# Patient Record
Sex: FEMALE | Race: WHITE | NOT HISPANIC OR LATINO | Employment: OTHER | URBAN - METROPOLITAN AREA
[De-identification: names, ages, dates, MRNs, and addresses within clinical notes are randomized per-mention and may not be internally consistent; named-entity substitution may affect disease eponyms.]

---

## 2017-07-26 ENCOUNTER — OFFICE VISIT (OUTPATIENT)
Dept: MEDICAL GROUP | Facility: LAB | Age: 77
End: 2017-07-26
Payer: MEDICARE

## 2017-07-26 VITALS
HEART RATE: 68 BPM | OXYGEN SATURATION: 94 % | SYSTOLIC BLOOD PRESSURE: 160 MMHG | WEIGHT: 150.2 LBS | HEIGHT: 65 IN | BODY MASS INDEX: 25.02 KG/M2 | DIASTOLIC BLOOD PRESSURE: 92 MMHG | TEMPERATURE: 99 F

## 2017-07-26 DIAGNOSIS — E78.2 MIXED HYPERLIPIDEMIA: ICD-10-CM

## 2017-07-26 DIAGNOSIS — I10 ESSENTIAL HYPERTENSION: ICD-10-CM

## 2017-07-26 DIAGNOSIS — R60.0 BILATERAL EDEMA OF LOWER EXTREMITY: ICD-10-CM

## 2017-07-26 DIAGNOSIS — E55.9 VITAMIN D DEFICIENCY DISEASE: ICD-10-CM

## 2017-07-26 DIAGNOSIS — F41.9 ANXIETY: ICD-10-CM

## 2017-07-26 PROCEDURE — 99214 OFFICE O/P EST MOD 30 MIN: CPT | Performed by: INTERNAL MEDICINE

## 2017-07-26 RX ORDER — DIAZEPAM 5 MG/1
5 TABLET ORAL EVERY 6 HOURS PRN
Qty: 60 TAB | Refills: 0 | Status: SHIPPED | OUTPATIENT
Start: 2017-07-26 | End: 2018-11-08 | Stop reason: SDUPTHER

## 2017-07-26 RX ORDER — METOPROLOL TARTRATE 100 MG/1
100 TABLET ORAL 2 TIMES DAILY
Qty: 180 TAB | Refills: 5 | Status: SHIPPED | OUTPATIENT
Start: 2017-07-26 | End: 2018-12-17 | Stop reason: SDUPTHER

## 2017-07-26 RX ORDER — TRIAMTERENE AND HYDROCHLOROTHIAZIDE 37.5; 25 MG/1; MG/1
1 CAPSULE ORAL EVERY MORNING
Qty: 30 CAP | Refills: 5 | Status: SHIPPED | OUTPATIENT
Start: 2017-07-26 | End: 2018-02-01 | Stop reason: SDUPTHER

## 2017-07-26 RX ORDER — ATORVASTATIN CALCIUM 20 MG/1
20 TABLET, FILM COATED ORAL DAILY
Qty: 30 TAB | Refills: 5 | Status: SHIPPED | OUTPATIENT
Start: 2017-07-26 | End: 2018-02-01 | Stop reason: SDUPTHER

## 2017-07-26 ASSESSMENT — PATIENT HEALTH QUESTIONNAIRE - PHQ9: CLINICAL INTERPRETATION OF PHQ2 SCORE: 0

## 2017-07-26 NOTE — MR AVS SNAPSHOT
"        Priscilla Ferraro Andreina   2017 11:00 AM   Office Visit   MRN: [id number]    Department:  [facility]   Dept Phone:  [phone]    Description:  Female : 1940   Provider:  Shanda Reyes M.D.           Reason for Visit     Medication Refill refill on water pill    Leg Swelling both legs are swollen      Allergies as of 2017     Allergen Noted Reactions    Other Environmental 2016   Cough    Sampson, Dust      You were diagnosed with     Essential hypertension   [3430865]       Mixed hyperlipidemia   [272.2.ICD-9-CM]       Vitamin D deficiency disease   [673440]       Anxiety   [313302]       Bilateral edema of lower extremity   [042831]         Vital Signs     Blood Pressure Pulse Temperature Height Weight Body Mass Index    160/92 mmHg 68 37.2 °C (99 °F) 1.651 m (5' 5\") 68.13 kg (150 lb 3.2 oz) 24.99 kg/m2    Oxygen Saturation Breastfeeding? Smoking Status             94% No Never Smoker          Basic Information     Date Of Birth Sex Race Ethnicity Preferred Language    1940 Female White Non- English      Problem List              ICD-10-CM Priority Class Noted - Resolved    Hyperlipidemia E78.5   Unknown - Present    Hypertension I10   Unknown - Present    Osteopenia M85.80   Unknown - Present    Vitamin D deficiency disease E55.9   2010 - Present    Anxiety F41.9   2017 - Present    Bilateral edema of lower extremity R60.0   2017 - Present      Health Maintenance        Date Due Completion Dates    IMM PNEUMOCOCCAL 65+ (ADULT) LOW/MEDIUM RISK SERIES (1 of 2 - PCV13) 2003 ---    MAMMOGRAM 10/20/2015 10/20/2014    IMM INFLUENZA (1) 2009    BONE DENSITY 2018 (Prv Comp), 2009 (Prv Comp)    Override on 2013: Previously completed    Override on 2009: Previously completed    IMM DTaP/Tdap/Td Vaccine (2 - Td) 2022    COLONOSCOPY 5/15/2022 5/15/2012 (Prv Comp)    Override on 5/15/2012: Previously " completed            Current Immunizations     Influenza Vaccine Pediatric 11/12/2009    Pneumococcal Vaccine (UF)Historical Data 11/12/2009    SHINGLES VACCINE 2/1/2012    Tdap Vaccine 2/1/2012      Below and/or attached are the medications your provider expects you to take. Review all of your home medications and newly ordered medications with your provider and/or pharmacist. Follow medication instructions as directed by your provider and/or pharmacist. Please keep your medication list with you and share with your provider. Update the information when medications are discontinued, doses are changed, or new medications (including over-the-counter products) are added; and carry medication information at all times in the event of emergency situations     Allergies:  OTHER ENVIRONMENTAL - Cough               Medications  Valid as of: July 26, 2017 - 12:56 PM    Generic Name Brand Name Tablet Size Instructions for use    Atorvastatin Calcium (Tab) LIPITOR 20 MG Take 1 Tab by mouth every day.        Cholecalciferol (Cap) Vitamin D 1000 UNIT Take 2 Caps by mouth every day.        DiazePAM (Tab) VALIUM 5 MG Take 1 Tab by mouth every 6 hours as needed for Anxiety.        Ipratropium-Albuterol (Aero Soln) COMBIVENT RESPIMAT  MCG/ACT Inhale 1 Puff by mouth 4 times a day as needed (For SOB).        Metoprolol Tartrate (Tab) LOPRESSOR 100 MG Take 1 Tab by mouth 2 times a day.        Multiple Vitamin   Take 1 Tab by mouth every day.        Triamterene-HCTZ (Cap) MAXZIDE-25/DYAZIDE 37.5-25 MG Take 1 Cap by mouth every morning.        .                 Medicines prescribed today were sent to:     [id number] - [id number] [facility] [place] [address]    Phone: [phone] Fax: [phone]    Open 24 Hours?: No      Medication refill instructions:       If your prescription bottle indicates you have medication refills left, it is not necessary to call your provider’s office. Please  contact your pharmacy and they will refill your medication.    If your prescription bottle indicates you do not have any refills left, you may request refills at any time through one of the following ways: The online [employer] system (except Urgent Care), by calling your provider’s office, or by asking your pharmacy to contact your provider’s office with a refill request. Medication refills are processed only during regular business hours and may not be available until the next business day. Your provider may request additional information or to have a follow-up visit with you prior to refilling your medication.   *Please Note: Medication refills are assigned a new Rx number when refilled electronically. Your pharmacy may indicate that no refills were authorized even though a new prescription for the same medication is available at the pharmacy. Please request the medicine by name with the pharmacy before contacting your provider for a refill.        Your To Do List     Future Labs/Procedures Complete By Expires    COMP METABOLIC PANEL  As directed 7/27/2018    LIPID PROFILE  As directed 7/27/2018         [employer] Access Code: Activation code not generated  Current [employer] Status: Active

## 2017-07-26 NOTE — PROGRESS NOTES
Chief Complaint   Patient presents with   • Medication Refill     refill on water pill   • Leg Swelling     both legs are swollen       HISTORY OF PRESENT ILLNESS: Patient is a 77 y.o. female patient who presents today to discuss the evaluation and management of several chronic medical problems as follows:   It is been greater than one year since she was last seen in the office.    1. Essential hypertension    Patient currently takes metoprolol 100 mg twice a day for blood pressure. She is also supposed to be on a diuretic, however she ran out of this several weeks ago. When she checks her BP at home she states it is better controlled than in the office. This morning it was 136/96.    2. Mixed hyperlipidemia    Results for MOISE MENCHACA (MRN [id number]) as of 7/26/2017 11:41   Ref. Range 10/22/2014 08:32 3/9/2016 09:37   Cholesterol,Tot Latest Ref Range: 100-199 mg/dL 206 (H) 164   Triglycerides Latest Ref Range: 0-149 mg/dL 191 (H) 169 (H)   HDL Latest Ref Range: >39 mg/dL 38 (L) 43   LDL Latest Ref Range: 0-99 mg/dL 130 (H) 87   VLDL Cholesterol Calc Latest Ref Range: 5-40 mg/dL 38 34     Patient takes Lipitor 20 mg for her cholesterol. She states she is compliant. It is been well over a year since she last had her cholesterol checked.    3. Vitamin D deficiency disease    Patient takes vitamin D 1000 units daily.    4. Anxiety    Patient takes Valium sparingly.  was checked and she received 90 tablets 6 months ago, and she still has some left. She is requesting a refill today however.    5. Bilateral edema of lower extremity    Patient has chronic lower extremity edema. This has been worse since her right hip replacement one year ago and since she fractured her toe about 18 months ago. It is dependent and is better in the mornings. It has been a little worse since she ran out of her diuretic several weeks ago.      Patient Active Problem List    Diagnosis Date Noted   • Anxiety 07/26/2017   • Bilateral edema  "of lower extremity 07/26/2017   • Vitamin D deficiency disease 11/12/2010   • Hypertension    • Osteopenia    • Hyperlipidemia       Allergies:Other environmental    Current meds including changes today  Current Outpatient Prescriptions   Medication Sig Dispense Refill   • metoprolol (LOPRESSOR) 100 MG Tab Take 1 Tab by mouth 2 times a day. 180 Tab 5   • atorvastatin (LIPITOR) 20 MG Tab Take 1 Tab by mouth every day. 30 Tab 5   • triamterene/hctz (MAXZIDE-25/DYAZIDE) 37.5-25 MG Cap Take 1 Cap by mouth every morning. 30 Cap 5   • diazepam (VALIUM) 5 MG Tab Take 1 Tab by mouth every 6 hours as needed for Anxiety. 60 Tab 0   • ipratropium-albuterol (COMBIVENT RESPIMAT)  MCG/ACT Aero Soln Inhale 1 Puff by mouth 4 times a day as needed (For SOB). 4 g 1   • Cholecalciferol (VITAMIN D) 1000 UNIT CAPS Take 2 Caps by mouth every day.     • Multiple Vitamin (MULTI-VITAMIN PO) Take 1 Tab by mouth every day.       No current facility-administered medications for this visit.     Social History   Substance Use Topics   • Smoking status: Never Smoker    • Smokeless tobacco: Never Used   • Alcohol Use: No     Social History     Social History Narrative       Family History   Problem Relation Age of Onset   • Heart Disease Father    • Hyperlipidemia Father    • Hypertension Father    • Heart Disease Brother    • Hypertension Mother    • Hyperlipidemia Mother        Review of Systems:  No chest pain, No shortness of breath, No dyspnea on exertion  Gastrointestinal ROS: No abdominal pain, No nausea, vomiting, diarrhea, or constipation        Exam:    Pleasant woman accompanied by her   Blood pressure 160/92, pulse 68, temperature 37.2 °C (99 °F), height 1.651 m (5' 5\"), weight 68.13 kg (150 lb 3.2 oz), SpO2 94 %, not currently breastfeeding.  General:  Well nourished, well developed female in NAD affect and mood within normal limits  Head is grossly normal.  Neck: Supple without adenopathy  Pulmonary: Clear to " ausculation.  Normal effort. No rales, rhonchi, or wheezing.  Cardiovascular: Regular rate and rhythm without murmur.   Extremities: no clubbing, cyanosis, 2+ pitting edema to mid shin.  .Neuro: moves all extremities symmetrically    Please note that this dictation was created using voice recognition software. I have made every reasonable attempt to correct obvious errors, but I expect that there are errors of grammar and possibly content that I did not discover before finalizing the note.    Assessment/Plan:  1. Essential hypertension    BP not adequately controlled, we'll try changing hydrochlorothiazide to Dyazide, continue to monitor.  - metoprolol (LOPRESSOR) 100 MG Tab; Take 1 Tab by mouth 2 times a day.  Dispense: 180 Tab; Refill: 5  - triamterene/hctz (MAXZIDE-25/DYAZIDE) 37.5-25 MG Cap; Take 1 Cap by mouth every morning.  Dispense: 30 Cap; Refill: 5  - COMP METABOLIC PANEL; Future    2. Mixed hyperlipidemia      - atorvastatin (LIPITOR) 20 MG Tab; Take 1 Tab by mouth every day.  Dispense: 30 Tab; Refill: 5  - LIPID PROFILE; Future    3. Vitamin D deficiency disease    Continue vitamin D    4. Anxiety    - diazepam (VALIUM) 5 MG Tab; Take 1 Tab by mouth every 6 hours as needed for Anxiety.  Dispense: 60 Tab; Refill: 0    5. Bilateral edema of lower extremity    We'll try Dyazide, hopefully resuming her diuretic will result in some improvement.    Followup: Patient would like to follow up with me at the [facility] location in several months.

## 2017-09-20 ENCOUNTER — HOSPITAL ENCOUNTER (OUTPATIENT)
Dept: LAB | Facility: MEDICAL CENTER | Age: 77
End: 2017-09-20
Attending: INTERNAL MEDICINE
Payer: MEDICARE

## 2017-09-20 DIAGNOSIS — E78.2 MIXED HYPERLIPIDEMIA: ICD-10-CM

## 2017-09-20 DIAGNOSIS — I10 ESSENTIAL HYPERTENSION: ICD-10-CM

## 2017-09-20 LAB
ALBUMIN SERPL BCP-MCNC: 4 G/DL (ref 3.2–4.9)
ALBUMIN/GLOB SERPL: 1.2 G/DL
ALP SERPL-CCNC: 85 U/L (ref 30–99)
ALT SERPL-CCNC: 13 U/L (ref 2–50)
ANION GAP SERPL CALC-SCNC: 7 MMOL/L (ref 0–11.9)
AST SERPL-CCNC: 20 U/L (ref 12–45)
BILIRUB SERPL-MCNC: 0.4 MG/DL (ref 0.1–1.5)
BUN SERPL-MCNC: 25 MG/DL (ref 8–22)
CALCIUM SERPL-MCNC: 10.1 MG/DL (ref 8.5–10.5)
CHLORIDE SERPL-SCNC: 108 MMOL/L (ref 96–112)
CHOLEST SERPL-MCNC: 143 MG/DL (ref 100–199)
CO2 SERPL-SCNC: 27 MMOL/L (ref 20–33)
CREAT SERPL-MCNC: 0.99 MG/DL (ref 0.5–1.4)
GFR SERPL CREATININE-BSD FRML MDRD: 54 ML/MIN/1.73 M 2
GLOBULIN SER CALC-MCNC: 3.3 G/DL (ref 1.9–3.5)
GLUCOSE SERPL-MCNC: 99 MG/DL (ref 65–99)
HDLC SERPL-MCNC: 39 MG/DL
LDLC SERPL CALC-MCNC: 79 MG/DL
POTASSIUM SERPL-SCNC: 4.4 MMOL/L (ref 3.6–5.5)
PROT SERPL-MCNC: 7.3 G/DL (ref 6–8.2)
SODIUM SERPL-SCNC: 142 MMOL/L (ref 135–145)
TRIGL SERPL-MCNC: 123 MG/DL (ref 0–149)

## 2017-09-20 PROCEDURE — 80061 LIPID PANEL: CPT

## 2017-09-20 PROCEDURE — 80053 COMPREHEN METABOLIC PANEL: CPT

## 2017-09-20 PROCEDURE — 36415 COLL VENOUS BLD VENIPUNCTURE: CPT

## 2017-10-10 ENCOUNTER — OFFICE VISIT (OUTPATIENT)
Dept: MEDICAL GROUP | Facility: MEDICAL CENTER | Age: 77
End: 2017-10-10
Payer: MEDICARE

## 2017-10-10 VITALS
RESPIRATION RATE: 16 BRPM | DIASTOLIC BLOOD PRESSURE: 90 MMHG | TEMPERATURE: 97.8 F | OXYGEN SATURATION: 97 % | WEIGHT: 152 LBS | HEART RATE: 62 BPM | BODY MASS INDEX: 25.33 KG/M2 | SYSTOLIC BLOOD PRESSURE: 138 MMHG | HEIGHT: 65 IN

## 2017-10-10 DIAGNOSIS — E78.00 PURE HYPERCHOLESTEROLEMIA: ICD-10-CM

## 2017-10-10 DIAGNOSIS — F41.9 ANXIETY: ICD-10-CM

## 2017-10-10 DIAGNOSIS — R60.0 BILATERAL EDEMA OF LOWER EXTREMITY: ICD-10-CM

## 2017-10-10 DIAGNOSIS — J98.01 COUGH DUE TO BRONCHOSPASM: ICD-10-CM

## 2017-10-10 DIAGNOSIS — I10 ESSENTIAL HYPERTENSION: ICD-10-CM

## 2017-10-10 PROCEDURE — 99214 OFFICE O/P EST MOD 30 MIN: CPT | Performed by: INTERNAL MEDICINE

## 2017-10-10 NOTE — PROGRESS NOTES
Chief Complaint   Patient presents with   • Establish Care     establish care and lab results       HISTORY OF PRESENT ILLNESS: Patient is a 77 y.o. female patient who presents today to discuss the evaluation and management of:    Patient is transferring her care to me, I have seen her in the past at [facility].      1. Essential hypertension    Patient currently is taking Lopressor 100 mg twice daily and Dyazide 37.5/25 daily. With this her blood pressure is fairly well controlled with readings in the 117-120/80-90 range at home. She states that she is always high in the doctor's office, today she is 132/90. She has no history of heart disease.    2. Pure hypercholesterolemia    Results for MOISE MENCHACA (MRN [id number]) as of 10/10/2017 13:48   Ref. Range 9/20/2017 09:12   Cholesterol,Tot Latest Ref Range: 100 - 199 mg/dL 143   Triglycerides Latest Ref Range: 0 - 149 mg/dL 123   HDL Latest Ref Range: >=40 mg/dL 39 (A)   LDL Latest Ref Range: <100 mg/dL 79   Patient had a recent lipid panel done which revealed her LDL is excellent at 79. She continues on atorvastatin 20 mg daily. Her  would like to her to take his chocolate supplement which he states has been FDA approved for hyperlipidemia.    3. Cough due to bronchospasm    Patient is requesting a refill on her Combivent inhaler. She takes this as needed when she has cough and it helps significantly.    4. Bilateral edema of lower extremity    Patient continues to have bilateral lower extremity edema. Unfortunately the Dyazide did not do anything. This is been a chronic problem for the patient, and she has compression stockings at home however she does not like to wear them. I presume this is from venous insufficiency.    5. Anxiety    Anxiety has been very well controlled recently. Patient has Valium which she takes at home sparingly.        Patient Active Problem List    Diagnosis Date Noted   • Cough due to bronchospasm 10/10/2017   • Anxiety  "07/26/2017   • Bilateral edema of lower extremity 07/26/2017   • Essential hypertension    • Osteopenia    • Hyperlipidemia         Allergies:Other environmental    Current meds including changes today  Current Outpatient Prescriptions   Medication Sig Dispense Refill   • ipratropium-albuterol (COMBIVENT RESPIMAT)  MCG/ACT Aero Soln Inhale 1 Puff by mouth 4 times a day. 1 Inhaler 2   • metoprolol (LOPRESSOR) 100 MG Tab Take 1 Tab by mouth 2 times a day. 180 Tab 5   • atorvastatin (LIPITOR) 20 MG Tab Take 1 Tab by mouth every day. 30 Tab 5   • triamterene/hctz (MAXZIDE-25/DYAZIDE) 37.5-25 MG Cap Take 1 Cap by mouth every morning. 30 Cap 5   • diazepam (VALIUM) 5 MG Tab Take 1 Tab by mouth every 6 hours as needed for Anxiety. 60 Tab 0   • Cholecalciferol (VITAMIN D) 1000 UNIT CAPS Take 2 Caps by mouth every day.     • Multiple Vitamin (MULTI-VITAMIN PO) Take 1 Tab by mouth every day.       No current facility-administered medications for this visit.      Social History   Substance Use Topics   • Smoking status: Never Smoker   • Smokeless tobacco: Never Used   • Alcohol use No     Social History     Social History Narrative   • No narrative on file       Family History   Problem Relation Age of Onset   • Heart Disease Father    • Hyperlipidemia Father    • Hypertension Father    • Heart Disease Brother    • Hypertension Mother    • Hyperlipidemia Mother        Review of Systems:  No chest pain, No shortness of breath, No dyspnea on exertion  Gastrointestinal ROS: No abdominal pain, No nausea, vomiting, diarrhea, or constipation        Exam:    Pleasant female accompanied by her   Blood pressure 138/90, pulse 62, temperature 36.6 °C (97.8 °F), resp. rate 16, height 1.651 m (5' 5\"), weight 68.9 kg (152 lb), SpO2 97 %.  General:  Well nourished, well developed female in NAD affect and mood within normal limits  Head is grossly normal.  Neck: Supple without adenopathy  Pulmonary: Clear to ausculation.  Normal " effort. No rales, rhonchi, or wheezing.  Cardiovascular: Regular rate and rhythm without murmur.   Extremities: no clubbing, cyanosis, or edema.  Neuro: moves all extremities symmetrically    Please note that this dictation was created using voice recognition software. I have made every reasonable attempt to correct obvious errors, but I expect that there are errors of grammar and possibly content that I did not discover before finalizing the note.    Assessment/Plan:  1. Essential hypertension    Stable, reasonable control, continue Lopressor and Dyazide. Patient did have an elevation in her creatinine when last checked, will repeat labs in a few months.  - BASIC METABOLIC PANEL; Future    2. Pure hypercholesterolemia    -Patient would like to discontinue her atorvastatin and switch over to her 's dark chocolate supplement. I think this is reasonable especially considering her age. She may make the switch and we will check a future lipid panel in about 3 months.  - LIPID PROFILE; Future    3. Cough due to bronchospasm      - ipratropium-albuterol (COMBIVENT RESPIMAT)  MCG/ACT Aero Soln; Inhale 1 Puff by mouth 4 times a day.  Dispense: 1 Inhaler; Refill: 2    4. Bilateral edema of lower extremity    Recommend patient try wearing her stockings that she has at home. Would not increase diuretic at this time.    5. Anxiety    Stable and infrequent problem, continue when necessary Valium.    Patient and her  are both going to get flu shots from their pharmacy.    Followup:  Patient will call for an appointment in about 3 months after she has her repeat blood work done.

## 2018-02-01 DIAGNOSIS — E78.2 MIXED HYPERLIPIDEMIA: ICD-10-CM

## 2018-02-01 DIAGNOSIS — I10 ESSENTIAL HYPERTENSION: ICD-10-CM

## 2018-02-01 RX ORDER — TRIAMTERENE AND HYDROCHLOROTHIAZIDE 37.5; 25 MG/1; MG/1
1 CAPSULE ORAL EVERY MORNING
Qty: 30 CAP | Refills: 5 | Status: SHIPPED | OUTPATIENT
Start: 2018-02-01 | End: 2018-12-17 | Stop reason: SDUPTHER

## 2018-02-01 RX ORDER — ATORVASTATIN CALCIUM 20 MG/1
20 TABLET, FILM COATED ORAL DAILY
Qty: 30 TAB | Refills: 5 | Status: SHIPPED | OUTPATIENT
Start: 2018-02-01 | End: 2018-11-08

## 2018-04-26 ENCOUNTER — HOSPITAL ENCOUNTER (EMERGENCY)
Facility: MEDICAL CENTER | Age: 78
End: 2018-04-26
Attending: EMERGENCY MEDICINE
Payer: MEDICARE

## 2018-04-26 ENCOUNTER — APPOINTMENT (OUTPATIENT)
Dept: RADIOLOGY | Facility: MEDICAL CENTER | Age: 78
End: 2018-04-26
Attending: EMERGENCY MEDICINE
Payer: MEDICARE

## 2018-04-26 VITALS
BODY MASS INDEX: 48.82 KG/M2 | HEIGHT: 65 IN | DIASTOLIC BLOOD PRESSURE: 60 MMHG | SYSTOLIC BLOOD PRESSURE: 141 MMHG | RESPIRATION RATE: 17 BRPM | HEART RATE: 60 BPM | OXYGEN SATURATION: 98 % | TEMPERATURE: 97.9 F | WEIGHT: 293 LBS

## 2018-04-26 DIAGNOSIS — S73.004A DISLOCATION OF RIGHT HIP, INITIAL ENCOUNTER (HCC): ICD-10-CM

## 2018-04-26 PROCEDURE — 99285 EMERGENCY DEPT VISIT HI MDM: CPT

## 2018-04-26 PROCEDURE — 96375 TX/PRO/DX INJ NEW DRUG ADDON: CPT

## 2018-04-26 PROCEDURE — 700111 HCHG RX REV CODE 636 W/ 250 OVERRIDE (IP): Performed by: EMERGENCY MEDICINE

## 2018-04-26 PROCEDURE — 304561 HCHG STAT O2

## 2018-04-26 PROCEDURE — 27265 TREAT HIP DISLOCATION: CPT

## 2018-04-26 PROCEDURE — 96374 THER/PROPH/DIAG INJ IV PUSH: CPT

## 2018-04-26 PROCEDURE — 73502 X-RAY EXAM HIP UNI 2-3 VIEWS: CPT | Mod: RT

## 2018-04-26 RX ORDER — ONDANSETRON 2 MG/ML
4 INJECTION INTRAMUSCULAR; INTRAVENOUS ONCE
Status: COMPLETED | OUTPATIENT
Start: 2018-04-26 | End: 2018-04-26

## 2018-04-26 RX ORDER — MORPHINE SULFATE 4 MG/ML
4 INJECTION, SOLUTION INTRAMUSCULAR; INTRAVENOUS ONCE
Status: COMPLETED | OUTPATIENT
Start: 2018-04-26 | End: 2018-04-26

## 2018-04-26 RX ORDER — HYDROCODONE BITARTRATE AND ACETAMINOPHEN 5; 325 MG/1; MG/1
1 TABLET ORAL EVERY 8 HOURS PRN
Qty: 10 TAB | Refills: 0 | Status: SHIPPED | OUTPATIENT
Start: 2018-04-26 | End: 2018-04-29

## 2018-04-26 RX ADMIN — ONDANSETRON 4 MG: 2 INJECTION INTRAMUSCULAR; INTRAVENOUS at 12:43

## 2018-04-26 RX ADMIN — PROPOFOL 40 MG: 10 INJECTION, EMULSION INTRAVENOUS at 14:15

## 2018-04-26 RX ADMIN — MORPHINE SULFATE 4 MG: 4 INJECTION INTRAVENOUS at 12:44

## 2018-04-26 ASSESSMENT — PAIN SCALES - GENERAL: PAINLEVEL_OUTOF10: 8

## 2018-04-26 NOTE — ED NOTES
Pre propofol conscious sedation, ERP, 2 RN, RT, ER tech at bedside  1405: 150/77, 69, 98% on 2L nasal cannula  1404: Propofol 40 mg via 20 guage R IV hand peripheral   1405:Hip in place   1406: 125/65 76 92% on 5L nasal cannula  Patient remains sedated and sleeping with VSS  1412: patient awake and oriented   1413 knee immobilizer in place  1414;post XR in room

## 2018-04-26 NOTE — ED PROVIDER NOTES
ED Provider Note    CHIEF COMPLAINT   Chief Complaint   Patient presents with   • GLF   • Hip Pain       HPI   Priscilla Hdz is a 78 y.o. female who presents to the emergency room today with complaints of right hip pain with probable dislocation. Patient states she fell just prior to being seen in the emergency room slipped on the floor at home wheezing her balance falling on her right hip causing dislocation she's had several previous dislocations to her right hip in the past. Hip replacement 8/2016 by Dr. Goodson. Patient has pain to the area itches extreme rated 9/10 and radiates slightly down the hip and around toward the pelvic area. Unable to move area and there is deformity noted. No numbness or tingling and no head injury or other complaints at this time.    REVIEW OF SYSTEMS   See HPI for further details. All other systems are negative.     PAST MEDICAL HISTORY   Past Medical History:   Diagnosis Date   • Arthritis     hip, knee   • ASTHMA    • Cataract    • High cholesterol    • Hyperlipidemia    • Hypertension    • Menopause    • Osteopenia        FAMILY HISTORY  Family History   Problem Relation Age of Onset   • Heart Disease Father    • Hyperlipidemia Father    • Hypertension Father    • Heart Disease Brother    • Hypertension Mother    • Hyperlipidemia Mother        SOCIAL HISTORY  Social History     Social History   • Marital status:      Spouse name: N/A   • Number of children: N/A   • Years of education: N/A     Social History Main Topics   • Smoking status: Never Smoker   • Smokeless tobacco: Never Used   • Alcohol use No   • Drug use: No   • Sexual activity: Yes     Partners: Male     Other Topics Concern   • Not on file     Social History Narrative   • No narrative on file       SURGICAL HISTORY  Past Surgical History:   Procedure Laterality Date   • HIP ARTHROPLASTY TOTAL Right 8/9/2016    Procedure: HIP ARTHROPLASTY TOTAL;  Surgeon: Oniel Goodson M.D.;  Location: SURGERY  "[facility];  Service:    • CATARACT EXTRACTION WITH IOL Bilateral 2012       CURRENT MEDICATIONS   Home Medications     Reviewed by Elsa Patricio (Pharmacy Tech) on 04/26/18 at 1259  Med List Status: Complete   Medication Last Dose Status   atorvastatin (LIPITOR) 20 MG Tab 4/25/2018 Active   diazepam (VALIUM) 5 MG Tab PRN Active   ipratropium-albuterol (COMBIVENT RESPIMAT)  MCG/ACT Aero Soln PRN Active   metoprolol (LOPRESSOR) 100 MG Tab 4/26/2018 Active   triamterene/hctz (MAXZIDE-25/DYAZIDE) 37.5-25 MG Cap 4/26/2018 Active                ALLERGIES   Allergies   Allergen Reactions   • Other Environmental Cough     Pine, Dust       PHYSICAL EXAM  VITAL SIGNS: /60   Pulse 60   Temp 36.6 °C (97.9 °F)   Resp 17   Ht 1.651 m (5' 5\")   Wt (!) 161 kg (354 lb 15.1 oz)   SpO2 98%   BMI 59.07 kg/m²  Room air O2: 90    Constitutional: Well developed, Well nourished,  acute distress, Non-toxic appearance.   Cardiovascular: Normal heart rate, Normal rhythm, No murmurs, No rubs, No gallops.   Thorax & Lungs: Normal breath sounds, No respiratory distress, No wheezing, No chest tenderness.   Abdomen: Bowel sounds normal, Soft, No tenderness, No masses, No pulsatile masses.   Skin: Warm, Dry, No erythema, No rash.   Extremities: Intact distal pulses, No cyanosis, No clubbing. Edema noted to lower extremities but patient states this is usual for her  Musculoskeletal: There is shortening of the right hip with obvious anterior dislocation noted pulses sensation intact distally.  Neurologic: Alert & oriented x 3, Normal motor function, Normal sensory function, No focal deficits noted.     RADIOLOGY/PROCEDURES  DX-HIP-COMPLETE - UNILATERAL 2+ RIGHT   Final Result      Anatomic reduction of prior right hip dislocation      DX-HIP-COMPLETE - UNILATERAL 2+ RIGHT   Final Result      Right hip arthroplasty femoral component dislocation from the acetabular cup            COURSE & MEDICAL DECISION " MAKING  Pertinent Labs & Imaging studies reviewed. (See chart for details) patient has dislocation anterior right hip which was reduced with conscious sedation by ER physician. On reexamination she is awake alert. Discuss follow-up with orthopedics this next week with Dr. Goodson. She is placed on Norco for pain. [place] regulations and consent was reviewed with patient.  was reviewed. Patient understands the above consent instructions had no further questions will follow up as directed return if further problems placed in a knee immobilizer. Will use also a walker which was given to her here in the emergency room.        PROCEDURE NOTE #1: Conscious sedation performed by ER physician for procedure of reduction of his location right hip; verbal consent was obtained by patient and  at bedside. IV established patient placed on oxygen and continuous pulse oximetry and cardiac monitoring with blood pressure checked every 5 minutes. She is given morphine for pain 4 mg IV. Given propofol 40 mg IV push with adequate sedation. She was kept until she is awake alert verbal. Total time with this procedure of 15 minutes. Patient tolerated this well.      PROCEDURE NOTE #2 reduction of dislocation right hip by ER physician; with conscious sedation as described above patient had ER tech placing pressure on her right hip/pelvis with ER physician giving countertraction and manipulated the right hip back into the joint without difficulty. Patient tolerated this procedure well and postprocedure x-ray showed adequate reduction with no fracture or complication.    FINAL IMPRESSION  1. Acute dislocation right hip  2. Hypertension by history  3.      Electronically signed by: Maikel Koehler, 4/26/2018 4:30 PM

## 2018-04-26 NOTE — ED NOTES
Patient R/A and D/C home by ERP, hip in place after XR. Prescription for Norco given, narcotic consent to be signed, Patient being D/C home with prescription for walker. Son aware and on route

## 2018-04-26 NOTE — RESPIRATORY CARE
Conscious Sedation Respiratory Update       O2 (LPM): 3 (04/26/18 1412)  O2 Daily Delivery Respiratory : Silicone Nasal Cannula (04/26/18 1406)  Events/Summary/Plan: conscious sedation (04/26/18 3139)    Provided therapist support during conscious sedation to assure oxygenation, maintain airway, and ventilation throughout the procedure. The patient did need jaw thrust to maintain her airway. Her RR was 16-18, her HR was 74-80, her oxygen saturation was kept between 94%-98% while on 3 lpm nasal cannula.

## 2018-04-26 NOTE — ED NOTES
Patient brb [facility] with a GLF this morning. Complaint of R hip pain, obvious deformity and shortening. Pedal pulses present. Patient states 8/10 for pain. Has has THR 2 yrs ago. Patient denies hitting head or LOC. MD at bedside

## 2018-07-19 NOTE — ED NOTES
7/19/2018      RE: Facundo Baeza  [address]  [facility] [address]       HISTORY OF PRESENT ILLNESS:  Facundo is a 1-year-old girl with a diagnosis of central precocious puberty.  She is here today for a Supprelin implant.  She is an absolutely adorable young lady.  She had had EMLA placed on her right upper arm.  With [facility] [employer] assistance and distraction, we were ultimately able to get it cleaned and we injected a local anesthetic but she was extraordinarily fearful and scared and with all capabilities of distraction therapy with myself and Child Family [facility] and our nurse assistant, did not feel we could control her anxiety to a level without some oral sedation to enable safe implant of the Supprelin implant.      Discussed this with mother and she was in agreement.  She was externally helpful throughout the event as well and we will arrange for Facundo to come back in the very near future in pediatric sedation for implantation of her Supprelin implant.  There was no charge for this visit for Physician Services.         Raffy aMrtinez MD   Med rec complete per patient  Allergies reviewed    Patient only uses Combivent and Valium PRN

## 2018-08-09 ENCOUNTER — HOSPITAL ENCOUNTER (OUTPATIENT)
Dept: LAB | Facility: MEDICAL CENTER | Age: 78
End: 2018-08-09
Attending: INTERNAL MEDICINE
Payer: MEDICARE

## 2018-08-09 DIAGNOSIS — I10 ESSENTIAL HYPERTENSION: ICD-10-CM

## 2018-08-09 DIAGNOSIS — E78.00 PURE HYPERCHOLESTEROLEMIA: ICD-10-CM

## 2018-08-09 LAB
ANION GAP SERPL CALC-SCNC: 10 MMOL/L (ref 0–11.9)
BUN SERPL-MCNC: 18 MG/DL (ref 8–22)
CALCIUM SERPL-MCNC: 9.3 MG/DL (ref 8.5–10.5)
CHLORIDE SERPL-SCNC: 109 MMOL/L (ref 96–112)
CHOLEST SERPL-MCNC: 173 MG/DL (ref 100–199)
CO2 SERPL-SCNC: 25 MMOL/L (ref 20–33)
CREAT SERPL-MCNC: 0.89 MG/DL (ref 0.5–1.4)
GLUCOSE SERPL-MCNC: 87 MG/DL (ref 65–99)
HDLC SERPL-MCNC: 41 MG/DL
LDLC SERPL CALC-MCNC: 106 MG/DL
POTASSIUM SERPL-SCNC: 4 MMOL/L (ref 3.6–5.5)
SODIUM SERPL-SCNC: 144 MMOL/L (ref 135–145)
TRIGL SERPL-MCNC: 129 MG/DL (ref 0–149)

## 2018-08-09 PROCEDURE — 80061 LIPID PANEL: CPT

## 2018-08-09 PROCEDURE — 36415 COLL VENOUS BLD VENIPUNCTURE: CPT

## 2018-08-09 PROCEDURE — 80048 BASIC METABOLIC PNL TOTAL CA: CPT

## 2018-08-10 ENCOUNTER — HOSPITAL ENCOUNTER (EMERGENCY)
Facility: MEDICAL CENTER | Age: 78
End: 2018-08-10
Attending: EMERGENCY MEDICINE
Payer: MEDICARE

## 2018-08-10 ENCOUNTER — APPOINTMENT (OUTPATIENT)
Dept: RADIOLOGY | Facility: MEDICAL CENTER | Age: 78
End: 2018-08-10
Attending: EMERGENCY MEDICINE
Payer: MEDICARE

## 2018-08-10 VITALS
TEMPERATURE: 98.6 F | DIASTOLIC BLOOD PRESSURE: 73 MMHG | SYSTOLIC BLOOD PRESSURE: 158 MMHG | RESPIRATION RATE: 18 BRPM | HEART RATE: 74 BPM | WEIGHT: 152.12 LBS | OXYGEN SATURATION: 98 % | BODY MASS INDEX: 25.31 KG/M2

## 2018-08-10 DIAGNOSIS — S73.004A DISLOCATION OF RIGHT HIP, INITIAL ENCOUNTER (HCC): ICD-10-CM

## 2018-08-10 PROCEDURE — 27265 TREAT HIP DISLOCATION: CPT

## 2018-08-10 PROCEDURE — 700111 HCHG RX REV CODE 636 W/ 250 OVERRIDE (IP)

## 2018-08-10 PROCEDURE — 700111 HCHG RX REV CODE 636 W/ 250 OVERRIDE (IP): Performed by: EMERGENCY MEDICINE

## 2018-08-10 PROCEDURE — 99151 MOD SED SAME PHYS/QHP <5 YRS: CPT

## 2018-08-10 PROCEDURE — 73502 X-RAY EXAM HIP UNI 2-3 VIEWS: CPT | Mod: RT

## 2018-08-10 PROCEDURE — 99285 EMERGENCY DEPT VISIT HI MDM: CPT

## 2018-08-10 PROCEDURE — 96374 THER/PROPH/DIAG INJ IV PUSH: CPT

## 2018-08-10 RX ORDER — HYDROCODONE BITARTRATE AND ACETAMINOPHEN 5; 325 MG/1; MG/1
1-2 TABLET ORAL EVERY 6 HOURS PRN
Qty: 10 TAB | Refills: 0 | Status: SHIPPED | OUTPATIENT
Start: 2018-08-10 | End: 2018-08-12

## 2018-08-10 RX ADMIN — PROPOFOL 60 MG: 10 INJECTION, EMULSION INTRAVENOUS at 09:18

## 2018-08-10 RX ADMIN — FENTANYL CITRATE 50 MCG: 50 INJECTION INTRAMUSCULAR; INTRAVENOUS at 08:27

## 2018-08-10 ASSESSMENT — PAIN SCALES - GENERAL
PAINLEVEL_OUTOF10: ASSUMED PAIN PRESENT
PAINLEVEL_OUTOF10: 6
PAINLEVEL_OUTOF10: 4

## 2018-08-10 NOTE — DISCHARGE INSTRUCTIONS
Hip Dislocation  Introduction  Hip dislocation is when the bones in your hip joint move out of place. To treat this, your doctor must move your bones back into place (reduction). This condition is an emergency. If you think you have dislocated your hip, do not move. Get medical help right away.  Symptoms of a dislocated hip may include:  · Very bad pain in your hip area. Pain may get worse when you move or when you try to use your hip to support (bear) your weight.  · Not being able to move the hip.  · Having the leg of the dislocated hip looking shorter than the other leg.  · Inward turning of the foot on the side of the dislocated hip.  · Loss of feeling in your lower leg, foot, or ankle.  Follow these instructions at home:  If you have a splint:  · Do not put pressure on any part of the splint until it is fully hardened. This may take many hours.  · Wear the splint as told by your doctor. Remove it only as told by your doctor.  · Loosen the splint if your toes tingle, get numb, or turn cold and blue.  · Do not let your splint get wet if it is not waterproof.  ¨ Do not take baths, swim, or use a hot tub until your doctor says it is okay. Ask your doctor if you can take showers. You may only be allowed to take sponge baths for bathing.  ¨ If your splint is not waterproof, cover it with a watertight plastic bag when you take a bath or a shower.  · Keep the splint clean.  Managing pain, stiffness, and swelling  · If directed, put ice on the injured area:  ¨ Put ice in a plastic bag.  ¨ Place a towel between your skin and the bag.  ¨ Leave the ice on for 20 minutes, 2-3 times a day.  · Wear compression stockings or wraps as told by your doctor.  · Move your toes often to avoid stiffness and to lessen swelling.  Driving  · Do not drive or use heavy machinery while taking prescription pain medicine, or as told by your doctor.  · Ask your doctor when it is safe to drive if you have a splint on your  hip.  Activity  · Return to your normal activities as told by your doctor. Ask your doctor what activities are safe for you.  · If physical therapy was prescribed, do exercises as told by your doctor.  Safety  · Do not use your hip to support your body weight until your doctor says that you can. Use crutches or a walker as told by your doctor.  General instructions  · Do not use any tobacco products, such as cigarettes, chewing tobacco, and e-cigarettes. Tobacco can delay bone healing. If you need help quitting, ask your doctor.  · Take over-the-counter and prescription medicines only as told by your doctor.  · Keep all follow-up visits as told by your doctor. This is important.  How is this prevented?  · If you have trouble walking or keeping your balance, try using a cane or a walker. If you feel unstable, sit down right away.  · Exercise regularly, as told by your doctor.  · Warm up and stretch before being active.  · Cool down and stretch after being active.  Contact a doctor if:  · You have pain that gets worse.  · You have pain that does not get better with medicine.  · You have swelling in your hip area or your leg.  · You have red skin on your hip area or your leg.  · You cannot move any part of your hip or leg.  · You feel tingling in any part of your hip, leg, or foot.  Get help right away if:  · You feel like your hip has dislocated again.  · You have very bad pain in your hip or groin.  · You have numbness or weakness in your leg.  If you have symptoms of a hip dislocation, do not wait to see if the symptoms will go away. Get medical help right away. Call your local emergency services (911 in the [place] Do not drive yourself to the hospital.   This information is not intended to replace advice given to you by your health care provider. Make sure you discuss any questions you have with your health care provider.  Document Released: 08/16/2012 Document Revised: 05/25/2017 Document Reviewed: 07/19/2016  ©  2017 [employer]

## 2018-08-10 NOTE — ED NOTES
D/c pt home with family , 1 rx given . Pt aware of f/u instructions with Ortho , aware to return for any changes or concerns. No further questions upon d/c home from ed  Pt and family  given and understands controlled substance use consent form signed  Pt aware to no drive or operate vehicle after receiving or taking medication

## 2018-08-10 NOTE — ED NOTES
Pt to ed c/o possible hip dislocation.  States happen 4 times before  Leg shortened , iv in place fentanyl 100, zofram 4 pta by ems

## 2018-08-10 NOTE — ED PROVIDER NOTES
ED Provider Note      CHIEF COMPLAINT   Chief Complaint   Patient presents with   • Dislocation Hip       HPI   Priscilla Hdz is a 78 y.o. female who presents with right hip pain.  Went to the restroom.  When standing up she had her right hip internally rotated felt a pop in the right hip and immediate pain with shortening.  She was holding onto the rails in the bathroom and did not fall to the ground or sustain significant trauma.  She has throbbing pain in the right hip.  Nonradiating constant.  Denies head injury, neck pain, back pain or other trauma.  She received fentanyl prior to coming in and reported this improved her symptoms.    REVIEW OF SYSTEMS   Pertinent negative: As above    PAST MEDICAL HISTORY   Past Medical History:   Diagnosis Date   • Arthritis     hip, knee   • ASTHMA    • Cataract    • High cholesterol    • Hyperlipidemia    • Hypertension    • Menopause    • Osteopenia        SOCIAL HISTORY  Social History   Substance Use Topics   • Smoking status: Never Smoker   • Smokeless tobacco: Never Used   • Alcohol use No       ALLERGIES   See chart    PHYSICAL EXAM  VITAL SIGNS: /73   Pulse 74   Temp 37 °C (98.6 °F)   Resp 18   Wt 69 kg (152 lb 1.9 oz)   SpO2 98%   BMI 25.31 kg/m²   Head: Atraumatic  Eyes: Eyes normal inspection  Neck: has full range of motion, normal inspection.  Constitutional: No acute distress   Cardiovascular: Normal heart rate. Pulses strong dorsalis pedis  Thorax & Lungs: No respiratory distress  Skin: Intact  Musculoskeletal: Shortening and internal rotation of the right leg.  Tenderness around the right hip.  Pedal edema bilaterally which is symmetric.  Neurologic:  Normal sensory and motor    RADIOLOGY/PROCEDURES  DX-HIP-COMPLETE - UNILATERAL 2+ RIGHT   Final Result         1. Interval reduction of the right hip prosthesis.      DX-HIP-COMPLETE - UNILATERAL 2+ RIGHT   Final Result         Superior dislocation of the right hip prosthesis.         Imaging is  interpreted by radiologist reviewed by me    Conscious Sedation Procedure Note    Indication: hip dislocation    Consent: I have discussed with the patient and/or the patient representative the indication, alternatives, and the possible risks and/or complications of the planned procedure and the anesthesia methods. The patient and/or patient representative appear to understand and agree to proceed.    Physician Involvement: The attending physician was present and supervising this procedure.    Pre-Sedation Documentation and Exam: I have personally completed a history, physical exam & review of systems for this patient (see notes).  Airway Assessment: normal    Prior History of Anesthesia Complications: none    ASA Classification: Class 2 - A normal healthy patient with mild systemic disease    Sedation/ Anesthesia Plan: intravenous sedation    Medications Used: propofol 40 mg intravenously followed by an additional 20 mg of propofol    Monitoring and Safety: The patient was placed on a cardiac monitor and vital signs, pulse oximetry and level of consciousness were continuously evaluated throughout the procedure. The patient was closely monitored until recovery from the medications was complete and the patient had returned to baseline status. Respiratory therapy was on standby at all times during the procedure.    Post-Sedation Vital Signs: Vital signs were reviewed and were stable after the procedure (see flow sheet for vitals)            Post-Sedation Exam: Lungs: clear and Cardiovascular: normal           Complications: none  Sedation time 15 minutes    Joint Reduction Procedure Note    Indication: Joint dislocation    Consent: The patient was counseled regarding the procedure, it's indications, risks, potential complications and alternatives and any questions were answered. Consent was obtained.    Procedure: The pre-reduction exam showed distal perfusion & neurologic function to be normal. The patient was placed  in the supine position. Anesthesia/pain control using conscious sedation as described above. Reduction of the right hip was performed by direct traction. Post reduction films were obtained and revealed satisfactory reduction. A post-reduction exam revealed distal perfusion & neurologic function to be normal.    The patient tolerated the procedure well.    Complications: None      COURSE & MEDICAL DECISION MAKING  Analgesia for possible fracture-fentanyl    Patient presents with right prosthetic hip dislocation.  This is the fourth time this has occurred.  Her pain was controlled.  X-ray demonstrated dislocation.  Dislocation reduced as described above.  I have advised hip abduction pillow limited internal rotation of the right lower extremity and follow-up with her orthopedist, Dr. whitaker.  Patient should return to the ER for recurrent dislocation or concern.      FINAL IMPRESSION  1.  Right prosthetic hip dislocation  2.  Closed reduction right prosthetic hip dislocation by me  3.  Procedural sedation by me      This dictation was created using voice recognition software. The accuracy of the dictation is limited to the abilities of the software. I expect there may be some errors of grammar and possibly content. The nursing notes were reviewed and certain aspects of this information were incorporated into this note.      Electronically signed by: Willem Brower, 8/10/2018 10:32 AM

## 2018-08-10 NOTE — RESPIRATORY CARE
Conscious Sedation Respiratory Update       O2 (LPM): 2 (08/10/18 0918)  O2 Daily Delivery Respiratory : Silicone Nasal Cannula (08/10/18 0918)    Events/Summary/Plan: Conscious sedation started at 0918. Found pt on 2L N. Increased O2 to 10L during the procedure to keep sats >90%. Procedure ended at 0925. Decreased O2 to 4L. Pt tolerated the procedure well. (08/10/18 0918)

## 2018-08-10 NOTE — ED NOTES
Pt medicated as directed by ER md, poc update given to pt. Imaging pending    Pt NPO since 1800 last night

## 2018-08-10 NOTE — ED NOTES
Mod sedation for right hip dislocation completed , pt tolerated well. Pt awake alert at this time  Xray pending

## 2018-08-14 ENCOUNTER — OFFICE VISIT (OUTPATIENT)
Dept: MEDICAL GROUP | Facility: MEDICAL CENTER | Age: 78
End: 2018-08-14
Payer: MEDICARE

## 2018-08-14 VITALS
WEIGHT: 154.32 LBS | SYSTOLIC BLOOD PRESSURE: 160 MMHG | TEMPERATURE: 98.4 F | RESPIRATION RATE: 16 BRPM | DIASTOLIC BLOOD PRESSURE: 84 MMHG | BODY MASS INDEX: 25.71 KG/M2 | OXYGEN SATURATION: 95 % | HEIGHT: 65 IN | HEART RATE: 76 BPM

## 2018-08-14 DIAGNOSIS — E78.1 PURE HYPERGLYCERIDEMIA: ICD-10-CM

## 2018-08-14 DIAGNOSIS — I10 ESSENTIAL HYPERTENSION: ICD-10-CM

## 2018-08-14 DIAGNOSIS — S73.004D HIP DISLOCATION, RIGHT, SUBSEQUENT ENCOUNTER: ICD-10-CM

## 2018-08-14 DIAGNOSIS — R60.0 BILATERAL EDEMA OF LOWER EXTREMITY: ICD-10-CM

## 2018-08-14 DIAGNOSIS — F41.9 ANXIETY: ICD-10-CM

## 2018-08-14 PROCEDURE — 99214 OFFICE O/P EST MOD 30 MIN: CPT | Performed by: INTERNAL MEDICINE

## 2018-08-14 ASSESSMENT — PATIENT HEALTH QUESTIONNAIRE - PHQ9: CLINICAL INTERPRETATION OF PHQ2 SCORE: 0

## 2018-08-15 NOTE — PROGRESS NOTES
Chief Complaint   Patient presents with   • Hip Pain     follow up,seen at ER on 8/10 for dislocation       HISTORY OF PRESENT ILLNESS: Patient is a 78 y.o. female patient who presents today to discuss the evaluation and management of:    Last seen 10 months ago when she established.      1. Hip dislocation, right, subsequent encounter    Patient was seen in the ER 1 week ago after dislocating her right hip.  She did not have a fall or trauma, however turned quickly when in the bathroom and felt/heard a loud pop.  ER physician put it back into place, she has follow-up with her orthopedic surgeon later this week.  She is still having some pain and soreness and is taking an occasional Norco.  She is walking with a walker again.    2. Bilateral edema of lower extremity    Patient's main complaint is of persistent lower extremity edema.  This is been a problem over several years.  She has never had any vein surgery or evaluation.  She has no history of heart disease, she had a normal echocardiogram 2 years ago.  She has no shortness of breath, and kidney and liver function have been normal.    3. Pure hyperglyceridemia    Results for MOISE MENCHACA (MRN [id number]) as of 8/14/2018 17:21   Ref. Range 8/9/2018 09:17   Cholesterol,Tot Latest Ref Range: 100 - 199 mg/dL 173   Triglycerides Latest Ref Range: 0 - 149 mg/dL 129   HDL Latest Ref Range: >=40 mg/dL 41   LDL Latest Ref Range: <100 mg/dL 106 (H)     Recent lipid panel shows LDL mildly elevated in spite of atorvastatin 20 mg daily.    4. Essential hypertension    Patient is currently taking Lopressor 100 mg twice daily and Dyazide 37.5/25 mg daily.  Her blood pressure remains somewhat elevated with this.  Patient has been under increased stress over the last couple of months due to her hip, as well as her  who was hospitalized in early July, and now is in rehab.    5. Anxiety    Patient has had more anxiety recently, she has Valium which she takes  "sparingly.        Patient Active Problem List    Diagnosis Date Noted   • Hip dislocation, right, subsequent encounter 08/14/2018   • Cough due to bronchospasm 10/10/2017   • Anxiety 07/26/2017   • Bilateral edema of lower extremity 07/26/2017   • Essential hypertension    • Osteopenia    • Hyperlipidemia         Allergies:Other environmental    Current meds including changes today  Current Outpatient Prescriptions   Medication Sig Dispense Refill   • atorvastatin (LIPITOR) 20 MG Tab Take 1 Tab by mouth every day. 30 Tab 5   • triamterene/hctz (MAXZIDE-25/DYAZIDE) 37.5-25 MG Cap Take 1 Cap by mouth every morning. 30 Cap 5   • ipratropium-albuterol (COMBIVENT RESPIMAT)  MCG/ACT Aero Soln Inhale 1 Puff by mouth 4 times a day. 1 Inhaler 2   • metoprolol (LOPRESSOR) 100 MG Tab Take 1 Tab by mouth 2 times a day. 180 Tab 5   • diazepam (VALIUM) 5 MG Tab Take 1 Tab by mouth every 6 hours as needed for Anxiety. 60 Tab 0     No current facility-administered medications for this visit.      Social History   Substance Use Topics   • Smoking status: Never Smoker   • Smokeless tobacco: Never Used   • Alcohol use No     Social History     Social History Narrative   • No narrative on file       Family History   Problem Relation Age of Onset   • Heart Disease Father    • Hyperlipidemia Father    • Hypertension Father    • Heart Disease Brother    • Hypertension Mother    • Hyperlipidemia Mother        Review of Systems:  No chest pain, No shortness of breath, No dyspnea on exertion  Gastrointestinal ROS: No abdominal pain, No nausea, vomiting, diarrhea, or constipation        Exam:      Blood pressure 160/84, pulse 76, temperature 36.9 °C (98.4 °F), resp. rate 16, height 1.651 m (5' 5\"), weight 70 kg (154 lb 5.2 oz), SpO2 95 %, not currently breastfeeding.  General: Overweight female in NAD affect and mood within normal limits  Head is grossly normal.  Neck: Supple without adenopathy  Pulmonary: Clear to ausculation.  Normal " effort. No rales, rhonchi, or wheezing.  Cardiovascular: Regular rate and rhythm without murmur.   Extremities: no clubbing, cyanosis, or edema.  Neuro: moves all extremities symmetrically    Please note that this dictation was created using voice recognition software. I have made every reasonable attempt to correct obvious errors, but I expect that there are errors of grammar and possibly content that I did not discover before finalizing the note.    Assessment/Plan:  1. Hip dislocation, right, subsequent encounter    Patient has orthopedic follow-up, it is unclear whether they will recommend physical therapy or need to do any further surgery.    2. Bilateral edema of lower extremity    -At this time, I suspect venous insufficiency.  There is no evidence of congestive heart failure or liver or kidney disease.  - REFERRAL TO VASCULAR SURGERY    3. Pure hyperglyceridemia    LDL is close to goal on current dose of atorvastatin    4. Essential hypertension    Patient's BP is somewhat elevated today, however she has been under a lot of stress.  Patient will monitor outside the office on her current medication, and we will follow-up here in the office in a couple of months    5. Anxiety    Stable    Followup: Return in about 2 months (around 10/14/2018).

## 2018-08-16 ENCOUNTER — TELEPHONE (OUTPATIENT)
Dept: MEDICAL GROUP | Facility: MEDICAL CENTER | Age: 78
End: 2018-08-16

## 2018-08-16 DIAGNOSIS — I10 ESSENTIAL (PRIMARY) HYPERTENSION: ICD-10-CM

## 2018-08-16 DIAGNOSIS — S73.004S HIP DISLOCATION, RIGHT, SEQUELA: ICD-10-CM

## 2018-08-16 NOTE — TELEPHONE ENCOUNTER
Pt is scheduled for Rt. Hip replacement surgery on 9/5/2018 by  (Ortho). He sent a letter asking for; Medical Clearance letter, EKG order, chest x-ray order and labs.   Dr. Reyes, please order or advise... If pt needs to be seen.

## 2018-08-20 ENCOUNTER — HOSPITAL ENCOUNTER (OUTPATIENT)
Dept: RADIOLOGY | Facility: MEDICAL CENTER | Age: 78
End: 2018-08-20
Attending: INTERNAL MEDICINE | Admitting: ORTHOPAEDIC SURGERY
Payer: MEDICARE

## 2018-08-20 ENCOUNTER — APPOINTMENT (OUTPATIENT)
Dept: ADMISSIONS | Facility: MEDICAL CENTER | Age: 78
DRG: 468 | End: 2018-08-20
Attending: ORTHOPAEDIC SURGERY
Payer: MEDICARE

## 2018-08-20 DIAGNOSIS — Z01.812 PRE-OPERATIVE LABORATORY EXAMINATION: ICD-10-CM

## 2018-08-20 DIAGNOSIS — Z01.810 PRE-OPERATIVE CARDIOVASCULAR EXAMINATION: ICD-10-CM

## 2018-08-20 DIAGNOSIS — S73.004S HIP DISLOCATION, RIGHT, SEQUELA: ICD-10-CM

## 2018-08-20 PROCEDURE — 36415 COLL VENOUS BLD VENIPUNCTURE: CPT

## 2018-08-20 PROCEDURE — 85027 COMPLETE CBC AUTOMATED: CPT

## 2018-08-20 PROCEDURE — 87641 MR-STAPH DNA AMP PROBE: CPT

## 2018-08-20 PROCEDURE — 87640 STAPH A DNA AMP PROBE: CPT | Mod: XU

## 2018-08-20 PROCEDURE — 71046 X-RAY EXAM CHEST 2 VIEWS: CPT

## 2018-08-20 NOTE — DISCHARGE PLANNING
DISCHARGE PLANNING NOTE - TOTAL JOINT    Procedure: Procedure(s):  HIP REVISION TOTAL  Procedure Date: 8/29/2018  Insurance:    Equipment currently available at home? yes  Steps into the home? 3  Steps within the home? none  Toilet height? Tall  Type of shower? Walk-in  Who will be with you during your recovery? other:possible inpt therapy  Is Outpatient Physical Therapy set up after surgery? No   Did you take the Total Joint Class and where? Yes    Plan: Pt plans to speak to Dr. Goodson on 8/23/18 regarding discharge plans and possibly going to [facility] therapy.   is in this facility and she does not have anyone at home with her.

## 2018-08-20 NOTE — OR NURSING
"Preadmit appointment: \" Preparing for your Procedure information\" sheet given to patient with verbal and written instructions. Patient instructed to continue prescribed medications through the day before surgery, instructed to take the following medications the day of surgery per anesthesia protocol: Combivent inhaler if needed, pt instructed to bring day of surgery, take Metoprolol, Valium if needed.   "

## 2018-08-20 NOTE — OR NURSING
"TOTAL JOINT REPLACEMENT SURGERY   PreAdmit Appointment  Pre-Operative Education Note       1) Did you take a Total Joint Replacement Pre-Operative Education class?  Where?  Answer: yes, 2 years ago at [facility]    2) If you did not take a class, did you receive pre-op education in the form of a book or through an online class?    Answer:     3) Have you had the same joint replacement procedure within the last 3 years?   Answer: Yes    For patients who answered \"No\" to the above questions:     4) Was patient given information on [facility]'s Pre-Op Education class through the Pre-Op Education Class flyer or the Alternative Pre-op Education flyer?   Answer:     5) Was a [facility] Total Joint Replacement Patient Guide binder handed out?   Answer:    6) Did the patient refuse preoperative education?  Answer:    "

## 2018-08-21 LAB
EKG IMPRESSION: NORMAL
ERYTHROCYTE [DISTWIDTH] IN BLOOD BY AUTOMATED COUNT: 46.5 FL (ref 35.9–50)
HCT VFR BLD AUTO: 43.7 % (ref 37–47)
HGB BLD-MCNC: 14.3 G/DL (ref 12–16)
MCH RBC QN AUTO: 30.9 PG (ref 27–33)
MCHC RBC AUTO-ENTMCNC: 32.7 G/DL (ref 33.6–35)
MCV RBC AUTO: 94.4 FL (ref 81.4–97.8)
PLATELET # BLD AUTO: 246 K/UL (ref 164–446)
PMV BLD AUTO: 11.2 FL (ref 9–12.9)
RBC # BLD AUTO: 4.63 M/UL (ref 4.2–5.4)
SCCMEC + MECA PNL NOSE NAA+PROBE: NEGATIVE
SCCMEC + MECA PNL NOSE NAA+PROBE: POSITIVE
WBC # BLD AUTO: 12.4 K/UL (ref 4.8–10.8)

## 2018-08-29 ENCOUNTER — TELEPHONE (OUTPATIENT)
Dept: MEDICAL GROUP | Facility: MEDICAL CENTER | Age: 78
End: 2018-08-29

## 2018-08-29 ENCOUNTER — HOSPITAL ENCOUNTER (INPATIENT)
Facility: MEDICAL CENTER | Age: 78
LOS: 2 days | DRG: 468 | End: 2018-08-31
Attending: ORTHOPAEDIC SURGERY | Admitting: ORTHOPAEDIC SURGERY
Payer: MEDICARE

## 2018-08-29 ENCOUNTER — APPOINTMENT (OUTPATIENT)
Dept: RADIOLOGY | Facility: MEDICAL CENTER | Age: 78
DRG: 468 | End: 2018-08-29
Attending: ORTHOPAEDIC SURGERY
Payer: MEDICARE

## 2018-08-29 DIAGNOSIS — S73.004D HIP DISLOCATION, RIGHT, SUBSEQUENT ENCOUNTER: ICD-10-CM

## 2018-08-29 LAB
GRAM STN SPEC: NORMAL
GRAM STN SPEC: NORMAL
SIGNIFICANT IND 70042: NORMAL
SIGNIFICANT IND 70042: NORMAL
SITE SITE: NORMAL
SITE SITE: NORMAL
SOURCE SOURCE: NORMAL
SOURCE SOURCE: NORMAL

## 2018-08-29 PROCEDURE — 160042 HCHG SURGERY MINUTES - EA ADDL 1 MIN LEVEL 5: Performed by: ORTHOPAEDIC SURGERY

## 2018-08-29 PROCEDURE — 160002 HCHG RECOVERY MINUTES (STAT): Performed by: ORTHOPAEDIC SURGERY

## 2018-08-29 PROCEDURE — 700101 HCHG RX REV CODE 250

## 2018-08-29 PROCEDURE — 160036 HCHG PACU - EA ADDL 30 MINS PHASE I: Performed by: ORTHOPAEDIC SURGERY

## 2018-08-29 PROCEDURE — A9270 NON-COVERED ITEM OR SERVICE: HCPCS

## 2018-08-29 PROCEDURE — C1776 JOINT DEVICE (IMPLANTABLE): HCPCS | Performed by: ORTHOPAEDIC SURGERY

## 2018-08-29 PROCEDURE — 0SR902A REPLACEMENT OF RIGHT HIP JOINT WITH METAL ON POLYETHYLENE SYNTHETIC SUBSTITUTE, UNCEMENTED, OPEN APPROACH: ICD-10-PCS | Performed by: ORTHOPAEDIC SURGERY

## 2018-08-29 PROCEDURE — 87205 SMEAR GRAM STAIN: CPT | Mod: 91

## 2018-08-29 PROCEDURE — 700111 HCHG RX REV CODE 636 W/ 250 OVERRIDE (IP): Performed by: ORTHOPAEDIC SURGERY

## 2018-08-29 PROCEDURE — 700102 HCHG RX REV CODE 250 W/ 637 OVERRIDE(OP)

## 2018-08-29 PROCEDURE — 700111 HCHG RX REV CODE 636 W/ 250 OVERRIDE (IP)

## 2018-08-29 PROCEDURE — 160048 HCHG OR STATISTICAL LEVEL 1-5: Performed by: ORTHOPAEDIC SURGERY

## 2018-08-29 PROCEDURE — 501838 HCHG SUTURE GENERAL: Performed by: ORTHOPAEDIC SURGERY

## 2018-08-29 PROCEDURE — 700105 HCHG RX REV CODE 258: Performed by: ORTHOPAEDIC SURGERY

## 2018-08-29 PROCEDURE — A9270 NON-COVERED ITEM OR SERVICE: HCPCS | Performed by: ORTHOPAEDIC SURGERY

## 2018-08-29 PROCEDURE — 87070 CULTURE OTHR SPECIMN AEROBIC: CPT

## 2018-08-29 PROCEDURE — 94760 N-INVAS EAR/PLS OXIMETRY 1: CPT

## 2018-08-29 PROCEDURE — 700112 HCHG RX REV CODE 229: Performed by: ORTHOPAEDIC SURGERY

## 2018-08-29 PROCEDURE — 700102 HCHG RX REV CODE 250 W/ 637 OVERRIDE(OP): Performed by: ORTHOPAEDIC SURGERY

## 2018-08-29 PROCEDURE — 502578 HCHG PACK, TOTAL HIP: Performed by: ORTHOPAEDIC SURGERY

## 2018-08-29 PROCEDURE — 160031 HCHG SURGERY MINUTES - 1ST 30 MINS LEVEL 5: Performed by: ORTHOPAEDIC SURGERY

## 2018-08-29 PROCEDURE — 0SP90JZ REMOVAL OF SYNTHETIC SUBSTITUTE FROM RIGHT HIP JOINT, OPEN APPROACH: ICD-10-PCS | Performed by: ORTHOPAEDIC SURGERY

## 2018-08-29 PROCEDURE — 700101 HCHG RX REV CODE 250: Performed by: ORTHOPAEDIC SURGERY

## 2018-08-29 PROCEDURE — 160035 HCHG PACU - 1ST 60 MINS PHASE I: Performed by: ORTHOPAEDIC SURGERY

## 2018-08-29 PROCEDURE — 72170 X-RAY EXAM OF PELVIS: CPT

## 2018-08-29 PROCEDURE — 87015 SPECIMEN INFECT AGNT CONCNTJ: CPT

## 2018-08-29 PROCEDURE — 160009 HCHG ANES TIME/MIN: Performed by: ORTHOPAEDIC SURGERY

## 2018-08-29 PROCEDURE — 770001 HCHG ROOM/CARE - MED/SURG/GYN PRIV*

## 2018-08-29 PROCEDURE — 87075 CULTR BACTERIA EXCEPT BLOOD: CPT

## 2018-08-29 DEVICE — IMPLANTABLE DEVICE: Type: IMPLANTABLE DEVICE | Site: HIP | Status: FUNCTIONAL

## 2018-08-29 RX ORDER — SCOLOPAMINE TRANSDERMAL SYSTEM 1 MG/1
1 PATCH, EXTENDED RELEASE TRANSDERMAL
Status: DISCONTINUED | OUTPATIENT
Start: 2018-08-29 | End: 2018-08-31 | Stop reason: HOSPADM

## 2018-08-29 RX ORDER — AMOXICILLIN 250 MG
1 CAPSULE ORAL
Status: DISCONTINUED | OUTPATIENT
Start: 2018-08-29 | End: 2018-08-31 | Stop reason: HOSPADM

## 2018-08-29 RX ORDER — ONDANSETRON 2 MG/ML
4 INJECTION INTRAMUSCULAR; INTRAVENOUS EVERY 4 HOURS PRN
Status: DISCONTINUED | OUTPATIENT
Start: 2018-08-29 | End: 2018-08-31 | Stop reason: HOSPADM

## 2018-08-29 RX ORDER — ACETAMINOPHEN 500 MG
TABLET ORAL
Status: COMPLETED
Start: 2018-08-29 | End: 2018-08-29

## 2018-08-29 RX ORDER — DEXTROSE MONOHYDRATE, SODIUM CHLORIDE, AND POTASSIUM CHLORIDE 50; 1.49; 4.5 G/1000ML; G/1000ML; G/1000ML
INJECTION, SOLUTION INTRAVENOUS CONTINUOUS
Status: DISCONTINUED | OUTPATIENT
Start: 2018-08-29 | End: 2018-08-31 | Stop reason: HOSPADM

## 2018-08-29 RX ORDER — CEFAZOLIN SODIUM 1 G/3ML
INJECTION, POWDER, FOR SOLUTION INTRAMUSCULAR; INTRAVENOUS
Status: DISCONTINUED | OUTPATIENT
Start: 2018-08-29 | End: 2018-08-29 | Stop reason: HOSPADM

## 2018-08-29 RX ORDER — POLYETHYLENE GLYCOL 3350 17 G/17G
1 POWDER, FOR SOLUTION ORAL 2 TIMES DAILY PRN
Status: DISCONTINUED | OUTPATIENT
Start: 2018-08-29 | End: 2018-08-31 | Stop reason: HOSPADM

## 2018-08-29 RX ORDER — SODIUM CHLORIDE, SODIUM LACTATE, POTASSIUM CHLORIDE, CALCIUM CHLORIDE 600; 310; 30; 20 MG/100ML; MG/100ML; MG/100ML; MG/100ML
INJECTION, SOLUTION INTRAVENOUS CONTINUOUS
Status: DISCONTINUED | OUTPATIENT
Start: 2018-08-29 | End: 2018-08-31 | Stop reason: HOSPADM

## 2018-08-29 RX ORDER — DOCUSATE SODIUM 100 MG/1
100 CAPSULE, LIQUID FILLED ORAL 2 TIMES DAILY
Status: DISCONTINUED | OUTPATIENT
Start: 2018-08-29 | End: 2018-08-31 | Stop reason: HOSPADM

## 2018-08-29 RX ORDER — TRIAMTERENE AND HYDROCHLOROTHIAZIDE 37.5; 25 MG/1; MG/1
1 TABLET ORAL
Status: DISCONTINUED | OUTPATIENT
Start: 2018-08-29 | End: 2018-08-31 | Stop reason: HOSPADM

## 2018-08-29 RX ORDER — BISACODYL 10 MG
10 SUPPOSITORY, RECTAL RECTAL
Status: DISCONTINUED | OUTPATIENT
Start: 2018-08-29 | End: 2018-08-31 | Stop reason: HOSPADM

## 2018-08-29 RX ORDER — CELECOXIB 200 MG/1
200 CAPSULE ORAL
Status: DISCONTINUED | OUTPATIENT
Start: 2018-08-30 | End: 2018-08-31 | Stop reason: HOSPADM

## 2018-08-29 RX ORDER — ENEMA 19; 7 G/133ML; G/133ML
1 ENEMA RECTAL
Status: DISCONTINUED | OUTPATIENT
Start: 2018-08-29 | End: 2018-08-31 | Stop reason: HOSPADM

## 2018-08-29 RX ORDER — ATORVASTATIN CALCIUM 20 MG/1
20 TABLET, FILM COATED ORAL EVERY EVENING
Status: DISCONTINUED | OUTPATIENT
Start: 2018-08-29 | End: 2018-08-31 | Stop reason: HOSPADM

## 2018-08-29 RX ORDER — ACETAMINOPHEN 500 MG
1000 TABLET ORAL EVERY 6 HOURS
Status: DISCONTINUED | OUTPATIENT
Start: 2018-08-29 | End: 2018-08-31 | Stop reason: HOSPADM

## 2018-08-29 RX ORDER — AMOXICILLIN 250 MG
1 CAPSULE ORAL NIGHTLY
Status: DISCONTINUED | OUTPATIENT
Start: 2018-08-29 | End: 2018-08-31 | Stop reason: HOSPADM

## 2018-08-29 RX ORDER — DIPHENHYDRAMINE HYDROCHLORIDE 50 MG/ML
25 INJECTION INTRAMUSCULAR; INTRAVENOUS EVERY 6 HOURS PRN
Status: DISCONTINUED | OUTPATIENT
Start: 2018-08-29 | End: 2018-08-31 | Stop reason: HOSPADM

## 2018-08-29 RX ORDER — HALOPERIDOL 5 MG/ML
1 INJECTION INTRAMUSCULAR EVERY 6 HOURS PRN
Status: DISCONTINUED | OUTPATIENT
Start: 2018-08-29 | End: 2018-08-31 | Stop reason: HOSPADM

## 2018-08-29 RX ORDER — CELECOXIB 200 MG/1
CAPSULE ORAL
Status: COMPLETED
Start: 2018-08-29 | End: 2018-08-29

## 2018-08-29 RX ORDER — OXYCODONE HYDROCHLORIDE 10 MG/1
10 TABLET ORAL
Status: DISCONTINUED | OUTPATIENT
Start: 2018-08-29 | End: 2018-08-31 | Stop reason: HOSPADM

## 2018-08-29 RX ORDER — OXYCODONE HYDROCHLORIDE 5 MG/1
5 TABLET ORAL
Status: DISCONTINUED | OUTPATIENT
Start: 2018-08-29 | End: 2018-08-31 | Stop reason: HOSPADM

## 2018-08-29 RX ORDER — TRAMADOL HYDROCHLORIDE 50 MG/1
50 TABLET ORAL EVERY 6 HOURS
Status: DISCONTINUED | OUTPATIENT
Start: 2018-08-29 | End: 2018-08-31 | Stop reason: HOSPADM

## 2018-08-29 RX ORDER — LIDOCAINE HYDROCHLORIDE 10 MG/ML
INJECTION, SOLUTION EPIDURAL; INFILTRATION; INTRACAUDAL; PERINEURAL
Status: COMPLETED
Start: 2018-08-29 | End: 2018-08-29

## 2018-08-29 RX ORDER — METOPROLOL TARTRATE 50 MG/1
100 TABLET, FILM COATED ORAL 2 TIMES DAILY
Status: DISCONTINUED | OUTPATIENT
Start: 2018-08-29 | End: 2018-08-31 | Stop reason: HOSPADM

## 2018-08-29 RX ORDER — IPRATROPIUM BROMIDE AND ALBUTEROL SULFATE 2.5; .5 MG/3ML; MG/3ML
3 SOLUTION RESPIRATORY (INHALATION)
Status: DISCONTINUED | OUTPATIENT
Start: 2018-08-29 | End: 2018-08-30

## 2018-08-29 RX ORDER — MORPHINE SULFATE 4 MG/ML
4 INJECTION, SOLUTION INTRAMUSCULAR; INTRAVENOUS
Status: DISCONTINUED | OUTPATIENT
Start: 2018-08-29 | End: 2018-08-31 | Stop reason: HOSPADM

## 2018-08-29 RX ORDER — DEXAMETHASONE SODIUM PHOSPHATE 4 MG/ML
4 INJECTION, SOLUTION INTRA-ARTICULAR; INTRALESIONAL; INTRAMUSCULAR; INTRAVENOUS; SOFT TISSUE
Status: DISCONTINUED | OUTPATIENT
Start: 2018-08-29 | End: 2018-08-31 | Stop reason: HOSPADM

## 2018-08-29 RX ADMIN — TRAMADOL HYDROCHLORIDE 50 MG: 50 TABLET, COATED ORAL at 23:54

## 2018-08-29 RX ADMIN — METOPROLOL TARTRATE 100 MG: 50 TABLET ORAL at 18:15

## 2018-08-29 RX ADMIN — ACETAMINOPHEN 1000 MG: 500 TABLET, FILM COATED ORAL at 23:54

## 2018-08-29 RX ADMIN — SODIUM CHLORIDE, SODIUM LACTATE, POTASSIUM CHLORIDE, CALCIUM CHLORIDE 1000 ML: 600; 310; 30; 20 INJECTION, SOLUTION INTRAVENOUS at 06:29

## 2018-08-29 RX ADMIN — ATORVASTATIN CALCIUM 20 MG: 20 TABLET, FILM COATED ORAL at 18:12

## 2018-08-29 RX ADMIN — SODIUM CHLORIDE 2 G: 9 INJECTION, SOLUTION INTRAVENOUS at 16:57

## 2018-08-29 RX ADMIN — CELECOXIB 200 MG: 200 CAPSULE ORAL at 06:15

## 2018-08-29 RX ADMIN — SODIUM CHLORIDE 2 G: 9 INJECTION, SOLUTION INTRAVENOUS at 23:51

## 2018-08-29 RX ADMIN — POTASSIUM CHLORIDE, DEXTROSE MONOHYDRATE AND SODIUM CHLORIDE: 150; 5; 450 INJECTION, SOLUTION INTRAVENOUS at 16:56

## 2018-08-29 RX ADMIN — DOCUSATE SODIUM 100 MG: 100 CAPSULE, LIQUID FILLED ORAL at 18:12

## 2018-08-29 RX ADMIN — FENTANYL CITRATE 50 MCG: 50 INJECTION, SOLUTION INTRAMUSCULAR; INTRAVENOUS at 08:45

## 2018-08-29 RX ADMIN — ACETAMINOPHEN 1000 MG: 500 TABLET, FILM COATED ORAL at 12:46

## 2018-08-29 RX ADMIN — DOCUSATE SODIUM 100 MG: 100 CAPSULE, LIQUID FILLED ORAL at 12:46

## 2018-08-29 RX ADMIN — LIDOCAINE HYDROCHLORIDE 0.2 ML: 10 INJECTION, SOLUTION EPIDURAL; INFILTRATION; INTRACAUDAL; PERINEURAL at 06:28

## 2018-08-29 RX ADMIN — ACETAMINOPHEN 1000 MG: 500 TABLET, COATED ORAL at 06:15

## 2018-08-29 RX ADMIN — TRAMADOL HYDROCHLORIDE 50 MG: 50 TABLET, COATED ORAL at 12:46

## 2018-08-29 RX ADMIN — POTASSIUM CHLORIDE, DEXTROSE MONOHYDRATE AND SODIUM CHLORIDE: 150; 5; 450 INJECTION, SOLUTION INTRAVENOUS at 23:58

## 2018-08-29 RX ADMIN — ACETAMINOPHEN 1000 MG: 500 TABLET, FILM COATED ORAL at 18:11

## 2018-08-29 RX ADMIN — TRAMADOL HYDROCHLORIDE 50 MG: 50 TABLET, COATED ORAL at 18:12

## 2018-08-29 ASSESSMENT — COGNITIVE AND FUNCTIONAL STATUS - GENERAL
TOILETING: A LITTLE
DRESSING REGULAR LOWER BODY CLOTHING: A LITTLE
SUGGESTED CMS G CODE MODIFIER DAILY ACTIVITY: CJ
STANDING UP FROM CHAIR USING ARMS: A LITTLE
MOBILITY SCORE: 18
TURNING FROM BACK TO SIDE WHILE IN FLAT BAD: A LITTLE
MOVING FROM LYING ON BACK TO SITTING ON SIDE OF FLAT BED: A LITTLE
DAILY ACTIVITIY SCORE: 20
CLIMB 3 TO 5 STEPS WITH RAILING: A LITTLE
DRESSING REGULAR UPPER BODY CLOTHING: A LITTLE
WALKING IN HOSPITAL ROOM: A LITTLE
SUGGESTED CMS G CODE MODIFIER MOBILITY: CK
HELP NEEDED FOR BATHING: A LITTLE
MOVING TO AND FROM BED TO CHAIR: A LITTLE

## 2018-08-29 ASSESSMENT — COPD QUESTIONNAIRES
HAVE YOU SMOKED AT LEAST 100 CIGARETTES IN YOUR ENTIRE LIFE: NO/DON'T KNOW
DO YOU EVER COUGH UP ANY MUCUS OR PHLEGM?: NO/ONLY WITH OCCASIONAL COLDS OR INFECTIONS
DURING THE PAST 4 WEEKS HOW MUCH DID YOU FEEL SHORT OF BREATH: NONE/LITTLE OF THE TIME
COPD SCREENING SCORE: 2

## 2018-08-29 ASSESSMENT — PAIN SCALES - GENERAL
PAINLEVEL_OUTOF10: 1
PAINLEVEL_OUTOF10: 0
PAINLEVEL_OUTOF10: 3
PAINLEVEL_OUTOF10: ASSUMED PAIN PRESENT
PAINLEVEL_OUTOF10: 0
PAINLEVEL_OUTOF10: ASSUMED PAIN PRESENT
PAINLEVEL_OUTOF10: 0
PAINLEVEL_OUTOF10: ASSUMED PAIN PRESENT
PAINLEVEL_OUTOF10: 0
PAINLEVEL_OUTOF10: ASSUMED PAIN PRESENT
PAINLEVEL_OUTOF10: ASSUMED PAIN PRESENT

## 2018-08-29 ASSESSMENT — LIFESTYLE VARIABLES
ALCOHOL_USE: NO
EVER_SMOKED: NEVER

## 2018-08-29 ASSESSMENT — PATIENT HEALTH QUESTIONNAIRE - PHQ9
2. FEELING DOWN, DEPRESSED, IRRITABLE, OR HOPELESS: NOT AT ALL
SUM OF ALL RESPONSES TO PHQ9 QUESTIONS 1 AND 2: 0
1. LITTLE INTEREST OR PLEASURE IN DOING THINGS: NOT AT ALL

## 2018-08-29 NOTE — OP REPORT
DATE OF SERVICE:  08/29/2018    PREOPERATIVE DIAGNOSIS:  Multiple right hip dislocations following total hip   arthroplasty.    POSTOPERATIVE DIAGNOSIS:  Multiple right hip dislocations following total hip   arthroplasty.    PROCEDURE PERFORMED:  Right total hip arthroplasty revision of femoral and   acetabular components.    SURGEON:  Oniel Goodson MD    ASSISTANT:  Thomas Helton CST FA    ANESTHESIA:  General endotracheal anesthesia with fascial iliacus block.    ANESTHESIOLOGIST:  Dave Wu MD.    ESTIMATED BLOOD LOSS:  Minimal.    COMPLICATIONS:  None.    INDICATIONS FOR PROCEDURE:  This is an 78-year-old white female who was   approximately 4 years status post right total hip arthroplasty.  She had early   dislocation of her hip approximately 2 months following her index procedure.    She was treated in a closed fashion.  She has had multiple dislocations since   that time the last being approximately 2 months ago, which was her fourth   dislocation.  She presents for operative treatment.  For further details of H   and P, please see chart.    Risks, benefits, alternatives of the procedure were discussed with patient   include but not limited to bleeding, infection, neurovascular injury, need for   further surgery, PE, DVT, blood transfusion with its associated risks,   anesthesia with its associated risks, and death.  All questions were answered.    No warranties or guarantees were given or implied.  Consent was signed and   is on chart.    DESCRIPTION OF PROCEDURE:  Patient was taken to operating room, placed supine   on the operating table.  Prior to this, a fascial iliacus block had been   placed in preoperative holding for postoperative pain control.  General   endotracheal anesthesia was induced.  Patient was placed in left lateral   decubitus position.  An axillary roll was placed, all bony prominences were   well padded.  Pegs were placed to stabilize the patient.  Right buttock and    lower extremity were prepped and draped in normal sterile fashion.    Examination of the hip showed a previous incision to be fairly posterior and   therefore a new incision made approximately 3 cm anterior.  This was taken   down sharply through skin and subcutaneous tissue.  Bovie cautery was used to   obtain hemostasis.  Dissection carried down to the level of the IT band.  The   IT band was split in line with the incision.  Examination of the hip at this   point showed no posterior remaining tissues.  The patient had index labs and   was not felt to be infected; however, cultures were still taken.  The   patient's hip was then dislocated.  Femoral head was removed and soft tissue   removed off of the calcar.  Examination at this time showed to be well seated   with no signs of loosening or other abnormalities.  It appeared to be in   appropriate version.  Retractors were then placed around the acetabulum.  Scar   tissue was removed around the periphery of the cup.  Previous poly was   removed without difficulty.  Examination of the acetabular component showed to   be well fixed with no obvious signs of loosening and adequate inclination and   version.  At this point, a 20 degree lipped trial liner was placed.  The   femoral stem was then trialed with a +4, 36 mm head.  This had significantly   improved stability, but her leg was still somewhat shortened compared to that   side; therefore, this was removed and a +8 mm head was placed.  This showed   excellent leg lengths with good stability in flexion and adduction to 80   degrees of internal rotation.  This was felt to be of the appropriate choice   and therefore, the trials were removed.  A [employer] 36 mm 20 degree   lipped liner was impacted into appropriate position with the lipped posterior   superior.  A 36 mm +8 cobalt chrome head was impacted.  This was again reduced   showed excellent alignment with good stability.  At this point, the hip was    washed out with diluted Betadine.  This was then lavaged with saline.  There   was not posterior scar tissue, which was able to close over the posterior   aspect of the hip.  The IT band was then closed using #1 Vicryl, subcutaneous   tissue closed using 2-0 Vicryl and skin closed using staples.  The patient was   placed in soft sterile dressing and abduction pillow.  She was awakened from   anesthesia and returned to recovery cart in the recovery room in stable   condition.  There were no luis m or intraoperative complications noted.       ____________________________________     MD AMANDEEP Greene / RAVEN    DD:  08/29/2018 08:42:57  DT:  08/29/2018 09:30:32    D#:  [id number]  Job#:  [id number]

## 2018-08-29 NOTE — TELEPHONE ENCOUNTER
Spoke with [facility]. I was informed they received results for orders but no clearance letter from you.  please advise. Her surgery is today.

## 2018-08-29 NOTE — OR SURGEON
Immediate Post OP Note    PreOp Diagnosis: Rt hip arthroplasty dislocation    PostOp Diagnosis: same    Procedure(s):  HIP REVISION TOTAL - Wound Class: Clean    Surgeon(s):  Oniel Goodson M.D.    Anesthesiologist/Type of Anesthesia:  Anesthesiologist: Dave Wu M.D.  Anesthesia Technician: Unique Sutton/General    Surgical Staff:  Assistant: Thomas Helton C.N.A.  Circulator: Vale Saeed R.N.  Limb Barfield: Finn Dougherty Circulator: Harsha Irwin R.N.  Scrub Person: Adilene Robles Reyes    Specimens removed if any:  * No specimens in log *    Estimated Blood Loss: minmal    Findings: unstable shortened hip    Complications: none        8/29/2018 8:37 AM Oniel Goodson M.D.

## 2018-08-29 NOTE — OR NURSING
"0837: To PACU from OR via bed, drowsy, c/o pain, respirations spontaneous and non-labored. Icepack applied over c/d/i R hip surgical dressings.   0845: Medicated for 8-9/10 pain as surgeon exchanging abductor pillow between legs for larger size.   0850: Drowsy, c/o pain but does not rate  0855: Sleeping - not roused at this time  0905: Rouses to name, \"I don't know\" level of pain dozes off again quickly after DB&C so not medicated further.   0920: Pain at tolerable level, awaiting xrays, sitting up sipping po water.  0935: Stable for transfer to room as as soon as Xrays complete.  "

## 2018-08-30 PROCEDURE — 97161 PT EVAL LOW COMPLEX 20 MIN: CPT

## 2018-08-30 PROCEDURE — G8978 MOBILITY CURRENT STATUS: HCPCS | Mod: CL

## 2018-08-30 PROCEDURE — A9270 NON-COVERED ITEM OR SERVICE: HCPCS | Performed by: ORTHOPAEDIC SURGERY

## 2018-08-30 PROCEDURE — 94760 N-INVAS EAR/PLS OXIMETRY 1: CPT

## 2018-08-30 PROCEDURE — G8979 MOBILITY GOAL STATUS: HCPCS | Mod: CJ

## 2018-08-30 PROCEDURE — 700112 HCHG RX REV CODE 229: Performed by: ORTHOPAEDIC SURGERY

## 2018-08-30 PROCEDURE — 700102 HCHG RX REV CODE 250 W/ 637 OVERRIDE(OP): Performed by: ORTHOPAEDIC SURGERY

## 2018-08-30 PROCEDURE — 770001 HCHG ROOM/CARE - MED/SURG/GYN PRIV*

## 2018-08-30 RX ORDER — ACETAMINOPHEN 500 MG
1000 TABLET ORAL EVERY 6 HOURS
Qty: 30 TAB | Refills: 0
Start: 2018-08-30 | End: 2019-04-21

## 2018-08-30 RX ORDER — TRAMADOL HYDROCHLORIDE 50 MG/1
50 TABLET ORAL EVERY 6 HOURS
Qty: 30 TAB | Refills: 0
Start: 2018-08-30 | End: 2018-09-09

## 2018-08-30 RX ORDER — OXYCODONE HYDROCHLORIDE 5 MG/1
5 TABLET ORAL
Qty: 30 TAB | Refills: 0
Start: 2018-08-30 | End: 2018-09-04

## 2018-08-30 RX ORDER — IPRATROPIUM BROMIDE AND ALBUTEROL SULFATE 2.5; .5 MG/3ML; MG/3ML
3 SOLUTION RESPIRATORY (INHALATION)
Status: DISCONTINUED | OUTPATIENT
Start: 2018-08-30 | End: 2018-08-31 | Stop reason: HOSPADM

## 2018-08-30 RX ORDER — ASPIRIN 325 MG
325 TABLET, DELAYED RELEASE (ENTERIC COATED) ORAL 2 TIMES DAILY
Qty: 60 TAB | Refills: 0 | Status: SHIPPED | OUTPATIENT
Start: 2018-08-30 | End: 2018-12-28

## 2018-08-30 RX ADMIN — TRAMADOL HYDROCHLORIDE 50 MG: 50 TABLET, COATED ORAL at 12:14

## 2018-08-30 RX ADMIN — TRIAMTERENE AND HYDROCHLOROTHIAZIDE 1 TABLET: 37.5; 25 TABLET ORAL at 06:58

## 2018-08-30 RX ADMIN — ACETAMINOPHEN 1000 MG: 500 TABLET, FILM COATED ORAL at 18:06

## 2018-08-30 RX ADMIN — CELECOXIB 200 MG: 200 CAPSULE ORAL at 08:21

## 2018-08-30 RX ADMIN — DOCUSATE SODIUM 100 MG: 100 CAPSULE, LIQUID FILLED ORAL at 18:06

## 2018-08-30 RX ADMIN — ACETAMINOPHEN 1000 MG: 500 TABLET, FILM COATED ORAL at 23:46

## 2018-08-30 RX ADMIN — TRAMADOL HYDROCHLORIDE 50 MG: 50 TABLET, COATED ORAL at 23:46

## 2018-08-30 RX ADMIN — ASPIRIN 325 MG: 325 TABLET, DELAYED RELEASE ORAL at 18:06

## 2018-08-30 RX ADMIN — METOPROLOL TARTRATE 100 MG: 50 TABLET ORAL at 06:57

## 2018-08-30 RX ADMIN — DOCUSATE SODIUM 100 MG: 100 CAPSULE, LIQUID FILLED ORAL at 06:57

## 2018-08-30 RX ADMIN — ATORVASTATIN CALCIUM 20 MG: 20 TABLET, FILM COATED ORAL at 18:06

## 2018-08-30 RX ADMIN — TRAMADOL HYDROCHLORIDE 50 MG: 50 TABLET, COATED ORAL at 18:06

## 2018-08-30 RX ADMIN — METOPROLOL TARTRATE 100 MG: 50 TABLET ORAL at 18:06

## 2018-08-30 RX ADMIN — ASPIRIN 325 MG: 325 TABLET, DELAYED RELEASE ORAL at 06:58

## 2018-08-30 RX ADMIN — TRAMADOL HYDROCHLORIDE 50 MG: 50 TABLET, COATED ORAL at 06:57

## 2018-08-30 RX ADMIN — ACETAMINOPHEN 1000 MG: 500 TABLET, FILM COATED ORAL at 06:57

## 2018-08-30 RX ADMIN — ACETAMINOPHEN 1000 MG: 500 TABLET, FILM COATED ORAL at 12:14

## 2018-08-30 ASSESSMENT — PAIN SCALES - GENERAL
PAINLEVEL_OUTOF10: 0
PAINLEVEL_OUTOF10: 0
PAINLEVEL_OUTOF10: 5
PAINLEVEL_OUTOF10: 2
PAINLEVEL_OUTOF10: 3
PAINLEVEL_OUTOF10: 0
PAINLEVEL_OUTOF10: 3
PAINLEVEL_OUTOF10: 6
PAINLEVEL_OUTOF10: 0

## 2018-08-30 ASSESSMENT — GAIT ASSESSMENTS: GAIT LEVEL OF ASSIST: UNABLE TO PARTICIPATE

## 2018-08-30 NOTE — PROGRESS NOTES
Received report from [facility] nurse.  Assumed patient care.  Patient is A&Ox4, resting comfortably in bed.  Patient on RA.  No signs of SOB/respiratory distress.  Denied pain, still numb from peripheral nerve block.  Assessment completed, labs noted, VSS.  Surgical dressing to R hip in place CDI, +CMS, + pulse, able to wiggle toes and dorsi/plantar flex.  Polar ice & SCDs on.  Educated patient regarding the importance to call for assistance. Fall precautions in place. Bed locked & at lowest position. Call light and personal belongings within reach. Continue to monitor.

## 2018-08-30 NOTE — PROGRESS NOTES
Doing well, some weakness from block  AVSS  Dressing c/d/i  Motor intact ehl/fhl  Sensation intact sp/dp/pt  dp 2+    S/p Rt hip revision  Doing well  D/c home after PT

## 2018-08-30 NOTE — PROGRESS NOTES
Received report from Eliane RO. Assumed care. This pt is AOx4,   reports pain in R hip,will medicate per MAR. Patient and RN discussed plan of care including ambulating 50 ft, IS use, Pt/OT, monitor neurovascular status for sensation as pt has been unable to walk as block has yet to wear off: questions answered. Chart reviewed. Call light in place, fall precautions in place, patient educated on importance of calling for assistance. No additional needs at this time.

## 2018-08-30 NOTE — PROGRESS NOTES
Spoke with Dr. Goodson regarding a knee immobilizer for the patient to assist with stability as she is still numb from her peripheral nerve block.  Received verbal to use immobilizer with ambulation.

## 2018-08-30 NOTE — CARE PLAN
Problem: Communication  Goal: The ability to communicate needs accurately and effectively will improve  Outcome: PROGRESSING AS EXPECTED    Intervention: [place] patient and significant other/support system to call light to alert staff of needs  Patient oriented to surroundings and unit policies/routines.  Educated and understands the use of the call button.  Patient calls appropriately.      Problem: Safety  Goal: Will remain free from falls  Outcome: PROGRESSING AS EXPECTED    Intervention: Implement fall precautions  Patient educated and understands the fall precautions in place to prevent falls.  Bed alarm is on, IV pole closest to bathroom, treaded slipper socks on, and bedrails closest to bathroom down.  Patient also educated and understands the use of the call button for any assistance with mobility.

## 2018-08-30 NOTE — PROGRESS NOTES
Pt refused to ambulate at this time due to numbness on her right lower extremety. Assisted to use her female urinal.Pt voided.

## 2018-08-30 NOTE — FLOWSHEET NOTE
Patient has declined duoneb nebulizer (only uses combivent once in awhile at home), duoneb changed to PRN per respiratory protocol.  Patient demonstrates good technique on her IS.     08/30/18 0905   Interdisciplinary Plan of Care-Goals (Indications)   Bronchodilator Indications History / Diagnosis   Hyperinflation Protocol Indications (post-op IS)   Therapy Not Performed   Type of Therapy Not Performed SVN   Reason Therapy Not Performed PRN, No Indication   Incentive Spirometry Group   Breathing Exercises Yes   Incentive Spirometer Volume 1250 mL   Chest Exam   Respiration 16   Pulse 64   Breath Sounds   RUL Breath Sounds Clear   RML Breath Sounds Diminished   RLL Breath Sounds Diminished   KEVIN Breath Sounds Clear   LLL Breath Sounds Diminished   Oxygen   Home O2 Use Prior To Admission? No   Pulse Oximetry 91 %   O2 (LPM) 0   O2 Daily Delivery Respiratory  Room Air with O2 Available

## 2018-08-30 NOTE — CARE PLAN
Problem: Knowledge Deficit  Goal: Knowledge of disease process/condition, treatment plan, diagnostic tests, and medications will improve    Intervention: Explain information regarding disease process/condition, treatment plan, diagnostic tests, and medications and document in education  1000 received to room 220 from PACU with VSS, rt hip dressing CDI with vasc status intact, pt has adequate pain relief with Block 100% intact.  1200 denies incisional pain, vasc status intact, reviewed post op orders with pt and son at bedside, no post op n/v.  1700 up to recyliner with 2 assist and FWW block remains 100 % intact,  using IS, anticipate dc to home tomorrow.

## 2018-08-30 NOTE — PROGRESS NOTES
Pt called wants to go BR , unable to walk due to weakness and buckling knee. Pt stated she can feel on her right leg but still weak.Pt agree to use commode to urinate at this time.   at this time.

## 2018-08-30 NOTE — PROGRESS NOTES
"Total Joint Replacement Program Rounding     Inpatient bedside rounding completed to address quality of care provided by total joint replacement program IDT and overall patient experience at [facility].    Patient Satisfaction addressed. Patient/family updated on POC during hospital stay.  Thorough safety education completed including use of call light prior to all mobility throughout the entirety of the hospital stay. Also educated on ankle pumps and incentive inspirometry use to prevent post-op complications.    CNA present with patient and asking for 2nd assist to get patient up to commode upon arrival to room for rounding. Pt's RLE continues to have motor weakness s/p iliacus block resulting in trace+ quad activation and inability to bear weight on RLE without buckling. Pt unable to stand and pivot on LLE to commode using FWW, therefore educated pt in sitting and scooting over to commode which she was able to complete. Able to transfer sit<>stand and stand at FWW with min A of 2 - verbal cues required throughout to ensure she follow hip precautions. Hip precautions were maintained throughout. No pain in R hip. Numbness reported in RLE but able to sense light touch on plantar surface of R foot.     Recommend knee immobilizer to improve stability and safety during transfers until block resolves. Recommend use of techniques as described above and nursing assist of 2-3 during mobility until strength and sensation improve to RLE. RN will follow up with MD dunn: pt's current functional status and block involvement. PT/OT also updated and will see patient later this morning.     Nerve block handout issued to patient for education.  Patient aware of goals for the day and need for nerve block to resolve prior to her being safe for DC to home.     No further questions/concerns at this time. Please see \"MyRounding\" for further details of rounding discussion. RN updated.         "

## 2018-08-30 NOTE — DISCHARGE SUMMARY
DATE OF ADMISSION:  08/29/2018    DATE OF DISCHARGE:  08/31/2018    PROCEDURE:  Right total hip revision.    HISTORY:  This is an 78-year-old female who is 4 years status post right total   hip.  She has had multiple dislocations and presents for revision.  For   further details, please see chart.    HOSPITAL COURSE:  The patient was admitted, taken to the operating room for   the above named procedure.  She tolerated the procedure well and returned to   the floor.  She was advanced from a clear liquid diet to general diet without   difficulty.  She was advanced from IV pain medications to oral pain   medications with good pain control.  On day of discharge, she was ambulating   independently.  Her pain was well controlled.  She was felt stable to be   discharged to home.    DISCHARGE INSTRUCTIONS:  The patient will be discharged home.    DISCHARGE DIAGNOSIS:  Status post right total hip revision.    DISCHARGE MEDICATIONS:  1.  Tylenol 1000 mg p.o. q. 6 hours.  2.  Tramadol 50 mg p.o. q. 6 hours.  3.  Oxycodone 5-10 mg p.o. q. 4 hours p.r.n. pain.  4.  Aspirin 325 mg p.o. daily.  5.  All prehospital medications as discussed.    The patient has been instructed to call my office for fever, redness, chills,   drainage, or other problems.  She will follow up in 2 weeks' time.  She will   start outpatient PT.       ____________________________________     MD AMANDEEP Greene / RAVEN    DD:  08/30/2018 08:06:42  DT:  08/30/2018 10:07:19    D#:  [id number]  Job#:  [id number]

## 2018-08-30 NOTE — PROGRESS NOTES
Paged Dr. Goodson's medical assistant and left voicemail that pt is unable to go home today. Pt is weary of walking at home. PT evaluated, stated that pt will need another night to get sensation and strength back.

## 2018-08-30 NOTE — RESPIRATORY CARE
COPD EDUCATION by COPD CLINICAL EDUCATOR  8/30/2018 at 10:21 AM by Bernice Mckeon     Patient reviewed by COPD education team. Patient does not qualify for COPD program.

## 2018-08-31 VITALS
BODY MASS INDEX: 24.9 KG/M2 | OXYGEN SATURATION: 92 % | DIASTOLIC BLOOD PRESSURE: 48 MMHG | RESPIRATION RATE: 16 BRPM | HEART RATE: 61 BPM | WEIGHT: 149.47 LBS | SYSTOLIC BLOOD PRESSURE: 129 MMHG | TEMPERATURE: 97.7 F | HEIGHT: 65 IN

## 2018-08-31 PROCEDURE — A9270 NON-COVERED ITEM OR SERVICE: HCPCS | Performed by: ORTHOPAEDIC SURGERY

## 2018-08-31 PROCEDURE — G8987 SELF CARE CURRENT STATUS: HCPCS | Mod: CJ

## 2018-08-31 PROCEDURE — G8989 SELF CARE D/C STATUS: HCPCS | Mod: CJ

## 2018-08-31 PROCEDURE — 97110 THERAPEUTIC EXERCISES: CPT

## 2018-08-31 PROCEDURE — G8979 MOBILITY GOAL STATUS: HCPCS | Mod: CJ

## 2018-08-31 PROCEDURE — 97166 OT EVAL MOD COMPLEX 45 MIN: CPT

## 2018-08-31 PROCEDURE — G8980 MOBILITY D/C STATUS: HCPCS | Mod: CJ

## 2018-08-31 PROCEDURE — 97116 GAIT TRAINING THERAPY: CPT

## 2018-08-31 PROCEDURE — 700102 HCHG RX REV CODE 250 W/ 637 OVERRIDE(OP): Performed by: ORTHOPAEDIC SURGERY

## 2018-08-31 PROCEDURE — 97530 THERAPEUTIC ACTIVITIES: CPT

## 2018-08-31 PROCEDURE — 94760 N-INVAS EAR/PLS OXIMETRY 1: CPT

## 2018-08-31 PROCEDURE — G8988 SELF CARE GOAL STATUS: HCPCS | Mod: CJ

## 2018-08-31 PROCEDURE — 700112 HCHG RX REV CODE 229: Performed by: ORTHOPAEDIC SURGERY

## 2018-08-31 RX ADMIN — ASPIRIN 325 MG: 325 TABLET, DELAYED RELEASE ORAL at 05:50

## 2018-08-31 RX ADMIN — DOCUSATE SODIUM 100 MG: 100 CAPSULE, LIQUID FILLED ORAL at 05:50

## 2018-08-31 RX ADMIN — CELECOXIB 200 MG: 200 CAPSULE ORAL at 08:38

## 2018-08-31 RX ADMIN — TRAMADOL HYDROCHLORIDE 50 MG: 50 TABLET, COATED ORAL at 12:59

## 2018-08-31 RX ADMIN — ACETAMINOPHEN 1000 MG: 500 TABLET, FILM COATED ORAL at 12:58

## 2018-08-31 RX ADMIN — METOPROLOL TARTRATE 100 MG: 50 TABLET ORAL at 05:50

## 2018-08-31 RX ADMIN — TRIAMTERENE AND HYDROCHLOROTHIAZIDE 1 TABLET: 37.5; 25 TABLET ORAL at 05:50

## 2018-08-31 RX ADMIN — ACETAMINOPHEN 500 MG: 500 TABLET, FILM COATED ORAL at 05:50

## 2018-08-31 RX ADMIN — TRAMADOL HYDROCHLORIDE 50 MG: 50 TABLET, COATED ORAL at 05:50

## 2018-08-31 RX ADMIN — OXYCODONE HYDROCHLORIDE 5 MG: 5 TABLET ORAL at 14:37

## 2018-08-31 ASSESSMENT — PAIN SCALES - GENERAL
PAINLEVEL_OUTOF10: 3
PAINLEVEL_OUTOF10: 7
PAINLEVEL_OUTOF10: 6
PAINLEVEL_OUTOF10: 4
PAINLEVEL_OUTOF10: 6

## 2018-08-31 ASSESSMENT — COGNITIVE AND FUNCTIONAL STATUS - GENERAL
DAILY ACTIVITIY SCORE: 20
HELP NEEDED FOR BATHING: A LITTLE
DRESSING REGULAR UPPER BODY CLOTHING: A LITTLE
SUGGESTED CMS G CODE MODIFIER DAILY ACTIVITY: CJ
TOILETING: A LITTLE
DRESSING REGULAR LOWER BODY CLOTHING: A LITTLE

## 2018-08-31 ASSESSMENT — GAIT ASSESSMENTS
DEVIATION: DECREASED TOE OFF
ASSISTIVE DEVICE: FRONT WHEEL WALKER
DISTANCE (FEET): 225
GAIT LEVEL OF ASSIST: STAND BY ASSIST

## 2018-08-31 ASSESSMENT — ACTIVITIES OF DAILY LIVING (ADL): TOILETING: INDEPENDENT

## 2018-08-31 NOTE — FLOWSHEET NOTE
08/31/18 1146   Events/Summary/Plan   Events/Summary/Plan PRN check   Interdisciplinary Plan of Care-Goals (Indications)   Bronchodilator Indications History / Diagnosis   Interdisciplinary Plan of Care-Outcomes    Bronchodilator Outcome Patient at Stable Baseline   Therapy Not Performed   Type of Therapy Not Performed SVN   Reason Therapy Not Performed PRN, No Indication   Chest Exam   Respiration 18   Heart Rate (Monitored) (!) 59   Breath Sounds   RUL Breath Sounds Clear   RML Breath Sounds Clear   RLL Breath Sounds Diminished   KEVIN Breath Sounds Clear   LLL Breath Sounds Diminished   Oximetry   #Pulse Oximetry (Single Determination) Yes   Oxygen   Pulse Oximetry 92 %   O2 (LPM) 0   O2 Daily Delivery Respiratory  Room Air with O2 Available

## 2018-08-31 NOTE — PROGRESS NOTES
Discharging patient home per MD order.  Pt demonstrated understanding of discharge instructions, follow up appointments, home medications, prescriptions. Ambulating with assistance of FWW, voiding without difficulty, pain well controlled, tolerating oral medications, tolerating diet. Pt verbalized understanding of discharge instructions and educational handouts, all questions answered.  Pt discharged off unit with hospital escort at 1455.

## 2018-08-31 NOTE — PROGRESS NOTES
Report received at change of shift, care of pt assumed. Pt A&Ox4, resting comfortably in bed. Continues to report numbness to the RLE, dorsi/plantar flexion decreased. Distal pulse 2+. Surgical dressing in tact, scant drainage. Polar ice machine is in place. Pain is well controlled with oral medications. Tolerating regular diet with no nausea or vomiting. Plan of care discussed and pt instructed to call for any needs or concerns, and prior to getting up out of bed. Safety precautions in place, will continue to monitor.

## 2018-08-31 NOTE — CARE PLAN
Problem: Discharge Barriers/Planning  Goal: Patient's continuum of care needs will be met    Intervention: Assess potential discharge barriers on admission and throughout hospital stay  All discharge criteria met, neurovascular status intact

## 2018-08-31 NOTE — PROGRESS NOTES
Received report from [facility] OR. Assumed care. This pt is AOx4, reports pain, will medicate per MAR. Patient and RN discussed plan of care including PT/OT, IS use, pain management, possible discharge today: questions answered. Chart reviewed. Call light in place, fall precautions in place, patient educated on importance of calling for assistance. No additional needs at this time.

## 2018-08-31 NOTE — PROGRESS NOTES
Pain controlled, had issues with weakness from blocks now resolving  AVSS  Dressing c/d/i  Motor intact EHL/FHL  5/5 strength to pf foot, weakness bilaterally ankle DF  Good quad strength    S/p Rt THR  Block resolving  D/c home after PT

## 2018-08-31 NOTE — THERAPY
"Occupational Therapy Evaluation completed.   Functional Status: Pt is an 79 y/o female, admit for a R SHAUN revision. Pt lives with her  and son. Son is able to assist with care after d/c. Pt PLOF - I with AMB ADLS with the use of a device. Pt presents with Mild residual deficits in R LE stability from block, has minimal c/o pain with ADLS and functional mobility. Pt requires CGA with AE to complete LB self care, Set up for UB, CGA to complete functional transfers with vc for safe walker placement. Pt was educated regarding THP, DME, and AE . Pt was able to verbalize understanding and return demonstrate techniques. Pt will be seen for initial evaluation and treatment only, as she is functional in this setting.  Plan of Care: Patient with no further skilled OT needs in the acute care setting at this time  Discharge Recommendations:  Equipment: No Equipment Needed. Post-acute therapy Discharge to home with outpatient or home health for additional skilled therapy services.    See \"Rehab Therapy-Acute\" Patient Summary Report for complete documentation.    "

## 2018-08-31 NOTE — PROGRESS NOTES
"Total Joint Replacement Program Rounding     Inpatient bedside rounding completed to address quality of care provided by total joint replacement program IDT and overall patient experience at [facility].    Patient Satisfaction addressed. Patient/family updated on POC during hospital stay.  Thorough safety education completed including use of call light prior to all mobility throughout the entirety of the hospital stay. Also educated on ankle pumps and incentive inspirometry use to prevent post-op complications.    Much improved quad strength on RLE today with good muscle activation. Pt will work with PT & OT; anticipate DC to home today pending goal achievement.     No further questions/concerns at this time. Please see \"MyRounding\" for further details of rounding discussion. RN updated.         "

## 2018-08-31 NOTE — THERAPY
"Physical Therapy Treatment completed.   Bed Mobility:  Supine to Sit: Stand by Assist  Transfers: Sit to Stand: Stand by Assist  Gait: Level Of Assist: Stand by Assist with Front-Wheel Walker       Plan of Care: Patient with no further skilled PT needs in the acute care setting at this time  Discharge Recommendations: Equipment: No Equipment Needed. Post-acute therapy Discharge to home with outpatient or home health for additional skilled therapy services.     See \"Rehab Therapy-Acute\" Patient Summary Report for complete documentation.     Pt demonstrating increased knee control this date secondary to block wearing off.  Pt able to perform knee extension indep .  Pt able to stand w/o immobilizer and perform standing TE.  Immobilizer donned for gait.  Pt completed HEP using handout for cues.  Progressed HEP to include seated and standing TE.  Educated pt in swing through gait with erect stance.  Educated pt in stairs with bilat rails.  Pt stated she has a rail on the left and something stable she holds onto on the right.  Pt demonstrated safe gait on level surfaces and stairs.  Pt demonstrated knowledge of HEP with handout.  Pt able to recall hip precautions and maintained precautions with mobility.  No further skilled PT needs in acute care setting.  Pt scheduled for OPPT.  "

## 2018-08-31 NOTE — DISCHARGE INSTRUCTIONS
Discharge Instructions    Discharged to home by car with relative. Discharged via wheelchair, hospital escort: Yes.  Special equipment needed: Walker    Be sure to schedule a follow-up appointment with your primary care doctor or any specialists as instructed.     Discharge Plan:   Diet Plan: Discussed  Activity Level: Discussed  Confirmed Follow up Appointment: Appointment Scheduled  Confirmed Symptoms Management: Discussed  Medication Reconciliation Updated: Yes  Pneumococcal Vaccine Administered/Refused: Not given - Patient refused pneumococcal vaccine  Influenza Vaccine Indication: Not indicated: Previously immunized this influenza season and > 8 years of age    I understand that a diet low in cholesterol, fat, and sodium is recommended for good health. Unless I have been given specific instructions below for another diet, I accept this instruction as my diet prescription.   Other diet: regular as tolerated    Special Instructions: Discharge instructions for the Orthopedic Patient    Follow up with Primary Care Physician within 2 weeks of discharge to home, regarding:  Review of medications and diagnostic testing.  Surveillance for medical complications.  Workup and treatment of osteoporosis, if appropriate.     -Is this a Joint Replacement patient? Yes   Total Joint Hip Replacement Discharge Instructions    Pain  - The goal is to slowly wean off the prescription pain medicine.  - Ice can be used for pain control.  20 minutes at a time is recommended, and never directly against your skin or incision.  - Most patients are off the pain pills by 3 weeks; others may require a low level of pain medications for many months. If your pain continues to be severe, follow up with your physician.  Infection  Deep hip joint infections that require removal of the prostheses occur in less than 0.1% of patients. Lesser infections in the skin (cellulites) are more common and much more easily treated.  - Keep the incision as  clean and dry as possible.  - Always wash your hands before touching your incision.  - Skin infections tend to develop around 7-10 days after surgery, most can be treated with oral antibiotics.  - Dental Care should be delayed for 3 months after surgery, your surgeon recommends taking a dose of antibiotics 1 hour prior to any dental procedure.  After 2 years, most surgeons recommend antibiotics only before an extensive procedure.  Ask your surgeon what he recommends.  - Signs and symptoms of infection can include:  low grade fever, redness, pain, swelling and drainage from your incision.  Notify your surgeon immediately if you develop any of these symptoms.  Post op Disturbances  - Bowel habits - constipation is extremely common and is caused by a combination of anesthesia, lack of mobility and pain medicine.  Use stool softeners or laxatives if necessary. It is important not to ignore this problem, as bowel obstructions can be a serious complication after joint replacement surgery.  - Mood/Energy Level - Many patients experience a lack of energy and endurance for up to 2-3 months after surgery.  Some may also feel down and can even become depressed.  This is likely due to the postoperative anemia, change in activity level, lack of sleep, pain medicine and just the emotional reaction to the surgery itself that is a big disruption in a person’s life.  This usually passes.  If symptoms persist, follow up with your primary physician.  - Returning to work - Your surgeon will give you more specific instructions.  Generally, if you work a sedentary job requiring little standing or walking, most patients may return within 2-6 weeks.  Manual labor jobs involving walking, lifting and standing may take 3-4 months.  Your surgeon’s office can provide a release to part-time or light duty work early on in your recovery and progress you to full duty as able.  - Driving - You can begin driving an automatic shift car in 4 to 8  weeks, provided you are no longer taking narcotic pain medication. If you have a stick-shift car and your right hip was replaced, do not begin driving until your doctor says you can.   - Avoiding falls -  throw rugs and tack down loose carpeting.  Be aware of floor hazards such as pets, small objects or uneven surfaces.   -  Airport Metal Detectors - The sensitivity of metal detectors varies and it is likely that your prosthesis will cause an alarm. Inform the  that you have an artificial joint.  Diet  - Resume your normal diet as tolerated.  - It is important to achieve a healthy nutritional status by eating a well balanced diet on a regular basis.  - Your physician may recommend that you take iron and vitamin supplements.   - Continue to drink plenty of fluids.  Shower/Bathing  - You may shower as soon as you get home from the hospital unless otherwise instructed.  - Keep your incision out of water.  To keep the incision dry when showering, cover it with a plastic bag or plastic wrap.  - Pat incision dry if it gets wet.  Don’t rub.  - Do not submerge in a bath until staples are out and the incision is completely healed. (Approximately 6-8 weeks after surgery).  Dressing Change:  Procedure (if recommended by your physician)  - Wash hands.  - Open all dressing change materials.  - Remove old dressing and discard.  - Inspect incision for redness, increase in clear drainage, yellow/green drainage, odor and surrounding skin hot to touch.  -  ABD (large gauze) pad by one corner and lay over the incision.  Be careful not to touch the inside of the dressing that will lay over the incision.  - Secure in place as instructed (Ace wrap or tape).    Swelling/Bruising  - Swelling is normal after hip replacement and can involve the thigh, knee, calf and foot.  - Swelling can last from 3-6 months.  - Elevate your leg higher than your heart while reclining.  The first week you are home you should  elevate your leg an equal amount of time, as you are active.    - Anti-inflammatory pills can be taken once you have stopped the blood thinners.  - The swelling is usually worse after you go home since you are upright for longer periods of time.  - Bruising is common and can involve the entire leg including the thigh, calf and even foot.  Bruising often does not appear until after you arrive home and it can be quite dramatic- purple, black, green.  The bruising you can see is not usually concerning and will subside without any treatment.      Blood Clot Prevention  Blood clots in the legs and the less common, but frightening, clots that travel to the lungs are a real focus of our preventative. Most patients are at standard risk for them, but those patients who are at higher risk include people who have had previous clots, a family history of clotting, smoking, diabetes, obesity, advanced age, use of estrogen and a sedentary lifestyle.    - Signs of blood clots in legs - Swelling in thigh, calf or ankle that does not go down with elevation.  Pain, heat and tenderness in calf, back of calf or groin area.  NOTE: blood clots can occur in either leg.  - You have been receiving anticoagulant therapy (blood thinners) in the hospital and you may be instructed to continue at home depending on your risk factors.  - Your risk for developing a clot continues for up to 2-3 months after surgery.  You should avoid prolonged sitting and dehydration during that time (long air trips and car trips).  If you do take a trip during this time, please get up and move around every 1- 1.5 hours.  - If you are prescribed blood thinning medication for home, follow instructions as directed. (Handouts provided if applicable).      Activity    Once you get home, you should stay active. The key is not to overdo it! While you can expect some good days and some bad days, you should notice a gradual improvement over time you should notice a gradual  improvement and a gradual increase in your endurance over the next 6 to 12 months.    - Weight Bearing - If you have undergone cemented or hybrid hip replacement, you can put some weight on the leg immediately using a cane or walker, and you should continue to use some support for 4 to 6 weeks to help the muscles recover.   - Sleeping Positions - Sleep on your back with your legs slightly apart or on your side with a regular pillow between your knees. Be sure to use the pillow for at least 6 weeks, or until your doctor says you can do without it. Sleeping on your stomach should be all right  - Sitting - For at least the first 3 months, sit only in chairs that have arms. Do not sit on low chairs, low stools, or reclining chairs. Do not cross your legs at the knees. The physical therapist will show you how to sit and stand from a chair, keeping your affected leg out in front of you. Get up and move around on a regular basis--at least once every hour.  - Walking - Walk as much as you like once your doctor gives you the go-ahead, but remember that walking is no substitute for your prescribed exercises. Walking with a pair of trekking poles is helpful and adds as much as 40% to the exercise you get when you walk  - Therapy may be needed in some cases, to strengthen your muscles and improve your gait (walking pattern).  This decision will be made at your post-operative appointment.  Follow your therapist recommended post-operative exercises (handout provided by Therapist).  - Swimming is also recommended; you can begin as soon as the sutures have been removed and the wound is healed, approximately 6 to 8 weeks after surgery. Using a pair of training fins may make swimming a more enjoyable and effective exercise.  - Other activities - Lower impact activities are preferred.  If you have specific questions, consult your Surgeon.    - Sexual activity - Your surgeon can tell you when it should be safe to resume sexual  activity.      When to Call the Doctor   Call the physician if:   - Fever over 100.5? F  - Increased pain, drainage, redness, odor or heat around the incision area  - Shaking chills  - Increased knee pain with activity and rest  - Increased pain in calf, tenderness or redness above or below the knee  - Increased swelling of calf, ankle, foot  - Sudden increased shortness of breath, sudden onset of chest pain, localized chest pain with coughing  - Incision opening  Or, if there are any questions or concerns about medications or care.       -Is this patient being discharged with medication to prevent blood clots?  Yes, Aspirin Aspirin, ASA oral tablets  What is this medicine?  ASPIRIN (AS pir in) is a pain reliever. It is used to treat mild pain and fever. This medicine is also used as directed by a doctor to prevent and to treat heart attacks, to prevent strokes, and to treat arthritis or inflammation.  This medicine may be used for other purposes; ask your health care provider or pharmacist if you have questions.  COMMON BRAND NAME(S): Aspir-Low, Aspir-Puja, Aspirtab, Gray Advanced Aspirin, Gray Aspirin, Gray Aspirin Extra Strength, Gray Aspirin Plus, Gray Extra Strength, Gray Extra Strength Plus, Gray Genuine Aspirin, Gray Womens Aspirin, Bufferin, Bufferin Extra Strength, Bufferin Low Dose  What should I tell my health care provider before I take this medicine?  They need to know if you have any of these conditions:  -anemia  -asthma  -bleeding problems  -child with chickenpox, the flu, or other viral infection  -diabetes  -gout  -if you frequently drink alcohol containing drinks  -kidney disease  -liver disease  -low level of vitamin K  -lupus  -smoke tobacco  -stomach ulcers or other problems  -an unusual or allergic reaction to aspirin, tartrazine dye, other medicines, dyes, or preservatives  -pregnant or trying to get pregnant  -breast-feeding  How should I use this medicine?  Take this medicine by  mouth with a glass of water. Follow the directions on the package or prescription label. You can take this medicine with or without food. If it upsets your stomach, take it with food. Do not take your medicine more often than directed.  Talk to your pediatrician regarding the use of this medicine in children. While this drug may be prescribed for children as young as 12 years of age for selected conditions, precautions do apply. Children and teenagers should not use this medicine to treat chicken pox or flu symptoms unless directed by a doctor.  Patients over 65 years old may have a stronger reaction and need a smaller dose.  Overdosage: If you think you have taken too much of this medicine contact a poison control center or emergency room at once.  NOTE: This medicine is only for you. Do not share this medicine with others.  What if I miss a dose?  If you are taking this medicine on a regular schedule and miss a dose, take it as soon as you can. If it is almost time for your next dose, take only that dose. Do not take double or extra doses.  What may interact with this medicine?  Do not take this medicine with any of the following medications:  -cidofovir  -ketorolac  -probenecid  This medicine may also interact with the following medications:  -alcohol  -alendronate  -bismuth subsalicylate  -flavocoxid  -herbal supplements like feverfew, garlic, atiya, ginkgo biloba, horse chestnut  -medicines for diabetes or glaucoma like acetazolamide, methazolamide  -medicines for gout  -medicines that treat or prevent blood clots like enoxaparin, heparin, ticlopidine, warfarin  -other aspirin and aspirin-like medicines  -NSAIDs, medicines for pain and inflammation, like ibuprofen or naproxen  -pemetrexed  -sulfinpyrazone  -varicella live vaccine  This list may not describe all possible interactions. Give your health care provider a list of all the medicines, herbs, non-prescription drugs, or dietary supplements you use. Also  tell them if you smoke, drink alcohol, or use illegal drugs. Some items may interact with your medicine.  What should I watch for while using this medicine?  If you are treating yourself for pain, tell your doctor or health care professional if the pain lasts more than 10 days, if it gets worse, or if there is a new or different kind of pain. Tell your doctor if you see redness or swelling. Also, check with your doctor if you have a fever that lasts for more than 3 days. Only take this medicine to prevent heart attacks or blood clotting if prescribed by your doctor or health care professional.  Do not take aspirin or aspirin-like medicines with this medicine. Too much aspirin can be dangerous. Always read the labels carefully.  This medicine can irritate your stomach or cause bleeding problems. Do not smoke cigarettes or drink alcohol while taking this medicine. Do not lie down for 30 minutes after taking this medicine to prevent irritation to your throat.  If you are scheduled for any medical or dental procedure, tell your healthcare provider that you are taking this medicine. You may need to stop taking this medicine before the procedure.  This medicine may be used to treat migraines. If you take migraine medicines for 10 or more days a month, your migraines may get worse. Keep a diary of headache days and medicine use. Contact your healthcare professional if your migraine attacks occur more frequently.  What side effects may I notice from receiving this medicine?  Side effects that you should report to your doctor or health care professional as soon as possible:  -allergic reactions like skin rash, itching or hives, swelling of the face, lips, or tongue  -breathing problems  -changes in hearing, ringing in the ears  -confusion  -general ill feeling or flu-like symptoms  -pain on swallowing  -redness, blistering, peeling or loosening of the skin, including inside the mouth or nose  -signs and symptoms of bleeding  such as bloody or black, tarry stools; red or dark-brown urine; spitting up blood or brown material that looks like coffee grounds; red spots on the skin; unusual bruising or bleeding from the eye, gums, or nose  -trouble passing urine or change in the amount of urine  -unusually weak or tired  -yellowing of the eyes or skin  Side effects that usually do not require medical attention (report to your doctor or health care professional if they continue or are bothersome):  -diarrhea or constipation  -headache  -nausea, vomiting  -stomach gas, heartburn  This list may not describe all possible side effects. Call your doctor for medical advice about side effects. You may report side effects to FDA at 7-452-EOY-2805.  Where should I keep my medicine?  Keep out of the reach of children.  Store at room temperature between 15 and 30 degrees C (59 and 86 degrees F). Protect from heat and moisture. Do not use this medicine if it has a strong vinegar smell. Throw away any unused medicine after the expiration date.  NOTE: This sheet is a summary. It may not cover all possible information. If you have questions about this medicine, talk to your doctor, pharmacist, or health care provider.  © 2018 [employer] (2014-08-19 11:30:31)      · Is patient discharged on Warfarin / Coumadin?   No     Depression / Suicide Risk    As you are discharged from this [facility] facility, it is important to learn how to keep safe from harming yourself.    Recognize the warning signs:  · Abrupt changes in personality, positive or negative- including increase in energy   · Giving away possessions  · Change in eating patterns- significant weight changes-  positive or negative  · Change in sleeping patterns- unable to sleep or sleeping all the time   · Unwillingness or inability to communicate  · Depression  · Unusual sadness, discouragement and loneliness  · Talk of wanting to die  · Neglect of personal appearance   · Rebelliousness-  reckless behavior  · Withdrawal from people/activities they love  · Confusion- inability to concentrate     If you or a loved one observes any of these behaviors or has concerns about self-harm, here's what you can do:  · Talk about it- your feelings and reasons for harming yourself  · Remove any means that you might use to hurt yourself (examples: pills, rope, extension cords, firearm)  · Get professional help from the community (Mental Health, Substance Abuse, psychological counseling)  · Do not be alone:Call your Safe Contact- someone whom you trust who will be there for you.  · Call your local CRISIS HOTLINE 794-2829 or 982-524-0064  · Call your local Children's Mobile Crisis Response Team [place] (987) 229-9498 or www.Biosceptre  · Call the toll free National Suicide Prevention Hotlines   · National Suicide Prevention Lifeline 242-293-SVZX (0523)  · National Hope Line Network 800-SUICIDE (477-6701)

## 2018-08-31 NOTE — CARE PLAN
Problem: Pain Management  Goal: Pain level will decrease to patient's comfort goal  Outcome: PROGRESSING AS EXPECTED  Assessing pain level during hourly rounding. Pain level well controlled with Tylenol, Tramadol, and Ice.     Problem: Mobility  Goal: Risk for activity intolerance will decrease  Outcome: PROGRESSING AS EXPECTED  Pt continuing to have numbness/tingling to RLE from the block, difficulty mobilizing. Pt able to stand pivot to commode.

## 2018-08-31 NOTE — THERAPY
"Physical Therapy Evaluation completed.   Bed Mobility:     Transfers: Sit to Stand: Minimal Assist  Gait: Level Of Assist: Unable to Participate with Front-Wheel Walker       Plan of Care: Will benefit from Physical Therapy 7 times per week  Discharge Recommendations: Equipment: No Equipment Needed. Post-acute therapy Currently anticipate no further skilled therapy needs once patient is discharged from the inpatient setting.    See \"Rehab Therapy-Acute\" Patient Summary Report for complete documentation.   Pt is an 78 y.o.f. 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 Pt states she had the original hip surgery 2 years ago.  She has dislocated 4 times in 2 years.  Pt presents with block still in place.  Pt is not able to perform any active knee extension.  Mild df returned.  Pt has some active hip flex.  Educated pt in ice and elvation at home.  Educated pt in hip precautions.  Educated pt in HEP and transfers.  No gait performed secondary to lack of knee control on right.  Pt would benefit from cont skilled PT in acute care setting to progress with HEP, gait, stairs and education in precautions.  "

## 2018-09-01 LAB
BACTERIA TISS AEROBE CULT: NORMAL
BACTERIA WND AEROBE CULT: NORMAL
GRAM STN SPEC: NORMAL
GRAM STN SPEC: NORMAL
SIGNIFICANT IND 70042: NORMAL
SIGNIFICANT IND 70042: NORMAL
SITE SITE: NORMAL
SITE SITE: NORMAL
SOURCE SOURCE: NORMAL
SOURCE SOURCE: NORMAL

## 2018-09-03 LAB
BACTERIA SPEC ANAEROBE CULT: NORMAL
BACTERIA SPEC ANAEROBE CULT: NORMAL
SIGNIFICANT IND 70042: NORMAL
SIGNIFICANT IND 70042: NORMAL
SITE SITE: NORMAL
SITE SITE: NORMAL
SOURCE SOURCE: NORMAL
SOURCE SOURCE: NORMAL

## 2018-11-08 ENCOUNTER — OFFICE VISIT (OUTPATIENT)
Dept: MEDICAL GROUP | Facility: MEDICAL CENTER | Age: 78
End: 2018-11-08
Payer: MEDICARE

## 2018-11-08 VITALS
DIASTOLIC BLOOD PRESSURE: 76 MMHG | BODY MASS INDEX: 24.24 KG/M2 | WEIGHT: 145.5 LBS | OXYGEN SATURATION: 94 % | HEART RATE: 76 BPM | HEIGHT: 65 IN | RESPIRATION RATE: 20 BRPM | SYSTOLIC BLOOD PRESSURE: 146 MMHG | TEMPERATURE: 97.7 F

## 2018-11-08 DIAGNOSIS — F41.9 ANXIETY: ICD-10-CM

## 2018-11-08 DIAGNOSIS — R60.0 BILATERAL EDEMA OF LOWER EXTREMITY: ICD-10-CM

## 2018-11-08 DIAGNOSIS — I10 ESSENTIAL HYPERTENSION: ICD-10-CM

## 2018-11-08 DIAGNOSIS — S73.004D HIP DISLOCATION, RIGHT, SUBSEQUENT ENCOUNTER: ICD-10-CM

## 2018-11-08 DIAGNOSIS — F39 MOOD DISORDER (HCC): ICD-10-CM

## 2018-11-08 DIAGNOSIS — M85.80 OSTEOPENIA, UNSPECIFIED LOCATION: ICD-10-CM

## 2018-11-08 DIAGNOSIS — E78.00 PURE HYPERCHOLESTEROLEMIA: ICD-10-CM

## 2018-11-08 DIAGNOSIS — Z23 NEED FOR VACCINATION: ICD-10-CM

## 2018-11-08 PROBLEM — J98.01 COUGH DUE TO BRONCHOSPASM: Status: RESOLVED | Noted: 2017-10-10 | Resolved: 2018-11-08

## 2018-11-08 PROCEDURE — 90662 IIV NO PRSV INCREASED AG IM: CPT | Performed by: INTERNAL MEDICINE

## 2018-11-08 PROCEDURE — 99214 OFFICE O/P EST MOD 30 MIN: CPT | Mod: 25 | Performed by: INTERNAL MEDICINE

## 2018-11-08 PROCEDURE — G0008 ADMIN INFLUENZA VIRUS VAC: HCPCS | Performed by: INTERNAL MEDICINE

## 2018-11-08 RX ORDER — DIAZEPAM 5 MG/1
5 TABLET ORAL EVERY 12 HOURS PRN
Qty: 30 TAB | Refills: 0 | Status: SHIPPED
Start: 2018-11-08 | End: 2018-12-08

## 2018-11-08 NOTE — PROGRESS NOTES
Chief Complaint   Patient presents with   • Surgery     R hip, follow up     Chief complaint: 3-month follow-up of hypertension, edema, hip surgery.    HISTORY OF PRESENT ILLNESS: Patient is a 78 y.o. female patient who presents today to discuss the evaluation and management of:          1. Need for vaccination    Requesting flu shot    2. Hip dislocation, right, subsequent encounter    When I saw the patient in August, she had partially dislocated her hip replacement.  She underwent revision at the end of August.  She is doing well with her rehab, she continues to go to physical therapy twice a week.  She is no longer taking pain medication.    3. Mood disorder (HCC)    Patient struggles daily due to dealing with her  who is challenging both psychologically, and with his health issues.  She has never been diagnosed with depression, and has never taken medication for depression.  She admits to becoming angry with him, and frustrated with him on a daily basis.  She is not tearful or despondent.  She is very open to counseling, and ideally would like to go with him.    4. Anxiety    Patient has Valium 5 mg which she takes infrequently.   was checked, she received 60 tablets in July 2017 and they have not been refilled.    5. Bilateral edema of lower extremity    Patient was evaluated by vein specialist, she is scheduled to undergo procedure in early January bilaterally.  In the interim, she is wearing compression stockings which her son helps her to take on and remove daily.    6. Pure hypercholesterolemia    Patient has been taking atorvastatin 20 mg daily for primary prevention.  She has no history of stroke or heart attack.    7. Essential hypertension    Patient's blood pressure is reasonably controlled on metoprolol 100 mg twice daily and triamterene/hydrochlorothiazide 1 daily.    8. Osteopenia, unspecified location    It is been several years since patient's last DEXA scan.  She would be willing to  repeat it in the future.        Patient Active Problem List    Diagnosis Date Noted   • Mood disorder (HCC) 11/08/2018   • Hip dislocation, right, subsequent encounter 08/14/2018   • Anxiety 07/26/2017   • Bilateral edema of lower extremity 07/26/2017   • Essential hypertension    • Osteopenia    • Hyperlipidemia         Allergies:Other environmental    Current meds including changes today  Current Outpatient Prescriptions   Medication Sig Dispense Refill   • diazePAM (VALIUM) 5 MG Tab Take 1 Tab by mouth every 12 hours as needed for Anxiety for up to 30 days. 30 Tab 0   • triamterene/hctz (MAXZIDE-25/DYAZIDE) 37.5-25 MG Cap Take 1 Cap by mouth every morning. 30 Cap 5   • metoprolol (LOPRESSOR) 100 MG Tab Take 1 Tab by mouth 2 times a day. 180 Tab 5   • acetaminophen (TYLENOL) 500 MG Tab Take 2 Tabs by mouth every 6 hours. (Patient not taking: Reported on 11/8/2018) 30 Tab 0   • aspirin  MG Tablet Delayed Response Take 1 Tab by mouth 2 times a day. (Patient not taking: Reported on 11/8/2018) 60 Tab 0   • ipratropium-albuterol (COMBIVENT RESPIMAT)  MCG/ACT Aero Soln Inhale 1 Puff by mouth 4 times a day. (Patient taking differently: Inhale 1 Puff by mouth as needed.) 1 Inhaler 2     No current facility-administered medications for this visit.      Social History   Substance Use Topics   • Smoking status: Never Smoker   • Smokeless tobacco: Never Used   • Alcohol use No     Social History     Social History Narrative   • No narrative on file       Family History   Problem Relation Age of Onset   • Heart Disease Father    • Hyperlipidemia Father    • Hypertension Father    • Heart Disease Brother    • Hypertension Mother    • Hyperlipidemia Mother        Review of Systems:  No chest pain, No shortness of breath, No dyspnea on exertion  Gastrointestinal ROS: No abdominal pain, No nausea, vomiting, diarrhea, or constipation        Exam:      Blood pressure 146/76, pulse 76, temperature 36.5 °C (97.7 °F),  "temperature source Temporal, resp. rate 20, height 1.651 m (5' 5\"), weight 66 kg (145 lb 8.1 oz), SpO2 94 %, not currently breastfeeding.  General:  Well nourished, well developed female in NAD affect and mood within normal limits  Head is grossly normal.  Neck: Supple without adenopathy  Pulmonary: Clear to ausculation.  Normal effort. No rales, rhonchi, or wheezing.  Cardiovascular: Regular rate and rhythm without murmur.   Extremities: no clubbing, cyanosis,, 1-2+ edema, compression stockings on.  Neuro: moves all extremities symmetrically    Please note that this dictation was created using voice recognition software. I have made every reasonable attempt to correct obvious errors, but I expect that there are errors of grammar and possibly content that I did not discover before finalizing the note.    Assessment/Plan:  1. Need for vaccination      - INFLUENZA VACCINE, HIGH DOSE (65+ ONLY)    2. Hip dislocation, right, subsequent encounter    Doing well with PT    3. Mood disorder (HCC)    -If patient is not able have counseling through her insurance, and she remains depressed, will discuss starting medication when I see her in 3 months.  - REFERRAL TO BEHAVIORAL HEALTH    4. Anxiety      - diazePAM (VALIUM) 5 MG Tab; Take 1 Tab by mouth every 12 hours as needed for Anxiety for up to 30 days.  Dispense: 30 Tab; Refill: 0    5. Bilateral edema of lower extremity    Scheduled for vein surgery    6. Pure hypercholesterolemia    Discussed that there is no evidence that continuing statins after age 80 is beneficial as primary prevention.  Patient is willing to discontinue.    7. Essential hypertension    Reasonable control of blood pressure, continue current medications.    8. Osteopenia, unspecified location    Plan on ordering DEXA this spring when patient has recovered from her vein surgery and has finished PT for her hip.    Followup: Return in about 3 months (around 2/8/2019).        "

## 2018-12-17 DIAGNOSIS — I10 ESSENTIAL HYPERTENSION: ICD-10-CM

## 2018-12-17 RX ORDER — METOPROLOL TARTRATE 100 MG/1
100 TABLET ORAL 2 TIMES DAILY
Qty: 180 TAB | Refills: 5 | Status: ON HOLD | OUTPATIENT
Start: 2018-12-17 | End: 2019-06-20

## 2018-12-17 RX ORDER — TRIAMTERENE AND HYDROCHLOROTHIAZIDE 37.5; 25 MG/1; MG/1
1 CAPSULE ORAL EVERY MORNING
Qty: 90 CAP | Refills: 1 | Status: SHIPPED | OUTPATIENT
Start: 2018-12-17 | End: 2019-01-31

## 2018-12-28 ENCOUNTER — OFFICE VISIT (OUTPATIENT)
Dept: MEDICAL GROUP | Facility: MEDICAL CENTER | Age: 78
End: 2018-12-28
Payer: MEDICARE

## 2018-12-28 VITALS
HEART RATE: 104 BPM | HEIGHT: 65 IN | TEMPERATURE: 99.8 F | WEIGHT: 145.8 LBS | OXYGEN SATURATION: 94 % | DIASTOLIC BLOOD PRESSURE: 66 MMHG | SYSTOLIC BLOOD PRESSURE: 146 MMHG | BODY MASS INDEX: 24.29 KG/M2 | RESPIRATION RATE: 20 BRPM

## 2018-12-28 DIAGNOSIS — R05.9 COUGH: ICD-10-CM

## 2018-12-28 DIAGNOSIS — J02.9 SORE THROAT: ICD-10-CM

## 2018-12-28 LAB
INT CON NEG: NEGATIVE
INT CON POS: POSITIVE
S PYO AG THROAT QL: NORMAL

## 2018-12-28 PROCEDURE — 99213 OFFICE O/P EST LOW 20 MIN: CPT | Performed by: INTERNAL MEDICINE

## 2018-12-28 PROCEDURE — 87880 STREP A ASSAY W/OPTIC: CPT | Performed by: INTERNAL MEDICINE

## 2018-12-28 RX ORDER — PROMETHAZINE HYDROCHLORIDE, PHENYLEPHRINE HYDROCHLORIDE AND CODEINE PHOSPHATE 6.25; 5; 1 MG/5ML; MG/5ML; MG/5ML
5 SOLUTION ORAL EVERY 8 HOURS PRN
Qty: 200 ML | Refills: 0 | Status: SHIPPED | OUTPATIENT
Start: 2018-12-28 | End: 2019-01-04

## 2018-12-28 NOTE — PROGRESS NOTES
Chief Complaint   Patient presents with   • Cough     congestion,sore throat x 3 days     Chief complaint: Cough and sore throat times 3 days    HISTORY OF PRESENT ILLNESS: Patient is a 78 y.o. female patient who presents today to discuss the evaluation and management of:          1. Cough    Patient developed cough, malaise, and sore throat on [employer] (3 days ago).  She took over-the-counter cough syrup without much relief.  She has been unable to sleep much the last 2 nights due to her coughing.  She had a difficult time swallowing yesterday because her throat was so sore.  She has had no fever that she knows of.  She has had no significant sinus congestion or purulent sputum or sinus drainage.    2. Sore throat    See above        Patient Active Problem List    Diagnosis Date Noted   • Cough 12/28/2018   • Sore throat 12/28/2018   • Mood disorder (HCC) 11/08/2018   • Hip dislocation, right, subsequent encounter 08/14/2018   • Anxiety 07/26/2017   • Bilateral edema of lower extremity 07/26/2017   • Essential hypertension    • Osteopenia    • Hyperlipidemia         Allergies:Other environmental    Current meds including changes today  Current Outpatient Prescriptions   Medication Sig Dispense Refill   • Promethazine-Phenyleph-Codeine (PHENERGAN VC CODEINE) 6.25-5-10 MG/5ML Syrup Take 5 mL by mouth every 8 hours as needed for up to 7 days. 200 mL 0   • metoprolol (LOPRESSOR) 100 MG Tab Take 1 Tab by mouth 2 times a day. 180 Tab 5   • triamterene/hctz (MAXZIDE-25/DYAZIDE) 37.5-25 MG Cap Take 1 Cap by mouth every morning. 90 Cap 1   • acetaminophen (TYLENOL) 500 MG Tab Take 2 Tabs by mouth every 6 hours. (Patient not taking: Reported on 11/8/2018) 30 Tab 0   • ipratropium-albuterol (COMBIVENT RESPIMAT)  MCG/ACT Aero Soln Inhale 1 Puff by mouth 4 times a day. (Patient taking differently: Inhale 1 Puff by mouth as needed.) 1 Inhaler 2     No current facility-administered medications for this visit.   "    Social History   Substance Use Topics   • Smoking status: Never Smoker   • Smokeless tobacco: Never Used   • Alcohol use No     Social History     Social History Narrative   • No narrative on file       Family History   Problem Relation Age of Onset   • Heart Disease Father    • Hyperlipidemia Father    • Hypertension Father    • Heart Disease Brother    • Hypertension Mother    • Hyperlipidemia Mother              Exam:      Blood pressure 146/66, pulse (!) 104, temperature 37.7 °C (99.8 °F), temperature source Temporal, resp. rate 20, height 1.651 m (5' 5\"), weight 66.1 kg (145 lb 12.8 oz), SpO2 94 %, not currently breastfeeding.  General:  Well nourished, well developed female in NAD affect and mood within normal limits  Head is grossly normal.  Oropharynx injected no exudate  Neck: Tender enlarged anterior cervical nodes bilaterally.  Pulmonary: Clear to ausculation.  Normal effort. No rales, rhonchi, or wheezing.  Cardiovascular: Regular rate and rhythm without murmur.   Extremities: no clubbing, cyanosis, or edema.  Neuro: moves all extremities symmetrically    Please note that this dictation was created using voice recognition software. I have made every reasonable attempt to correct obvious errors, but I expect that there are errors of grammar and possibly content that I did not discover before finalizing the note.    Assessment/Plan:  1. Cough    -Likely viral URI with persistent cough interfering with sleep.  - Promethazine-Phenyleph-Codeine (PHENERGAN VC CODEINE) 6.25-5-10 MG/5ML Syrup; Take 5 mL by mouth every 8 hours as needed for up to 7 days.  Dispense: 200 mL; Refill: 0    2. Sore throat    -Rapid strep negative, patient will continue to treat symptomatically with OTC Advil, warm tea with honey, salt water gargles.  - POCT Rapid Strep A    Followup: Patient has pre-existing follow-up in February 2019.       "

## 2019-01-31 DIAGNOSIS — I10 ESSENTIAL HYPERTENSION: ICD-10-CM

## 2019-01-31 RX ORDER — TRIAMTERENE AND HYDROCHLOROTHIAZIDE 37.5; 25 MG/1; MG/1
CAPSULE ORAL
Qty: 90 CAP | Refills: 1 | Status: SHIPPED | OUTPATIENT
Start: 2019-01-31 | End: 2019-09-03 | Stop reason: SDUPTHER

## 2019-02-12 ENCOUNTER — OFFICE VISIT (OUTPATIENT)
Dept: MEDICAL GROUP | Facility: MEDICAL CENTER | Age: 79
End: 2019-02-12
Payer: MEDICARE

## 2019-02-12 VITALS
SYSTOLIC BLOOD PRESSURE: 160 MMHG | TEMPERATURE: 99.1 F | RESPIRATION RATE: 20 BRPM | HEIGHT: 65 IN | BODY MASS INDEX: 24.16 KG/M2 | OXYGEN SATURATION: 95 % | DIASTOLIC BLOOD PRESSURE: 68 MMHG | HEART RATE: 72 BPM | WEIGHT: 145 LBS

## 2019-02-12 DIAGNOSIS — F41.9 ANXIETY: ICD-10-CM

## 2019-02-12 DIAGNOSIS — S73.004D HIP DISLOCATION, RIGHT, SUBSEQUENT ENCOUNTER: ICD-10-CM

## 2019-02-12 DIAGNOSIS — I87.2 VENOUS INSUFFICIENCY OF BOTH LOWER EXTREMITIES: ICD-10-CM

## 2019-02-12 DIAGNOSIS — F39 MOOD DISORDER (HCC): ICD-10-CM

## 2019-02-12 DIAGNOSIS — E78.00 PURE HYPERCHOLESTEROLEMIA: ICD-10-CM

## 2019-02-12 DIAGNOSIS — I10 ESSENTIAL HYPERTENSION: ICD-10-CM

## 2019-02-12 PROBLEM — R60.0 BILATERAL EDEMA OF LOWER EXTREMITY: Status: RESOLVED | Noted: 2017-07-26 | Resolved: 2019-02-12

## 2019-02-12 PROBLEM — J02.9 SORE THROAT: Status: RESOLVED | Noted: 2018-12-28 | Resolved: 2019-02-12

## 2019-02-12 PROBLEM — R05.9 COUGH: Status: RESOLVED | Noted: 2018-12-28 | Resolved: 2019-02-12

## 2019-02-12 PROCEDURE — 99214 OFFICE O/P EST MOD 30 MIN: CPT | Performed by: INTERNAL MEDICINE

## 2019-02-12 RX ORDER — ATORVASTATIN CALCIUM 20 MG/1
20 TABLET, FILM COATED ORAL DAILY
Qty: 90 TAB | Refills: 2 | Status: SHIPPED | OUTPATIENT
Start: 2019-02-12 | End: 2019-05-22 | Stop reason: SDUPTHER

## 2019-02-12 ASSESSMENT — PATIENT HEALTH QUESTIONNAIRE - PHQ9: CLINICAL INTERPRETATION OF PHQ2 SCORE: 0

## 2019-02-12 NOTE — PROGRESS NOTES
Chief Complaint   Patient presents with   • Hypertension     follow up     Chief complaint: 3-month follow-up of depression/anxiety, hypertension    HISTORY OF PRESENT ILLNESS: Patient is a 79 y.o. female patient who presents today to discuss the evaluation and management of:          1. Venous insufficiency of both lower extremities    Patient had vein ablation done at [facility] for lower extremity edema and pain.  Her symptoms are improved, but she was told that she needed to have her posterior veins treated as well.  That will be coming up next month.  She continues to wear compression stockings.    2. Hip dislocation, right, subsequent encounter    Patient re-dislocated her hip this fall, she continues with therapy and follow-up with the orthopedic surgeon.  She is still using a walker.  She has not fallen.    3. Mood disorder (HCC)    Patient is doing much better the last few weeks.  The vast majority of her stress and anxiety is due to caring for her  who is chronically ill.  He has been in the hospital then rehab for the last 6 weeks, therefore her home life is much better.  Her son has been pitching in around the house helping with cooking and cleaning.  She is not currently on any medication, and scores low on the depression scale.  She has not taken Valium for 2 weeks.    4. Essential hypertension    Patient is taking metoprolol 100 mg twice a day, and triamterene/hydrochlorothiazide daily.  Her BP at home has been fine, it is a little elevated currently.  Metabolic panel in August was normal    5. Pure hypercholesterolemia    Patient is taking atorvastatin 20 mg daily his primary prevention for vascular disease.  Her LDL was 165 in 2010.  Last lipid panel August 2017 LDL was 106.    6. Anxiety    As above, patient's anxiety is much improved.  She does not need a refill on her Valium        Patient Active Problem List    Diagnosis Date Noted   • Venous insufficiency of both lower extremities  "02/12/2019   • Mood disorder (HCC) 11/08/2018   • Hip dislocation, right, subsequent encounter 08/14/2018   • Anxiety 07/26/2017   • Essential hypertension    • Osteopenia    • Hyperlipidemia         Allergies:Other environmental    Current meds including changes today  Current Outpatient Prescriptions   Medication Sig Dispense Refill   • atorvastatin (LIPITOR) 20 MG Tab Take 1 Tab by mouth every day. 90 Tab 2   • triamterene/hctz (MAXZIDE-25/DYAZIDE) 37.5-25 MG Cap TAKE ONE CAPSULE BY MOUTH EVERY MORNING 90 Cap 1   • metoprolol (LOPRESSOR) 100 MG Tab Take 1 Tab by mouth 2 times a day. 180 Tab 5   • acetaminophen (TYLENOL) 500 MG Tab Take 2 Tabs by mouth every 6 hours. (Patient not taking: Reported on 11/8/2018) 30 Tab 0   • ipratropium-albuterol (COMBIVENT RESPIMAT)  MCG/ACT Aero Soln Inhale 1 Puff by mouth 4 times a day. (Patient taking differently: Inhale 1 Puff by mouth as needed.) 1 Inhaler 2     No current facility-administered medications for this visit.      Social History   Substance Use Topics   • Smoking status: Never Smoker   • Smokeless tobacco: Never Used   • Alcohol use No     Social History     Social History Narrative   • No narrative on file       Family History   Problem Relation Age of Onset   • Heart Disease Father    • Hyperlipidemia Father    • Hypertension Father    • Heart Disease Brother    • Hypertension Mother    • Hyperlipidemia Mother        Review of Systems:  No chest pain, No shortness of breath, No dyspnea on exertion  Gastrointestinal ROS: No abdominal pain, No nausea, vomiting, diarrhea, or constipation        Exam:      Blood pressure 160/68, pulse 72, temperature 37.3 °C (99.1 °F), temperature source Temporal, resp. rate 20, height 1.651 m (5' 5\"), weight 65.8 kg (145 lb), SpO2 95 %, not currently breastfeeding.  General:  Well nourished, well developed female in NAD affect and mood within normal limits  Head is grossly normal.  Neck: Supple without adenopathy  Pulmonary: " Clear to ausculation.  Normal effort. No rales, rhonchi, or wheezing.  Cardiovascular: Regular rate and rhythm without murmur.   Extremities: no clubbing, cyanosis, 1+ puffy ankle and pedal edema  Neuro: moves all extremities symmetrically    Please note that this dictation was created using voice recognition software. I have made every reasonable attempt to correct obvious errors, but I expect that there are errors of grammar and possibly content that I did not discover before finalizing the note.    Assessment/Plan:  1. Venous insufficiency of both lower extremities    Patient has follow-up procedure scheduled in March    2. Hip dislocation, right, subsequent encounter    Continue therapy and Ortho follow-up    3. Mood disorder (HCC)    At this time, patient is much improved and does not require any further intervention    4. Essential hypertension    Stable, continue current medications    5. Pure hypercholesterolemia    Discussed that patient may not be benefiting from statin therapy for primary prevention, she prefers to continue for now    6. Anxiety    Stable, continue as needed Valium    Discussed patient with , she will see patient going forward after I leave this summer.    Followup: Return in about 4 months (around 6/12/2019).

## 2019-04-21 ENCOUNTER — APPOINTMENT (OUTPATIENT)
Dept: RADIOLOGY | Facility: MEDICAL CENTER | Age: 79
End: 2019-04-21
Attending: EMERGENCY MEDICINE
Payer: MEDICARE

## 2019-04-21 ENCOUNTER — HOSPITAL ENCOUNTER (EMERGENCY)
Facility: MEDICAL CENTER | Age: 79
End: 2019-04-21
Attending: EMERGENCY MEDICINE
Payer: MEDICARE

## 2019-04-21 VITALS
HEIGHT: 65 IN | DIASTOLIC BLOOD PRESSURE: 70 MMHG | HEART RATE: 75 BPM | OXYGEN SATURATION: 98 % | RESPIRATION RATE: 18 BRPM | BODY MASS INDEX: 24.13 KG/M2 | TEMPERATURE: 97.8 F | SYSTOLIC BLOOD PRESSURE: 138 MMHG

## 2019-04-21 DIAGNOSIS — S90.222A CONTUSION OF TOENAIL OF LEFT FOOT, INITIAL ENCOUNTER: ICD-10-CM

## 2019-04-21 PROCEDURE — 99284 EMERGENCY DEPT VISIT MOD MDM: CPT

## 2019-04-21 PROCEDURE — 73660 X-RAY EXAM OF TOE(S): CPT | Mod: LT

## 2019-04-21 RX ORDER — BACITRACIN ZINC AND POLYMYXIN B SULFATE 500; 1000 [USP'U]/G; [USP'U]/G
OINTMENT TOPICAL ONCE
Status: DISCONTINUED | OUTPATIENT
Start: 2019-04-21 | End: 2019-04-21 | Stop reason: HOSPADM

## 2019-04-21 ASSESSMENT — PAIN DESCRIPTION - DESCRIPTORS: DESCRIPTORS: ACHING

## 2019-04-21 NOTE — ED PROVIDER NOTES
ED Provider Note    Scribed for Dave De La Garza M.D. by Dave De La Garza. 4/21/2019  10:29 AM    CHIEF COMPLAINT  Chief Complaint   Patient presents with   • Foot Pain   • Laceration       HPI  Priscilla Hdz is a 79 y.o. female who presents to the Emergency Room after striking her left great toe against a shower chair earlier this morning.  She reports a moderate amount of bleeding at the time, which is controlled on arrival.  She is concerned because she broke her toe 2 years ago.  She did not fall or hurt any other part of her body.  She denies any recent illness including fevers or chills or nausea or vomiting or chest pain or shortness of breath.  She reports that the toenail of her affected great toe had to be removed by podiatry, and has been problematic since, with significant disfiguration.  REVIEW OF SYSTEMS  See HPI for further details.    PAST MEDICAL HISTORY   has a past medical history of Arthritis; ASTHMA; Cataract; High cholesterol; Hyperlipidemia; Hypertension; Menopause; and Osteopenia.    SOCIAL HISTORY  Social History     Social History Main Topics   • Smoking status: Never Smoker   • Smokeless tobacco: Never Used   • Alcohol use No   • Drug use: No   • Sexual activity: Yes     Partners: Male       SURGICAL HISTORY   has a past surgical history that includes cataract extraction with iol (Bilateral, 2012); hip arthroplasty total (Right, 8/9/2016); and hip revision total (Right, 8/29/2018).    CURRENT MEDICATIONS  Home Medications     Reviewed by Elsa Vidal (Pharmacy Tech) on 04/21/19 at 1106  Med List Status: Complete   Medication Last Dose Status   atorvastatin (LIPITOR) 20 MG Tab 4/20/2019 Active   metoprolol (LOPRESSOR) 100 MG Tab 4/20/2019 Active   triamterene/hctz (MAXZIDE-25/DYAZIDE) 37.5-25 MG Cap 4/20/2019 Active                ALLERGIES  Allergies   Allergen Reactions   • Other Environmental Cough     Pine, Dust       PHYSICAL EXAM  VITAL SIGNS: /66   Pulse 78    "Temp 36.6 °C (97.8 °F) (Temporal)   Resp 18   Ht 1.651 m (5' 5\")   SpO2 99%   BMI 24.13 kg/m²   Pulse ox interpretation: I interpret this pulse ox as normal.  Constitutional: Alert in no apparent distress.  HENT: Normocephalic, Atraumatic, Bilateral external ears normal. Nose normal.   Eyes: Conjunctiva normal, non-icteric.   Heart: Normal peripheral perfusion.  Lungs: Unlabored respirations.  Skin: Superficial laceration across the top of the left great toe just proximal to the nailbed.  Nails of all toes, but especially the left great toe, are chronically disfigured, thickened, with evidence of nail fungus.  Neurologic: Alert, Grossly non-focal.   Psychiatric: Affect normal, Judgment normal, Mood normal, Appears appropriate and not intoxicated.     COURSE & MEDICAL DECISION MAKING  The patient's VS, Nurses notes reviewed. (See chart for details)    10:29 AM Patient seen and examined at bedside.  She has significant onychomycosis of this of disfigured nail, and bleeding seems to have been coming from the nailbed itself.  There is no subungual hematoma.  There is a superficial, nonsuturable laceration across the top of the toe, perhaps secondary to partial toenail avulsion.  There is no active bleeding.  The patient's tetanus shot is up-to-date.  I have her concern for fracture, her toe will be x-rayed.  She does not require a tetanus shot.  Should be treated with bacitracin ointment and a bandage.    11:27 AM this patient's x-ray did not show any acute fracture.  She has had no further bleeding.  We discussed that she should follow-up with her podiatrist again due to the disfigured toenails, rest this toe to prevent further bleeding while it heals for the next 24 hours, and try to stay off her feet until then.       The patient will return for new or worsening symptoms and is stable at the time of discharge.    The patient is referred to a primary physician for blood pressure management, diabetic screening, and " for all other preventative health concerns.      DISPOSITION:  Patient will be discharged home in stable condition.    FOLLOW UP:  Your podiatrist            OUTPATIENT MEDICATIONS:  New Prescriptions    No medications on file         FINAL IMPRESSION  1. Contusion of toenail of left foot, initial encounter

## 2019-04-21 NOTE — DISCHARGE INSTRUCTIONS
Continue to gently with soap and water once or twice daily.  Keep it covered with a dry bandage.  You may notice a small amount of bleeding as it is.  Try to rest it for the next 24 hours.  Please follow-up with your podiatrist.

## 2019-04-21 NOTE — ED NOTES
"Pt presents with a hx of left foot fracture approximately 2 years.  She C/O of injuring her extremity earlier this AM after accidentally hitting a shower chair.  Her medical history is significant for the following:  Arthritis        hip, knee   • ASTHMA     • Cataract     • High cholesterol     • Hyperlipidemia     • Hypertension     • Menopause     • Osteopenia      /66   Pulse 78   Temp 36.6 °C (97.8 °F) (Temporal)   Resp 18   Ht 1.651 m (5' 5\")   SpO2 99%   BMI 24.13 kg/m²   /66   Pulse 78   Temp 36.6 °C (97.8 °F) (Temporal)   Resp 18   Ht 1.651 m (5' 5\")   SpO2 99%   BMI 24.13 kg/m²     "

## 2019-05-23 RX ORDER — ATORVASTATIN CALCIUM 20 MG/1
TABLET, FILM COATED ORAL
Qty: 90 TAB | Refills: 2 | Status: SHIPPED | OUTPATIENT
Start: 2019-05-23 | End: 2019-10-11 | Stop reason: SDUPTHER

## 2019-06-11 ENCOUNTER — APPOINTMENT (OUTPATIENT)
Dept: MEDICAL GROUP | Facility: MEDICAL CENTER | Age: 79
End: 2019-06-11
Payer: MEDICARE

## 2019-06-18 ENCOUNTER — HOSPITAL ENCOUNTER (INPATIENT)
Facility: MEDICAL CENTER | Age: 79
LOS: 1 days | DRG: 310 | End: 2019-06-19
Attending: EMERGENCY MEDICINE | Admitting: INTERNAL MEDICINE
Payer: MEDICARE

## 2019-06-18 ENCOUNTER — APPOINTMENT (OUTPATIENT)
Dept: RADIOLOGY | Facility: MEDICAL CENTER | Age: 79
DRG: 310 | End: 2019-06-18
Attending: EMERGENCY MEDICINE
Payer: MEDICARE

## 2019-06-18 ENCOUNTER — OFFICE VISIT (OUTPATIENT)
Dept: MEDICAL GROUP | Facility: MEDICAL CENTER | Age: 79
End: 2019-06-18
Payer: MEDICARE

## 2019-06-18 VITALS
TEMPERATURE: 99 F | HEART RATE: 40 BPM | OXYGEN SATURATION: 91 % | RESPIRATION RATE: 20 BRPM | DIASTOLIC BLOOD PRESSURE: 56 MMHG | SYSTOLIC BLOOD PRESSURE: 182 MMHG | BODY MASS INDEX: 26.52 KG/M2 | WEIGHT: 159.17 LBS | HEIGHT: 65 IN

## 2019-06-18 DIAGNOSIS — I44.2 COMPLETE HEART BLOCK BY ELECTROCARDIOGRAM (HCC): ICD-10-CM

## 2019-06-18 DIAGNOSIS — R74.01 TRANSAMINITIS: ICD-10-CM

## 2019-06-18 DIAGNOSIS — I10 ESSENTIAL HYPERTENSION: ICD-10-CM

## 2019-06-18 DIAGNOSIS — I44.2 HEART BLOCK AV COMPLETE (HCC): ICD-10-CM

## 2019-06-18 PROBLEM — R00.1 BRADYCARDIA: Status: ACTIVE | Noted: 2019-06-18

## 2019-06-18 LAB
ALBUMIN SERPL BCP-MCNC: 3.5 G/DL (ref 3.2–4.9)
ALBUMIN/GLOB SERPL: 1.1 G/DL
ALP SERPL-CCNC: 119 U/L (ref 30–99)
ALT SERPL-CCNC: 135 U/L (ref 2–50)
ANION GAP SERPL CALC-SCNC: 11 MMOL/L (ref 0–11.9)
AST SERPL-CCNC: 182 U/L (ref 12–45)
BASOPHILS # BLD AUTO: 0.3 % (ref 0–1.8)
BASOPHILS # BLD: 0.03 K/UL (ref 0–0.12)
BILIRUB SERPL-MCNC: 0.6 MG/DL (ref 0.1–1.5)
BUN SERPL-MCNC: 34 MG/DL (ref 8–22)
CALCIUM SERPL-MCNC: 8.3 MG/DL (ref 8.4–10.2)
CHLORIDE SERPL-SCNC: 107 MMOL/L (ref 96–112)
CO2 SERPL-SCNC: 21 MMOL/L (ref 20–33)
CREAT SERPL-MCNC: 1.12 MG/DL (ref 0.5–1.4)
EKG IMPRESSION: NORMAL
EOSINOPHIL # BLD AUTO: 0.16 K/UL (ref 0–0.51)
EOSINOPHIL NFR BLD: 1.7 % (ref 0–6.9)
ERYTHROCYTE [DISTWIDTH] IN BLOOD BY AUTOMATED COUNT: 51 FL (ref 35.9–50)
GLOBULIN SER CALC-MCNC: 3.1 G/DL (ref 1.9–3.5)
GLUCOSE SERPL-MCNC: 134 MG/DL (ref 65–99)
HCT VFR BLD AUTO: 36.6 % (ref 37–47)
HGB BLD-MCNC: 11.5 G/DL (ref 12–16)
IMM GRANULOCYTES # BLD AUTO: 0.03 K/UL (ref 0–0.11)
IMM GRANULOCYTES NFR BLD AUTO: 0.3 % (ref 0–0.9)
LYMPHOCYTES # BLD AUTO: 1.69 K/UL (ref 1–4.8)
LYMPHOCYTES NFR BLD: 17.7 % (ref 22–41)
MAGNESIUM SERPL-MCNC: 2 MG/DL (ref 1.5–2.5)
MCH RBC QN AUTO: 29.6 PG (ref 27–33)
MCHC RBC AUTO-ENTMCNC: 31.4 G/DL (ref 33.6–35)
MCV RBC AUTO: 94.1 FL (ref 81.4–97.8)
MONOCYTES # BLD AUTO: 1.25 K/UL (ref 0–0.85)
MONOCYTES NFR BLD AUTO: 13.1 % (ref 0–13.4)
NEUTROPHILS # BLD AUTO: 6.38 K/UL (ref 2–7.15)
NEUTROPHILS NFR BLD: 66.9 % (ref 44–72)
NRBC # BLD AUTO: 0 K/UL
NRBC BLD-RTO: 0 /100 WBC
PLATELET # BLD AUTO: 179 K/UL (ref 164–446)
PMV BLD AUTO: 11.8 FL (ref 9–12.9)
POTASSIUM SERPL-SCNC: 4 MMOL/L (ref 3.6–5.5)
PROT SERPL-MCNC: 6.6 G/DL (ref 6–8.2)
RBC # BLD AUTO: 3.89 M/UL (ref 4.2–5.4)
SODIUM SERPL-SCNC: 139 MMOL/L (ref 135–145)
TROPONIN I SERPL-MCNC: 0.04 NG/ML (ref 0–0.04)
WBC # BLD AUTO: 9.5 K/UL (ref 4.8–10.8)

## 2019-06-18 PROCEDURE — 94760 N-INVAS EAR/PLS OXIMETRY 1: CPT

## 2019-06-18 PROCEDURE — 80053 COMPREHEN METABOLIC PANEL: CPT

## 2019-06-18 PROCEDURE — 93005 ELECTROCARDIOGRAM TRACING: CPT | Performed by: EMERGENCY MEDICINE

## 2019-06-18 PROCEDURE — 700102 HCHG RX REV CODE 250 W/ 637 OVERRIDE(OP): Performed by: HOSPITALIST

## 2019-06-18 PROCEDURE — 99213 OFFICE O/P EST LOW 20 MIN: CPT | Performed by: INTERNAL MEDICINE

## 2019-06-18 PROCEDURE — 71045 X-RAY EXAM CHEST 1 VIEW: CPT

## 2019-06-18 PROCEDURE — 83735 ASSAY OF MAGNESIUM: CPT

## 2019-06-18 PROCEDURE — 76705 ECHO EXAM OF ABDOMEN: CPT

## 2019-06-18 PROCEDURE — 93005 ELECTROCARDIOGRAM TRACING: CPT

## 2019-06-18 PROCEDURE — G0378 HOSPITAL OBSERVATION PER HR: HCPCS

## 2019-06-18 PROCEDURE — 99220 PR INITIAL OBSERVATION CARE,LEVL III: CPT | Performed by: HOSPITALIST

## 2019-06-18 PROCEDURE — 85025 COMPLETE CBC W/AUTO DIFF WBC: CPT

## 2019-06-18 PROCEDURE — 99285 EMERGENCY DEPT VISIT HI MDM: CPT

## 2019-06-18 PROCEDURE — 36415 COLL VENOUS BLD VENIPUNCTURE: CPT

## 2019-06-18 PROCEDURE — 84484 ASSAY OF TROPONIN QUANT: CPT

## 2019-06-18 PROCEDURE — A9270 NON-COVERED ITEM OR SERVICE: HCPCS | Performed by: HOSPITALIST

## 2019-06-18 RX ORDER — AMOXICILLIN 250 MG
2 CAPSULE ORAL 2 TIMES DAILY
Status: DISCONTINUED | OUTPATIENT
Start: 2019-06-18 | End: 2019-06-19

## 2019-06-18 RX ORDER — TRIAMTERENE AND HYDROCHLOROTHIAZIDE 37.5; 25 MG/1; MG/1
1 TABLET ORAL
Status: DISCONTINUED | OUTPATIENT
Start: 2019-06-19 | End: 2019-06-19

## 2019-06-18 RX ORDER — DIAZEPAM 5 MG/1
5 TABLET ORAL EVERY 6 HOURS PRN
COMMUNITY
End: 2021-08-02

## 2019-06-18 RX ORDER — DIAZEPAM 5 MG/1
5 TABLET ORAL EVERY 6 HOURS PRN
Status: DISCONTINUED | OUTPATIENT
Start: 2019-06-18 | End: 2019-06-19

## 2019-06-18 RX ORDER — BISACODYL 10 MG
10 SUPPOSITORY, RECTAL RECTAL
Status: DISCONTINUED | OUTPATIENT
Start: 2019-06-18 | End: 2019-06-19

## 2019-06-18 RX ORDER — POLYETHYLENE GLYCOL 3350 17 G/17G
1 POWDER, FOR SOLUTION ORAL
Status: DISCONTINUED | OUTPATIENT
Start: 2019-06-18 | End: 2019-06-19

## 2019-06-18 RX ORDER — ATORVASTATIN CALCIUM 20 MG/1
20 TABLET, FILM COATED ORAL
Status: DISCONTINUED | OUTPATIENT
Start: 2019-06-18 | End: 2019-06-19

## 2019-06-18 RX ADMIN — ATORVASTATIN CALCIUM 20 MG: 20 TABLET, FILM COATED ORAL at 23:26

## 2019-06-18 ASSESSMENT — LIFESTYLE VARIABLES
EVER FELT BAD OR GUILTY ABOUT YOUR DRINKING: NO
ON A TYPICAL DAY WHEN YOU DRINK ALCOHOL HOW MANY DRINKS DO YOU HAVE: 0
TOTAL SCORE: 0
HAVE YOU EVER FELT YOU SHOULD CUT DOWN ON YOUR DRINKING: NO
ALCOHOL_USE: YES
CONSUMPTION TOTAL: NEGATIVE
TOTAL SCORE: 0
EVER_SMOKED: NEVER
AVERAGE NUMBER OF DAYS PER WEEK YOU HAVE A DRINK CONTAINING ALCOHOL: 0
EVER HAD A DRINK FIRST THING IN THE MORNING TO STEADY YOUR NERVES TO GET RID OF A HANGOVER: NO
HOW MANY TIMES IN THE PAST YEAR HAVE YOU HAD 5 OR MORE DRINKS IN A DAY: 0
TOTAL SCORE: 0
HAVE PEOPLE ANNOYED YOU BY CRITICIZING YOUR DRINKING: NO

## 2019-06-18 ASSESSMENT — COPD QUESTIONNAIRES
COPD SCREENING SCORE: 2
HAVE YOU SMOKED AT LEAST 100 CIGARETTES IN YOUR ENTIRE LIFE: NO/DON'T KNOW
IN THE PAST 12 MONTHS DO YOU DO LESS THAN YOU USED TO BECAUSE OF YOUR BREATHING PROBLEMS: DISAGREE/UNSURE
DURING THE PAST 4 WEEKS HOW MUCH DID YOU FEEL SHORT OF BREATH: NONE/LITTLE OF THE TIME
DO YOU EVER COUGH UP ANY MUCUS OR PHLEGM?: NO/ONLY WITH OCCASIONAL COLDS OR INFECTIONS

## 2019-06-18 ASSESSMENT — ENCOUNTER SYMPTOMS
DEPRESSION: 0
BLURRED VISION: 0
PHOTOPHOBIA: 0
TINGLING: 0
DOUBLE VISION: 0
VOMITING: 0
EYE PAIN: 0
STRIDOR: 0
ORTHOPNEA: 0
DIZZINESS: 0
SPUTUM PRODUCTION: 0
SHORTNESS OF BREATH: 0
CHILLS: 0
COUGH: 0
SORE THROAT: 0
MYALGIAS: 0
HEADACHES: 0
TREMORS: 0
BACK PAIN: 0
BLOOD IN STOOL: 0
NERVOUS/ANXIOUS: 0
WEAKNESS: 0
SPEECH CHANGE: 0
FEVER: 0
HEARTBURN: 0
CLAUDICATION: 0
MEMORY LOSS: 0
PND: 0
NECK PAIN: 0
SENSORY CHANGE: 0
CONSTIPATION: 0
HEMOPTYSIS: 0
NAUSEA: 0
PALPITATIONS: 0

## 2019-06-18 ASSESSMENT — PATIENT HEALTH QUESTIONNAIRE - PHQ9
2. FEELING DOWN, DEPRESSED, IRRITABLE, OR HOPELESS: NOT AT ALL
1. LITTLE INTEREST OR PLEASURE IN DOING THINGS: NOT AT ALL
SUM OF ALL RESPONSES TO PHQ9 QUESTIONS 1 AND 2: 0

## 2019-06-19 ENCOUNTER — APPOINTMENT (OUTPATIENT)
Dept: CARDIOLOGY | Facility: MEDICAL CENTER | Age: 79
DRG: 310 | End: 2019-06-19
Attending: INTERNAL MEDICINE
Payer: MEDICARE

## 2019-06-19 ENCOUNTER — HOSPITAL ENCOUNTER (INPATIENT)
Facility: MEDICAL CENTER | Age: 79
LOS: 2 days | DRG: 244 | End: 2019-06-21
Attending: INTERNAL MEDICINE | Admitting: INTERNAL MEDICINE
Payer: MEDICARE

## 2019-06-19 VITALS
TEMPERATURE: 99.2 F | OXYGEN SATURATION: 92 % | WEIGHT: 163.8 LBS | SYSTOLIC BLOOD PRESSURE: 169 MMHG | DIASTOLIC BLOOD PRESSURE: 42 MMHG | RESPIRATION RATE: 20 BRPM | HEIGHT: 65 IN | BODY MASS INDEX: 27.29 KG/M2 | HEART RATE: 38 BPM

## 2019-06-19 DIAGNOSIS — I44.1 HEART BLOCK AV SECOND DEGREE: ICD-10-CM

## 2019-06-19 PROBLEM — R60.0 PEDAL EDEMA: Status: ACTIVE | Noted: 2019-06-19

## 2019-06-19 PROBLEM — R00.1 SYMPTOMATIC BRADYCARDIA: Status: ACTIVE | Noted: 2019-06-19

## 2019-06-19 PROBLEM — I27.20 PULMONARY HYPERTENSION (HCC): Status: ACTIVE | Noted: 2019-06-19

## 2019-06-19 PROBLEM — I10 SYSTOLIC HYPERTENSION: Status: ACTIVE | Noted: 2019-06-19

## 2019-06-19 LAB
ALBUMIN SERPL BCP-MCNC: 3.5 G/DL (ref 3.2–4.9)
ALBUMIN/GLOB SERPL: 1.1 G/DL
ALP SERPL-CCNC: 101 U/L (ref 30–99)
ALT SERPL-CCNC: 138 U/L (ref 2–50)
ANION GAP SERPL CALC-SCNC: 9 MMOL/L (ref 0–11.9)
AST SERPL-CCNC: 142 U/L (ref 12–45)
BASOPHILS # BLD AUTO: 0.4 % (ref 0–1.8)
BASOPHILS # BLD: 0.04 K/UL (ref 0–0.12)
BILIRUB SERPL-MCNC: 0.7 MG/DL (ref 0.1–1.5)
BUN SERPL-MCNC: 33 MG/DL (ref 8–22)
CALCIUM SERPL-MCNC: 8.6 MG/DL (ref 8.4–10.2)
CHLORIDE SERPL-SCNC: 109 MMOL/L (ref 96–112)
CO2 SERPL-SCNC: 25 MMOL/L (ref 20–33)
CREAT SERPL-MCNC: 1.13 MG/DL (ref 0.5–1.4)
EKG IMPRESSION: NORMAL
EOSINOPHIL # BLD AUTO: 0.22 K/UL (ref 0–0.51)
EOSINOPHIL NFR BLD: 2.3 % (ref 0–6.9)
ERYTHROCYTE [DISTWIDTH] IN BLOOD BY AUTOMATED COUNT: 50.8 FL (ref 35.9–50)
GLOBULIN SER CALC-MCNC: 3.1 G/DL (ref 1.9–3.5)
GLUCOSE SERPL-MCNC: 101 MG/DL (ref 65–99)
HCT VFR BLD AUTO: 35.7 % (ref 37–47)
HGB BLD-MCNC: 11.3 G/DL (ref 12–16)
IMM GRANULOCYTES # BLD AUTO: 0.02 K/UL (ref 0–0.11)
IMM GRANULOCYTES NFR BLD AUTO: 0.2 % (ref 0–0.9)
LV EJECT FRACT  99904: 75
LV EJECT FRACT MOD 2C 99903: 87.7
LV EJECT FRACT MOD 4C 99902: 85.06
LV EJECT FRACT MOD BP 99901: 86.91
LYMPHOCYTES # BLD AUTO: 2.41 K/UL (ref 1–4.8)
LYMPHOCYTES NFR BLD: 25.3 % (ref 22–41)
MCH RBC QN AUTO: 29.8 PG (ref 27–33)
MCHC RBC AUTO-ENTMCNC: 31.7 G/DL (ref 33.6–35)
MCV RBC AUTO: 94.2 FL (ref 81.4–97.8)
MONOCYTES # BLD AUTO: 1.43 K/UL (ref 0–0.85)
MONOCYTES NFR BLD AUTO: 15 % (ref 0–13.4)
NEUTROPHILS # BLD AUTO: 5.41 K/UL (ref 2–7.15)
NEUTROPHILS NFR BLD: 56.8 % (ref 44–72)
NRBC # BLD AUTO: 0 K/UL
NRBC BLD-RTO: 0 /100 WBC
PLATELET # BLD AUTO: 163 K/UL (ref 164–446)
PMV BLD AUTO: 12 FL (ref 9–12.9)
POTASSIUM SERPL-SCNC: 4.2 MMOL/L (ref 3.6–5.5)
PROT SERPL-MCNC: 6.6 G/DL (ref 6–8.2)
RBC # BLD AUTO: 3.79 M/UL (ref 4.2–5.4)
SODIUM SERPL-SCNC: 143 MMOL/L (ref 135–145)
WBC # BLD AUTO: 9.5 K/UL (ref 4.8–10.8)

## 2019-06-19 PROCEDURE — 700102 HCHG RX REV CODE 250 W/ 637 OVERRIDE(OP): Performed by: INTERNAL MEDICINE

## 2019-06-19 PROCEDURE — 99221 1ST HOSP IP/OBS SF/LOW 40: CPT | Mod: AI | Performed by: INTERNAL MEDICINE

## 2019-06-19 PROCEDURE — 93306 TTE W/DOPPLER COMPLETE: CPT | Mod: 26 | Performed by: INTERNAL MEDICINE

## 2019-06-19 PROCEDURE — 93306 TTE W/DOPPLER COMPLETE: CPT

## 2019-06-19 PROCEDURE — 85025 COMPLETE CBC W/AUTO DIFF WBC: CPT

## 2019-06-19 PROCEDURE — 770020 HCHG ROOM/CARE - TELE (206)

## 2019-06-19 PROCEDURE — A9270 NON-COVERED ITEM OR SERVICE: HCPCS | Performed by: INTERNAL MEDICINE

## 2019-06-19 PROCEDURE — A9270 NON-COVERED ITEM OR SERVICE: HCPCS | Performed by: HOSPITALIST

## 2019-06-19 PROCEDURE — 700102 HCHG RX REV CODE 250 W/ 637 OVERRIDE(OP): Performed by: HOSPITALIST

## 2019-06-19 PROCEDURE — 93010 ELECTROCARDIOGRAM REPORT: CPT | Performed by: INTERNAL MEDICINE

## 2019-06-19 PROCEDURE — 96374 THER/PROPH/DIAG INJ IV PUSH: CPT

## 2019-06-19 PROCEDURE — 93005 ELECTROCARDIOGRAM TRACING: CPT | Performed by: INTERNAL MEDICINE

## 2019-06-19 PROCEDURE — 700111 HCHG RX REV CODE 636 W/ 250 OVERRIDE (IP): Performed by: INTERNAL MEDICINE

## 2019-06-19 PROCEDURE — 80053 COMPREHEN METABOLIC PANEL: CPT

## 2019-06-19 RX ORDER — FUROSEMIDE 10 MG/ML
20 INJECTION INTRAMUSCULAR; INTRAVENOUS
Status: DISCONTINUED | OUTPATIENT
Start: 2019-06-19 | End: 2019-06-19

## 2019-06-19 RX ORDER — HYDRALAZINE HYDROCHLORIDE 25 MG/1
25 TABLET, FILM COATED ORAL EVERY 8 HOURS
Status: DISCONTINUED | OUTPATIENT
Start: 2019-06-19 | End: 2019-06-19

## 2019-06-19 RX ORDER — FUROSEMIDE 10 MG/ML
20 INJECTION INTRAMUSCULAR; INTRAVENOUS
Status: DISCONTINUED | OUTPATIENT
Start: 2019-06-20 | End: 2019-06-20

## 2019-06-19 RX ORDER — BISACODYL 10 MG
10 SUPPOSITORY, RECTAL RECTAL
Status: DISCONTINUED | OUTPATIENT
Start: 2019-06-19 | End: 2019-06-21 | Stop reason: HOSPADM

## 2019-06-19 RX ORDER — FUROSEMIDE 10 MG/ML
20 INJECTION INTRAMUSCULAR; INTRAVENOUS
Status: CANCELLED | OUTPATIENT
Start: 2019-06-19

## 2019-06-19 RX ORDER — AMOXICILLIN 250 MG
2 CAPSULE ORAL 2 TIMES DAILY
Status: CANCELLED | OUTPATIENT
Start: 2019-06-19

## 2019-06-19 RX ORDER — POLYETHYLENE GLYCOL 3350 17 G/17G
1 POWDER, FOR SOLUTION ORAL
Status: CANCELLED | OUTPATIENT
Start: 2019-06-19

## 2019-06-19 RX ORDER — BISACODYL 10 MG
10 SUPPOSITORY, RECTAL RECTAL
Status: CANCELLED | OUTPATIENT
Start: 2019-06-19

## 2019-06-19 RX ORDER — AMLODIPINE BESYLATE 5 MG/1
10 TABLET ORAL
Status: DISCONTINUED | OUTPATIENT
Start: 2019-06-19 | End: 2019-06-19

## 2019-06-19 RX ORDER — POLYETHYLENE GLYCOL 3350 17 G/17G
1 POWDER, FOR SOLUTION ORAL
Status: DISCONTINUED | OUTPATIENT
Start: 2019-06-19 | End: 2019-06-21 | Stop reason: HOSPADM

## 2019-06-19 RX ORDER — DIAZEPAM 5 MG/1
5 TABLET ORAL EVERY 6 HOURS PRN
Status: CANCELLED | OUTPATIENT
Start: 2019-06-19

## 2019-06-19 RX ORDER — HYDRALAZINE HYDROCHLORIDE 25 MG/1
25 TABLET, FILM COATED ORAL EVERY 8 HOURS
Status: DISCONTINUED | OUTPATIENT
Start: 2019-06-20 | End: 2019-06-20

## 2019-06-19 RX ORDER — DIAZEPAM 5 MG/1
5 TABLET ORAL EVERY 6 HOURS PRN
Status: DISCONTINUED | OUTPATIENT
Start: 2019-06-19 | End: 2019-06-21 | Stop reason: HOSPADM

## 2019-06-19 RX ORDER — ATORVASTATIN CALCIUM 20 MG/1
20 TABLET, FILM COATED ORAL
Status: CANCELLED | OUTPATIENT
Start: 2019-06-19

## 2019-06-19 RX ORDER — ATORVASTATIN CALCIUM 20 MG/1
20 TABLET, FILM COATED ORAL DAILY
Status: DISCONTINUED | OUTPATIENT
Start: 2019-06-20 | End: 2019-06-20

## 2019-06-19 RX ORDER — HYDRALAZINE HYDROCHLORIDE 25 MG/1
25 TABLET, FILM COATED ORAL EVERY 8 HOURS
Status: CANCELLED | OUTPATIENT
Start: 2019-06-19

## 2019-06-19 RX ORDER — AMOXICILLIN 250 MG
2 CAPSULE ORAL 2 TIMES DAILY
Status: DISCONTINUED | OUTPATIENT
Start: 2019-06-20 | End: 2019-06-21 | Stop reason: HOSPADM

## 2019-06-19 RX ADMIN — AMLODIPINE BESYLATE 10 MG: 5 TABLET ORAL at 09:24

## 2019-06-19 RX ADMIN — FUROSEMIDE 20 MG: 10 INJECTION, SOLUTION INTRAVENOUS at 11:48

## 2019-06-19 RX ADMIN — TRIAMTERENE AND HYDROCHLOROTHIAZIDE 1 TABLET: 37.5; 25 TABLET ORAL at 03:40

## 2019-06-19 RX ADMIN — ATORVASTATIN CALCIUM 20 MG: 20 TABLET, FILM COATED ORAL at 20:12

## 2019-06-19 RX ADMIN — FUROSEMIDE 20 MG: 10 INJECTION, SOLUTION INTRAVENOUS at 17:02

## 2019-06-19 ASSESSMENT — COGNITIVE AND FUNCTIONAL STATUS - GENERAL
CLIMB 3 TO 5 STEPS WITH RAILING: A LITTLE
MOBILITY SCORE: 22
SUGGESTED CMS G CODE MODIFIER DAILY ACTIVITY: CH
SUGGESTED CMS G CODE MODIFIER MOBILITY: CJ
WALKING IN HOSPITAL ROOM: A LITTLE
DAILY ACTIVITIY SCORE: 24

## 2019-06-19 ASSESSMENT — ENCOUNTER SYMPTOMS
NAUSEA: 0
DIZZINESS: 1
DIZZINESS: 1
PALPITATIONS: 0
NERVOUS/ANXIOUS: 0
VOMITING: 0
NERVOUS/ANXIOUS: 0
WEAKNESS: 1
HEADACHES: 0
ABDOMINAL PAIN: 0
SHORTNESS OF BREATH: 1
SORE THROAT: 0
ABDOMINAL PAIN: 0
DIARRHEA: 0
WEAKNESS: 1
CHILLS: 0
NAUSEA: 0
HEADACHES: 0
SORE THROAT: 0
DIARRHEA: 0
CONSTIPATION: 0
FEVER: 0
VOMITING: 0

## 2019-06-19 NOTE — PROGRESS NOTES
Patient BP rising to 200's systolically, MD Sandoval notified. Order to give BP med early, see MAR. Will continue to monitor.

## 2019-06-19 NOTE — PROGRESS NOTES
Telemetry Shift Summary    Rhythm SB with 2nd degree type 2 AVB  HR Range 34-44  Ectopy RPVC, rare couplet, rare bigeminy  Measurements 20/08/52        Normal Values  Rhythm SR  HR Range    Measurements 0.12-0.20 / 0.06-0.10  / 0.30-0.52

## 2019-06-19 NOTE — PROGRESS NOTES
"2 RN skin check complete.   Devices in place continuous cardiac monitoring, sequential compression devices, and nasal cannula.  Skin assessed under devices intact.  Confirmed pressure ulcers found on n/a.  New potential pressure ulcers noted on n/a.   Patient's right big toe bruised from \"banging against chair.\"  The following interventions in place patient turns self side to side, patient occasionally ambulates, pillows used for support and positioning, silicone oxygen tubing in use*  "

## 2019-06-19 NOTE — ED TRIAGE NOTES
"Chief Complaint   Patient presents with   • Weakness   • Bradycardia   • Shortness of Breath     Pt reports that she was at her PCP today, noted to have low HR in 30's. Takes metoprolol 100mg BID.   States that she has been trying to \"get off my walker,\" but feels weak and SOB.  PWD.  BP (!) 165/56   Pulse (!) 33   Temp 36.7 °C (98 °F)   Resp 16   SpO2 92%   Pt informed of wait times. Educated on triage process.  Asked to return to triage RN for any new or worsening of symptoms. Thanked for patience.        "

## 2019-06-19 NOTE — PROGRESS NOTES
Monitor summary:  Second degree type II and Third degree HB 37-45  Frequent PVCs, occasional couplets, and triplets noted

## 2019-06-19 NOTE — ASSESSMENT & PLAN NOTE
[facility] 2 (2:1)  Patient is symptomatic with this  It has not improved despite cessation of beta-blockers  Echocardiogram is pending  Plan to transfer to [facility] for pacemaker once bed is available

## 2019-06-19 NOTE — CARE PLAN
Problem: Communication  Goal: The ability to communicate needs accurately and effectively will improve  Patient is calling appropriately for any and all needs.     Problem: Knowledge Deficit  Goal: Knowledge of disease process/condition, treatment plan, diagnostic tests, and medications will improve  POC discussed with patient. All questions answered. Patient verbalized understanding.

## 2019-06-19 NOTE — H&P
Hospital Medicine History & Physical Note    Date of Service  6/18/2019    Primary Care Physician  Shanda Reyes    Consultants  ERP discussed case with Dr. Wan     Code Status  Full Code    Chief Complaint  Chief Complaint   Patient presents with   • Weakness   • Bradycardia   • Shortness of Breath       History of Presenting Illness  Andreina is a very pleasant 79 y.o. female with a past medical history of hypertension, dyslipidemia presented to the emergency room on 6/18/2019 for evaluation of bradycardia.  Patient apparently saw her primary care physician today and was noted that she was profoundly bradycardic as a result she was sent to the emergency room for further evaluation.  Luckily patient says she had no issues, denies having any lightheadedness, shortness of breath, denies any chest pain or nausea vomiting.    Review of Systems  Review of Systems   Constitutional: Negative for chills, fever and malaise/fatigue.   HENT: Negative for congestion, hearing loss, sore throat and tinnitus.    Eyes: Negative for blurred vision, double vision, photophobia and pain.   Respiratory: Negative for cough, hemoptysis, sputum production, shortness of breath and stridor.    Cardiovascular: Negative for chest pain, palpitations, orthopnea, claudication and PND.   Gastrointestinal: Negative for blood in stool, constipation, heartburn, melena, nausea and vomiting.   Genitourinary: Negative for dysuria, frequency and urgency.   Musculoskeletal: Negative for back pain, myalgias and neck pain.   Neurological: Negative for dizziness, tingling, tremors, sensory change, speech change, weakness and headaches.   Psychiatric/Behavioral: Negative for depression, memory loss and suicidal ideas. The patient is not nervous/anxious.        Past Medical History  Past Medical History:   Diagnosis Date   • Arthritis     hip, knee   • ASTHMA    • Cataract     bilateral IOL   • High cholesterol    • Hyperlipidemia    • Hypertension    •  Menopause    • Osteopenia        Surgical History   has a past surgical history that includes cataract extraction with iol (Bilateral, 2012); hip arthroplasty total (Right, 8/9/2016); and hip revision total (Right, 8/29/2018).    Family History  family history includes Heart Disease in her brother and father; Hyperlipidemia in her father and mother; Hypertension in her father and mother.    Social History   reports that she has never smoked. She has never used smokeless tobacco. She reports that she does not drink alcohol or use drugs.    Allergies  Allergies   Allergen Reactions   • Other Environmental Cough     Pine, Dust       Medications  Prior to Admission medications    Medication Sig Start Date End Date Taking? Authorizing Provider   diazePAM (VALIUM) 5 MG Tab Take 5 mg by mouth every 6 hours as needed for Anxiety.   Yes Nn Emergency Md Per Protocol, M.D.   Ipratropium-Albuterol (COMBIVENT INH) Inhale  by mouth.   Yes Nn Emergency Md Per Protocol, M.D.   atorvastatin (LIPITOR) 20 MG Tab TAKE ONE TABLET BY MOUTH EVERY DAY 5/23/19   Shanda Reyes   triamterene/hctz (MAXZIDE-25/DYAZIDE) 37.5-25 MG Cap TAKE ONE CAPSULE BY MOUTH EVERY MORNING 1/31/19   Shanda Reyes   metoprolol (LOPRESSOR) 100 MG Tab Take 1 Tab by mouth 2 times a day. 12/17/18   Shanda Reyes       Physical Exam  Temp:  [36.7 °C (98 °F)] 36.7 °C (98 °F)  Pulse:  [30-44] 37  Resp:  [16-35] 19  BP: (165)/(56) 165/56  SpO2:  [92 %-96 %] 96 %  Physical Exam   Constitutional: She is oriented to person, place, and time. She appears well-developed and well-nourished. No distress.   HENT:   Head: Normocephalic and atraumatic.   Mouth/Throat: No oropharyngeal exudate.   Eyes: Pupils are equal, round, and reactive to light. Conjunctivae are normal. Right eye exhibits no discharge. No scleral icterus.   Neck: Neck supple. No JVD present. No thyromegaly present.   Cardiovascular: Regular rhythm and intact distal pulses.    No murmur  heard.  Bradycardic   Pulmonary/Chest: Effort normal and breath sounds normal. No stridor. No respiratory distress. She has no wheezes. She has no rales.   Abdominal: Soft. Bowel sounds are normal. She exhibits no distension. There is no tenderness. There is no rebound.   Musculoskeletal: Normal range of motion. She exhibits no edema.   Neurological: She is alert and oriented to person, place, and time. No cranial nerve deficit.   Skin: Skin is warm. She is not diaphoretic. No erythema.   Psychiatric: She has a normal mood and affect. Her behavior is normal. Thought content normal.       Laboratory:  Recent Labs      06/18/19   1748   WBC  9.5   RBC  3.89*   HEMOGLOBIN  11.5*   HEMATOCRIT  36.6*   MCV  94.1   MCH  29.6   MCHC  31.4*   RDW  51.0*   PLATELETCT  179   MPV  11.8     Recent Labs      06/18/19   1748   SODIUM  139   POTASSIUM  4.0   CHLORIDE  107   CO2  21   GLUCOSE  134*   BUN  34*   CREATININE  1.12   CALCIUM  8.3*     Recent Labs      06/18/19   1748   ALTSGPT  135*   ASTSGOT  182*   ALKPHOSPHAT  119*   TBILIRUBIN  0.6   GLUCOSE  134*         Recent Labs      06/18/19   1748   TROPONINI  0.04       Urinalysis:          Imaging:  US-RUQ   Final Result         1.  Mild atherosclerosis   2.  Mildly pulsatile flow in the main portal vein.      DX-CHEST-PORTABLE (1 VIEW)   Final Result      New interstitial edema, basilar atelectasis and possible pleural fluid. Consolidation from pneumonia or aspiration is not excluded          Assessment/Plan:  I anticipate this patient is appropriate for observation status at this time.    * Bradycardia   Assessment & Plan    Asymptomatic bradycardia  Was likely secondary to high doses of metoprolol.  ERP discussed case with  recommended stopping metoprolol and seeing if her heart rate does improve, if not patient may need a pacemaker and transfer to [facility].  Otherwise I recommend lower dose of AV barrett blockade medications once her heart  rate has normalized  Continue to monitor on telemetry     Essential hypertension- (present on admission)   Assessment & Plan    Controlled, resume triamterene with hydrochlorothiazide, hold metoprolol         VTE prophylaxis: Prophylaxis: lovenox

## 2019-06-19 NOTE — ED NOTES
ERP in to discuss results and further plan of care with pt and family. Pt states no changes in condition at this time.

## 2019-06-19 NOTE — ED NOTES
Pt c/o weakness in legs x 1 wk, SOB x3-4 day and saw PCP today, sent to ED for Bradycardia. Pt mildly hypoxic in Triage, started on 2L NC. Pt on cardiac monitor and Zoll pads in place. IV placed and blood samples to lab.

## 2019-06-19 NOTE — PROGRESS NOTES
Report received. Assumed care of patient. Patient resting well in bed with complaints of mild shortness of breath. Patient has no chest pain and complains of weakness and dizziness when standing. This RN looked at patient on telemetry monitor, patient appears to be in a second degree type II HB. All needs are currently met. All safety precautions in place. Will continue to monitor.

## 2019-06-19 NOTE — PROGRESS NOTES
RN called by monitor tech, patient sustaining heart rate in the 30s. Patient appears to be in third degree HB, MD notified. Patient remains asymptomatic.

## 2019-06-19 NOTE — PROGRESS NOTES
Chief Complaint   Patient presents with   • Hypertension     follow up     Chief complaint: 4-month follow-up of anxiety, hypertension    HISTORY OF PRESENT ILLNESS: Patient is a 79 y.o. female patient who presents today to discuss the evaluation and management of:          1. Essential hypertension    Patient's blood pressure is running high today, she is on metoprolol 100 mg twice daily, and triamterene/hydrochlorothiazide daily.  When she left her house today it was 150 systolic.  In general, patient has been more stressed.  Her  Bennie is going to be hospitalized next week for procedure for multiple kidney stones.  He has been in and out of the hospital for the last year and a half and had been home doing well.    2. Complete heart block by electrocardiogram (HCC)    Patient noted to have marked bradycardia on exam, EKG shows complete heart block with rate of 32.  Patient denies chest pain, she does note upon questioning shortness of breath with exertion, and an increase in her chronic lower extremity edema.  I note that she has gained 14 pounds since I saw her in February.  She has been on the beta-blocker at her current dose for years, and has never had bradycardia.        Patient Active Problem List    Diagnosis Date Noted   • Complete heart block by electrocardiogram (HCC) 06/18/2019   • Venous insufficiency of both lower extremities 02/12/2019   • Mood disorder ([facility]) 11/08/2018   • Hip dislocation, right, subsequent encounter 08/14/2018   • Anxiety 07/26/2017   • Essential hypertension    • Osteopenia    • Hyperlipidemia         Allergies:Other environmental    Current meds including changes today  Current Outpatient Prescriptions   Medication Sig Dispense Refill   • diazePAM (VALIUM) 5 MG Tab Take 5 mg by mouth every 6 hours as needed for Anxiety.     • Ipratropium-Albuterol (COMBIVENT INH) Inhale  by mouth.     • atorvastatin (LIPITOR) 20 MG Tab TAKE ONE TABLET BY MOUTH EVERY DAY 90 Tab 2   •  "triamterene/hctz (MAXZIDE-25/DYAZIDE) 37.5-25 MG Cap TAKE ONE CAPSULE BY MOUTH EVERY MORNING 90 Cap 1   • metoprolol (LOPRESSOR) 100 MG Tab Take 1 Tab by mouth 2 times a day. 180 Tab 5     No current facility-administered medications for this visit.      Social History   Substance Use Topics   • Smoking status: Never Smoker   • Smokeless tobacco: Never Used   • Alcohol use No     Social History     Social History Narrative   • No narrative on file       Family History   Problem Relation Age of Onset   • Heart Disease Father    • Hyperlipidemia Father    • Hypertension Father    • Heart Disease Brother    • Hypertension Mother    • Hyperlipidemia Mother        Review of Systems:  See above      Exam:      BP (!) 182/56 (BP Location: Left arm, Patient Position: Sitting)   Pulse (!) 40   Temp 37.2 °C (99 °F) (Temporal)   Resp 20   Ht 1.651 m (5' 5\")   Wt 72.2 kg (159 lb 2.8 oz)   SpO2 91%   General:  Well nourished, well developed female in NAD affect and mood within normal limits  Head is grossly normal.  Neck: Supple without adenopathy  Pulmonary: Clear to ausculation.  Normal effort. No rales, rhonchi, or wheezing.  Cardiovascular: Regular bradycardia rate low 30s  Extremities: 2-3+ pitting edema to knees no skin breakdown  Neuro: moves all extremities symmetrically    Please note that this dictation was created using voice recognition software. I have made every reasonable attempt to correct obvious errors, but I expect that there are errors of grammar and possibly content that I did not discover before finalizing the note.    Assessment/Plan:  1. Essential hypertension    Patient will need to have new BP medication started in the hospital she will be going off her beta-blocker    2. Complete heart block by electrocardiogram (HCC)    -As above, EKG shows complete heart block.  Patient advised to go to emergency room for admission, she will need telemetry, cardiology consult, and possible pacemaker.  - EKG - " Clinic Performed    Followup: No Follow-up on file.

## 2019-06-19 NOTE — ASSESSMENT & PLAN NOTE
Started on Norvasc but then changed to hydralazine due to severe chronic pedal edema  We will also start Lasix as it appears her HCTZ has not been addressing this issue  Echo is pending

## 2019-06-19 NOTE — PROGRESS NOTES
Patient's EKG currently reading as 2nd degree type II. MD Sandoval notified. Order received to place transcutaneous pads on patient proactively. Will continue to monitor.

## 2019-06-19 NOTE — CARE PLAN
Problem: Safety  Goal: Will remain free from injury  Outcome: PROGRESSING AS EXPECTED  Keep call light within reach. Ensure environment is clutter free. Have patient wear treaded socks. Provide appropriate assistive device.     Problem: Knowledge Deficit  Goal: Knowledge of disease process/condition, treatment plan, diagnostic tests, and medications will improve  Outcome: PROGRESSING AS EXPECTED  Allow time for patient to ask questions. Answer questions to best of ability. Update patient on plan of care.

## 2019-06-19 NOTE — DISCHARGE PLANNING
LSW spoke with pt at bedside to complete assessment. Pt lives with spouse. Pt is independent with ADLs and IADLs. Pt uses a walker, no 02. Pt reports no issues with getting to appointments even though she has not been driving. Pt has never had HHC although her  uses [facility]. Pt reported no SNF history. No noted substance abuse or mental health history. Pt declined AD packet. Pt uses [facility] [address] for Rx. D/c needs unknown at this time. CM team will continue to follow.     Care Transition Team Assessment    Information Source  Information Given By: Patient  Who is responsible for making decisions for patient? : Patient    Readmission Evaluation  Is this a readmission?: No    Elopement Risk  Legal Hold: No  Ambulatory or Self Mobile in Wheelchair: Yes  Disoriented: No  Psychiatric Symptoms: None  History of Wandering: No  Elopement this Admit: No  Vocalizing Wanting to Leave: No  Displays Behaviors, Body Language Wanting to Leave: No-Not at Risk for Elopement  Elopement Risk: Not at Risk for Elopement    Interdisciplinary Discharge Planning  Does Admitting Nurse Feel This Could be a Complex Discharge?: No  Primary Care Physician: Dr. Reyes  Lives with - Patient's Self Care Capacity: Spouse  Patient or legal guardian wants to designate a caregiver (see row info): No  Support Systems: Family Member(s), Children  Housing / Facility: [address]  Do You Take your Prescribed Medications Regularly: Yes  Able to Return to Previous ADL's: Yes  Mobility Issues: Yes (uses walker)  Prior Services: None  Patient Expects to be Discharged to:: Home  Assistance Needed: No  Durable Medical Equipment: Walker  DME Provider / Phone: Walker from Hospital    Discharge Preparedness  What is your plan after discharge?: Uncertain - pending medical team collaboration  What are your discharge supports?: Child, Spouse  Prior Functional Level: Independent with Activities of Daily Living  Difficulity with ADLs:  None  Difficulity with IADLs: None    Functional Assesment  Prior Functional Level: Independent with Activities of Daily Living    Finances  Financial Barriers to Discharge: No  Prescription Coverage: Yes    Vision / Hearing Impairment  Vision Impairment : No  Hearing Impairment : No         Advance Directive  Advance Directive?: None  Advance Directive offered?: AD Booklet refused    Domestic Abuse  Have you ever been the victim of abuse or violence?: No  Physical Abuse or Sexual Abuse: No  Verbal Abuse or Emotional Abuse: No  Possible Abuse Reported to:: Not Applicable    Psychological Assessment  History of Substance Abuse: None  History of Psychiatric Problems: No  Non-compliant with Treatment: No  Newly Diagnosed Illness: No    Discharge Risks or Barriers  Discharge risks or barriers?: No    Anticipated Discharge Information  Anticipated discharge disposition: Discharge needs currently unknown  Discharge Address:  ([id number] [id number])  Discharge Contact Phone Number:  ([phone])

## 2019-06-19 NOTE — ED PROVIDER NOTES
ED Provider Note  ED Provider Note    Primary care provider: Shanda Reyes  Means of arrival: Walk-in  History obtained from: Patient    CHIEF COMPLAINT  Chief Complaint   Patient presents with   • Weakness   • Bradycardia   • Shortness of Breath     Seen at 6:10 PM.   HPI  Priscilla Hdz is a 79 y.o. female who presents to the Emergency Department with bradycardia.  She is on metoprolol but has not had any change in her dose.  She went to see her primary care physician today, primarily for a checkup.  She did not have any symptoms going in other than some increased stress of late due to her 's multiple surgeries.  At the clinic she was noted to be profoundly bradycardic and sent here for further evaluation.    She is otherwise without complaints.  She denies any lightheadedness, chest pain, shortness of breath, cough, numbness, weakness, impaired immunity, rash, bleeding diathesis.    REVIEW OF SYSTEMS  See HPI,   Remainder of ROS negative.     PAST MEDICAL HISTORY   has a past medical history of Arthritis; ASTHMA; Cataract; High cholesterol; Hyperlipidemia; Hypertension; Menopause; and Osteopenia.    SURGICAL HISTORY   has a past surgical history that includes cataract extraction with iol (Bilateral, 2012); hip arthroplasty total (Right, 8/9/2016); and hip revision total (Right, 8/29/2018).    SOCIAL HISTORY  Social History   Substance Use Topics   • Smoking status: Never Smoker   • Smokeless tobacco: Never Used   • Alcohol use No      History   Drug Use No       FAMILY HISTORY  Family History   Problem Relation Age of Onset   • Heart Disease Father    • Hyperlipidemia Father    • Hypertension Father    • Heart Disease Brother    • Hypertension Mother    • Hyperlipidemia Mother        CURRENT MEDICATIONS  Reviewed.  See Encounter Summary.     ALLERGIES  Allergies   Allergen Reactions   • Other Environmental Cough     Pine, Dust       PHYSICAL EXAM  VITAL SIGNS: BP (!) 165/56   Pulse (!) 39   Temp  36.7 °C (98 °F)   Resp (!) 26   SpO2 93%   Constitutional: Awake, alert in no apparent distress.  HENT: Normocephalic, Bilateral external ears normal. Nose normal.   Eyes: Conjunctiva normal, non-icteric, EOMI.    Thorax & Lungs: Easy unlabored respirations, faint crackles bibasilar.  Cardiovascular: Bradycardic, No murmurs, rubs or gallops.  Abdomen:  Soft, nontender, nondistended, normal active bowel sounds.   :    Skin: Visualized skin is  Dry, No erythema, No rash.   Musculoskeletal:   2+ edema of the bilateral lower extremities, the patient states that this is chronic and slightly improved.  Neurologic: Alert, Grossly non-focal.   Psychiatric: Normal affect, Normal mood  Lymphatic:  No cervical LAD    EKG   12 lead Interpreted by me  Rhythm: Junctional bradycardia  Rate: 33  Axis: normal  Ectopy: none  Conduction: Complete heart block   ST Segments: no acute change  T Waves: no acute change  Clinical Impression: Complete heart block with bradycardia.    RADIOLOGY  US-RUQ   Final Result         1.  Mild atherosclerosis   2.  Mildly pulsatile flow in the main portal vein.      DX-CHEST-PORTABLE (1 VIEW)   Final Result      New interstitial edema, basilar atelectasis and possible pleural fluid. Consolidation from pneumonia or aspiration is not excluded            COURSE & MEDICAL DECISION MAKING  Pertinent Labs & Imaging studies reviewed. (See chart for details)      6:10 PM - Medical record reviewed, patient seen and examined at bedside.    6:50 PM-patient updated with test results, and border of the mildly elevated LFTs.  She does not have any right upper quadrant tenderness, she has not any vomiting or diarrhea.  She does not drink alcohol and has not had any Tylenol use.  Current resting heart rate is in the high 30s.  She is hemodynamically stable at this time and asymptomatic.  Plan to discuss case with cardiology.    Decision Making:  This is a 79 y.o. year old female who presents with essentially  asymptomatic bradycardia.  She went to her primary care physician today for a checkup and was found to have a profound bradycardia.  Fortunately she is is not hypotensive, in fact her blood pressures are actually mildly elevated today.  Cardiac work-up is otherwise unrevealing.  I discussed the case with Dr. Wan-the plan will be to hold the patient's beta-blocker as she is on a significant dose of this and it is likely contributing to her bradycardia.  If she does not have return of normal sinus rhythm overnight she may have to transfer to [facility] for pacemaker placement.    Incidentally, the patient did have LFTs mildly elevated today, she does not have any pain.  Right upper quadrant ultrasound is unrevealing at this time.  Possibly fatty liver changes.  At this time the patient will be admitted to telemetry in stable condition.      FINAL IMPRESSION  1. Heart block AV complete (HCC)    2. Transaminitis

## 2019-06-19 NOTE — PROGRESS NOTES
Pt arrived to unit via gurney. Ambulated from gurney to bed, x1 assist and FWW. Tele monitor applied, vitals taken. Pt assessed. A&O x4.  Discussed POC with pt. Welcome folder provided and discussed. Communication board filled out. Questions and concerns addressed, verbalized understanding. Fall precautions in place. Pt demonstrates ability to use call light appropriately. Pt left in lowest position. Bed locked and low, bed alarm on.

## 2019-06-19 NOTE — RESPIRATORY CARE
COPD EDUCATION by COPD CLINICAL EDUCATOR  6/19/2019 at 8:18 AM by Bernice Mckeon     Patient reviewed by COPD education team. Patient does not have a history or diagnosis of COPD and is a non-smoker, therefore does not qualify for the COPD program.

## 2019-06-20 ENCOUNTER — APPOINTMENT (OUTPATIENT)
Dept: RADIOLOGY | Facility: MEDICAL CENTER | Age: 79
DRG: 244 | End: 2019-06-20
Attending: INTERNAL MEDICINE
Payer: MEDICARE

## 2019-06-20 ENCOUNTER — APPOINTMENT (OUTPATIENT)
Dept: CARDIOLOGY | Facility: MEDICAL CENTER | Age: 79
DRG: 244 | End: 2019-06-20
Attending: INTERNAL MEDICINE
Payer: MEDICARE

## 2019-06-20 ENCOUNTER — PATIENT OUTREACH (OUTPATIENT)
Dept: HEALTH INFORMATION MANAGEMENT | Facility: OTHER | Age: 79
End: 2019-06-20

## 2019-06-20 PROBLEM — R74.01 TRANSAMINITIS: Status: ACTIVE | Noted: 2019-06-20

## 2019-06-20 LAB
ANION GAP SERPL CALC-SCNC: 6 MMOL/L (ref 0–11.9)
BUN SERPL-MCNC: 36 MG/DL (ref 8–22)
CALCIUM SERPL-MCNC: 9 MG/DL (ref 8.5–10.5)
CHLORIDE SERPL-SCNC: 109 MMOL/L (ref 96–112)
CO2 SERPL-SCNC: 25 MMOL/L (ref 20–33)
CREAT SERPL-MCNC: 1.09 MG/DL (ref 0.5–1.4)
EKG IMPRESSION: NORMAL
GLUCOSE SERPL-MCNC: 132 MG/DL (ref 65–99)
POTASSIUM SERPL-SCNC: 4.3 MMOL/L (ref 3.6–5.5)
SODIUM SERPL-SCNC: 140 MMOL/L (ref 135–145)

## 2019-06-20 PROCEDURE — 700102 HCHG RX REV CODE 250 W/ 637 OVERRIDE(OP): Performed by: INTERNAL MEDICINE

## 2019-06-20 PROCEDURE — A9270 NON-COVERED ITEM OR SERVICE: HCPCS | Performed by: INTERNAL MEDICINE

## 2019-06-20 PROCEDURE — 93010 ELECTROCARDIOGRAM REPORT: CPT | Mod: 59 | Performed by: INTERNAL MEDICINE

## 2019-06-20 PROCEDURE — 71045 X-RAY EXAM CHEST 1 VIEW: CPT

## 2019-06-20 PROCEDURE — 33208 INSRT HEART PM ATRIAL & VENT: CPT | Mod: KX | Performed by: INTERNAL MEDICINE

## 2019-06-20 PROCEDURE — 36415 COLL VENOUS BLD VENIPUNCTURE: CPT

## 2019-06-20 PROCEDURE — 02H63JZ INSERTION OF PACEMAKER LEAD INTO RIGHT ATRIUM, PERCUTANEOUS APPROACH: ICD-10-PCS | Performed by: INTERNAL MEDICINE

## 2019-06-20 PROCEDURE — 305387 CL-PERMANENT PACEMAKER INSERTION

## 2019-06-20 PROCEDURE — A9270 NON-COVERED ITEM OR SERVICE: HCPCS | Performed by: HOSPITALIST

## 2019-06-20 PROCEDURE — 700117 HCHG RX CONTRAST REV CODE 255: Performed by: INTERNAL MEDICINE

## 2019-06-20 PROCEDURE — 0JH606Z INSERTION OF PACEMAKER, DUAL CHAMBER INTO CHEST SUBCUTANEOUS TISSUE AND FASCIA, OPEN APPROACH: ICD-10-PCS | Performed by: INTERNAL MEDICINE

## 2019-06-20 PROCEDURE — 700101 HCHG RX REV CODE 250

## 2019-06-20 PROCEDURE — 02HK3JZ INSERTION OF PACEMAKER LEAD INTO RIGHT VENTRICLE, PERCUTANEOUS APPROACH: ICD-10-PCS | Performed by: INTERNAL MEDICINE

## 2019-06-20 PROCEDURE — 770020 HCHG ROOM/CARE - TELE (206)

## 2019-06-20 PROCEDURE — 99152 MOD SED SAME PHYS/QHP 5/>YRS: CPT | Performed by: INTERNAL MEDICINE

## 2019-06-20 PROCEDURE — 93005 ELECTROCARDIOGRAM TRACING: CPT | Performed by: INTERNAL MEDICINE

## 2019-06-20 PROCEDURE — 700111 HCHG RX REV CODE 636 W/ 250 OVERRIDE (IP)

## 2019-06-20 PROCEDURE — 700111 HCHG RX REV CODE 636 W/ 250 OVERRIDE (IP): Performed by: INTERNAL MEDICINE

## 2019-06-20 PROCEDURE — 700102 HCHG RX REV CODE 250 W/ 637 OVERRIDE(OP): Performed by: HOSPITALIST

## 2019-06-20 PROCEDURE — 80048 BASIC METABOLIC PNL TOTAL CA: CPT

## 2019-06-20 PROCEDURE — 99232 SBSQ HOSP IP/OBS MODERATE 35: CPT | Performed by: HOSPITALIST

## 2019-06-20 PROCEDURE — 700111 HCHG RX REV CODE 636 W/ 250 OVERRIDE (IP): Performed by: HOSPITALIST

## 2019-06-20 PROCEDURE — 99223 1ST HOSP IP/OBS HIGH 75: CPT | Mod: 25 | Performed by: INTERNAL MEDICINE

## 2019-06-20 RX ORDER — TRIAMTERENE AND HYDROCHLOROTHIAZIDE 37.5; 25 MG/1; MG/1
1 CAPSULE ORAL
Status: DISCONTINUED | OUTPATIENT
Start: 2019-06-20 | End: 2019-06-21 | Stop reason: HOSPADM

## 2019-06-20 RX ORDER — FUROSEMIDE 10 MG/ML
20 INJECTION INTRAMUSCULAR; INTRAVENOUS
Status: DISCONTINUED | OUTPATIENT
Start: 2019-06-21 | End: 2019-06-21 | Stop reason: HOSPADM

## 2019-06-20 RX ORDER — LIDOCAINE HYDROCHLORIDE 20 MG/ML
INJECTION, SOLUTION INFILTRATION; PERINEURAL
Status: COMPLETED
Start: 2019-06-20 | End: 2019-06-20

## 2019-06-20 RX ORDER — ACETAMINOPHEN 325 MG/1
650 TABLET ORAL EVERY 4 HOURS PRN
Status: DISCONTINUED | OUTPATIENT
Start: 2019-06-20 | End: 2019-06-21 | Stop reason: HOSPADM

## 2019-06-20 RX ORDER — ATORVASTATIN CALCIUM 20 MG/1
20 TABLET, FILM COATED ORAL EVERY EVENING
Status: DISCONTINUED | OUTPATIENT
Start: 2019-06-20 | End: 2019-06-21 | Stop reason: HOSPADM

## 2019-06-20 RX ORDER — MAGNESIUM SULFATE HEPTAHYDRATE 40 MG/ML
2 INJECTION, SOLUTION INTRAVENOUS ONCE
Status: COMPLETED | OUTPATIENT
Start: 2019-06-20 | End: 2019-06-20

## 2019-06-20 RX ORDER — HYDRALAZINE HYDROCHLORIDE 25 MG/1
37.5 TABLET, FILM COATED ORAL EVERY 8 HOURS
Status: DISCONTINUED | OUTPATIENT
Start: 2019-06-20 | End: 2019-06-21 | Stop reason: HOSPADM

## 2019-06-20 RX ORDER — MIDAZOLAM HYDROCHLORIDE 1 MG/ML
INJECTION INTRAMUSCULAR; INTRAVENOUS
Status: COMPLETED
Start: 2019-06-20 | End: 2019-06-20

## 2019-06-20 RX ORDER — CEFAZOLIN SODIUM 1 G/3ML
INJECTION, POWDER, FOR SOLUTION INTRAMUSCULAR; INTRAVENOUS
Status: COMPLETED
Start: 2019-06-20 | End: 2019-06-20

## 2019-06-20 RX ORDER — HYDRALAZINE HYDROCHLORIDE 50 MG/1
50 TABLET, FILM COATED ORAL EVERY 8 HOURS
Status: DISCONTINUED | OUTPATIENT
Start: 2019-06-20 | End: 2019-06-20

## 2019-06-20 RX ORDER — AMLODIPINE BESYLATE 5 MG/1
5 TABLET ORAL
Status: DISCONTINUED | OUTPATIENT
Start: 2019-06-20 | End: 2019-06-20

## 2019-06-20 RX ORDER — BUPIVACAINE HYDROCHLORIDE 2.5 MG/ML
INJECTION, SOLUTION EPIDURAL; INFILTRATION; INTRACAUDAL
Status: COMPLETED
Start: 2019-06-20 | End: 2019-06-20

## 2019-06-20 RX ADMIN — MIDAZOLAM HYDROCHLORIDE 2 MG: 1 INJECTION, SOLUTION INTRAMUSCULAR; INTRAVENOUS at 08:59

## 2019-06-20 RX ADMIN — LIDOCAINE HYDROCHLORIDE: 20 INJECTION, SOLUTION INFILTRATION; PERINEURAL at 08:58

## 2019-06-20 RX ADMIN — HYDRALAZINE HYDROCHLORIDE 37.5 MG: 25 TABLET, FILM COATED ORAL at 21:06

## 2019-06-20 RX ADMIN — MAGNESIUM SULFATE IN WATER 2 G: 40 INJECTION, SOLUTION INTRAVENOUS at 14:47

## 2019-06-20 RX ADMIN — ATORVASTATIN CALCIUM 20 MG: 20 TABLET, FILM COATED ORAL at 17:56

## 2019-06-20 RX ADMIN — HYDRALAZINE HYDROCHLORIDE 25 MG: 25 TABLET, FILM COATED ORAL at 06:03

## 2019-06-20 RX ADMIN — IOHEXOL 10 ML: 350 INJECTION, SOLUTION INTRAVENOUS at 09:16

## 2019-06-20 RX ADMIN — FENTANYL CITRATE 100 MCG: 50 INJECTION INTRAMUSCULAR; INTRAVENOUS at 08:59

## 2019-06-20 RX ADMIN — CEFAZOLIN 2000 MG: 330 INJECTION, POWDER, FOR SOLUTION INTRAMUSCULAR; INTRAVENOUS at 08:38

## 2019-06-20 RX ADMIN — ACETAMINOPHEN 650 MG: 325 TABLET, FILM COATED ORAL at 19:44

## 2019-06-20 RX ADMIN — HYDRALAZINE HYDROCHLORIDE 37.5 MG: 25 TABLET, FILM COATED ORAL at 14:47

## 2019-06-20 RX ADMIN — BUPIVACAINE HYDROCHLORIDE: 2.5 INJECTION, SOLUTION EPIDURAL; INFILTRATION; INTRACAUDAL; PERINEURAL at 08:59

## 2019-06-20 RX ADMIN — FUROSEMIDE 20 MG: 10 INJECTION, SOLUTION INTRAMUSCULAR; INTRAVENOUS at 06:08

## 2019-06-20 RX ADMIN — TRIAMTERENE AND HYDROCHLOROTHIAZIDE 1 CAPSULE: 25; 37.5 CAPSULE ORAL at 06:03

## 2019-06-20 ASSESSMENT — COGNITIVE AND FUNCTIONAL STATUS - GENERAL
MOVING FROM LYING ON BACK TO SITTING ON SIDE OF FLAT BED: A LITTLE
DAILY ACTIVITIY SCORE: 24
SUGGESTED CMS G CODE MODIFIER MOBILITY: CK
WALKING IN HOSPITAL ROOM: A LITTLE
STANDING UP FROM CHAIR USING ARMS: A LITTLE
SUGGESTED CMS G CODE MODIFIER DAILY ACTIVITY: CH
CLIMB 3 TO 5 STEPS WITH RAILING: A LITTLE
MOBILITY SCORE: 19
MOVING TO AND FROM BED TO CHAIR: A LITTLE

## 2019-06-20 ASSESSMENT — ENCOUNTER SYMPTOMS
ABDOMINAL PAIN: 0
DEPRESSION: 0
NAUSEA: 0
FOCAL WEAKNESS: 0
EYE DISCHARGE: 0
CHILLS: 0
VOMITING: 0
PSYCHIATRIC NEGATIVE: 1
FALLS: 0
HEADACHES: 0
HEMOPTYSIS: 0
SPEECH CHANGE: 0
WEAKNESS: 0
COUGH: 0
BLURRED VISION: 0
NERVOUS/ANXIOUS: 0
EYE PAIN: 0
MYALGIAS: 0
WEAKNESS: 1
DIZZINESS: 0
SORE THROAT: 0
SHORTNESS OF BREATH: 1
LOSS OF CONSCIOUSNESS: 0
FEVER: 0
SHORTNESS OF BREATH: 0
PALPITATIONS: 0
WHEEZING: 0
BRUISES/BLEEDS EASILY: 0

## 2019-06-20 ASSESSMENT — PATIENT HEALTH QUESTIONNAIRE - PHQ9
SUM OF ALL RESPONSES TO PHQ9 QUESTIONS 1 AND 2: 0
1. LITTLE INTEREST OR PLEASURE IN DOING THINGS: NOT AT ALL
2. FEELING DOWN, DEPRESSED, IRRITABLE, OR HOPELESS: NOT AT ALL

## 2019-06-20 ASSESSMENT — LIFESTYLE VARIABLES
ALCOHOL_USE: NO
EVER_SMOKED: NEVER

## 2019-06-20 NOTE — ASSESSMENT & PLAN NOTE
Now significantly hypertensive.  - Beta-blocker discontinued due to bradycardia  - tolerates norvasc but severe chronic pedal edema  - increase HCTZ to 50mg q8hrs, monitor BP  - continue home maxzide-25/dyazide  - lasix 20mg BID, monitor renal function

## 2019-06-20 NOTE — PROGRESS NOTES
STEVE arrived. Report and COBRA given. No further questions asked. Patient states she has all belongings. Tele monitor taken off and given to monitor tech.

## 2019-06-20 NOTE — PROGRESS NOTES
Telemetry Shift Summary    Rhythm 3rd degree HB, SB  HR Range 35-38  Ectopy rare PVC, rare couplet  Measurements -/08/56        Normal Values  Rhythm SR  HR Range    Measurements 0.12-0.20 / 0.06-0.10  / 0.30-0.52

## 2019-06-20 NOTE — ASSESSMENT & PLAN NOTE
Chronic due to venous insufficiency  - Diuresis as tolerated  - trying to avoid norvasc if possible

## 2019-06-20 NOTE — PROGRESS NOTES
Hospital Medicine Daily Progress Note    Date of Service  6/19/2019    Chief Complaint  Weakness    Hospital Course    *This is a 79-year-old female with past medical history of hypertension venous insufficiency presented to her primary care provider and was found to have profound bradycardia and sent to the ER she complains of weakness and shortness of breath.  She was on 200 mg of metoprolol total daily and this was discontinued upon admission.  Despite this patient remains fatigued, and has bradycardia including second degree type II heart block.*      Interval Problem Update  Patient still feels weak and fatigued,  Discussed EKG with cardiology  Plan for transfer to [facility] when bed available    Consultants/Specialty  Cardiology    Code Status  Full    Disposition  Transfer for higher level of care (PPM)    Review of Systems  Review of Systems   Constitutional: Positive for malaise/fatigue. Negative for chills and fever.   HENT: Negative for congestion and sore throat.    Respiratory: Positive for shortness of breath.    Cardiovascular: Positive for leg swelling. Negative for chest pain and palpitations.   Gastrointestinal: Negative for abdominal pain, constipation, diarrhea, nausea and vomiting.   Genitourinary: Negative for dysuria.   Skin: Negative for itching and rash.   Neurological: Positive for dizziness and weakness. Negative for headaches.   Psychiatric/Behavioral: The patient is not nervous/anxious.         Physical Exam  Temp:  [36.4 °C (97.6 °F)-36.8 °C (98.2 °F)] 36.8 °C (98.2 °F)  Pulse:  [30-48] 36  Resp:  [16-35] 20  BP: (150-205)/(44-77) 150/44  SpO2:  [92 %-98 %] 93 %    Physical Exam   Constitutional: She is oriented to person, place, and time. She appears well-developed and well-nourished. No distress.   HENT:   Head: Normocephalic and atraumatic.   Eyes: Pupils are equal, round, and reactive to light. Conjunctivae and EOM are normal. Right eye exhibits no discharge. Left eye exhibits  no discharge.   Neck: Neck supple.   Cardiovascular: Regular rhythm.    siddhartha   Pulmonary/Chest: Effort normal and breath sounds normal.   Abdominal: Soft. Bowel sounds are normal. She exhibits no distension. There is no tenderness.   Musculoskeletal: She exhibits edema (3+). She exhibits no tenderness.   Neurological: She is alert and oriented to person, place, and time. No cranial nerve deficit.   Skin: Skin is warm and dry. She is not diaphoretic.   Psychiatric: She has a normal mood and affect.   Nursing note and vitals reviewed.      Fluids    Intake/Output Summary (Last 24 hours) at 06/19/19 1706  Last data filed at 06/19/19 1300   Gross per 24 hour   Intake              700 ml   Output              150 ml   Net              550 ml       Laboratory  Recent Labs      06/18/19 1748  06/19/19   0325   WBC  9.5  9.5   RBC  3.89*  3.79*   HEMOGLOBIN  11.5*  11.3*   HEMATOCRIT  36.6*  35.7*   MCV  94.1  94.2   MCH  29.6  29.8   MCHC  31.4*  31.7*   RDW  51.0*  50.8*   PLATELETCT  179  163*   MPV  11.8  12.0     Recent Labs      06/18/19   1748  06/19/19   0325   SODIUM  139  143   POTASSIUM  4.0  4.2   CHLORIDE  107  109   CO2  21  25   GLUCOSE  134*  101*   BUN  34*  33*   CREATININE  1.12  1.13   CALCIUM  8.3*  8.6                   Imaging  EC-ECHOCARDIOGRAM COMPLETE W/O CONT         US-RUQ   Final Result         1.  Mild atherosclerosis   2.  Mildly pulsatile flow in the main portal vein.      DX-CHEST-PORTABLE (1 VIEW)   Final Result      New interstitial edema, basilar atelectasis and possible pleural fluid. Consolidation from pneumonia or aspiration is not excluded           Assessment/Plan  * Bradycardia   Assessment & Plan    Dell 2 (2:1)  Patient is symptomatic with this  It has not improved despite cessation of beta-blockers  Echocardiogram is pending  Plan to transfer to [facility] for pacemaker once bed is available     Essential hypertension- (present on admission)   Assessment & Plan     Started on Norvasc but then changed to hydralazine due to severe chronic pedal edema  We will also start Lasix as it appears her HCTZ has not been addressing this issue  Echo is pending          VTE prophylaxis: lovenox

## 2019-06-20 NOTE — PROGRESS NOTES
Direct admit from [facility]. For Bradycardia. Admitting MD is Dr. Charles. ADT order signed and held, to be released upon patient's arrival on the unit. Patient arriving via EMS ground.

## 2019-06-20 NOTE — PROGRESS NOTES
Report given to Laurel RO. Plan of care discussed. No further questions asked. Awaiting for [facility] for transport.

## 2019-06-20 NOTE — CONSULTS
Cardiology consult    Requested by Dr. Charles      REASON FOR CONSULT: Pleasant 79-year-old white female with complete heart block and now second-degree heart block    HPI: Patient admitted after seeing her primary care physician.  Patient noted to be bradycardic.  Symptoms included fatigue and some dyspnea.  Denies any chest pain patient uses a walker recent right hip revision.  No history of chest pain.  No history of stroke.  Patient was on metoprolol 100 mg twice a day.  This is an old medication was not changed.  Noted to be in complete heart block medications held now and second-degree heart block.  Heart rates in 30s.  Patient's  has children.  She is retired from [employer].  No previous cardiac history.  Has history of hypertension and hypercholesterolemia.  Initially case discussed with Dr. Wan and then Dr. Becker.    MEDICATIONS:      Current Facility-Administered Medications:   •  triamterene/hctz (MAXZIDE-25/DYAZIDE) 37.5-25 MG 1 Cap, 1 Cap, Oral, Q DAY, Erik Woodall M.D.  •  atorvastatin (LIPITOR) tablet 20 mg, 20 mg, Oral, DAILY, Radha Charles M.D.  •  diazePAM (VALIUM) tablet 5 mg, 5 mg, Oral, Q6HRS PRN, Radha Charles M.D.  •  furosemide (LASIX) injection 20 mg, 20 mg, Intravenous, BID DIURETIC, Radha Charles M.D.  •  hydrALAZINE (APRESOLINE) tablet 25 mg, 25 mg, Oral, Q8HRS, Radha Charles M.D.  •  senna-docusate (PERICOLACE or SENOKOT S) 8.6-50 MG per tablet 2 Tab, 2 Tab, Oral, BID **AND** polyethylene glycol/lytes (MIRALAX) PACKET 1 Packet, 1 Packet, Oral, QDAY PRN **AND** magnesium hydroxide (MILK OF MAGNESIA) suspension 30 mL, 30 mL, Oral, QDAY PRN **AND** bisacodyl (DULCOLAX) suppository 10 mg, 10 mg, Rectal, QDAY PRN, Radha Charles M.D.    ALLERGIES:   Ms. Hdz  is allergic to other environmental.     SURGERIES:  Ms. Hdz   has a past surgical history that includes cataract extraction with iol (Bilateral, 2012); hip  arthroplasty total (Right, 8/9/2016); and hip revision total (Right, 8/29/2018).     MEDICAL ILLNESSES:  Ms. Hdz   has a past medical history of Arthritis; ASTHMA; Cataract; High cholesterol; Hyperlipidemia; Hypertension; Menopause; and Osteopenia.     SOCIAL HISTORY:  Ms. Hdz   reports that she has never smoked. She has never used smokeless tobacco. She reports that she does not drink alcohol or use drugs.     FAMILY HISTORY:  Ms.'s Hdz  family history includes Heart Disease in her brother and father; Hyperlipidemia in her father and mother; Hypertension in her father and mother.      ROS:  Review of Systems   Constitutional: Positive for malaise/fatigue. Negative for chills and fever.   HENT: Negative for congestion.    Eyes: Negative for blurred vision, pain and discharge.   Respiratory: Positive for shortness of breath. Negative for cough, hemoptysis and wheezing.    Cardiovascular: Negative for chest pain and palpitations.   Gastrointestinal: Negative for abdominal pain, nausea and vomiting.   Musculoskeletal: Negative for joint pain and myalgias.   Skin: Negative for itching and rash.   Neurological: Positive for weakness. Negative for speech change and loss of consciousness.   Endo/Heme/Allergies: Does not bruise/bleed easily.   Psychiatric/Behavioral: Negative for depression. The patient is not nervous/anxious.    All other systems reviewed and are negative.    In addition to the above, a complete review of system was performed and all other organs systems were normal    PHYSICAL EXAM:  Physical Exam   Constitutional: She is oriented to person, place, and time and well-developed, well-nourished, and in no distress.   HENT:   Head: Normocephalic and atraumatic.   Eyes: Pupils are equal, round, and reactive to light. EOM are normal.   Neck: Normal range of motion. Neck supple.   Cardiovascular: Regular rhythm.  Bradycardia present.  Exam reveals no gallop and no friction rub.    Pulmonary/Chest: Effort  "normal and breath sounds normal.   Abdominal: Soft. Bowel sounds are normal.   Musculoskeletal: Normal range of motion. She exhibits no edema, tenderness or deformity.   Neurological: She is alert and oriented to person, place, and time.   Skin: Skin is dry.   Psychiatric: Mood, memory, affect and judgment normal.       BP (!) 184/63   Pulse (!) 40   Temp 37.1 °C (98.7 °F) (Temporal)   Resp 16   Ht 1.651 m (5' 5\")   Wt 70.1 kg (154 lb 8.7 oz)   SpO2 93%   BMI 25.72 kg/m²      Lab Results   Component Value Date/Time    CHOLSTRLTOT 173 08/09/2018 09:17 AM     (H) 08/09/2018 09:17 AM    HDL 41 08/09/2018 09:17 AM    TRIGLYCERIDE 129 08/09/2018 09:17 AM       Lab Results   Component Value Date/Time    SODIUM 143 06/19/2019 03:25 AM    POTASSIUM 4.2 06/19/2019 03:25 AM    CHLORIDE 109 06/19/2019 03:25 AM    CO2 25 06/19/2019 03:25 AM    GLUCOSE 101 (H) 06/19/2019 03:25 AM    BUN 33 (H) 06/19/2019 03:25 AM    CREATININE 1.13 06/19/2019 03:25 AM    CREATININE 0.77 01/29/2013 07:49 AM    BUNCREATRAT 19 03/09/2016 09:37 AM    BUNCREATRAT 23 01/29/2013 07:49 AM    GLOMRATE >59 11/10/2010 09:26 AM     Lab Results   Component Value Date/Time    ALKPHOSPHAT 101 (H) 06/19/2019 03:25 AM    ASTSGOT 142 (H) 06/19/2019 03:25 AM    ALTSGPT 138 (H) 06/19/2019 03:25 AM    TBILIRUBIN 0.7 06/19/2019 03:25 AM      No results found for: BNPBTYPENAT  Recent Labs      06/18/19   1748   TROPONINI  0.04     Recent Labs      06/18/19   1748  06/19/19   0325   WBC  9.5  9.5   RBC  3.89*  3.79*   HEMOGLOBIN  11.5*  11.3*   HEMATOCRIT  36.6*  35.7*   MCV  94.1  94.2   MCH  29.6  29.8   MCHC  31.4*  31.7*   RDW  51.0*  50.8*   PLATELETCT  179  163*   MPV  11.8  12.0   Echocardiogram from 6/19  CONCLUSIONS  Prior Echo - 8/5/16Hyperdynamic left ventricular systolic function.  Left ventricular ejection fraction is visually estimated to be greater   than 75%.  Estimated right ventricular systolic pressure  is 60 mmHg.    EKG: From 6/18 and "  showed complete heart block with escape with a right bundle branch block similar to her native morphology.  Telemetry now shows 2-1 heart block  Results for orders placed or performed during the hospital encounter of 19   EKG   Result Value Ref Range    Report       [facility] Emergency Dept.    Test Date:  2019  Pt Name:    MOISE MENCHACA                Department: [facility]  MRN:        [id number]                      Room:  Gender:     Female                       Technician: [id number]  :        [id number]                   Requested By:ER TRIAGE PROTOCOL  Order #:    [id number]                    Reading MD: DEBORA ORTIZ MD    Measurements  Intervals                                Axis  Rate:       34                           P:  FL:                                      QRS:        103  QRSD:       120                          T:          78  QT:         648  QTc:        488    Interpretive Statements  POSSIBLE ATRIAL ARRHYTHMIA, A-RATE  83  IVCD, CONSIDER ATYPICAL RBBB  Compared to ECG 2018 14:18:42  Sinus rhythm no longer present    Electronically Signed On 2019 18:00:04 PDT by DEBORA ORTIZ MD     EKG   Result Value Ref Range    Report       [facility]    Test Date:  2019  Pt Name:    MOISE MENCHACA                Department: MED  MRN:        [id number]                      Room:       3315  Gender:     Female                       Technician: [id number]  :        [id number]                   Requested By:PATITO GUERRA  Order #:    [id number]                    Reading MD: Louis Lucero MD    Measurements  Intervals                                Axis  Rate:       38                           P:          0  FL:                                      QRS:        88  QRSD:       135                          T:          77  QT:         565  QTc:        450    Interpretive Statements  AV block, complete (third degree)  Right bundle branch block  Abnrm  T, consider ischemia, anterolateral lds  Compared to ECG 06/18/2019 17:33:30  Possible ischemia now present    Electronically Signed On 6- 14:52:23 PDT by Louis Lucero MD         IMAGING:   EC-ECHOCARDIOGRAM COMPLETE W/O CONT    (Results Pending)   CL-PERMANENT PACEMAKER INSERTION    (Results Pending)         Patient Active Problem List    Diagnosis Date Noted   • Bradycardia 06/18/2019     Priority: High   • Heart block AV second degree 06/19/2019   • Symptomatic bradycardia 06/19/2019   • Systolic hypertension 06/19/2019   • Pedal edema 06/19/2019   • Pulmonary hypertension (HCC) 06/19/2019   • Complete heart block by electrocardiogram ([facility]) 06/18/2019   • Venous insufficiency of both lower extremities 02/12/2019   • Mood disorder ([facility]) 11/08/2018   • Hip dislocation, right, subsequent encounter 08/14/2018   • Anxiety 07/26/2017   • Essential hypertension    • Osteopenia    • Hyperlipidemia          ASSESSMENT AND PLAN:   1.  Complete heart block and now secondary heart block following discontinuation of metoprolol.  Symptomatic including fatigue and dyspnea on exertion.  Recommendation is for placement of permanent pacemaker.  The risks, benefits, and alternatives to permanent pacemaker placement were discussed in great detail.  Specific risks mentioned to the patient including bleeding, cardiac perforation with possible tamponade possibly requiring pericardiocentesis or open heart surgery.  In addition the possibility of lead dislodgment (2-3%), pneumothorax (3%), hemothorax, infection were discussed.  Also, mentioned were the risk of death, stroke, and myocardial infarction.  The patient verbalized understanding of the potential complications and wishes to proceed with the procedure.  2.  Hypertension resume antihypertensives.  3.  Recent right hip revision.

## 2019-06-20 NOTE — CARE PLAN
Problem: Communication  Goal: The ability to communicate needs accurately and effectively will improve  Outcome: PROGRESSING AS EXPECTED  Allow time for patient to verbalize feelings and ask questions. Answer questions to best of ability. Update patient on plan of care.     Problem: Respiratory:  Goal: Respiratory status will improve  Outcome: PROGRESSING AS EXPECTED  Assess need for oxygen. Titrate oxygen as needed. Educate patient to sit head of bed up, deep breathe and cough.

## 2019-06-20 NOTE — ASSESSMENT & PLAN NOTE
AST/ALT and alk phos elevated. 2/2 hypoperfusion from bradycardia? No history of liver disease, T bili normal and examination is benign.  - repeat tomorrow

## 2019-06-20 NOTE — PROGRESS NOTES
[facility] Hospital Medicine Daily Progress Note    Date of Service  6/20/2019    Chief Complaint  79 y.o. female admitted 6/19/2019 with weakness and bradycardia. Evaluated at [facility], found to be significantly bradycardic and with generalized malaise.     Interval Problem Update  Sleeping, wakes easily. States she's not hungry for lunch as she had a late breakfast tray after her pacemaker placement this AM. Some soreness around the pacemaker site but no severe pain. No dizziness or lightheadedness.    Consultants/Specialty  Cardiology    Code Status  Full    Disposition  Home pending cardiology clearance. Anticipate tomorrow.    Review of Systems  Review of Systems   Constitutional: Positive for malaise/fatigue. Negative for chills and fever.   HENT: Negative for congestion and sore throat.    Respiratory: Negative for cough and shortness of breath.    Cardiovascular: Positive for chest pain (around pacemaker site). Negative for palpitations and leg swelling.   Gastrointestinal: Negative for abdominal pain, nausea and vomiting.   Genitourinary: Negative for dysuria.   Musculoskeletal: Negative for falls.   Neurological: Negative for dizziness, focal weakness, loss of consciousness, weakness and headaches.   Psychiatric/Behavioral: Negative.    All other systems reviewed and are negative.       Physical Exam  Temp:  [36.9 °C (98.5 °F)-37.2 °C (98.9 °F)] 37.2 °C (98.9 °F)  Pulse:  [36-40] 36  Resp:  [16] 16  BP: (182-187)/(59-64) 187/64  SpO2:  [92 %-93 %] 92 %    Physical Exam   Constitutional: She is oriented to person, place, and time. She appears well-developed. No distress.   HENT:   Head: Normocephalic.   Mouth/Throat: Oropharynx is clear and moist.   Eyes: Pupils are equal, round, and reactive to light. EOM are normal. No scleral icterus.   Neck: Normal range of motion. Neck supple. No tracheal deviation present.   Cardiovascular: Regular rhythm and intact distal pulses.    Pulmonary/Chest: Effort normal and  breath sounds normal. No respiratory distress. She has no rales.   Abdominal: Soft. Bowel sounds are normal. She exhibits no distension. There is no tenderness.   Musculoskeletal: She exhibits no edema.   Neurological: She is alert and oriented to person, place, and time.   Skin: Skin is warm and dry.   Psychiatric: She has a normal mood and affect. Her behavior is normal.   Vitals reviewed.      Fluids  No intake or output data in the 24 hours ending 06/20/19 0655    Laboratory  Recent Labs      06/18/19 1748 06/19/19   0325   WBC  9.5  9.5   RBC  3.89*  3.79*   HEMOGLOBIN  11.5*  11.3*   HEMATOCRIT  36.6*  35.7*   MCV  94.1  94.2   MCH  29.6  29.8   MCHC  31.4*  31.7*   RDW  51.0*  50.8*   PLATELETCT  179  163*   MPV  11.8  12.0     Recent Labs      06/18/19 1748 06/19/19 0325  06/20/19   0301   SODIUM  139  143  140   POTASSIUM  4.0  4.2  4.3   CHLORIDE  107  109  109   CO2  21  25  25   GLUCOSE  134*  101*  132*   BUN  34*  33*  36*   CREATININE  1.12  1.13  1.09   CALCIUM  8.3*  8.6  9.0                   Imaging  DX-CHEST-PORTABLE (1 VIEW)   Final Result      Placement left cardiac conducting device. No pneumothorax.   Bilateral perihilar lung base atelectasis/edema and pleural effusion similar to recent findings.      EC-ECHOCARDIOGRAM COMPLETE W/O CONT    (Results Pending)   CL-PERMANENT PACEMAKER INSERTION    (Results Pending)        Assessment/Plan  * Heart block AV second degree- (present on admission)   Assessment & Plan    Episodes of complete heart block and second degree type 2 once metop was stopped.  - cardiology following, appreciate  - went for permanent pacemaker placement this AM  - will need interrogation prior to d/c  - no beta blockers  - monitor on telemetry     Systolic hypertension- (present on admission)   Assessment & Plan    Now significantly hypertensive.  - Beta-blocker discontinued due to bradycardia  - tolerates norvasc but severe chronic pedal edema  - increase HCTZ to 50mg  q8hrs, monitor BP  - continue home maxzide-25/dyazide  - lasix 20mg BID, monitor renal function     Transaminitis- (present on admission)   Assessment & Plan    AST/ALT and alk phos elevated. 2/2 hypoperfusion from bradycardia? No history of liver disease, T bili normal and examination is benign.  - repeat tomorrow     Pulmonary hypertension (HCC)- (present on admission)   Assessment & Plan    RVSP 60 mmHg     Anxiety- (present on admission)   Assessment & Plan    Takes valium at home  - continue PRN     Pedal edema- (present on admission)   Assessment & Plan    Chronic due to venous insufficiency  - Diuresis as tolerated  - trying to avoid norvasc if possible          VTE prophylaxis: SCDs

## 2019-06-20 NOTE — ASSESSMENT & PLAN NOTE
Episodes of complete heart block and second degree type 2 once metop was stopped.  - cardiology following, appreciate  - went for permanent pacemaker placement this AM  - will need interrogation prior to d/c  - no beta blockers  - monitor on telemetry

## 2019-06-20 NOTE — PROGRESS NOTES
Pt arrived from [facility] via [facility]. Handoff report received from Eugenio RO by phone. Assumed patient care. PT is reclined in bed, AAOx4. Tele box is on, rate and rhythm noted. POC discussed with PT and all questions addressed. Safety precautions in place. Call light and personal belongings within reach. Educated to call for assistance if needed.

## 2019-06-20 NOTE — H&P
Hospital Medicine History & Physical Note    Date of Service  6/19/2019    Primary Care Physician  Shanda Reyes    Consultants  Cardiology    Code Status  Full    Chief Complaint  Weakness, bradycardia    History of Presenting Illness  79 y.o. female who presented on 6/18 to [facility] with weakness, she was originally seen at her primary care physician's office where she was found to be significantly bradycardic in 30s and was sent to the ER.  Patient was on high-dose metoprolol (200 mg/day), this has been held since admission but she has had no significant improvement in her heart rate, she remains fatigued.  She has episodes of second-degree type II and possible third-degree heart block.  Strips were sent to Dr. Becker and he agreed that patient should be transferred to [facility] for possible pacemaker placement.    Review of Systems  Review of Systems   Constitutional: Positive for malaise/fatigue.   HENT: Negative for congestion and sore throat.    Cardiovascular: Positive for leg swelling. Negative for chest pain.   Gastrointestinal: Negative for abdominal pain, diarrhea, nausea and vomiting.   Genitourinary: Negative for dysuria.   Skin: Negative for itching and rash.   Neurological: Positive for dizziness and weakness. Negative for headaches.   Psychiatric/Behavioral: The patient is not nervous/anxious.        Past Medical History   has a past medical history of Arthritis; ASTHMA; Cataract; High cholesterol; Hyperlipidemia; Hypertension; Menopause; and Osteopenia.    Surgical History   has a past surgical history that includes cataract extraction with iol (Bilateral, 2012); hip arthroplasty total (Right, 8/9/2016); and hip revision total (Right, 8/29/2018).     Family History  family history includes Heart Disease in her brother and father; Hyperlipidemia in her father and mother; Hypertension in her father and mother.     Social History   reports that she has never smoked. She has never  used smokeless tobacco. She reports that she does not drink alcohol or use drugs.    Allergies  Allergies   Allergen Reactions   • Other Environmental Cough     Pine, Dust       Medications  Cannot display prior to admission medications because the patient has not been admitted in this contact.       Physical Exam       Physical Exam   Constitutional: She is oriented to person, place, and time. She appears well-developed and well-nourished. No distress.   HENT:   Head: Normocephalic and atraumatic.   Mouth/Throat: Oropharynx is clear and moist.   Eyes: Pupils are equal, round, and reactive to light. Conjunctivae and EOM are normal. Right eye exhibits no discharge. Left eye exhibits no discharge. No scleral icterus.   Neck: Neck supple.   Cardiovascular: Normal rate and regular rhythm.    siddhartha   Pulmonary/Chest: Effort normal and breath sounds normal.   Abdominal: Soft. Bowel sounds are normal. She exhibits no distension. There is no tenderness.   Musculoskeletal: She exhibits edema (3+). She exhibits no tenderness.   Neurological: She is alert and oriented to person, place, and time. No cranial nerve deficit.   Skin: Skin is warm and dry. She is not diaphoretic.   Psychiatric: She has a normal mood and affect.   Nursing note and vitals reviewed.      Laboratory:  Recent Labs      06/18/19 1748 06/19/19   0325   WBC  9.5  9.5   RBC  3.89*  3.79*   HEMOGLOBIN  11.5*  11.3*   HEMATOCRIT  36.6*  35.7*   MCV  94.1  94.2   MCH  29.6  29.8   MCHC  31.4*  31.7*   RDW  51.0*  50.8*   PLATELETCT  179  163*   MPV  11.8  12.0     Recent Labs      06/18/19   1748  06/19/19   0325   SODIUM  139  143   POTASSIUM  4.0  4.2   CHLORIDE  107  109   CO2  21  25   GLUCOSE  134*  101*   BUN  34*  33*   CREATININE  1.12  1.13   CALCIUM  8.3*  8.6     Recent Labs      06/18/19 1748  06/19/19   0325   ALTSGPT  135*  138*   ASTSGOT  182*  142*   ALKPHOSPHAT  119*  101*   TBILIRUBIN  0.6  0.7   GLUCOSE  134*  101*                 Recent  Labs      19   1748   TROPONINI  0.04       Urinalysis:    No results found     Imaging:  Transthoracic  Echo Report      Echocardiography Laboratory    CONCLUSIONS  Prior Echo - 16Hyperdynamic left ventricular systolic function.  Left ventricular ejection fraction is visually estimated to be greater   than 75%.  Estimated right ventricular systolic pressure  is 60 mmHg.      MOISE MENCHACA  Exam Date:         2019                      16:06  Exam Location:     Inpatient  Priority:          Routine    Ordering Physician:        PATITO GUERRA  Referring Physician:       CHARLIE Kumar  Sonographer:               ODESSA Lanier    Age:    79     Gender:    F  MRN:    [id number]  :    1940  BSA:    1.81   Ht (in):    65     Wt (lb):    163  Exam Type:     Complete    Indications:     Bradycardia, unspecified  ICD Codes:       R00.1    CPT Codes:       24647    BP:   181    /   54     HR:   35  Technical Quality:       Fair    MEASUREMENTS  (Male / Female) Normal Values  2D ECHO  LV Diastolic Diameter PLAX        3.5 cm                4.2 - 5.9 / 3.9 - 5.3   cm  LV Systolic Diameter PLAX         1.4 cm                2.1 - 4.0 cm  IVS Diastolic Thickness           0.98 cm                 LVPW Diastolic Thickness          0.94 cm                 LVOT Diameter                     1.8 cm                  Estimated LV Ejection Fraction    75 %                    LV Ejection Fraction MOD BP       86.9 %                >= 55  %  LV Ejection Fraction MOD 4C       85.1 %                  LV Ejection Fraction MOD 2C       87.7 %                  IVC Diameter                      2 cm                      DOPPLER  AV Peak Velocity                  1.9 m/s                 AV Peak Gradient                  14.1 mmHg               AV Mean Gradient                  7.1 mmHg                LVOT Peak Velocity                1.2 m/s                  AV Area Cont Eq vti               1.6 cm²                 Mitral E Point Velocity           1.4 m/s                 Mitral E to A Ratio               1.3                     Mitral A Duration                 138 ms                  MV Pressure Half Time             59.7 ms                 MV Area PHT                       3.7 cm²                 MV Deceleration Time              206 ms                  TR Peak Velocity                  315 cm/s                PV Peak Velocity                  1.1 m/s                 PV Peak Gradient                  5 mmHg                  RVOT Peak Velocity                0.74 m/s                  * Indicates values subject to auto-interpretation  LV EF:  75    %    FINDINGS  Left Ventricle  Normal left ventricular chamber size. Normal left ventricular wall   thickness. Hyperdynamic left ventricular systolic function. Left   ventricular ejection fraction is visually estimated to be greater than   75%. Normal regional wall motion. Diastolic function is difficult to   assess.    Right Ventricle  The right ventricle was normal in size and function.    Right Atrium  The right atrium is normal in size.  Normal inferior vena cava size   without inspiratory collapse.    Left Atrium  The left atrium is normal in size.  Left atrial volume index is 17   mL/sq m.    Mitral Valve  Mitral annular calcification. No mitral stenosis. Mild mitral   regurgitation.    Aortic Valve  Tricuspid aortic valve. Aortic sclerosis without stenosis. No aortic   insufficiency.    Tricuspid Valve  Structurally normal tricuspid valve without significant stenosis. Mild   tricuspid regurgitation. Estimated right ventricular systolic pressure    is 60 mmHg. Right atrial pressure is estimated to be 8 mmHg.    Pulmonic Valve  The pulmonic valve is not well visualized. No stenosis or regurgitation   seen.    Pericardium  Normal pericardium without effusion.    Aorta  The aortic root is normal.  Ascending  aorta diameter is 2.8 cm.    Louis Lucero      Assessment/Plan:  I anticipate this patient will require at least two midnights for appropriate medical management, necessitating inpatient admission.    Pulmonary hypertension (HCC)   Assessment & Plan    RVSP 60 mm     Pedal edema   Assessment & Plan    Chronic due to venous insufficiency and l? pulmonary hypertension  Diuresis as tolerated     Systolic hypertension   Assessment & Plan    Beta-blocker discontinued due to bradycardia,  Did well with Norvasc however she has severe chronic pedal edema so wish to avoid this medication  Hydralazine for now  Lasix also added as HCTZ was not helping her edema     Symptomatic bradycardia   Assessment & Plan    Reason for presentation     Heart block AV second degree   Assessment & Plan    Mobitz 2 with possible third-degree as well  Has not improved after beta-blocker discontinued  Transferred to [facility] for probable pacemaker placement  Discussed with Dr. Bird Becker         VTE prophylaxis: SCDs

## 2019-06-20 NOTE — PROGRESS NOTES
Pt back from cath lab pacemaker placement. Site C/D/I. Arm in sling and pt alert and oriented. Pt denies pain at this time. Telemetry on. Pt educated on left arm precautions. Post-op vitals obtained. Call light within reach. Will continue to monitor.

## 2019-06-20 NOTE — PROGRESS NOTES
Patient transferred from [facility] seen and examined. Agree with H&P from Dr. Charles. Patient continues to be bradycardic in the 30's. Symptomatic with shortness of breath and weakness. Discussed with Dr. Woodall cardiology who will evaluate the patient.

## 2019-06-20 NOTE — OP REPORT
[facility] ELECTROPHYSIOLOGY PROCEDURE NOTE    PROCEDURE PERFORMED: Permanent Pacemaker Implantation    DATE OF SERVICE: 6/20/2019    : Justa Mcginnis MD    ASSISTANT: None    ANESTHESIA: Local and moderate sedation (start time 0838, stop time 0915, total dose 2 mg IV versed, 100 mcg IV fentanyl)    EBL: 30 cc    SPECIMENS: None    INDICATION(S):  Complete heart block    COMPLICATION(S): None    DESCRIPTION OF PROCEDURE:  After informed written consent, the patient was brought to the electrophysiology lab in the fasting, unsedated state. The patient was prepped and draped in the usual sterile fashion. The procedure was performed under moderate sedation with local anesthetic. A left upper extremity venogram was performed, demonstrating a patent subclavian vein. A left infraclavicular incision was made with a scalpel and the pectoral device pocket was created using a combination of blunt dissection and electrocautery. The modified Seldinger technique was used to gain access to the left axillary vein. A peel-away hemostasis sheath was placed in the vein. Under fluoroscopic guidance, the pacemaker leads were introduced into the heart. The ventricular lead was advanced to the RVOT and then lowered into position at the RV apex. The ventricular lead was repositioned three times to achieve the best sensing we could. The atrial lead was positioned on R atrial appendage. The leads were tested and had satisfactory sensing and pacing parameters. High output ventricular pacing did not produce extracardiac stimulation. The leads were sutured to the underlying pectoral muscle with interrupted silk over a silastic suture sleeve. The device pocket was irrigated with antibiotic solution, inspected, and no bleeding was seen. The leads were connected to the pacemaker pulse generator and the device was inserted into the pocket. The wound was closed with three layers of absorbable sutures. Following recovery from sedation, the  patient was transferred to a monitored bed in good condition.     IMPLANTED DEVICE INFORMATION:  Pulse generator is a MDT model W3DR01  Serial # [id number]    LEAD INFORMATION:  1)Right atrial lead is a MDT model #5076-45, serial #[id number] ,P wave 1.1 millivolts, threshold 0.875 Volts at 0.4 milliseconds, pacing impedance 646 Ohms.    2)Right ventricular lead is a MDT model #5076-52, serial #[id number], R wave 2.8 millivolts, threshold 0.5 Volts at 0.4 milliseconds, pacing impedance 969 Ohms.    DEVICE PROGRAMMING:  DDDR 60 -120 ppm    FLUOROSCOPY TIME: 3.9 minutes    IMPRESSIONS:  1. Successful dual chamber pacemaker implantation    RECOMMENDATIONS:  1. Transfer to monitored bed  2. Chest x-ray  3. Device interrogation prior to hospital discharge  4. Followup in device clinic

## 2019-06-20 NOTE — PROGRESS NOTES
Assumed care of pt. Bedside report completed with night shift RN. Pt alert and oriented. Pt denies pain for this RN. Pt resting comfortably in bed. Call light within reach. Bed low and locked. Offered needs.    Pt transported to cath lab for pacemaker. Telemetry on and transported with RN.

## 2019-06-21 ENCOUNTER — HOSPITAL ENCOUNTER (INPATIENT)
Dept: CARDIOLOGY | Facility: MEDICAL CENTER | Age: 79
DRG: 244 | End: 2019-06-21
Attending: INTERNAL MEDICINE | Admitting: INTERNAL MEDICINE
Payer: MEDICARE

## 2019-06-21 ENCOUNTER — APPOINTMENT (OUTPATIENT)
Dept: RADIOLOGY | Facility: MEDICAL CENTER | Age: 79
DRG: 244 | End: 2019-06-21
Attending: INTERNAL MEDICINE
Payer: MEDICARE

## 2019-06-21 VITALS
TEMPERATURE: 97.3 F | HEART RATE: 87 BPM | DIASTOLIC BLOOD PRESSURE: 57 MMHG | RESPIRATION RATE: 18 BRPM | HEIGHT: 65 IN | WEIGHT: 156.31 LBS | OXYGEN SATURATION: 94 % | BODY MASS INDEX: 26.04 KG/M2 | SYSTOLIC BLOOD PRESSURE: 136 MMHG

## 2019-06-21 PROBLEM — I10 SYSTOLIC HYPERTENSION: Status: RESOLVED | Noted: 2019-06-19 | Resolved: 2019-06-21

## 2019-06-21 PROBLEM — I44.1 HEART BLOCK AV SECOND DEGREE: Status: RESOLVED | Noted: 2019-06-19 | Resolved: 2019-06-21

## 2019-06-21 PROBLEM — R74.01 TRANSAMINITIS: Status: RESOLVED | Noted: 2019-06-20 | Resolved: 2019-06-21

## 2019-06-21 LAB
ALBUMIN SERPL BCP-MCNC: 3.4 G/DL (ref 3.2–4.9)
ALBUMIN/GLOB SERPL: 1.2 G/DL
ALP SERPL-CCNC: 81 U/L (ref 30–99)
ALT SERPL-CCNC: 58 U/L (ref 2–50)
ANION GAP SERPL CALC-SCNC: 10 MMOL/L (ref 0–11.9)
AST SERPL-CCNC: 27 U/L (ref 12–45)
BILIRUB SERPL-MCNC: 0.7 MG/DL (ref 0.1–1.5)
BUN SERPL-MCNC: 29 MG/DL (ref 8–22)
CALCIUM SERPL-MCNC: 9 MG/DL (ref 8.5–10.5)
CHLORIDE SERPL-SCNC: 109 MMOL/L (ref 96–112)
CO2 SERPL-SCNC: 25 MMOL/L (ref 20–33)
CREAT SERPL-MCNC: 0.95 MG/DL (ref 0.5–1.4)
EKG IMPRESSION: NORMAL
GLOBULIN SER CALC-MCNC: 2.9 G/DL (ref 1.9–3.5)
GLUCOSE SERPL-MCNC: 107 MG/DL (ref 65–99)
POTASSIUM SERPL-SCNC: 4.1 MMOL/L (ref 3.6–5.5)
PROT SERPL-MCNC: 6.3 G/DL (ref 6–8.2)
SODIUM SERPL-SCNC: 144 MMOL/L (ref 135–145)
TSH SERPL DL<=0.005 MIU/L-ACNC: 2.34 UIU/ML (ref 0.38–5.33)

## 2019-06-21 PROCEDURE — A9270 NON-COVERED ITEM OR SERVICE: HCPCS | Performed by: HOSPITALIST

## 2019-06-21 PROCEDURE — 93010 ELECTROCARDIOGRAM REPORT: CPT | Performed by: INTERNAL MEDICINE

## 2019-06-21 PROCEDURE — 84443 ASSAY THYROID STIM HORMONE: CPT

## 2019-06-21 PROCEDURE — 700102 HCHG RX REV CODE 250 W/ 637 OVERRIDE(OP): Performed by: INTERNAL MEDICINE

## 2019-06-21 PROCEDURE — 36415 COLL VENOUS BLD VENIPUNCTURE: CPT

## 2019-06-21 PROCEDURE — 99024 POSTOP FOLLOW-UP VISIT: CPT | Performed by: NURSE PRACTITIONER

## 2019-06-21 PROCEDURE — 99239 HOSP IP/OBS DSCHRG MGMT >30: CPT | Performed by: HOSPITALIST

## 2019-06-21 PROCEDURE — 71045 X-RAY EXAM CHEST 1 VIEW: CPT

## 2019-06-21 PROCEDURE — 93005 ELECTROCARDIOGRAM TRACING: CPT | Performed by: INTERNAL MEDICINE

## 2019-06-21 PROCEDURE — 99232 SBSQ HOSP IP/OBS MODERATE 35: CPT | Mod: 24 | Performed by: INTERNAL MEDICINE

## 2019-06-21 PROCEDURE — 80053 COMPREHEN METABOLIC PANEL: CPT

## 2019-06-21 PROCEDURE — A9270 NON-COVERED ITEM OR SERVICE: HCPCS | Performed by: INTERNAL MEDICINE

## 2019-06-21 PROCEDURE — 700111 HCHG RX REV CODE 636 W/ 250 OVERRIDE (IP): Performed by: HOSPITALIST

## 2019-06-21 PROCEDURE — 700102 HCHG RX REV CODE 250 W/ 637 OVERRIDE(OP): Performed by: HOSPITALIST

## 2019-06-21 RX ORDER — HYDRALAZINE HYDROCHLORIDE 25 MG/1
25 TABLET, FILM COATED ORAL 3 TIMES DAILY
Qty: 90 TAB | Refills: 0 | Status: SHIPPED | OUTPATIENT
Start: 2019-06-21 | End: 2019-07-01 | Stop reason: SDUPTHER

## 2019-06-21 RX ADMIN — ACETAMINOPHEN 650 MG: 325 TABLET, FILM COATED ORAL at 05:33

## 2019-06-21 RX ADMIN — TRIAMTERENE AND HYDROCHLOROTHIAZIDE 1 CAPSULE: 25; 37.5 CAPSULE ORAL at 05:31

## 2019-06-21 RX ADMIN — FUROSEMIDE 20 MG: 10 INJECTION, SOLUTION INTRAMUSCULAR; INTRAVENOUS at 05:32

## 2019-06-21 RX ADMIN — HYDRALAZINE HYDROCHLORIDE 37.5 MG: 25 TABLET, FILM COATED ORAL at 13:51

## 2019-06-21 RX ADMIN — HYDRALAZINE HYDROCHLORIDE 37.5 MG: 25 TABLET, FILM COATED ORAL at 05:31

## 2019-06-21 ASSESSMENT — ENCOUNTER SYMPTOMS
LIGHT-HEADEDNESS: 0
MYALGIAS: 0
WHEEZING: 0
SHORTNESS OF BREATH: 0
COLOR CHANGE: 0
DIZZINESS: 0
FATIGUE: 0
DIAPHORESIS: 0
FEVER: 0
COUGH: 0
ABDOMINAL PAIN: 0
CHEST TIGHTNESS: 0
STRIDOR: 0
PALPITATIONS: 0
CHILLS: 0

## 2019-06-21 NOTE — DISCHARGE INSTRUCTIONS
Discharge Instructions    Discharged to home by car with relative. Discharged via wheelchair, hospital escort: Yes.  Special equipment needed: Not Applicable    Be sure to schedule a follow-up appointment with your primary care doctor or any specialists as instructed.     Discharge Plan:   Diet Plan: Discussed  Activity Level: Discussed  Confirmed Follow up Appointment: Appointment Scheduled  Confirmed Symptoms Management: Discussed  Medication Reconciliation Updated: Yes  Influenza Vaccine Indication: Not indicated: Previously immunized this influenza season and > 8 years of age    I understand that a diet low in cholesterol, fat, and sodium is recommended for good health. Unless I have been given specific instructions below for another diet, I accept this instruction as my diet prescription.   Other diet: Cardiac    Special Instructions: None    · Is patient discharged on Warfarin / Coumadin?   No       1.  No showers for one week; may take sponge bath.  Keep dressing dry & in place until seen at for you follow up visit at the cardiology office.   Report s/s of infection such as warmth/redness/drainage/swelling at site or fever/chills.   2.  No lifting over 10 lbs for six weeks.   3.  Do not raise affected arm above shoulder level or behind head for six weeks.   4.  Avoid excessive pushing, pulling, or twisting for six weeks.   5.  No driving for the first week.   6.  Call our office ([phone]) if you notice any increased swelling, redness, warmth, or drainage at the implant site.   7.  Call our office if you develop fever > 101F or uncontrolled pain.   8.  No MRI's for 6 weeks if you have a MRI compatible device.   9.  No dental work or cleanings for 3 months.   10.  May remove arm sling after one day, but please wear if you have trouble remembering to keep your arm down.   11. Do not place cell phones or mobile devices directly over implanted device.   12. Your follow-up appointments will be done at  approximately 1 week, 6 weeks, then every 3-4 months.   Patient will follow up in 1 week at our pacemaker clinic for pacemaker check or sooner if needed. Patient instructed to contact the office with any questions or concerns.        Pacemaker Implantation, Care After  Refer to this sheet over the next few weeks. These instructions provide you with information on caring for yourself after the procedure. Your health care provider may also give you more specific instructions. Your treatment has been planned according to current medical practices, but problems sometimes occur. Call your health care provider if you have any problems or questions regarding your pacemaker.   WHAT TO EXPECT AFTER THE PROCEDURE  · You may feel pain. Some pain is normal. It may last a few days.  · A slight bump may be seen over the skin where the device was placed. Sometimes, it is possible to feel the device under the skin. This is normal.  · In the months and years afterward, your health care provider will check the device, the leads, and the battery every few months. Eventually, when the battery is low, the device will be replaced.  HOME CARE INSTRUCTIONS  Medicines  · Take medicines only as directed by your health care provider.  · If you were prescribed an antibiotic medicine, finish it all even if you start to feel better.  · Do not take any other medicines without asking your health care provider first. Some medicines, including certain painkillers, can cause bleeding in your stomach after surgery.  Wound Care  · Do not remove the bandage on your chest until directed to do so by your health care provider.  · After your bandage is removed, you may see pieces of tape called skin adhesive strips over the area where the cut was made (incision site). Let them fall off on their own.  · Check the incision site every day to make sure it is not infected, bleeding, or starting to pull apart.  · Do not use lotions or ointments near the incision  site unless directed to do so.  · Keep the incision area clean and dry for 2-3 days after the procedure or as directed by your health care provider. It takes several weeks for the incision site to completely heal.  · Do not take baths, swim, or use a hot tub until your health care provider approves.  Activities  · Try to walk a little every day. Exercising is important after this procedure. It is also important to use your shoulder on the side of the pacemaker in daily tasks that do not require exaggerated motion.  · Avoid sudden jerking, pulling, or chopping movements that pull your upper arm far away from your body for at least 6 weeks.  · Do not lift your upper arm above your shoulders for at least 6 weeks. This means no tennis, golf, or swimming for this period of time. If you sleep with the arm above your head, use a restraint to prevent this from happening as you sleep.  · You may go back to work when your health care provider says it is okay. Check with your health care provider before you start to drive or play sports.  Other Instructions  · Follow diet instructions if they were provided. You should be able to eat what you usually do right away, but you may need to limit your salt intake.  · Weigh yourself every day. If you suddenly gain weight, fluid may be building up in your body.  · Always carry your pacemaker identification card with you. The card should list the implant date, device model, and . Consider wearing a medical alert bracelet or necklace.  · Tell all health care providers that you have a pacemaker. This may prevent them from giving you a magnetic resource imaging scan (MRI) because of the strong magnets used during that test.  · If you must pass through a metal detector, quickly walk through it. Do not stop under the detector or stand near it.  · Avoid places or objects with a strong electric or magnetic field, including:  ¨ Airport security jones. When at the airport, let  officials know you have a pacemaker. Your ID card will let you be checked in a way that is safe for you and that will not damage your pacemaker. Also, do not let a security person wave a magnetic wand near your pacemaker. That can make it stop working.  ¨ Power plants.  ¨ Large electrical generators.  ¨ Radiofrequency transmission towers, such as cell phone and radio towers.  · Do not use amateur (ham) radio equipment or electric (arc) welding torches. Some devices are safe to use if held at least 1 foot from your pacemaker. These include power tools, lawn mowers, and speakers. If you are unsure of whether something is safe to use, ask your health care provider.  · You may safely use electric blankets, heating pads, computers, and microwave ovens.  · When using your cell phone, hold it to the ear opposite the pacemaker. Do not leave your cell phone in a pocket over the pacemaker.  · Keep all follow-up visits as directed by your health care provider. This is how your health care provider makes sure your chest is healing the way it should. Ask your health care provider when you should come back to have your stitches or staples taken out.  · Have your pacemaker checked every 3-6 months or as directed by your health care provider. Most pacemakers last for 4-8 years before a new one is needed.  SEEK MEDICAL CARE IF:  · You gain weight suddenly.  · Your legs or feet swell more than they have before.  · It feels like your heart is fluttering or skipping beats (heart palpitations).  · You have a fever.  SEEK IMMEDIATE MEDICAL CARE IF:  · You have chest pain.  · You feel more short of breath than you have felt before.  · You feel more light-headed than you have felt before.  · You have problems with your incision site, such as swelling or bleeding, or it starts to open up.  · You have drainage, redness, swelling, or pain at your incision site.     This information is not intended to replace advice given to you by your health  care provider. Make sure you discuss any questions you have with your health care provider.     Document Released: 07/07/2006 Document Revised: 01/08/2016 Document Reviewed: 04/19/2013  [employer] Interactive Patient Education ©2016 Elsevier Inc.    Hydralazine tablets  What is this medicine?  HYDRALAZINE (jules rodriguez) is a type of vasodilator. It relaxes blood vessels, increasing the blood and oxygen supply to your heart. This medicine is used to treat high blood pressure.  This medicine may be used for other purposes; ask your health care provider or pharmacist if you have questions.  COMMON BRAND NAME(S): Apresoline  What should I tell my health care provider before I take this medicine?  They need to know if you have any of these conditions:  -blood vessel disease  -heart disease including angina or history of heart attack  -kidney or liver disease  -systemic lupus erythematosus (SLE)  -an unusual or allergic reaction to hydralazine, tartrazine dye, other medicines, foods, dyes, or preservatives  -pregnant or trying to get pregnant  -breast-feeding  How should I use this medicine?  Take this medicine by mouth with a glass of water. Follow the directions on the prescription label. Take your doses at regular intervals. Do not take your medicine more often than directed. Do not stop taking except on the advice of your doctor or health care professional.  Talk to your pediatrician regarding the use of this medicine in children. Special care may be needed. While this drug may be prescribed for children for selected conditions, precautions do apply.  Overdosage: If you think you have taken too much of this medicine contact a poison control center or emergency room at once.  NOTE: This medicine is only for you. Do not share this medicine with others.  What if I miss a dose?  If you miss a dose, take it as soon as you can. If it is almost time for your next dose, take only that dose. Do not take double or extra  doses.  What may interact with this medicine?  -medicines for high blood pressure  -medicines for mental depression  This list may not describe all possible interactions. Give your health care provider a list of all the medicines, herbs, non-prescription drugs, or dietary supplements you use. Also tell them if you smoke, drink alcohol, or use illegal drugs. Some items may interact with your medicine.  What should I watch for while using this medicine?  Visit your doctor or health care professional for regular checks on your progress. Check your blood pressure and pulse rate regularly. Ask your doctor or health care professional what your blood pressure and pulse rate should be and when you should contact him or her.  You may get drowsy or dizzy. Do not drive, use machinery, or do anything that needs mental alertness until you know how this medicine affects you. Do not stand or sit up quickly, especially if you are an older patient. This reduces the risk of dizzy or fainting spells. Alcohol may interfere with the effect of this medicine. Avoid alcoholic drinks.  Do not treat yourself for coughs, colds, or pain while you are taking this medicine without asking your doctor or health care professional for advice. Some ingredients may increase your blood pressure.  What side effects may I notice from receiving this medicine?  Side effects that you should report to your doctor or health care professional as soon as possible:  -chest pain, or fast or irregular heartbeat  -fever, chills, or sore throat  -numbness or tingling in the hands or feet  -shortness of breath  -skin rash, redness, blisters or itching  -stiff or swollen joints  -sudden weight gain  -swelling of the feet or legs  -swollen lymph glands  -unusual weakness  Side effects that usually do not require medical attention (report to your doctor or health care professional if they continue or are bothersome):  -diarrhea, or constipation  -headache  -loss of  appetite  -nausea, vomiting  This list may not describe all possible side effects. Call your doctor for medical advice about side effects. You may report side effects to FDA at 4-135-OSQ-7506.  Where should I keep my medicine?  Keep out of the reach of children.  Store at room temperature between 15 and 30 degrees C (59 and 86 degrees F). Throw away any unused medicine after the expiration date.  NOTE: This sheet is a summary. It may not cover all possible information. If you have questions about this medicine, talk to your doctor, pharmacist, or health care provider.  © 2018 Elsevier/Gold Standard (2009-05-01 15:44:58)      Depression / Suicide Risk    As you are discharged from this [facility] facility, it is important to learn how to keep safe from harming yourself.    Recognize the warning signs:  · Abrupt changes in personality, positive or negative- including increase in energy   · Giving away possessions  · Change in eating patterns- significant weight changes-  positive or negative  · Change in sleeping patterns- unable to sleep or sleeping all the time   · Unwillingness or inability to communicate  · Depression  · Unusual sadness, discouragement and loneliness  · Talk of wanting to die  · Neglect of personal appearance   · Rebelliousness- reckless behavior  · Withdrawal from people/activities they love  · Confusion- inability to concentrate     If you or a loved one observes any of these behaviors or has concerns about self-harm, here's what you can do:  · Talk about it- your feelings and reasons for harming yourself  · Remove any means that you might use to hurt yourself (examples: pills, rope, extension cords, firearm)  · Get professional help from the community (Mental Health, Substance Abuse, psychological counseling)  · Do not be alone:Call your Safe Contact- someone whom you trust who will be there for you.  · Call your local CRISIS HOTLINE 809-2962 or 314-471-4324  · Call your local Children's  Mobile Crisis Response Team [place] (010) 960-0230 or www.[employer]Jam.Pocket Social  · Call the toll free National Suicide Prevention Hotlines   · National Suicide Prevention Lifeline 694-391-QLDL (2763)  · National Hope Line Network 800-SUICIDE (479-1235)

## 2019-06-21 NOTE — DISCHARGE SUMMARY
Discharge Summary    CHIEF COMPLAINT ON ADMISSION  No chief complaint on file.      Reason for Admission  Bradycardia     Admission Date  6/19/2019    CODE STATUS  Full Code    HPI & HOSPITAL COURSE  This is a 79 y.o. Female with pulmonary HTN, HTN, and HLD here with symptomatic bradycardia. She was found to be in complete heart block, metop was discontinued and she continued to be in second degree type II block. Case was discussed with cardiology and it was recommended that she transfer to [facility] for pacemaker placement. Pacemaker was placed without issue, monitored on telemetry and was paced from 80s to 100 bpm. She felt improved and with more energy. She ambulated without issue and was eager for discharge.       Therefore, she is discharged in good and stable condition to home with close outpatient follow-up.    The patient met 2-midnight criteria for an inpatient stay at the time of discharge.    Discharge Date  6/21/2019    FOLLOW UP ITEMS POST DISCHARGE  Please follow up with your PCP and Cardiology for pacemaker check in 1 week.    DISCHARGE DIAGNOSES  Principal Problem (Resolved):    Heart block AV second degree POA: Yes  Active Problems:    Pedal edema POA: Yes    Anxiety POA: Yes    Pulmonary hypertension (HCC) POA: Yes  Resolved Problems:    Systolic hypertension POA: Yes    Transaminitis POA: Yes      FOLLOW UP  Future Appointments  Date Time Provider Department Center   7/1/2019 11:00 AM DINORA Ordoñez [facility] None   8/26/2019 2:00 PM Beth Friedman M.D. [facility]   10/11/2019 1:00 PM Bird Becker M.D. [facility] None     Shanda Reyes  [address] [address]  [phone]    In 2 weeks  As needed      MEDICATIONS ON DISCHARGE     Medication List      START taking these medications      Instructions   hydrALAZINE 25 MG Tabs  Commonly known as:  APRESOLINE   Take 1 Tab by mouth 3 times a day.  Dose:  25 mg        CONTINUE taking these medications      Instructions    atorvastatin 20 MG Tabs  Commonly known as:  LIPITOR   TAKE ONE TABLET BY MOUTH EVERY DAY     COMBIVENT INH   Inhale  by mouth.     diazePAM 5 MG Tabs  Commonly known as:  VALIUM   Take 5 mg by mouth every 6 hours as needed for Anxiety.  Dose:  5 mg     triamterene/hctz 37.5-25 MG Caps  Commonly known as:  MAXZIDE-25/DYAZIDE   TAKE ONE CAPSULE BY MOUTH EVERY MORNING            Allergies  Allergies   Allergen Reactions   • Other Environmental Cough     Pine, Dust       DIET  Orders Placed This Encounter   Procedures   • Diet Order Cardiac     Standing Status:   Standing     Number of Occurrences:   1     Order Specific Question:   Diet:     Answer:   Cardiac [6]       ACTIVITY  As tolerated.  Weight bearing as tolerated    CONSULTATIONS  Cardiology    PROCEDURES  DX-CHEST-PORTABLE (1 VIEW)   Final Result         1.  Pulmonary edema and/or infiltrates are identified, which are stable since the prior exam.   2.  Small bilateral pleural effusions   3.  Atherosclerosis      DX-CHEST-PORTABLE (1 VIEW)   Final Result      Placement left cardiac conducting device. No pneumothorax.   Bilateral perihilar lung base atelectasis/edema and pleural effusion similar to recent findings.      CL-PERMANENT PACEMAKER INSERTION    (Results Pending)         LABORATORY  Lab Results   Component Value Date    SODIUM 144 06/21/2019    POTASSIUM 4.1 06/21/2019    CHLORIDE 109 06/21/2019    CO2 25 06/21/2019    GLUCOSE 107 (H) 06/21/2019    BUN 29 (H) 06/21/2019    CREATININE 0.95 06/21/2019    CREATININE 0.77 01/29/2013    GLOMRATE >59 11/10/2010        Lab Results   Component Value Date    WBC 9.5 06/19/2019    HEMOGLOBIN 11.3 (L) 06/19/2019    HEMATOCRIT 35.7 (L) 06/19/2019    PLATELETCT 163 (L) 06/19/2019        Total time of the discharge process exceeds 33 minutes.

## 2019-06-21 NOTE — PROGRESS NOTES
Cardiology Follow Up Progress Note    Date of Service  6/21/2019    Attending Physician  Yanely Rodriguez M.D.    Chief Complaint   Generalized weakness    HPI  Priscilla Hdz is a 79 y.o. female admitted 6/19/2019 with weakness, she was originally seen at her primary care physician's office where she was found to be significantly bradycardic in 30s and was sent to the ER.  Patient was on high-dose metoprolol (200 mg/day), this has been held since admission but she has had no significant improvement in her heart rate, she remains fatigued.  She has episodes of second-degree type II and possible third-degree heart block.  Strips were sent to Dr. Becker and he agreed that patient should be transferred to [facility] for possible pacemaker placement.    Interim Events  6/21: Tolerated pacemaker procedure yesterday.  Slight soreness generator site.    Review of Systems  Review of Systems   Respiratory: Negative for chest tightness and shortness of breath.    Cardiovascular: Negative for chest pain, palpitations and leg swelling.   Musculoskeletal: Negative for myalgias.   Neurological: Negative for dizziness.       Vital signs in last 24 hours  Temp:  [36.4 °C (97.6 °F)-38 °C (100.4 °F)] 36.4 °C (97.6 °F)  Pulse:  [] 78  Resp:  [16] 16  BP: (105-183)/(40-88) 105/69  SpO2:  [90 %-98 %] 93 %    Physical Exam  Physical Exam   Constitutional: She is oriented to person, place, and time. She appears well-nourished. No distress.   HENT:   Head: Normocephalic and atraumatic.   Eyes: EOM are normal. No scleral icterus.   Neck: No JVD present.       Cardiovascular: Normal rate, regular rhythm, S1 normal, S2 normal, normal heart sounds and intact distal pulses.  Exam reveals no gallop and no friction rub.    No murmur heard.  Pulmonary/Chest: Effort normal and breath sounds normal. She has no wheezes. She has no rhonchi. She has no rales.   Left subclavian pulse generator site without hematoma.  Bandage.    Musculoskeletal: She exhibits no edema.   Neurological: She is alert and oriented to person, place, and time.   Skin: Skin is warm and dry.   Psychiatric: She has a normal mood and affect. Her behavior is normal.       Lab Review  Lab Results   Component Value Date/Time    WBC 9.5 06/19/2019 03:25 AM    RBC 3.79 (L) 06/19/2019 03:25 AM    HEMOGLOBIN 11.3 (L) 06/19/2019 03:25 AM    HEMATOCRIT 35.7 (L) 06/19/2019 03:25 AM    MCV 94.2 06/19/2019 03:25 AM    MCH 29.8 06/19/2019 03:25 AM    MCHC 31.7 (L) 06/19/2019 03:25 AM    MPV 12.0 06/19/2019 03:25 AM      Lab Results   Component Value Date/Time    SODIUM 144 06/21/2019 02:36 AM    POTASSIUM 4.1 06/21/2019 02:36 AM    CHLORIDE 109 06/21/2019 02:36 AM    CO2 25 06/21/2019 02:36 AM    GLUCOSE 107 (H) 06/21/2019 02:36 AM    BUN 29 (H) 06/21/2019 02:36 AM    CREATININE 0.95 06/21/2019 02:36 AM    CREATININE 0.77 01/29/2013 07:49 AM    BUNCREATRAT 19 03/09/2016 09:37 AM    BUNCREATRAT 23 01/29/2013 07:49 AM    GLOMRATE >59 11/10/2010 09:26 AM      Lab Results   Component Value Date/Time    ASTSGOT 27 06/21/2019 02:36 AM    ALTSGPT 58 (H) 06/21/2019 02:36 AM     Lab Results   Component Value Date/Time    CHOLSTRLTOT 173 08/09/2018 09:17 AM     (H) 08/09/2018 09:17 AM    HDL 41 08/09/2018 09:17 AM    TRIGLYCERIDE 129 08/09/2018 09:17 AM    TROPONINI 0.04 06/18/2019 05:48 PM             Cardiac Imaging and Procedures Review  Rhythm: 6/21: A sensed V paced, reviewed by myself.    Echocardiogram 08/05/2016  No prior study is available for comparison.   Normal left ventricular systolic function.  Left ventricular ejection fraction is visually estimated to be 65%.  Right heart pressures are normal.  No significant valve abnormalities.     Echocardiogram from 6/19  CONCLUSIONS  Prior Echo - 8/5/16Hyperdynamic left ventricular systolic function.  Left ventricular ejection fraction is visually estimated to be greater   than 75%.  Estimated right ventricular systolic  pressure  is 60 mmHg.     EKG: From 6/18 and 6/19 showed complete heart block with escape with a right bundle branch block similar to her native morphology.  Telemetry now shows 2-1 heart block    Imaging  Chest X-Ray: Pacemaker leads in place.  No pneumothorax.  Interstitial edema.    Assessment/Plan  1.  Successful permanent pacemaker 6/24 third-degree AV block.  2.  Pulmonary hypertension.  3.  Hypertension, now controlled.  4.  Dyslipidemia, on atorvastatin.    Recommendation Discussion  1.  Stable from a cardiac standpoint to be discharged.  2.  Await pacemaker interrogation.  3.  Follow-up will be arranged in pacemaker clinic in general cardiology.  4.  Needs follow-up for pulmonary hypertension and any necessary attendant evaluation.    Thank you for allowing me to participate in the care of this patient.    Please contact me with any questions.    Ridge Calderon M.D.   Cardiologist, [facility]  [phone]

## 2019-06-21 NOTE — PROGRESS NOTES
Pt ready for discharge. IV removed. AVS reviewed with pt. All questions answered. Pt has all belongings. Pt leaving to go home, in wheelchair accompanied by this RN,

## 2019-06-21 NOTE — PROGRESS NOTES
Pt ambulated in matos with walker and CNA as stand by assist. Pt ambulated well and was encouraged to do small walks at home while recovering and healing. Pt does have walker at home as well.

## 2019-07-01 ENCOUNTER — NON-PROVIDER VISIT (OUTPATIENT)
Dept: CARDIOLOGY | Facility: MEDICAL CENTER | Age: 79
End: 2019-07-01
Payer: MEDICARE

## 2019-07-01 VITALS
SYSTOLIC BLOOD PRESSURE: 158 MMHG | DIASTOLIC BLOOD PRESSURE: 80 MMHG | BODY MASS INDEX: 26.49 KG/M2 | HEART RATE: 100 BPM | HEIGHT: 65 IN | OXYGEN SATURATION: 94 % | WEIGHT: 159 LBS

## 2019-07-01 DIAGNOSIS — Z95.0 CARDIAC PACEMAKER IN SITU: ICD-10-CM

## 2019-07-01 DIAGNOSIS — I44.2 COMPLETE HEART BLOCK BY ELECTROCARDIOGRAM (HCC): ICD-10-CM

## 2019-07-01 DIAGNOSIS — R00.1 BRADYCARDIA: ICD-10-CM

## 2019-07-01 DIAGNOSIS — I10 ESSENTIAL HYPERTENSION: ICD-10-CM

## 2019-07-01 DIAGNOSIS — I27.20 PULMONARY HYPERTENSION (HCC): ICD-10-CM

## 2019-07-01 PROCEDURE — 93280 PM DEVICE PROGR EVAL DUAL: CPT | Performed by: NURSE PRACTITIONER

## 2019-07-01 RX ORDER — HYDRALAZINE HYDROCHLORIDE 25 MG/1
25 TABLET, FILM COATED ORAL 3 TIMES DAILY
Qty: 90 TAB | Refills: 3 | Status: SHIPPED | OUTPATIENT
Start: 2019-07-01 | End: 2019-10-11

## 2019-07-01 RX ORDER — LOSARTAN POTASSIUM 50 MG/1
50 TABLET ORAL DAILY
Qty: 30 TAB | Refills: 6 | Status: SHIPPED | OUTPATIENT
Start: 2019-07-01 | End: 2019-10-11 | Stop reason: SDUPTHER

## 2019-07-01 NOTE — PROGRESS NOTES
Patient presented to [facility] on 6/18/2019 with fatigue and bradycardia. EKG confirmed complete AV block, and she was transferred to [facility] for PM implantation. This was implanted on 6/20/2019, and Metoprolol was stopped. She was given Hydralazine 25mg TID to help with BP control.    She is here today for PM interrogation and wound check. She is feeling better, but still somewhat tired.    Device is working normally. No mode switching episodes.  Stable sensing of RA and RV leads; good capture of RA lead, but higher capture of RV lead (3.75V at 0.4ms); stable impedances. Battery longevity is 3.3 years.  No changes are made today.    Patient is reminded about limited ROM of left arm, and given wound care instructions, general PM information and anticipatory guidance. Did also explain that RV threshold is higher than implant, and discussed symptoms to watch for if lead dislodgement occurs (dizziness, lightheadedness and low HR), she is to call us right away.    FU in 4 weeks for next PM check with me.    Collaborating MD: Alec

## 2019-07-11 NOTE — DISCHARGE SUMMARY
Discharge Summary    CHIEF COMPLAINT ON ADMISSION  Chief Complaint   Patient presents with   • Weakness   • Bradycardia   • Shortness of Breath       Reason for Admission  Sent by doctor     Admission Date  6/18/2019    CODE STATUS  Prior    HPI & HOSPITAL COURSE    *This is a 79-year-old female with past medical history of hypertension venous insufficiency presented to her primary care provider and was found to have profound bradycardia and sent to the ER she complains of weakness and shortness of breath.  She was on 200 mg of metoprolol total daily and this was discontinued upon admission.  Despite this patient remains fatigued, and has bradycardia including second degree type II heart block.*      She had episodes of second-degree type II and possible third-degree heart block.  Strips were sent to Dr. Becker and he agreed that patient should be transferred to [facility] for possible pacemaker placement.    Therefore, she is discharged in guarded and stable condition to [facility] for higher level of care    Patient stayed less than 2 midnights at this facility due to need to transfer to higher level of care    Transfer/ discharge Date  6/19/2019        DISCHARGE DIAGNOSES  Principal Problem:    Bradycardia POA: Unknown  Active Problems:    Essential hypertension POA: Yes  Resolved Problems:    * No resolved hospital problems. *      FOLLOW UP  Future Appointments  Date Time Provider Department Center   7/30/2019 12:40 PM DINORA Ordoñez [facility] None   8/26/2019 2:00 PM Beth Friedman M.D. [facility]   10/11/2019 1:20 PM Bird Becker M.D. [facility] None     Shanda Reyes  [address]  [address]  [facility] [address]  [phone]            MEDICATIONS ON DISCHARGE  Continued inpatient meds per reconciliation    Allergies  Allergies   Allergen Reactions   • Other Environmental Cough     Pine, Dust       DIET  NPO midnight    ACTIVITY  Per  nursing    CONSULTATIONS  Cardiology    PROCEDURES  none    LABORATORY  Lab Results   Component Value Date    SODIUM 144 06/21/2019    POTASSIUM 4.1 06/21/2019    CHLORIDE 109 06/21/2019    CO2 25 06/21/2019    GLUCOSE 107 (H) 06/21/2019    BUN 29 (H) 06/21/2019    CREATININE 0.95 06/21/2019    CREATININE 0.77 01/29/2013    GLOMRATE >59 11/10/2010        Lab Results   Component Value Date    WBC 9.5 06/19/2019    HEMOGLOBIN 11.3 (L) 06/19/2019    HEMATOCRIT 35.7 (L) 06/19/2019    PLATELETCT 163 (L) 06/19/2019

## 2019-07-30 ENCOUNTER — NON-PROVIDER VISIT (OUTPATIENT)
Dept: CARDIOLOGY | Facility: MEDICAL CENTER | Age: 79
End: 2019-07-30
Payer: MEDICARE

## 2019-07-30 VITALS
HEIGHT: 65 IN | BODY MASS INDEX: 23.66 KG/M2 | DIASTOLIC BLOOD PRESSURE: 66 MMHG | SYSTOLIC BLOOD PRESSURE: 144 MMHG | HEART RATE: 96 BPM | WEIGHT: 142 LBS

## 2019-07-30 DIAGNOSIS — I44.2 COMPLETE HEART BLOCK BY ELECTROCARDIOGRAM (HCC): ICD-10-CM

## 2019-07-30 DIAGNOSIS — R00.1 BRADYCARDIA: ICD-10-CM

## 2019-07-30 DIAGNOSIS — Z95.0 CARDIAC PACEMAKER IN SITU: ICD-10-CM

## 2019-07-30 PROCEDURE — 93280 PM DEVICE PROGR EVAL DUAL: CPT | Performed by: NURSE PRACTITIONER

## 2019-07-30 NOTE — PROGRESS NOTES
Patient had PM implanted on 6/20/2019. She is here today for final threshold checks.    Initially,  head did not  her device. [employer] Rep came to the office, used his , and we were able to interrogate the device.    Device is working normally. No mode switching episodes.  Normal sensing of RA and RV lead; good capture of RA lead at 0.25V at 0.4ms, but RV capture still high at 3.75V at 1.0ms. Stable impedances.     Changes today include decreasing RA output to 2.5V at 0.4ms; RV output remains at 5.0V at 1.0ms.    We will make sure she is monitored monthly on [employer].    I did discuss the above findings with patient and her granddaughter.    FU in 3 months for next PM check with me.    Collaborating MD: Jazmine

## 2019-08-02 ENCOUNTER — TELEPHONE (OUTPATIENT)
Dept: CARDIOLOGY | Facility: MEDICAL CENTER | Age: 79
End: 2019-08-02

## 2019-08-02 NOTE — TELEPHONE ENCOUNTER
Patient's device transmitted via home monitor-- episodes of ?? T wave oversensing--device calling it VT--scanned for review.

## 2019-08-06 ENCOUNTER — TELEPHONE (OUTPATIENT)
Dept: CARDIOLOGY | Facility: MEDICAL CENTER | Age: 79
End: 2019-08-06

## 2019-08-06 DIAGNOSIS — T82.110D FAILURE OF PACEMAKER LEAD, SUBSEQUENT ENCOUNTER: ICD-10-CM

## 2019-08-06 DIAGNOSIS — R00.1 BRADYCARDIA: ICD-10-CM

## 2019-08-06 DIAGNOSIS — Z95.0 CARDIAC PACEMAKER IN SITU: ICD-10-CM

## 2019-08-06 DIAGNOSIS — I44.2 COMPLETE HEART BLOCK BY ELECTROCARDIOGRAM (HCC): ICD-10-CM

## 2019-08-06 NOTE — TELEPHONE ENCOUNTER
----- Message from DINORA Ordoñez sent at 8/1/2019  2:01 PM PDT -----  Regarding: RE: Monthly Carelink monitoring  I called the patient and explained what needs to get done re: RV lead revision. (She has to rely on children to transport her to the hospital, so will have to coordinate that).    Anastacia - can you please call her to scheduled procedure?    Thank you!  ----- Message -----  From: Anastacia Brantley, Med Ass't  Sent: 8/1/2019   1:33 PM PDT  To: Sakina Fernandez L.P.N., DINORA Ordoñez  Subject: RE: Monthly Carelink monitoring                  Anahi,    Please let me know once you've talked to this patient and I will call her.    Thank You,  Anastacia  ----- Message -----  From: Sakina Fernandez L.P.NSilvia  Sent: 8/1/2019   1:18 PM PDT  To: Anastacia Brantley, Med Ass't, DINORA Ordoñez  Subject: RE: Monthly [employer] monitoring                  Hi Anahi,  Dr. Mcginnis wants to schedule Priscilla for a lead revision. Do you want to call her to advise since you just saw her? Just let her know that Anastacia will be calling her to schedule tomorrow.  Thanks, Sakina  ----- Message -----  From: Raven Bo  Sent: 8/1/2019   1:08 PM PDT  To: MARIA LUISA MasonPSilviaNSilvia  Subject: FW: Monthly Carelink monitoring                      ----- Message -----  From: BAKARI OrdoñezPAFSHAN  Sent: 7/30/2019   2:17 PM PDT  To: Raven Bo, Gabriella Pizarro, Med Ass't  Subject: Monthly Carelink monitoring                      Hi there,    Saw this lady at  today for final threshold checks. PM implanted on 6/20/2019 for high AV block. RV capture at 1 week check was 3.75V at 1.0ms; it was the same today.    Changed her Carelink monitoring to be done monthly - just wanted to give you a heads up, since she is dependent.    Thanks!  AB

## 2019-08-06 NOTE — TELEPHONE ENCOUNTER
For Dr. Mcginnis to review Anahi's noted-- please advise if anything further needs to be done.        ----- Message from DINORA Ordoñez sent at 7/30/2019  2:17 PM PDT -----  Regarding: Monthly Carelink monitoring  Hi there,    Saw this lady at  today for final threshold checks. PM implanted on 6/20/2019 for high AV block. RV capture at 1 week check was 3.75V at 1.0ms; it was the same today.    Changed her Carelink monitoring to be done monthly - just wanted to give you a heads up, since she is dependent.    Thanks!  AB

## 2019-08-06 NOTE — TELEPHONE ENCOUNTER
Patient scheduled for lead revision on 8-9-19 at [facility] with Dr. Mcginnis.

## 2019-08-09 ENCOUNTER — APPOINTMENT (OUTPATIENT)
Dept: CARDIOLOGY | Facility: MEDICAL CENTER | Age: 79
End: 2019-08-09
Attending: NURSE PRACTITIONER
Payer: MEDICARE

## 2019-08-09 ENCOUNTER — APPOINTMENT (OUTPATIENT)
Dept: RADIOLOGY | Facility: MEDICAL CENTER | Age: 79
End: 2019-08-09
Attending: INTERNAL MEDICINE
Payer: MEDICARE

## 2019-08-09 ENCOUNTER — HOSPITAL ENCOUNTER (OUTPATIENT)
Facility: MEDICAL CENTER | Age: 79
End: 2019-08-10
Attending: INTERNAL MEDICINE | Admitting: INTERNAL MEDICINE
Payer: MEDICARE

## 2019-08-09 DIAGNOSIS — T82.110D FAILURE OF PACEMAKER LEAD, SUBSEQUENT ENCOUNTER: ICD-10-CM

## 2019-08-09 DIAGNOSIS — Z95.0 CARDIAC PACEMAKER IN SITU: ICD-10-CM

## 2019-08-09 DIAGNOSIS — R00.1 BRADYCARDIA: ICD-10-CM

## 2019-08-09 DIAGNOSIS — I44.2 COMPLETE HEART BLOCK BY ELECTROCARDIOGRAM (HCC): ICD-10-CM

## 2019-08-09 LAB
ALBUMIN SERPL BCP-MCNC: 3.9 G/DL (ref 3.2–4.9)
ALBUMIN/GLOB SERPL: 1.3 G/DL
ALP SERPL-CCNC: 65 U/L (ref 30–99)
ALT SERPL-CCNC: 8 U/L (ref 2–50)
ANION GAP SERPL CALC-SCNC: 7 MMOL/L (ref 0–11.9)
AST SERPL-CCNC: 16 U/L (ref 12–45)
BILIRUB SERPL-MCNC: 0.4 MG/DL (ref 0.1–1.5)
BUN SERPL-MCNC: 24 MG/DL (ref 8–22)
CALCIUM SERPL-MCNC: 9.8 MG/DL (ref 8.5–10.5)
CHLORIDE SERPL-SCNC: 106 MMOL/L (ref 96–112)
CO2 SERPL-SCNC: 25 MMOL/L (ref 20–33)
CREAT SERPL-MCNC: 0.96 MG/DL (ref 0.5–1.4)
EKG IMPRESSION: NORMAL
EKG IMPRESSION: NORMAL
ERYTHROCYTE [DISTWIDTH] IN BLOOD BY AUTOMATED COUNT: 46.5 FL (ref 35.9–50)
GLOBULIN SER CALC-MCNC: 2.9 G/DL (ref 1.9–3.5)
GLUCOSE SERPL-MCNC: 94 MG/DL (ref 65–99)
HCT VFR BLD AUTO: 38 % (ref 37–47)
HGB BLD-MCNC: 11.9 G/DL (ref 12–16)
INR PPP: 1.04 (ref 0.87–1.13)
MCH RBC QN AUTO: 28.9 PG (ref 27–33)
MCHC RBC AUTO-ENTMCNC: 31.3 G/DL (ref 33.6–35)
MCV RBC AUTO: 92.2 FL (ref 81.4–97.8)
PLATELET # BLD AUTO: 181 K/UL (ref 164–446)
PMV BLD AUTO: 10.4 FL (ref 9–12.9)
POTASSIUM SERPL-SCNC: 4.2 MMOL/L (ref 3.6–5.5)
PROT SERPL-MCNC: 6.8 G/DL (ref 6–8.2)
PROTHROMBIN TIME: 13.8 SEC (ref 12–14.6)
RBC # BLD AUTO: 4.12 M/UL (ref 4.2–5.4)
SODIUM SERPL-SCNC: 138 MMOL/L (ref 135–145)
WBC # BLD AUTO: 9 K/UL (ref 4.8–10.8)

## 2019-08-09 PROCEDURE — 85610 PROTHROMBIN TIME: CPT

## 2019-08-09 PROCEDURE — 160002 HCHG RECOVERY MINUTES (STAT)

## 2019-08-09 PROCEDURE — 93010 ELECTROCARDIOGRAM REPORT: CPT | Performed by: INTERNAL MEDICINE

## 2019-08-09 PROCEDURE — 93005 ELECTROCARDIOGRAM TRACING: CPT | Mod: XU | Performed by: INTERNAL MEDICINE

## 2019-08-09 PROCEDURE — 700101 HCHG RX REV CODE 250

## 2019-08-09 PROCEDURE — 99152 MOD SED SAME PHYS/QHP 5/>YRS: CPT | Performed by: INTERNAL MEDICINE

## 2019-08-09 PROCEDURE — 700102 HCHG RX REV CODE 250 W/ 637 OVERRIDE(OP)

## 2019-08-09 PROCEDURE — 99153 MOD SED SAME PHYS/QHP EA: CPT

## 2019-08-09 PROCEDURE — 700111 HCHG RX REV CODE 636 W/ 250 OVERRIDE (IP)

## 2019-08-09 PROCEDURE — G0378 HOSPITAL OBSERVATION PER HR: HCPCS

## 2019-08-09 PROCEDURE — 33215 REPOSITION PACING-DEFIB LEAD: CPT | Mod: 78 | Performed by: INTERNAL MEDICINE

## 2019-08-09 PROCEDURE — A9270 NON-COVERED ITEM OR SERVICE: HCPCS | Performed by: INTERNAL MEDICINE

## 2019-08-09 PROCEDURE — 71045 X-RAY EXAM CHEST 1 VIEW: CPT

## 2019-08-09 PROCEDURE — 80053 COMPREHEN METABOLIC PANEL: CPT

## 2019-08-09 PROCEDURE — A9270 NON-COVERED ITEM OR SERVICE: HCPCS

## 2019-08-09 PROCEDURE — 85027 COMPLETE CBC AUTOMATED: CPT

## 2019-08-09 PROCEDURE — 700102 HCHG RX REV CODE 250 W/ 637 OVERRIDE(OP): Performed by: INTERNAL MEDICINE

## 2019-08-09 RX ORDER — ACETAMINOPHEN 325 MG/1
650 TABLET ORAL EVERY 4 HOURS PRN
Status: DISCONTINUED | OUTPATIENT
Start: 2019-08-09 | End: 2019-08-10 | Stop reason: HOSPADM

## 2019-08-09 RX ORDER — ATORVASTATIN CALCIUM 20 MG/1
20 TABLET, FILM COATED ORAL
Status: DISCONTINUED | OUTPATIENT
Start: 2019-08-09 | End: 2019-08-10 | Stop reason: HOSPADM

## 2019-08-09 RX ORDER — LIDOCAINE HYDROCHLORIDE 10 MG/ML
INJECTION, SOLUTION INFILTRATION; PERINEURAL
Status: COMPLETED
Start: 2019-08-09 | End: 2019-08-09

## 2019-08-09 RX ORDER — BUPIVACAINE HYDROCHLORIDE 2.5 MG/ML
INJECTION, SOLUTION EPIDURAL; INFILTRATION; INTRACAUDAL
Status: COMPLETED
Start: 2019-08-09 | End: 2019-08-09

## 2019-08-09 RX ORDER — CEFAZOLIN SODIUM 1 G/3ML
INJECTION, POWDER, FOR SOLUTION INTRAMUSCULAR; INTRAVENOUS
Status: COMPLETED
Start: 2019-08-09 | End: 2019-08-09

## 2019-08-09 RX ORDER — LOSARTAN POTASSIUM 50 MG/1
50 TABLET ORAL DAILY
Status: DISCONTINUED | OUTPATIENT
Start: 2019-08-10 | End: 2019-08-10 | Stop reason: HOSPADM

## 2019-08-09 RX ORDER — MIDAZOLAM HYDROCHLORIDE 1 MG/ML
INJECTION INTRAMUSCULAR; INTRAVENOUS
Status: COMPLETED
Start: 2019-08-09 | End: 2019-08-09

## 2019-08-09 RX ORDER — HYDRALAZINE HYDROCHLORIDE 25 MG/1
25 TABLET, FILM COATED ORAL 3 TIMES DAILY
Status: DISCONTINUED | OUTPATIENT
Start: 2019-08-09 | End: 2019-08-10 | Stop reason: HOSPADM

## 2019-08-09 RX ORDER — TRAMADOL HYDROCHLORIDE 50 MG/1
50 TABLET ORAL EVERY 6 HOURS PRN
Status: DISCONTINUED | OUTPATIENT
Start: 2019-08-09 | End: 2019-08-10 | Stop reason: HOSPADM

## 2019-08-09 RX ORDER — TRAMADOL HYDROCHLORIDE 50 MG/1
TABLET ORAL
Status: COMPLETED
Start: 2019-08-09 | End: 2019-08-09

## 2019-08-09 RX ORDER — DIAZEPAM 5 MG/1
5 TABLET ORAL EVERY 6 HOURS PRN
Status: DISCONTINUED | OUTPATIENT
Start: 2019-08-09 | End: 2019-08-10 | Stop reason: HOSPADM

## 2019-08-09 RX ORDER — TRIAMTERENE AND HYDROCHLOROTHIAZIDE 37.5; 25 MG/1; MG/1
1 CAPSULE ORAL
Status: DISCONTINUED | OUTPATIENT
Start: 2019-08-10 | End: 2019-08-10 | Stop reason: HOSPADM

## 2019-08-09 RX ADMIN — LIDOCAINE HYDROCHLORIDE: 10 INJECTION, SOLUTION INFILTRATION; PERINEURAL at 14:51

## 2019-08-09 RX ADMIN — CEFAZOLIN 3000 MG: 330 INJECTION, POWDER, FOR SOLUTION INTRAMUSCULAR; INTRAVENOUS at 14:51

## 2019-08-09 RX ADMIN — FENTANYL CITRATE 100 MCG: 50 INJECTION, SOLUTION INTRAMUSCULAR; INTRAVENOUS at 15:24

## 2019-08-09 RX ADMIN — BUPIVACAINE HYDROCHLORIDE: 2.5 INJECTION, SOLUTION EPIDURAL; INFILTRATION; INTRACAUDAL; PERINEURAL at 14:51

## 2019-08-09 RX ADMIN — TRAMADOL HYDROCHLORIDE 50 MG: 50 TABLET, FILM COATED ORAL at 16:21

## 2019-08-09 RX ADMIN — ATORVASTATIN CALCIUM 20 MG: 20 TABLET, FILM COATED ORAL at 18:03

## 2019-08-09 RX ADMIN — FENTANYL CITRATE 25 MCG: 50 INJECTION, SOLUTION INTRAMUSCULAR; INTRAVENOUS at 15:35

## 2019-08-09 RX ADMIN — MIDAZOLAM HYDROCHLORIDE 2 MG: 1 INJECTION, SOLUTION INTRAMUSCULAR; INTRAVENOUS at 15:24

## 2019-08-09 RX ADMIN — HYDRALAZINE HYDROCHLORIDE 25 MG: 25 TABLET, FILM COATED ORAL at 18:03

## 2019-08-09 RX ADMIN — MIDAZOLAM HYDROCHLORIDE 1 MG: 1 INJECTION, SOLUTION INTRAMUSCULAR; INTRAVENOUS at 15:35

## 2019-08-09 ASSESSMENT — COGNITIVE AND FUNCTIONAL STATUS - GENERAL
TOILETING: A LITTLE
EATING MEALS: A LITTLE
DAILY ACTIVITIY SCORE: 17
MOBILITY SCORE: 19
DRESSING REGULAR LOWER BODY CLOTHING: A LITTLE
CLIMB 3 TO 5 STEPS WITH RAILING: A LITTLE
HELP NEEDED FOR BATHING: A LITTLE
MOVING FROM LYING ON BACK TO SITTING ON SIDE OF FLAT BED: A LITTLE
MOVING TO AND FROM BED TO CHAIR: A LITTLE
SUGGESTED CMS G CODE MODIFIER MOBILITY: CK
STANDING UP FROM CHAIR USING ARMS: A LITTLE
DRESSING REGULAR UPPER BODY CLOTHING: A LOT
PERSONAL GROOMING: A LITTLE
WALKING IN HOSPITAL ROOM: A LITTLE
SUGGESTED CMS G CODE MODIFIER DAILY ACTIVITY: CK

## 2019-08-09 ASSESSMENT — COPD QUESTIONNAIRES
COPD SCREENING SCORE: 2
IN THE PAST 12 MONTHS DO YOU DO LESS THAN YOU USED TO BECAUSE OF YOUR BREATHING PROBLEMS: DISAGREE/UNSURE
HAVE YOU SMOKED AT LEAST 100 CIGARETTES IN YOUR ENTIRE LIFE: NO/DON'T KNOW
DO YOU EVER COUGH UP ANY MUCUS OR PHLEGM?: NO/ONLY WITH OCCASIONAL COLDS OR INFECTIONS
DURING THE PAST 4 WEEKS HOW MUCH DID YOU FEEL SHORT OF BREATH: NONE/LITTLE OF THE TIME

## 2019-08-09 ASSESSMENT — LIFESTYLE VARIABLES
HOW MANY TIMES IN THE PAST YEAR HAVE YOU HAD 5 OR MORE DRINKS IN A DAY: 0
ON A TYPICAL DAY WHEN YOU DRINK ALCOHOL HOW MANY DRINKS DO YOU HAVE: 0
AVERAGE NUMBER OF DAYS PER WEEK YOU HAVE A DRINK CONTAINING ALCOHOL: 0
HAVE PEOPLE ANNOYED YOU BY CRITICIZING YOUR DRINKING: NO
CONSUMPTION TOTAL: NEGATIVE
EVER_SMOKED: NEVER
TOTAL SCORE: 0
TOTAL SCORE: 0
EVER FELT BAD OR GUILTY ABOUT YOUR DRINKING: NO
HAVE YOU EVER FELT YOU SHOULD CUT DOWN ON YOUR DRINKING: NO
TOTAL SCORE: 0
ALCOHOL_USE: NO
EVER HAD A DRINK FIRST THING IN THE MORNING TO STEADY YOUR NERVES TO GET RID OF A HANGOVER: NO

## 2019-08-09 NOTE — OP REPORT
[facility] PROCEDURE NOTE    PROCEDURE(S) PERFORMED:   Pacemaker lead repositioning    DATE OF SERVICE: 8/9/2019    : Justa Mcginnis MD    ASSISTANT: None    ANESTHESIA: Local and moderate sedation (start time 1506, stop time 1540, total dose given 3 mg IV versed, 125 mcg IV fentanyl)    EBL: 30 cc    SPECIMENS: None    INDICATION(S):  Failure of pacemaker lead    DESCRIPTION OF PROCEDURE:  After informed written consent, the patient was brought to the electrophysiology lab in the fasting, unsedated state. The patient was prepped and draped in the usual sterile fashion. The procedure was performed under moderate sedation with local anesthetic. A left infraclavicular incision was made with a scalpel and the existing pectoral device pocket was accessed using a combination of blunt dissection and electrocautery. The leads and device were freed from adhesions. The RV lead was disconnected from the device header and freed from the suture sleeve. The fixation screw was retracted and the lead was advanced to the RVOT and then lowered into position at the RV apex. The leads were tested and had satisfactory sensing and pacing parameters. High output ventricular pacing did not produce extracardiac stimulation. The leads were sutured to the underlying pectoral muscle with interrupted silk over a silastic suture sleeve. The device pocket was irrigated with antibiotic solution, inspected, and no bleeding was seen. The lead was reconnected to the pacemaker pulse generator and the device was inserted into the pocket. The wound was closed with three layers of absorbable sutures. Following recovery from sedation, the patient was transferred to a monitored bed in good condition.     For generator and atrial lead information please see prior OP report    REPOSITIONED LEAD INFORMATION:  Right ventricular lead is a MDT model #5076, serial #[id number],R wave 7.8 millivolts, threshold 0.8 Volts at 0.4 milliseconds, pacing  impedance 950 Ohms.    DEVICE PROGRAMMING:  DDDR 60 -120 ppm    FLUOROSCOPY TIME: 1.1 minutes    IMPRESSIONS:  1. Successful RV lead repositioning    RECOMMENDATIONS:  1. Transfer to monitored bed  2. Chest x-ray  3. Device interrogation prior to hospital discharge  4. Followup in device clinic

## 2019-08-09 NOTE — H&P
"[facility] Pre-procedure History and Physical    Date of service: 8/9/2019    Reason for visit/Chief complaint: Pacemaker malfunction    HPI:   Patient is an 78 yo F. History of complete heart block s/p dual chamber PPM about a month ago. Since that time threshold has jumped up. Here for RV lead revision.    ROS: Negative for orthopnea, PND, palpitations, chest pain    Physical Exam:  Vitals:    08/09/19 1357   BP: 151/64   Pulse: 90   Resp: 18   Temp: 37.2 °C (98.9 °F)   TempSrc: Temporal   SpO2: 93%   Weight: 64.5 kg (142 lb 3.2 oz)   Height: 1.651 m (5' 5\")     Gen: NAD, conversant  HEENT: PERRL, EOMI  LUNGS: CTA B, no w/r/r  CV: RRR, no m/r/g, no JVD  Abd: Soft, NT/ND, +BS  Ext: no edema, warm and well perfused    Labs reviewed    EKG interpreted by me:  ApVp    Impression/Recs:  1. Cardiac pacemaker in situ  CL-REVISION OF ONE OR MORE LEADS    CL-REVISION OF ONE OR MORE LEADS   2. Bradycardia  CL-REVISION OF ONE OR MORE LEADS    CL-REVISION OF ONE OR MORE LEADS   3. Complete heart block by electrocardiogram (HCC)  CL-REVISION OF ONE OR MORE LEADS    CL-REVISION OF ONE OR MORE LEADS   4. Failure of pacemaker lead, subsequent encounter  CL-REVISION OF ONE OR MORE LEADS    CL-REVISION OF ONE OR MORE LEADS     -Risks/benefits/alternatives discussed  -All questions answered  -Proceed with RV lead repositioning    Justa Mcginnis MD    "

## 2019-08-09 NOTE — OR NURSING
1603 Pt over from cath lab post pacemaker lead revision. L chest site CDI and soft, no bleeding. Pt awake and alert, complains of pain (see flowsheets) and denies nausea. VSS.  1620 Tolerating orals, box lunch and snacks provided  1635 Xray to bedside  1640 EKG to bedside  1650 Spoke with pt's son and updated on pt status and pt's tele bed assignment  1705 Report to Eleonora RO  1730 Pt transferred to T8-2 with all belongings without incident. Placed on Tele box and L chest pacer site visualized with JAYJAY Crews.

## 2019-08-10 ENCOUNTER — APPOINTMENT (OUTPATIENT)
Dept: RADIOLOGY | Facility: MEDICAL CENTER | Age: 79
End: 2019-08-10
Attending: INTERNAL MEDICINE
Payer: MEDICARE

## 2019-08-10 VITALS
WEIGHT: 142.64 LBS | RESPIRATION RATE: 16 BRPM | DIASTOLIC BLOOD PRESSURE: 68 MMHG | HEART RATE: 78 BPM | BODY MASS INDEX: 23.76 KG/M2 | TEMPERATURE: 97.3 F | OXYGEN SATURATION: 92 % | HEIGHT: 65 IN | SYSTOLIC BLOOD PRESSURE: 133 MMHG

## 2019-08-10 LAB — MAGNESIUM SERPL-MCNC: 1.9 MG/DL (ref 1.5–2.5)

## 2019-08-10 PROCEDURE — 83735 ASSAY OF MAGNESIUM: CPT

## 2019-08-10 PROCEDURE — A9270 NON-COVERED ITEM OR SERVICE: HCPCS | Performed by: INTERNAL MEDICINE

## 2019-08-10 PROCEDURE — G0378 HOSPITAL OBSERVATION PER HR: HCPCS

## 2019-08-10 PROCEDURE — 700102 HCHG RX REV CODE 250 W/ 637 OVERRIDE(OP): Performed by: NURSE PRACTITIONER

## 2019-08-10 PROCEDURE — 93005 ELECTROCARDIOGRAM TRACING: CPT | Performed by: INTERNAL MEDICINE

## 2019-08-10 PROCEDURE — 71045 X-RAY EXAM CHEST 1 VIEW: CPT

## 2019-08-10 PROCEDURE — A9270 NON-COVERED ITEM OR SERVICE: HCPCS | Performed by: NURSE PRACTITIONER

## 2019-08-10 PROCEDURE — 700102 HCHG RX REV CODE 250 W/ 637 OVERRIDE(OP): Performed by: INTERNAL MEDICINE

## 2019-08-10 PROCEDURE — 36415 COLL VENOUS BLD VENIPUNCTURE: CPT

## 2019-08-10 PROCEDURE — 93010 ELECTROCARDIOGRAM REPORT: CPT | Performed by: INTERNAL MEDICINE

## 2019-08-10 RX ORDER — DOXYCYCLINE 100 MG/1
100 TABLET ORAL EVERY 12 HOURS
Status: DISCONTINUED | OUTPATIENT
Start: 2019-08-10 | End: 2019-08-10 | Stop reason: HOSPADM

## 2019-08-10 RX ORDER — DOXYCYCLINE 100 MG/1
100 TABLET ORAL EVERY 12 HOURS
Qty: 9 TAB | Refills: 0 | Status: SHIPPED | OUTPATIENT
Start: 2019-08-10 | End: 2019-08-15

## 2019-08-10 RX ORDER — ACETAMINOPHEN 325 MG/1
325 TABLET ORAL EVERY 6 HOURS PRN
Qty: 6 TAB | Refills: 0 | Status: SHIPPED | OUTPATIENT
Start: 2019-08-10 | End: 2020-10-05

## 2019-08-10 RX ADMIN — TRAMADOL HYDROCHLORIDE 50 MG: 50 TABLET, FILM COATED ORAL at 04:42

## 2019-08-10 RX ADMIN — HYDRALAZINE HYDROCHLORIDE 25 MG: 25 TABLET, FILM COATED ORAL at 04:42

## 2019-08-10 RX ADMIN — METOPROLOL TARTRATE 25 MG: 25 TABLET, FILM COATED ORAL at 09:40

## 2019-08-10 RX ADMIN — TRIAMTERENE AND HYDROCHLOROTHIAZIDE 1 CAPSULE: 25; 37.5 CAPSULE ORAL at 04:42

## 2019-08-10 RX ADMIN — HYDRALAZINE HYDROCHLORIDE 25 MG: 25 TABLET, FILM COATED ORAL at 12:19

## 2019-08-10 RX ADMIN — TRAMADOL HYDROCHLORIDE 50 MG: 50 TABLET, FILM COATED ORAL at 12:19

## 2019-08-10 RX ADMIN — DOXYCYCLINE 100 MG: 100 TABLET, FILM COATED ORAL at 09:40

## 2019-08-10 RX ADMIN — LOSARTAN POTASSIUM 50 MG: 50 TABLET ORAL at 04:42

## 2019-08-10 NOTE — CARE PLAN
Problem: Communication  Goal: The ability to communicate needs accurately and effectively will improve  Outcome: PROGRESSING AS EXPECTED  Note:   Communication board updated. All pt questions answered at this time and no further questions. Pt encouraged to voice feelings and ask questions as they arise.          Problem: Safety  Goal: Will remain free from injury  Outcome: PROGRESSING AS EXPECTED  Note:   Patient educated about risk of falls and reasoning for use of tread socks, calling for help, and bed alarms.

## 2019-08-10 NOTE — DISCHARGE SUMMARY
Discharge Summary    Admission date  8/9/19  Discharge date  8/10/19    Discharge diagnosis    #1 failure of pacemaker lead underwent successful RV lead repositioning 8/9/19      #2 complete heart block status post dual-chamber pacemaker implantation 6/20/19, [employer]    #3 hypertension, well controlled on hydralazine, losartan, triamterene/      #4 pulmonary hypertension, secondary, based on echo 6/19/19, RVSP 60 mmHg, question secondary to left heart disease.      Consults  None    Procedures    Successful RV lead repositioning 8/9/19    Echocardiogram 6/19/19, hyperdynamic LV systolic function, LVEF is visually estimated to be greater than 75%, RVSP 60 mmHg      HPI/hospital course    79-year-old female with history of complete heart block status post dual-chamber pacemaker implantation 6/20/19 found to have elevated time threshold admitted 8/9/19 to undergo elective RV lead revision.      Day of discharge tachycardia noted, completely asymptomatic, walks with walker in the matos denies dyspnea, no discomfort, denies chest pain.    Pocket site uncomplicated  Chest x-ray 8/10/19 no normal thorax      Labs reviewed    WBC 9, hemoglobin 11.9, hematocrit 38, platelet count 181, sodium 138, potassium 4.2, glucose 94, BUN 24, creatinine 0.96, AST 16, ALT 8, magnesium 1.9      Discharge meds  #1 doxycycline 100 mg p.o. twice daily x5 days only  #2 metoprolol 25 mg twice daily  #3 resume Lipitor 20 mg daily  #4 resume home dose diazepam 5 mg every 6 hours as needed for anxiety  #5 hydralazine 25 mg 3 times daily  #6 losartan 50 mg daily  #7 triamterene/hydrochlorothiazide 37.5/25 1 capsule daily  #8 Tylenol 325 mg as needed every 6 hours as needed for pain for the next 2 days      Discharge instructions  PPM restrictions reviewed with patient. Do not raise affected arm above head or behind back for six weeks. May remove arm sling after 24 hrs, please wear if trouble remembering arm limitations and prn at night. No heavy  lifting/pushing for six weeks. No driving for first week. No showers first week. Keep dressing on, clean, and dry until sees us in follow up. Wound care reviewed. Instructed to report s/s of infection such as warmth/redness/drainage/swelling at site or fever/chills.     Appointment with device clinic on 8/13/19

## 2019-08-10 NOTE — PROGRESS NOTES
Assumed care at 0700 bedside report received from day shift RN. Pt. Is paced on the monitor. Initial assessment completed, orders reviewed, call light within reach, bed alarm is in use, and hourly rounding in place. POC addressed with patient, no additional questions at this time.

## 2019-08-10 NOTE — DISCHARGE INSTRUCTIONS
Discharge Instructions    Discharged to home by car with relative. Discharged via wheelchair, hospital escort: Yes.  Special equipment needed: Not Applicable    Be sure to schedule a follow-up appointment with your primary care doctor or any specialists as instructed.     Discharge Plan:   Diet Plan: Discussed  Activity Level: Discussed  Confirmed Follow up Appointment: Appointment Scheduled  Confirmed Symptoms Management: Discussed  Medication Reconciliation Updated: No (Comments)  Influenza Vaccine Indication: Not indicated: Previously immunized this influenza season and > 8 years of age    I understand that a diet low in cholesterol, fat, and sodium is recommended for good health. Unless I have been given specific instructions below for another diet, I accept this instruction as my diet prescription.   Other diet: Regular    Special Instructions: None    · Is patient discharged on Warfarin / Coumadin?   No     PACEMAKER     DISCHARGE INSTRUCTIONS      WOUND CARE:    • No Showers for one week, you may take a sponge bath.  Keep your dressing dry.  No submerging in bath tub, hot tub, swimming, etc. for six weeks.  • Leave your dressing in place until your follow up appointment.    • No lifting over 5-10 pounds for six weeks; this applies to affected side only.  • Do not raise affected arm above shoulder level for 4-6 weeks.  • Avoid excessive pushing, pulling, or twisting for six weeks; this applies to affected side only.  • Call your doctor’s office if you notice any increased swelling, redness, or any drainage at the incision site.  Also notify your doctor if you develop a fever.  • You can also call the Health Hotline open 24 hours/day, 7 days/week and speak to a nurse at [phone], or toll free at [phone].  • Seven to ten days after implant, your cardiologist’s office will schedule a visit for you with a nurse to check your incision site.  The nurse will remove your original dressing at this time.   If you have an AICD, you will be enrolled in an AICD clinic.  • For AICD’s - you should be checked every three months or as determined by your cardiologist.    • Do not drive until you have been cleared to do so by your cardiologist.  • Call 911 if you develop problems with breathing or chest pain.    Metoprolol extended-release tablets  What is this medicine?  METOPROLOL (me TOE proe lole) is a beta-blocker. Beta-blockers reduce the workload on the heart and help it to beat more regularly. This medicine is used to treat high blood pressure and to prevent chest pain. It is also used to after a heart attack and to prevent an additional heart attack from occurring.  This medicine may be used for other purposes; ask your health care provider or pharmacist if you have questions.  COMMON BRAND NAME(S): toprol, Toprol XL  What should I tell my health care provider before I take this medicine?  They need to know if you have any of these conditions:  -diabetes  -heart or vessel disease like slow heart rate, worsening heart failure, heart block, sick sinus syndrome or Raynaud's disease  -kidney disease  -liver disease  -lung or breathing disease, like asthma or emphysema  -pheochromocytoma  -thyroid disease  -an unusual or allergic reaction to metoprolol, other beta-blockers, medicines, foods, dyes, or preservatives  -pregnant or trying to get pregnant  -breast-feeding  How should I use this medicine?  Take this medicine by mouth with a glass of water. Follow the directions on the prescription label. Do not crush or chew. Take this medicine with or immediately after meals. Take your doses at regular intervals. Do not take more medicine than directed. Do not stop taking this medicine suddenly. This could lead to serious heart-related effects.  Talk to your pediatrician regarding the use of this medicine in children. While this drug may be prescribed for children as young as 6 years for selected conditions, precautions do  apply.  Overdosage: If you think you have taken too much of this medicine contact a poison control center or emergency room at once.  NOTE: This medicine is only for you. Do not share this medicine with others.  What if I miss a dose?  If you miss a dose, take it as soon as you can. If it is almost time for your next dose, take only that dose. Do not take double or extra doses.  What may interact with this medicine?  This medicine may interact with the following medications:  -certain medicines for blood pressure, heart disease, irregular heart beat  -certain medicines for depression, like monoamine oxidase (MAO) inhibitors, fluoxetine, or paroxetine  -clonidine  -dobutamine  -epinephrine  -isoproterenol  -reserpine  This list may not describe all possible interactions. Give your health care provider a list of all the medicines, herbs, non-prescription drugs, or dietary supplements you use. Also tell them if you smoke, drink alcohol, or use illegal drugs. Some items may interact with your medicine.  What should I watch for while using this medicine?  Visit your doctor or health care professional for regular check ups. Contact your doctor right away if your symptoms worsen. Check your blood pressure and pulse rate regularly. Ask your health care professional what your blood pressure and pulse rate should be, and when you should contact them.  You may get drowsy or dizzy. Do not drive, use machinery, or do anything that needs mental alertness until you know how this medicine affects you. Do not sit or stand up quickly, especially if you are an older patient. This reduces the risk of dizzy or fainting spells. Contact your doctor if these symptoms continue. Alcohol may interfere with the effect of this medicine. Avoid alcoholic drinks.  What side effects may I notice from receiving this medicine?  Side effects that you should report to your doctor or health care professional as soon as possible:  -allergic reactions  like skin rash, itching or hives  -cold or numb hands or feet  -depression  -difficulty breathing  -faint  -fever with sore throat  -irregular heartbeat, chest pain  -rapid weight gain  -swollen legs or ankles  Side effects that usually do not require medical attention (report to your doctor or health care professional if they continue or are bothersome):  -anxiety or nervousness  -change in sex drive or performance  -dry skin  -headache  -nightmares or trouble sleeping  -short term memory loss  -stomach upset or diarrhea  -unusually tired  This list may not describe all possible side effects. Call your doctor for medical advice about side effects. You may report side effects to FDA at 3-975-RQW-0191.  Where should I keep my medicine?  Keep out of the reach of children.  Store at room temperature between 15 and 30 degrees C (59 and 86 degrees F). Throw away any unused medicine after the expiration date.  NOTE: This sheet is a summary. It may not cover all possible information. If you have questions about this medicine, talk to your doctor, pharmacist, or health care provider.  © 2018 Elsevier/Gold Standard (2014-08-22 14:41:37)    Doxycycline oral tablets   What is this medicine?  DOXYCYCLINE (dox i ALE mills) is a tetracycline antibiotic. It kills certain bacteria or stops their growth.   This medicine may be used for other purposes; ask your health care provider or pharmacist if you have questions.  COMMON BRAND NAME(S): Oraxyl, Periostat  What should I tell my health care provider before I take this medicine?  They need to know if you have any of these conditions:  -liver disease  -long exposure to sunlight like working outdoors  -stomach problems like colitis  -an unusual or allergic reaction to doxycycline, tetracycline antibiotics, other medicines, foods, dyes, or preservatives  -pregnant or trying to get pregnant  -breast-feeding  How should I use this medicine?  Take this medicine by mouth with a full glass  of water. Follow the directions on the prescription label. Take this medicine at least 1 hour before or 2 hours after food. Take your medicine at regular intervals. Do not take your medicine more often than directed. Take all of your medicine as directed even if you think you are better. Do not skip doses or stop your medicine early.  Talk to your pediatrician regarding the use of this medicine in children. Special care may be needed.  Overdosage: If you think you have taken too much of this medicine contact a poison control center or emergency room at once.  NOTE: This medicine is only for you. Do not share this medicine with others.  What if I miss a dose?  If you miss a dose, take it as soon as you can. If it is almost time for your next dose, take only that dose. Do not take double or extra doses.  What may interact with this medicine?  -antacids  -barbiturates  -birth control pills  -bismuth subsalicylate  -carbamazepine  -methoxyflurane  -other antibiotics  -phenytoin  -vitamins that contain iron  -warfarin  This list may not describe all possible interactions. Give your health care provider a list of all the medicines, herbs, non-prescription drugs, or dietary supplements you use. Also tell them if you smoke, drink alcohol, or use illegal drugs. Some items may interact with your medicine.  What should I watch for while using this medicine?  Tell your doctor or health care professional if your symptoms do not improve.  Do not treat diarrhea with over the counter products. Contact your doctor if you have diarrhea that lasts more than 2 days or if it is severe and watery.  Do not take this medicine just before going to bed. It may not dissolve properly when you lay down and can cause pain in your throat. Drink plenty of fluids while taking this medicine to also help reduce irritation in your throat.  This medicine can make you more sensitive to the sun. Keep out of the sun. If you cannot avoid being in the sun,  wear protective clothing and use sunscreen. Do not use sun lamps or tanning beds/booths.  Birth control pills may not work properly while you are taking this medicine. Talk to your doctor about using an extra method of birth control.  If you are being treated for a sexually transmitted infection, avoid sexual contact until you have finished your treatment. Your sexual partner may also need treatment.  Avoid antacids, aluminum, calcium, magnesium, and iron products for 4 hours before and 2 hours after taking a dose of this medicine.  What side effects may I notice from receiving this medicine?  Side effects that you should report to your doctor or health care professional as soon as possible:  -allergic reactions like skin rash, itching or hives, swelling of the face, lips, or tongue  -difficulty breathing  -fever  -itching in the rectal or genital area  -pain on swallowing  -redness, blistering, peeling or loosening of the skin, including inside the mouth  -severe stomach pain or cramps  -unusual bleeding or bruising  -unusually weak or tired  -yellowing of the eyes or skin  Side effects that usually do not require medical attention (report to your doctor or health care professional if they continue or are bothersome):  -diarrhea  -loss of appetite  -nausea, vomiting  This list may not describe all possible side effects. Call your doctor for medical advice about side effects. You may report side effects to FDA at 5-514-FDA-3797.  Where should I keep my medicine?  Keep out of the reach of children.  Store at room temperature between 15 and 30 degrees C (59 and 86 degrees F). Protect from light. Keep container tightly closed. Throw away any unused medicine after the expiration date. Taking this medicine after the expiration date can make you seriously ill.  NOTE: This sheet is a summary. It may not cover all possible information. If you have questions about this medicine, talk to your doctor, pharmacist, or health  care provider.  © 2018 Elsevier/Gold Standard (2009-04-16 14:50:34)              Depression / Suicide Risk    As you are discharged from this [facility] facility, it is important to learn how to keep safe from harming yourself.    Recognize the warning signs:  · Abrupt changes in personality, positive or negative- including increase in energy   · Giving away possessions  · Change in eating patterns- significant weight changes-  positive or negative  · Change in sleeping patterns- unable to sleep or sleeping all the time   · Unwillingness or inability to communicate  · Depression  · Unusual sadness, discouragement and loneliness  · Talk of wanting to die  · Neglect of personal appearance   · Rebelliousness- reckless behavior  · Withdrawal from people/activities they love  · Confusion- inability to concentrate     If you or a loved one observes any of these behaviors or has concerns about self-harm, here's what you can do:  · Talk about it- your feelings and reasons for harming yourself  · Remove any means that you might use to hurt yourself (examples: pills, rope, extension cords, firearm)  · Get professional help from the community (Mental Health, Substance Abuse, psychological counseling)  · Do not be alone:Call your Safe Contact- someone whom you trust who will be there for you.  · Call your local CRISIS HOTLINE 559-0982 or 962-998-0319  · Call your local Children's Mobile Crisis Response Team [place] (864) 315-3971 or www.[facility]art  · Call the toll free National Suicide Prevention Hotlines   · National Suicide Prevention Lifeline 384-758-AYIC (6993)  · National Hope Line Network 800-SUICIDE (099-5555)

## 2019-08-10 NOTE — PROGRESS NOTES
2 RN skin check completed with JAYJAY Cadet.   Devices in place tele box, PIV.  Skin assessed under devices yes.  Confirmed pressure ulcers found on n/a.  New potential pressure ulcers noted on n/a. Wound consult placed n/a.    Skin is intact.  Incision to L chest covered with dressing. Dressing is CDI.   Bilat heels pink/blanching, dry.   R big toe has a small non blanching spot, blanchable erythema surrounding. Picture uploaded in Epic.     The following interventions in place:  Educated Pt to reposition frequently. Pillows in use for support, comfort, and to float heels

## 2019-08-10 NOTE — PROGRESS NOTES
Pt arrived to unit via gurny at 1745. Pt oriented to room, unit, and plan of care. Tele-monitor placed and monitor room notified. All questions answered at this time. Call light within reach; fall precautions in place.

## 2019-08-11 LAB — EKG IMPRESSION: NORMAL

## 2019-08-13 ENCOUNTER — NON-PROVIDER VISIT (OUTPATIENT)
Dept: CARDIOLOGY | Facility: MEDICAL CENTER | Age: 79
End: 2019-08-13
Payer: MEDICARE

## 2019-08-13 VITALS
OXYGEN SATURATION: 90 % | SYSTOLIC BLOOD PRESSURE: 148 MMHG | BODY MASS INDEX: 23.66 KG/M2 | HEIGHT: 65 IN | WEIGHT: 142 LBS | HEART RATE: 72 BPM | DIASTOLIC BLOOD PRESSURE: 70 MMHG

## 2019-08-13 DIAGNOSIS — R00.1 BRADYCARDIA: ICD-10-CM

## 2019-08-13 DIAGNOSIS — I44.2 COMPLETE HEART BLOCK BY ELECTROCARDIOGRAM (HCC): ICD-10-CM

## 2019-08-13 DIAGNOSIS — Z95.0 CARDIAC PACEMAKER IN SITU: ICD-10-CM

## 2019-08-13 PROCEDURE — 93280 PM DEVICE PROGR EVAL DUAL: CPT | Performed by: NURSE PRACTITIONER

## 2019-08-13 NOTE — PROGRESS NOTES
Patient had RV lead revision last week. (PM was implanted on 6/20/2019).    Device is working normally. No mode switching episodes.  Normal sensing and capture of RA and RV leads; stable impedances. Battery longevity is 11.4 years.  No changes are made today.    Incision is healing well; no redness, swelling or drainage noted.    She is reminded about limited ROM of left arm, and also discussed wound care.    FU in 1 months for next PM check with me.    Collaborating MD: Eugenio

## 2019-08-26 ENCOUNTER — OFFICE VISIT (OUTPATIENT)
Dept: MEDICAL GROUP | Facility: MEDICAL CENTER | Age: 79
End: 2019-08-26
Payer: MEDICARE

## 2019-08-26 VITALS
SYSTOLIC BLOOD PRESSURE: 130 MMHG | TEMPERATURE: 99.7 F | DIASTOLIC BLOOD PRESSURE: 78 MMHG | HEART RATE: 89 BPM | OXYGEN SATURATION: 95 % | HEIGHT: 65 IN | BODY MASS INDEX: 24.24 KG/M2 | WEIGHT: 145.5 LBS

## 2019-08-26 DIAGNOSIS — R60.0 PEDAL EDEMA: ICD-10-CM

## 2019-08-26 DIAGNOSIS — I27.20 PULMONARY HYPERTENSION (HCC): ICD-10-CM

## 2019-08-26 DIAGNOSIS — I44.2 COMPLETE HEART BLOCK BY ELECTROCARDIOGRAM (HCC): ICD-10-CM

## 2019-08-26 DIAGNOSIS — Z95.0 CARDIAC PACEMAKER IN SITU: ICD-10-CM

## 2019-08-26 PROBLEM — S73.004D HIP DISLOCATION, RIGHT, SUBSEQUENT ENCOUNTER: Status: RESOLVED | Noted: 2018-08-14 | Resolved: 2019-08-26

## 2019-08-26 PROBLEM — F39 MOOD DISORDER (HCC): Status: RESOLVED | Noted: 2018-11-08 | Resolved: 2019-08-26

## 2019-08-26 PROBLEM — R00.1 BRADYCARDIA: Status: RESOLVED | Noted: 2019-06-18 | Resolved: 2019-08-26

## 2019-08-26 PROCEDURE — 99214 OFFICE O/P EST MOD 30 MIN: CPT | Performed by: FAMILY MEDICINE

## 2019-08-26 SDOH — HEALTH STABILITY: MENTAL HEALTH: HOW OFTEN DO YOU HAVE A DRINK CONTAINING ALCOHOL?: MONTHLY OR LESS

## 2019-08-27 NOTE — PROGRESS NOTES
Subjective:     CC: Diagnoses of Pulmonary hypertension (HCC), Pedal edema, Complete heart block by electrocardiogram (HCC), and Cardiac pacemaker in situ were pertinent to this visit.    HPI: Patient is a 79 y.o. female established patient who presents today to [facility] care.  The patient was seen by Dr. Reyes previously.  Patient recently had a revision of her pacemaker lead, states she is doing well following this.      Pulmonary hypertension (HCC)  Chronic problem.  Denies any shortness of breath.  Recent echo. RVSP 60mmhg.     Pedal edema  Chronic problem.  Improving, although still present. On triamterene/hctz. Sees cardiology in about 2 weeks.     Complete heart block by electrocardiogram ([facility])  Now fully resolved s/p pacemaker.     Cardiac pacemaker in situ  Doing s/p revision. Pain is well controlled. Healing well.  Denies any shortness of breath or chest pain.      Past Medical History:   Diagnosis Date   • Arthritis     hip, knee   • ASTHMA    • AV block, complete (HCC) 06/2019    Status post pacemaker implantation   • Cataract     Bilateral IOL   • Hyperlipidemia    • Hypertension    • Menopause    • Osteopenia    • Pedal edema    • Pulmonary hypertension (HCC) 06/2019    Echocardiogram with normal LV size, hyperdynamic LV, LVEF 75%. Mild MR, mild TR. RVSP 60mmHg.       Social History     Tobacco Use   • Smoking status: Never Smoker   • Smokeless tobacco: Never Used   Substance Use Topics   • Alcohol use: Yes     Alcohol/week: 0.0 oz     Frequency: Monthly or less   • Drug use: No       Current Outpatient Medications Ordered in Epic   Medication Sig Dispense Refill   • acetaminophen (TYLENOL) 325 MG Tab Take 1 Tab by mouth every 6 hours as needed (pain). 6 Tab 0   • metoprolol (LOPRESSOR) 25 MG Tab Take 1 Tab by mouth 2 Times a Day. 60 Tab 2   • losartan (COZAAR) 50 MG Tab Take 1 Tab by mouth every day. 30 Tab 6   • hydrALAZINE (APRESOLINE) 25 MG Tab Take 1 Tab by mouth 3 times a day. 90 Tab 3   •  "diazePAM (VALIUM) 5 MG Tab Take 5 mg by mouth every 6 hours as needed for Anxiety.     • Ipratropium-Albuterol (COMBIVENT INH) Inhale  by mouth.     • atorvastatin (LIPITOR) 20 MG Tab TAKE ONE TABLET BY MOUTH EVERY DAY 90 Tab 2   • triamterene/hctz (MAXZIDE-25/DYAZIDE) 37.5-25 MG Cap TAKE ONE CAPSULE BY MOUTH EVERY MORNING 90 Cap 1     No current Epic-ordered facility-administered medications on file.        Allergies:  Other environmental    Health Maintenance: Completed    ROS:  Gen: no fevers/chill, no changes in weight  Eyes: no changes in vision  ENT: no sore throat, no hearing loss, no bloody nose  Pulm: no sob, no cough  CV: no chest pain, no palpitations  GI: no nausea/vomiting, no diarrhea  : no dysuria  MSk: no myalgias  Skin: no rash  Neuro: no headaches, no numbness/tingling  Heme/Lymph: no easy bruising      Objective:       Exam:  /78 (BP Location: Left arm, Patient Position: Sitting, BP Cuff Size: Adult)   Pulse 89   Temp 37.6 °C (99.7 °F) (Temporal)   Ht 1.651 m (5' 5\")   Wt 66 kg (145 lb 8.1 oz)   SpO2 95%   BMI 24.21 kg/m²  Body mass index is 24.21 kg/m².    General: Normal appearing. No distress.  HEAD: NCAT  EYES: conjunctiva clear, lids without ptosis, pupils equal and reactive to light  EARS: ears normal shape and contour.  MOUTH: normal dentition   Neck:  Normal ROM  Pulmonary: Clear to auscultation bilaterally, no wheezes rales or rhonchi.  Normal effort. Normal respiratory rate.  Cardiovascular: Regular rate and rhythm, no murmurs rubs or gallops.  Well perfused.  Significant bilateral lower extremity edema.  Neurologic: Grossly normal, no focal deficits  Skin: Warm and dry.  No obvious lesions.  Musculoskeletal: Normal gait and station.   Psych: Normal mood and affect. Alert and oriented x3. Judgment and insight is normal.      Labs: 8/9/2019 results reviewed and discussed with the patient, questions answered.    Assessment & Plan:     79 y.o. female with the following - "     1. Pulmonary hypertension (HCC)  Chronic problem, followed by cardiology with regular echocardiograms.  Denies any symptoms currently.    2. Pedal edema  Chronic problem, patient reports that it is improving although still significant.  Plan to continue to monitor.    3. Complete heart block by electrocardiogram (HCC)  Resolved status post pacemaker.    4. Cardiac pacemaker in situ  Chronic problem, status post revision on 8/9/2019, doing well, healing properly.  Has an appointment with cardiology on 9/16/2018.      Return in about 6 months (around 2/26/2020).    Please note that this dictation was created using voice recognition software. I have made every reasonable attempt to correct obvious errors, but I expect that there are errors of grammar and possibly content that I did not discover before finalizing the note.

## 2019-09-03 DIAGNOSIS — I10 ESSENTIAL HYPERTENSION: ICD-10-CM

## 2019-09-03 RX ORDER — TRIAMTERENE AND HYDROCHLOROTHIAZIDE 37.5; 25 MG/1; MG/1
CAPSULE ORAL
Qty: 90 CAP | Refills: 1 | Status: SHIPPED | OUTPATIENT
Start: 2019-09-03 | End: 2019-10-11 | Stop reason: SDUPTHER

## 2019-09-04 ENCOUNTER — TELEPHONE (OUTPATIENT)
Dept: MEDICAL GROUP | Facility: MEDICAL CENTER | Age: 79
End: 2019-09-04

## 2019-09-04 NOTE — TELEPHONE ENCOUNTER
1. Caller Name: Priscilla Hdz                                           Call Back Number: [phone] (home)         Patient approves a detailed voicemail message: N\A    Patients [facility] called and states they can't dipense her medication Triam/HCTZ till we notify them its ok. Pharmacist states that she is also taking Losartan from another DR they want to make sure its ok

## 2019-10-04 ENCOUNTER — TELEPHONE (OUTPATIENT)
Dept: CARDIOLOGY | Facility: MEDICAL CENTER | Age: 79
End: 2019-10-04

## 2019-10-04 NOTE — TELEPHONE ENCOUNTER
FYI-- patient's device transmitted via home monitor. She had 3 brief NST VT episodes <5 seconds.  9/30 @ 12:22 pm/2 9/6 @ 8:40 pm.  Patient is scheduled with Dr. Becker 10/11/19

## 2019-10-04 NOTE — TELEPHONE ENCOUNTER
Called pt, denies current symptoms or feeling symptoms at times of transmissions. Pt reports she is taking her medications regularly as prescribed. She states she has been feeling some stress regarding her  being in a nursing home. Confirmed pt's 10/11/19 FV with ANNE. Verbalized understanding and appreciative of call.

## 2019-10-11 ENCOUNTER — OFFICE VISIT (OUTPATIENT)
Dept: CARDIOLOGY | Facility: MEDICAL CENTER | Age: 79
End: 2019-10-11
Payer: MEDICARE

## 2019-10-11 VITALS
BODY MASS INDEX: 22.99 KG/M2 | WEIGHT: 138 LBS | HEIGHT: 65 IN | OXYGEN SATURATION: 96 % | HEART RATE: 102 BPM | SYSTOLIC BLOOD PRESSURE: 144 MMHG | DIASTOLIC BLOOD PRESSURE: 82 MMHG

## 2019-10-11 DIAGNOSIS — E78.5 DYSLIPIDEMIA: ICD-10-CM

## 2019-10-11 DIAGNOSIS — N18.30 CKD (CHRONIC KIDNEY DISEASE), STAGE III (HCC): ICD-10-CM

## 2019-10-11 DIAGNOSIS — Z95.0 CARDIAC PACEMAKER IN SITU: ICD-10-CM

## 2019-10-11 DIAGNOSIS — I44.2 COMPLETE HEART BLOCK BY ELECTROCARDIOGRAM (HCC): ICD-10-CM

## 2019-10-11 DIAGNOSIS — I10 ESSENTIAL HYPERTENSION: ICD-10-CM

## 2019-10-11 DIAGNOSIS — I27.20 PULMONARY HYPERTENSION (HCC): ICD-10-CM

## 2019-10-11 PROCEDURE — 99214 OFFICE O/P EST MOD 30 MIN: CPT | Mod: 24 | Performed by: INTERNAL MEDICINE

## 2019-10-11 RX ORDER — TRIAMTERENE AND HYDROCHLOROTHIAZIDE 37.5; 25 MG/1; MG/1
CAPSULE ORAL
Qty: 90 CAP | Refills: 3 | Status: SHIPPED | OUTPATIENT
Start: 2019-10-11 | End: 2019-12-16 | Stop reason: SDUPTHER

## 2019-10-11 RX ORDER — ATORVASTATIN CALCIUM 20 MG/1
20 TABLET, FILM COATED ORAL
Qty: 90 TAB | Refills: 3 | Status: SHIPPED | OUTPATIENT
Start: 2019-10-11 | End: 2019-12-16 | Stop reason: SDUPTHER

## 2019-10-11 RX ORDER — HYDRALAZINE HYDROCHLORIDE 25 MG/1
25 TABLET, FILM COATED ORAL 3 TIMES DAILY
Qty: 270 TAB | Refills: 3 | Status: SHIPPED | OUTPATIENT
Start: 2019-10-11 | End: 2020-02-06 | Stop reason: SDUPTHER

## 2019-10-11 RX ORDER — LOSARTAN POTASSIUM 50 MG/1
50 TABLET ORAL 2 TIMES DAILY
Qty: 180 TAB | Refills: 3 | Status: SHIPPED | OUTPATIENT
Start: 2019-10-11 | End: 2020-02-06 | Stop reason: SDUPTHER

## 2019-10-11 NOTE — PROGRESS NOTES
Cardiology Follow-up Consultation Note    Date of note:    10/11/2019    Primary Care Provider: Beth Friedman M.D.  Referring Provider: Dr. Erik Woodall    Patient Name: Priscilla Hdz   YOB: 1940  MRN:              [id number]    Chief Complaint: bradycardia    History of Present Illness: Priscilla Hdz is a 79 y.o. female whose current medical problems include complete heart block s/p dual chamber pacemaker 6/20/2019 with RV lead repositioning 8/9/2019, hypertension, dyslipidemia  who is here for follow-up.    Interim Events:   having multiple medical problems, current in the hospital at [facility].     Patient denies chest pain, palpitations, dyspnea on exertion, pre-syncope, syncope, orthopnea, PND or recent weight gain.     In terms of hypertension, poorly controlled.     Walks in a walker after falls and hip replacement x 2.       Review of Systems   Musculoskeletal: Positive for joint pain.   Allergic/Immunologic: Positive for environmental allergies.   Constitution: Negative for chills, fever and night sweats.   HENT: Negative for nosebleeds.    Eyes: Negative for vision loss in left eye and vision loss in right eye.   Respiratory: Negative for hemoptysis.    Gastrointestinal: Negative for hematemesis, hematochezia and melena.   Genitourinary: Negative for hematuria.   Neurological: Negative for focal weakness, numbness and paresthesias.     All other systems reviewed and discussed using a comprehensive questionnaire and are negative.       Past Medical History:   Diagnosis Date   • Arthritis     hip, knee   • ASTHMA    • AV block, complete (HCC) 06/2019    Status post pacemaker implantation   • Cataract     Bilateral IOL   • Hyperlipidemia    • Hypertension    • Menopause    • Osteopenia    • Pedal edema    • Pulmonary hypertension (HCC) 06/2019    Echocardiogram with normal LV size, hyperdynamic LV, LVEF 75%. Mild MR, mild TR. RVSP 60mmHg.         Past Surgical History:    Procedure Laterality Date   • PACEMAKER INSERTION Left 06/20/2019    Medtronic Kalie S  MRI [id number] implanted by Dr. Mcginnis.   • HIP REVISION TOTAL Right 8/29/2018    Procedure: HIP REVISION TOTAL;  Surgeon: Oniel Goodson M.D.;  Location: SURGERY [facility];  Service: Orthopedics   • HIP ARTHROPLASTY TOTAL Right 8/9/2016    Procedure: HIP ARTHROPLASTY TOTAL;  Surgeon: Oniel Goodson M.D.;  Location: SURGERY [facility];  Service:    • CATARACT EXTRACTION WITH IOL Bilateral 2012         Current Outpatient Medications   Medication Sig Dispense Refill   • triamterene/hctz (MAXZIDE-25/DYAZIDE) 37.5-25 MG Cap TAKE ONE CAPSULE BY MOUTH EVERY MORNING 90 Cap 1   • acetaminophen (TYLENOL) 325 MG Tab Take 1 Tab by mouth every 6 hours as needed (pain). 6 Tab 0   • metoprolol (LOPRESSOR) 25 MG Tab Take 1 Tab by mouth 2 Times a Day. 60 Tab 2   • losartan (COZAAR) 50 MG Tab Take 1 Tab by mouth every day. 30 Tab 6   • hydrALAZINE (APRESOLINE) 25 MG Tab Take 1 Tab by mouth 3 times a day. 90 Tab 3   • diazePAM (VALIUM) 5 MG Tab Take 5 mg by mouth every 6 hours as needed for Anxiety.     • Ipratropium-Albuterol (COMBIVENT INH) Inhale  by mouth.     • atorvastatin (LIPITOR) 20 MG Tab TAKE ONE TABLET BY MOUTH EVERY DAY 90 Tab 2     No current facility-administered medications for this visit.          Allergies   Allergen Reactions   • Other Environmental Cough     Pine, Dust         Family History   Problem Relation Age of Onset   • Heart Disease Father    • Hyperlipidemia Father    • Hypertension Father    • Heart Disease Brother    • Hypertension Mother    • Hyperlipidemia Mother          Social History     Socioeconomic History   • Marital status:      Spouse name: Not on file   • Number of children: Not on file   • Years of education: Not on file   • Highest education level: Not on file   Occupational History   • Not on file   Social Needs   • Financial resource strain: Not on file   • Food  "insecurity:     Worry: Not on file     Inability: Not on file   • Transportation needs:     Medical: Not on file     Non-medical: Not on file   Tobacco Use   • Smoking status: Never Smoker   • Smokeless tobacco: Never Used   Substance and Sexual Activity   • Alcohol use: Yes     Alcohol/week: 0.0 oz     Frequency: Monthly or less   • Drug use: No   • Sexual activity: Not Currently     Partners: Male   Lifestyle   • Physical activity:     Days per week: Not on file     Minutes per session: Not on file   • Stress: Not on file   Relationships   • Social connections:     Talks on phone: Not on file     Gets together: Not on file     Attends Hindu service: Not on file     Active member of club or organization: Not on file     Attends meetings of clubs or organizations: Not on file     Relationship status: Not on file   • Intimate partner violence:     Fear of current or ex partner: Not on file     Emotionally abused: Not on file     Physically abused: Not on file     Forced sexual activity: Not on file   Other Topics Concern   • Not on file   Social History Narrative   • Not on file         Physical Exam:  Ambulatory Vitals  /82 (BP Location: Left arm, Patient Position: Sitting, BP Cuff Size: Adult)   Pulse (!) 102   Ht 1.651 m (5' 5\")   Wt 62.6 kg (138 lb)   SpO2 96%    Oxygen Therapy:     BP Readings from Last 4 Encounters:   08/26/19 130/78   08/13/19 148/70   08/10/19 133/68   07/30/19 144/66       Weight/BMI: There is no height or weight on file to calculate BMI.  Wt Readings from Last 4 Encounters:   08/26/19 66 kg (145 lb 8.1 oz)   08/13/19 64.4 kg (142 lb)   08/09/19 64.7 kg (142 lb 10.2 oz)   07/30/19 64.4 kg (142 lb)       General: No apparent distress  Eyes: nl conjunctiva  ENT: OP clear, normal external appearance of nose and ears  Neck: JVP 4 cm H2O, no carotid bruits  Lungs: normal respiratory effort, CTAB  Heart: RRR, no murmurs, no rubs or gallops, 1+ edema bilateral lower extremities. No " LV/RV heave on cardiac palpatation. 2+ bilateral radial pulses.  2+ bilateral dp pulses. Well healed pacemaker insertion site.   Abdomen: soft, non tender, non distended, no masses, normal bowel sounds.  No HSM.  Extremities/MSK: no clubbing, no cyanosis  Neurological: No focal sensory deficits  Psychiatric: Appropriate affect, A/O x 3, intact judgement and insight  Skin: Warm extremities    Lab Data Review:  Lab Results   Component Value Date/Time    CHOLSTRLTOT 173 08/09/2018 09:17 AM     (H) 08/09/2018 09:17 AM    HDL 41 08/09/2018 09:17 AM    TRIGLYCERIDE 129 08/09/2018 09:17 AM       Lab Results   Component Value Date/Time    SODIUM 138 08/09/2019 02:30 PM    POTASSIUM 4.2 08/09/2019 02:30 PM    CHLORIDE 106 08/09/2019 02:30 PM    CO2 25 08/09/2019 02:30 PM    GLUCOSE 94 08/09/2019 02:30 PM    BUN 24 (H) 08/09/2019 02:30 PM    CREATININE 0.96 08/09/2019 02:30 PM    CREATININE 0.77 01/29/2013 07:49 AM    BUNCREATRAT 19 03/09/2016 09:37 AM    BUNCREATRAT 23 01/29/2013 07:49 AM    GLOMRATE >59 11/10/2010 09:26 AM     Lab Results   Component Value Date/Time    ALKPHOSPHAT 65 08/09/2019 02:30 PM    ASTSGOT 16 08/09/2019 02:30 PM    ALTSGPT 8 08/09/2019 02:30 PM    TBILIRUBIN 0.4 08/09/2019 02:30 PM      Lab Results   Component Value Date/Time    WBC 9.0 08/09/2019 02:30 PM     No components found for: HBGA1C  No components found for: TROPONIN  No components found for: BNP      Cardiac Imaging and Procedures Review:    EKG dated 8/11/2019:  ATRIAL-SENSED VENTRICULAR-PACED COMPLEXES   NO FURTHER ANALYSIS ATTEMPTED DUE TO PACED RHYTHM     Echo dated 6/19/2019:  Left Ventricle  Normal left ventricular chamber size. Normal left ventricular wall   thickness. Hyperdynamic left ventricular systolic function. Left   ventricular ejection fraction is visually estimated to be greater than   75%. Normal regional wall motion. Diastolic function is difficult to   assess.    Right Ventricle  The right ventricle was normal in  size and function.    Right Atrium  The right atrium is normal in size.  Normal inferior vena cava size   without inspiratory collapse.    Left Atrium  The left atrium is normal in size.  Left atrial volume index is 17   mL/sq m.    Mitral Valve  Mitral annular calcification. No mitral stenosis. Mild mitral   regurgitation.    Aortic Valve  Tricuspid aortic valve. Aortic sclerosis without stenosis. No aortic   insufficiency.    Tricuspid Valve  Structurally normal tricuspid valve without significant stenosis. Mild   tricuspid regurgitation. Estimated right ventricular systolic pressure    is 60 mmHg. Right atrial pressure is estimated to be 8 mmHg.    Pulmonic Valve  The pulmonic valve is not well visualized. No stenosis or regurgitation   seen.    Pericardium  Normal pericardium without effusion.    Aorta  The aortic root is normal.  Ascending aorta diameter is 2.8 cm.    By my personal review, RVSP 54mmHg. Nl RV size and systolic function, normal LV systolic and diastolic function. No significant valvular disease.     2016 TTE:  CONCLUSIONS  No prior study is available for comparison.   Normal left ventricular systolic function.  Left ventricular ejection fraction is visually estimated to be 65%.  Right heart pressures are normal.  No significant valve abnormalities.     Stress test 2016:  Myocardial Perfusion   Report   NUCLEAR IMAGING INTERPRETATION   Normal left ventricular size, ejection fraction, and wall motion.   No evidence of significant jeopardized viable myocardium or prior myocardial    infarction.   ECG INTERPRETATION   Negative stress ECG for ischemia.    Radiology test Review:  CXR:   Tubes and lines: Left transvenous electrodes project over the right atrium and right ventricle.    Elevated right hemidiaphragm again demonstrated.  Mild lung base interstitial opacities  No pleural effusion. No pneumothorax.    Stable cardiopericardial silhouette.      Medical Decision Makin. Essential  hypertension  Poorly controlled  -stop metoprolol, likely ineffective  -continue triamterene/hctz 37.5/25 once a day.   -increase losartan to 50mg PO bid  -continue hydralazine 25mg PO tid, will try eventually to wean off, perhaps with coreg or bisoprolol. Will avoid CCBs due to baseline LE swelling.     2. Dyslipidemia  No clinical CVD, however stable  -continue lipitor 20mg PO Daily  -no current clinical indication for aspirin for primary prevention.    3. Complete heart block by electrocardiogram (HCC)  S/p pacemaker.  -f/u with Anahi Gan    4. Pulmonary hypertension (HCC)  Moderate (PASP 54mmHg by my read) and in the setting of severe hypertension during echo (SBP 180s). No current symptoms and likely PASP is normal when BP well controlled  -continue hypertension control  -removed pulmonary hypertension from problem list.   -repeat echo when normotensive if dyspnea occurs.    5. CKD (chronic kidney disease), stage III (HCC)  Stable  -recheck BMP in 6 months.     6. Stress - she is under a lot of stress due to her 's health. Discussed a therapy referral and she will let me know if she would like one.       Return in about 6 months (around 4/11/2020).      Bird Becker MD, [facility]  [facility], [facility]  [address] [place] [address]  JOEPSH Acevedo [address]  Phone: [phone]  Fax: [phone]

## 2019-10-29 ENCOUNTER — NON-PROVIDER VISIT (OUTPATIENT)
Dept: CARDIOLOGY | Facility: MEDICAL CENTER | Age: 79
End: 2019-10-29
Payer: MEDICARE

## 2019-10-29 VITALS
HEIGHT: 65 IN | OXYGEN SATURATION: 94 % | DIASTOLIC BLOOD PRESSURE: 70 MMHG | HEART RATE: 100 BPM | WEIGHT: 138 LBS | BODY MASS INDEX: 22.99 KG/M2 | SYSTOLIC BLOOD PRESSURE: 152 MMHG

## 2019-10-29 DIAGNOSIS — I44.2 COMPLETE HEART BLOCK BY ELECTROCARDIOGRAM (HCC): ICD-10-CM

## 2019-10-29 DIAGNOSIS — Z95.0 CARDIAC PACEMAKER IN SITU: ICD-10-CM

## 2019-10-29 PROCEDURE — 93280 PM DEVICE PROGR EVAL DUAL: CPT | Performed by: NURSE PRACTITIONER

## 2019-10-29 NOTE — PROGRESS NOTES
Patient had PM implanted on 6/20/2019, and RV lead revision on 8/9/2019.    Device is working normally. 3 brief mode switching episodes (<0.1% of total time).  Normal sensing and capture of RA and RV leads; stable impedances. Battery longevity is 12.9 years.  No changes are made today.    FU in 6 months for next PM check with me, as well as FU with Dr. Becker.    To continue to keep track of BP at home.    Collaborating MD: Jazmine

## 2019-12-13 DIAGNOSIS — I10 ESSENTIAL HYPERTENSION: ICD-10-CM

## 2019-12-14 NOTE — TELEPHONE ENCOUNTER
Was the patient seen in the last year in this department? Yes    Does patient have an active prescription for medications requested? No     Received Request Via: Patient         MOISE MENCHACA     Age: 79  demographics  Data as of: 12/14/2019              NARCOTIC 100    Created with Raphaël [id number]   SEDATIVE 070       STIMULANT 000       NARxSCORES can range from 000 to 999. The first two digits represent the composite percentile risk based on an overall analysis of prescription drug use. The third digit represents the number of active prescriptions. The distribution of scores in the population is such that approximately 75% fall below 200, 95% fall below 500 and 99% fall below 650. The information on this report is not warranted as accurate or complete. This report is based on the search criteria supplied and the data entered by the dispensing pharmacy. For more information about any prescription, please contact the dispensing pharmacy or the prescriber. NARxSCORES and Reports are intended to aid, not replace medical decision making. None of the information presented should be used as sole justification for providing or refusing to provide medications.       RX GRAPH Grayed out drugs could not be included in score calculations.   [x] Narcotic [x] Sedative [x] Stimulant [x] Buprenorphine [x] Other    Created with Raphaël 2.2.0All Prescribers  Created with Raphaël 2.2.4Qwzwhgwt69/[id number] - Shanda Reyes2 - Oniel Estrada3 - Willem Brower4 - Maikel Koehler  Morphine MgEq (MME)  Created with Raphaël 2.2.5448286854  Created with Raphaël 2.2.7Fdstfska49/[id number]  Lorazepam MgEq (LME)  Created with Raphaël 2.2.1605185  Created with Raphaël 2.2.6Nofvugzb41/[id number]  *Per CDC guidance, the MME conversion factors prescribed or provided as part of the medication-assisted treatment for opioid use disorder should not be used to benchmark against dosage  thresholds meant for opioids prescribed for pain. Buprenorphine products have no agreed upon morphine equivalency, and as partial opioid agonists, are not expected to be associated with overdose risk in the same dose-dependent manner as doses for full agonist opioids. MME = morphine milligram equivalents. LME = Lorazepam milligram equivalents. mg = dose in milligrams.     Data Analysis   Narcotics (100) 2 months 6 months 1 year 2 years   Prescribers (narcotic, sedative) 0  0 0  0 1  8 4  21   Pharmacies (narcotic, sedative) 0  0 0  0 1  13 2  19   Morphine mg 0  0 0  0 60  13 850  64   Morphine overlap (1) 0  0 0  0 0  0 0  0           Sedatives (070) 2 months 6 months 1 year 2 years   Prescribers (narcotic, sedative) 0  0 0  0 1  8 4  21   Pharmacies (narcotic, sedative) 0  0 0  0 1  13 2  19   Sedative mg 0  0 0  0 0  0 15  26   Sedative overlap (1) 0  0 0  0 0  0 0  0           Stimulants (000)  2 months 6 months 1 year 2 years   Prescribers (stimulant) 0  0 0  0 0  0 0  0   Pharmacies (stimulant) 0  0 0  0 0  0 0  0   Stimulant days 0  0 0  0 0  0 0  0   Stimulant overlap (1) 0  0 0  0 0  0 0  0      (1) Number of days for which a simliar type of medication was prescribed from different prescribers for use on the same day.         Summary   Total Prescriptions: 6   Total Prescribers: 4   Total Pharmacies: 2   Narcotics*    (excluding buprenorphine)  Current Qty: 0   Current MME/day: 0.00   30 Day Avg MME/day: 0.00   Buprenorphine*   Current Qty: 0   Current mg/day: 0.00   30 Day Avg mg/day: 0.00   Sedatives*   Current Qty: 0   Current LME/day: 0.00   30 Day Avg LME/day: 0.00   *Highlighted drugs could not be included in score calculations   PRESCRIPTIONS  Total Prescriptions: 6   Total Private Pay: 1   Fill Date ID Written Drug Qty Days Prescriber Rx # Pharmacy Refill Daily Dose * Pymt Type    12/29/2018  1   12/28/2018  Promethazine-Pe-Codeine Syrup  200.00 7  Negrito Rodriguez  [id number]   [facility] ([id number])  0  8.57 MME  Private Pay  [place]   11/08/2018  1   11/08/2018  Diazepam 5 MG Tablet  30.00 30 Negrito Rodriguez  [id number]   [facility] ([id number])  0  0.50 LME  Comm Ins  [place]   08/23/2018  1   08/23/2018  Oxycodone Hcl 5 MG Tablet  60.00 5 Tr Jurgen  [id number]   [facility] ([id number])  0  90.00 MME  Comm Ins  [place]   08/23/2018  1   08/23/2018  Tramadol Hcl 50 MG Tablet  48.00 12 Tr Jurgen  [id number]   [facility] ([id number])  0  20.00 MME  Comm Ins  [place]   08/10/2018  1   08/10/2018  Hydrocodone-Acetamin 5-325 MG  10.00 2 Br Tri  [id number]   [facility] ([id number])  0  25.00 MME  Comm Ins  [place]   04/27/2018  1   04/26/2018  Hydrocodone-Acetamin 5-325 MG  10.00 3 Ri Hae  [id number]   [facility] ([id number])  0  16.67 MME  Comm Ins  [place]   *Per CDC guidance, the MME conversion factors prescribed or provided as part of the medication-assisted treatment for opioid use disorder should not be used to benchmark against dosage thresholds meant for opioids prescribed for pain. Buprenorphine products have no agreed upon morphine equivalency, and as partial opioid agonists, are not expected to be associated with overdose risk in the same dose-dependent manner as doses for full agonist opioids. MME = morphine milligram equivalents. LME = Lorazepam milligram equivalents. MG = dose in milligrams.   PROVIDERS  Total Providers: 4   Name Address City State Zipcode Phone   Maikel Koehler [address] [place] [address]    Willem Brower [address] [place] [address]    Oniel Goodson [address] [place] [address]    Shanda Reyes [address] [place] [address]    PHARMACIES  Total Pharmacies: 2   Name Address City State Zipcode Phone   [facility] [phone] [place] [address] [phone]   [facility]  [phone]  [place] [address] [phone]

## 2019-12-16 RX ORDER — DIAZEPAM 5 MG/1
5 TABLET ORAL EVERY 6 HOURS PRN
Qty: 30 TAB | Status: CANCELLED | OUTPATIENT
Start: 2019-12-16

## 2019-12-16 RX ORDER — TRIAMTERENE AND HYDROCHLOROTHIAZIDE 37.5; 25 MG/1; MG/1
CAPSULE ORAL
Qty: 90 CAP | Refills: 3 | Status: SHIPPED | OUTPATIENT
Start: 2019-12-16 | End: 2021-01-13

## 2019-12-16 RX ORDER — TRIAMTERENE AND HYDROCHLOROTHIAZIDE 37.5; 25 MG/1; MG/1
CAPSULE ORAL
Qty: 90 CAP | Refills: 3 | Status: CANCELLED | OUTPATIENT
Start: 2019-12-16

## 2019-12-16 RX ORDER — ATORVASTATIN CALCIUM 20 MG/1
20 TABLET, FILM COATED ORAL
Qty: 90 TAB | Refills: 3 | Status: SHIPPED | OUTPATIENT
Start: 2019-12-16 | End: 2020-12-21

## 2019-12-16 NOTE — TELEPHONE ENCOUNTER
There is no pharmacy on file to prescribe the triamterene/hctz.   Also, I do not prescribe benzodiazepines without an appt. And benzos are not generally advised in the elderly. She is free to make an appt to discuss this though.   Thanks,   Dr. Friedman

## 2019-12-30 ENCOUNTER — TELEPHONE (OUTPATIENT)
Dept: CARDIOLOGY | Facility: MEDICAL CENTER | Age: 79
End: 2019-12-30

## 2019-12-30 NOTE — TELEPHONE ENCOUNTER
FYI:   Remote transmission 12/30/2019  2 brief VT episodes <3 seconds, 12/14/2019 at 8:39 pm and 12/24/19 at 3:00 am.   2 brief AT/AF episodes <1 min, 12/16/19 at 1:25 pm and 12/18/19 at 1:59 pm.  To be scanned into media.

## 2020-01-06 NOTE — TELEPHONE ENCOUNTER
Called pt, discussed device transmission, pt denies any symptoms but under stress lately due to 's hospitalization, she will cont to monitor and will give us a call back if she developed any symptoms. Informed pt will have Dr Becker review the transmission as well and will let her know if he has further recommendations, pt verbalizes understanding

## 2020-01-14 NOTE — TELEPHONE ENCOUNTER
Discussed w/ Dr Becker, per Dr Becker, please schedule FV in February    Task sent to scheduling to call and schedule appt

## 2020-01-31 NOTE — TELEPHONE ENCOUNTER
Isabella Alicia, Med Ass't  Fany Elias, RDENIS.             I scheduled pt for 02/14 but patient would like to know If this appt is necessary, since she thought she had to see  in April for a fv and pmc with Anahi Gan?       Thank you      Called pt and discussed indication for appointment, pt appreciative of call and would keep appt on 2/14/20

## 2020-02-06 ENCOUNTER — TELEPHONE (OUTPATIENT)
Dept: CARDIOLOGY | Facility: MEDICAL CENTER | Age: 80
End: 2020-02-06

## 2020-02-06 DIAGNOSIS — I10 ESSENTIAL HYPERTENSION: ICD-10-CM

## 2020-02-06 RX ORDER — HYDRALAZINE HYDROCHLORIDE 25 MG/1
25 TABLET, FILM COATED ORAL 3 TIMES DAILY
Qty: 270 TAB | Refills: 3 | Status: SHIPPED | OUTPATIENT
Start: 2020-02-06 | End: 2020-10-05

## 2020-02-06 RX ORDER — LOSARTAN POTASSIUM 50 MG/1
50 TABLET ORAL 2 TIMES DAILY
Qty: 180 TAB | Refills: 3 | Status: SHIPPED | OUTPATIENT
Start: 2020-02-06 | End: 2020-10-05

## 2020-02-13 ENCOUNTER — HOSPITAL ENCOUNTER (EMERGENCY)
Facility: MEDICAL CENTER | Age: 80
End: 2020-02-13
Attending: EMERGENCY MEDICINE
Payer: MEDICARE

## 2020-02-13 ENCOUNTER — APPOINTMENT (OUTPATIENT)
Dept: RADIOLOGY | Facility: MEDICAL CENTER | Age: 80
End: 2020-02-13
Attending: EMERGENCY MEDICINE
Payer: MEDICARE

## 2020-02-13 VITALS
SYSTOLIC BLOOD PRESSURE: 143 MMHG | OXYGEN SATURATION: 98 % | HEIGHT: 65 IN | DIASTOLIC BLOOD PRESSURE: 53 MMHG | BODY MASS INDEX: 22.99 KG/M2 | RESPIRATION RATE: 14 BRPM | WEIGHT: 138.01 LBS | HEART RATE: 84 BPM | TEMPERATURE: 97.9 F

## 2020-02-13 DIAGNOSIS — M25.551 RIGHT HIP PAIN: ICD-10-CM

## 2020-02-13 DIAGNOSIS — M25.551 HIP PAIN, RIGHT: ICD-10-CM

## 2020-02-13 LAB
ALBUMIN SERPL BCP-MCNC: 3.9 G/DL (ref 3.2–4.9)
ALBUMIN/GLOB SERPL: 1.4 G/DL
ALP SERPL-CCNC: 61 U/L (ref 30–99)
ALT SERPL-CCNC: 8 U/L (ref 2–50)
ANION GAP SERPL CALC-SCNC: 7 MMOL/L (ref 0–11.9)
AST SERPL-CCNC: 18 U/L (ref 12–45)
BASOPHILS # BLD AUTO: 0.4 % (ref 0–1.8)
BASOPHILS # BLD: 0.03 K/UL (ref 0–0.12)
BILIRUB SERPL-MCNC: 0.3 MG/DL (ref 0.1–1.5)
BUN SERPL-MCNC: 25 MG/DL (ref 8–22)
CALCIUM SERPL-MCNC: 9.1 MG/DL (ref 8.5–10.5)
CHLORIDE SERPL-SCNC: 109 MMOL/L (ref 96–112)
CO2 SERPL-SCNC: 24 MMOL/L (ref 20–33)
CREAT SERPL-MCNC: 1.22 MG/DL (ref 0.5–1.4)
EOSINOPHIL # BLD AUTO: 0.16 K/UL (ref 0–0.51)
EOSINOPHIL NFR BLD: 2.1 % (ref 0–6.9)
ERYTHROCYTE [DISTWIDTH] IN BLOOD BY AUTOMATED COUNT: 48.2 FL (ref 35.9–50)
GLOBULIN SER CALC-MCNC: 2.8 G/DL (ref 1.9–3.5)
GLUCOSE SERPL-MCNC: 105 MG/DL (ref 65–99)
HCT VFR BLD AUTO: 36 % (ref 37–47)
HGB BLD-MCNC: 11.9 G/DL (ref 12–16)
IMM GRANULOCYTES # BLD AUTO: 0.03 K/UL (ref 0–0.11)
IMM GRANULOCYTES NFR BLD AUTO: 0.4 % (ref 0–0.9)
LYMPHOCYTES # BLD AUTO: 1.48 K/UL (ref 1–4.8)
LYMPHOCYTES NFR BLD: 19.7 % (ref 22–41)
MCH RBC QN AUTO: 30.8 PG (ref 27–33)
MCHC RBC AUTO-ENTMCNC: 33.1 G/DL (ref 33.6–35)
MCV RBC AUTO: 93.3 FL (ref 81.4–97.8)
MONOCYTES # BLD AUTO: 0.85 K/UL (ref 0–0.85)
MONOCYTES NFR BLD AUTO: 11.3 % (ref 0–13.4)
NEUTROPHILS # BLD AUTO: 4.95 K/UL (ref 2–7.15)
NEUTROPHILS NFR BLD: 66.1 % (ref 44–72)
NRBC # BLD AUTO: 0 K/UL
NRBC BLD-RTO: 0 /100 WBC
PLATELET # BLD AUTO: 180 K/UL (ref 164–446)
PMV BLD AUTO: 10.6 FL (ref 9–12.9)
POTASSIUM SERPL-SCNC: 4.2 MMOL/L (ref 3.6–5.5)
PROT SERPL-MCNC: 6.7 G/DL (ref 6–8.2)
RBC # BLD AUTO: 3.86 M/UL (ref 4.2–5.4)
SODIUM SERPL-SCNC: 140 MMOL/L (ref 135–145)
WBC # BLD AUTO: 7.5 K/UL (ref 4.8–10.8)

## 2020-02-13 PROCEDURE — 99284 EMERGENCY DEPT VISIT MOD MDM: CPT

## 2020-02-13 PROCEDURE — 700102 HCHG RX REV CODE 250 W/ 637 OVERRIDE(OP): Performed by: EMERGENCY MEDICINE

## 2020-02-13 PROCEDURE — 73700 CT LOWER EXTREMITY W/O DYE: CPT | Mod: RT

## 2020-02-13 PROCEDURE — 85025 COMPLETE CBC W/AUTO DIFF WBC: CPT

## 2020-02-13 PROCEDURE — 73501 X-RAY EXAM HIP UNI 1 VIEW: CPT | Mod: RT

## 2020-02-13 PROCEDURE — 80053 COMPREHEN METABOLIC PANEL: CPT

## 2020-02-13 PROCEDURE — A9270 NON-COVERED ITEM OR SERVICE: HCPCS | Performed by: EMERGENCY MEDICINE

## 2020-02-13 RX ORDER — HYDROCODONE BITARTRATE AND ACETAMINOPHEN 5; 325 MG/1; MG/1
1-2 TABLET ORAL EVERY 6 HOURS PRN
Qty: 15 TAB | Refills: 0 | Status: SHIPPED | OUTPATIENT
Start: 2020-02-13 | End: 2020-02-18

## 2020-02-13 RX ORDER — HYDROCODONE BITARTRATE AND ACETAMINOPHEN 5; 325 MG/1; MG/1
1 TABLET ORAL ONCE
Status: COMPLETED | OUTPATIENT
Start: 2020-02-13 | End: 2020-02-13

## 2020-02-13 RX ADMIN — HYDROCODONE BITARTRATE AND ACETAMINOPHEN 1 TABLET: 5; 325 TABLET ORAL at 13:29

## 2020-02-13 NOTE — DISCHARGE PLANNING
Agency/Facility Name: [facility]  Spoke To: Marivel  Outcome: Patient accepted.

## 2020-02-13 NOTE — PROGRESS NOTES
Spiritual Care Note    Patient Information     Patient's Name: Priscilla Hdz   MRN: [id number]    YOB: 1940   Age and Gender: 80 y.o. female   Service Area: ED [facility]   Room (and Bed):  13/13 STEPH   Ethnicity or Nationality:     Primary Language: English   Samaritan/Spiritual preference: Adventist   Place of Residence: [place], [place]   Family/Friends/Others Present: Yes, aga   Clinical Team Present: No   Medical Diagnosis(-es)/Procedure(s): Hip Pain   Code Status: Prior    Date of Admission: 2/13/2020   Length of Stay: 0 days        Spiritual Care Provider Information:  Name of Spiritual Care Provider: Hoa De Los Santos  Title of Spiritual Care Provider: Associate   Phone Number: [phone]  E-mail: [email or web address]  Total time : 10 minutes    Spiritual Screen Results:    Gen Nursing        Palliative Care         Encounter/Request Information  Encounter/Request Type   Visited With: Patient and family together  Nature of the Visit: Initial  General Visit: Yes  Referral From/ Origin of Request: [place] rounds, Verbal patient    Religous Needs/Values  Samaritan Needs Visit  Samaritan Needs: Prayer    Spiritual Assessment     Spiritual Care Encounters    Observations/Symptoms: Accepting, Thankfulness    Interaction/Conversation: Very pleasant pt welcomed prayer for herself and her family and thanked the .  Aga Holbrook at the bedside.    Assessment: Need    Need: Seeking Spiritual Assistance and Support    Interventions: Compassionate presence, prayer.    Outcomes: Spiritual Comfort    Plan: Visit Upon Request    Notes:

## 2020-02-13 NOTE — ED PROVIDER NOTES
ED Provider Note    ER PROVIDER NOTE        CHIEF COMPLAINT  Chief Complaint   Patient presents with   • Hip Pain     R-sided hip pain - denies trauma, hx ortho procedure to that hip; received total 200 mcg fentanyl and 1 mg Versed en route via EMS       HPI  Priscilla Hdz is a 80 y.o. female who presents to the emergency department complaining of right hip pain.  Patient reports she has had longstanding pain to the left hip, both hip replacement as well as dislocations in the past as well.  Some increase in pain over the last several weeks, but seemed much worse this morning and was unable to get up out of bed due to the pain.  She denies any trauma or injury to the area.  She denies any fevers or chills.  No abdominal pain nausea or vomiting.  No focal weakness numbness or tingling.  There is report some swelling to bilateral lower extremities although this is more chronic.  No chest pain or shortness of breath    REVIEW OF SYSTEMS  Pertinent positives include pain. Pertinent negatives include no fever or chills. See HPI for details. All other systems reviewed and are negative.    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    SURGICAL HISTORY   has a past surgical history that includes cataract extraction with iol (Bilateral, 2012); hip arthroplasty total (Right, 8/9/2016); hip revision total (Right, 8/29/2018); and pacemaker insertion (Left, 06/20/2019).    FAMILY HISTORY  Family History   Problem Relation Age of Onset   • Heart Disease Father    • Hyperlipidemia Father    • Hypertension Father    • Heart Disease Brother    • Hypertension Mother    • Hyperlipidemia Mother        SOCIAL HISTORY  Social History     Socioeconomic History   • Marital status:      Spouse name: Not on file   • Number of children: Not on file   • Years of education: Not on file   •  "Highest education level: Not on file   Occupational History   • Not on file   Social Needs   • Financial resource strain: Not on file   • Food insecurity     Worry: Not on file     Inability: Not on file   • Transportation needs     Medical: Not on file     Non-medical: Not on file   Tobacco Use   • Smoking status: Never Smoker   • Smokeless tobacco: Never Used   Substance and Sexual Activity   • Alcohol use: Yes     Alcohol/week: 0.0 oz     Frequency: Monthly or less     Comment: once every 2-3 months.    • Drug use: No   • Sexual activity: Not Currently     Partners: Male   Lifestyle   • Physical activity     Days per week: Not on file     Minutes per session: Not on file   • Stress: Not on file   Relationships   • Social connections     Talks on phone: Not on file     Gets together: Not on file     Attends Islam service: Not on file     Active member of club or organization: Not on file     Attends meetings of clubs or organizations: Not on file     Relationship status: Not on file   • Intimate partner violence     Fear of current or ex partner: Not on file     Emotionally abused: Not on file     Physically abused: Not on file     Forced sexual activity: Not on file   Other Topics Concern   • Not on file   Social History Narrative    Retired , [place].       Social History     Substance and Sexual Activity   Drug Use No       CURRENT MEDICATIONS  Home Medications    **Home medications have not yet been reviewed for this encounter**         ALLERGIES  Allergies   Allergen Reactions   • Other Environmental Cough     Pine, Dust       PHYSICAL EXAM  VITAL SIGNS: /53   Pulse 84   Temp 36.6 °C (97.9 °F) (Temporal)   Resp 14   Ht 1.651 m (5' 5\")   Wt 62.6 kg (138 lb 0.1 oz)   SpO2 98%   BMI 22.97 kg/m²   Pulse ox interpretation: I interpret this pulse ox as normal.    Constitutional: Alert in no apparent distress.  HENT: No signs of trauma, Bilateral external ears normal, Nose " normal.   Eyes: Pupils are equal and reactive, Conjunctiva normal, Non-icteric.   Neck: Normal range of motion, No tenderness, Supple, No stridor.   Lymphatic: No lymphadenopathy noted.   Cardiovascular: Regular rate and rhythm, no murmurs.   Thorax & Lungs: Normal breath sounds, No respiratory distress, No wheezing, No chest tenderness.   Abdomen: Bowel sounds normal, Soft, No tenderness, No masses, No pulsatile masses. No peritoneal signs.  Skin: Warm, Dry, No erythema, No rash.   Back: No bony tenderness, No CVA tenderness.   Extremities: Intact distal pulses, trace bilateral lower extremity edema, No tenderness, No cyanosis,Negative Josette's sign.  Musculoskeletal: Some tenderness over the right greater trochanter, and mild pain with logroll, and heeltap.  No obvious deformity noted, and otherwise good range of motion in all major joints. No tenderness to palpation or major deformities noted.   Neurologic: Alert , Normal motor function, Normal sensory function, No focal deficits noted.   Psychiatric: Affect normal, Judgment normal, Mood normal.     DIAGNOSTIC STUDIES / PROCEDURES      LABS  Labs Reviewed   CBC WITH DIFFERENTIAL - Abnormal; Notable for the following components:       Result Value    RBC 3.86 (*)     Hemoglobin 11.9 (*)     Hematocrit 36.0 (*)     MCHC 33.1 (*)     Lymphocytes 19.70 (*)     All other components within normal limits   COMP METABOLIC PANEL - Abnormal; Notable for the following components:    Glucose 105 (*)     Bun 25 (*)     All other components within normal limits   ESTIMATED GFR - Abnormal; Notable for the following components:    GFR If  51 (*)     GFR If Non  42 (*)     All other components within normal limits       All labs reviewed by me.    RADIOLOGY  CT-HIP W/O PLUS RECONS RIGHT   Final Result         1. No fracture or dislocation.   2. Small amount of fluid along the inferomedial acetabular component of right hip arthroplasty, which may be  due to particle disease. Right hip arthroplasty is otherwise well seated.   3. Colonic diverticulosis.      DX-HIP-UNILATERAL-WITH PELVIS-1 VIEW RIGHT   Final Result      No radiographic evidence of acute traumatic injury.        The radiologist's interpretation of all radiological studies have been reviewed by me.    COURSE & MEDICAL DECISION MAKING  Nursing notes, Emerita MIR reviewed in chart.    10:51 AM Patient seen and examined at bedside.  She received fentanyl and Versed prehospital. Ordered for x-ray, CBC, CMP to evaluate her symptoms.     Patient reevaluated, she is still comfortable this time, updated on results of x-ray, will plan for CT    Patient reevaluated given, still comfortable although she states the pain is starting to return, order for Norco    Discussed case with Dr. Caal from orthopedics, recommends 6 weeks of physical therapy follow-up in the clinic    Through case management as well as home health ordered I have placed order for home health physical therapy and Occupational Therapy    1:50 PM patient reevaluated, comfortable this time.  Updated on all results we will plan for discharge        Decision Making:  This is a 80 y.o. female presenting with right hip pain.  At this point does not appear to have any emergent pathology.  X-ray and CT showed no evidence of fracture, dislocation or hardware malposition.  She is had no fevers or leukocytosis suggestive of infectious process.  She has good pain control here, will provide short course of Norco for continued pain management, as well as physical therapy and occupational therapy at home to help with her symptoms.  Follow-up with her primary care as well as orthopedics    I reviewed prescription monitoring program for patient's narcotic use before prescribing a scheduled drug.The patient will not drink alcohol nor drive with prescribed medications.The patient will return for new or worsening symptoms and is stable at the time of  discharge.    The patient is referred to a primary physician for blood pressure management, diabetic screening, and for all other preventative health concerns.    In prescribing controlled substances to this patient, I certify that I have obtained and reviewed the medical history of Priscilla Hdz. I have also made a good tomás effort to obtain applicable records from other providers who have treated the patient and no other records are available at this time.     I have conducted a physical exam and documented it. I have reviewed Ms. Hdz’s prescription history as maintained by the [place] Prescription Monitoring Program.     I have assessed the patient’s risk for abuse, dependency, and addiction using the validated Opioid Risk Tool available at https://www.mdcalc.com/pljhfm-aeen-kkyd-ort-narcotic-abuse.     Given the above, I believe the benefits of controlled substance therapy outweigh the risks. The reasons for prescribing controlled substances include non-narcotic, oral analgesic alternatives have been inadequate for pain control. Accordingly, I have discussed the risk and benefits, treatment plan, and alternative therapies with the patient.         DISPOSITION:  Patient will be discharged home in stable condition.    FOLLOW UP:  Beth Friedman M.D.  [address] [facility]  [address] [place] [id number]  [phone]    In 1 week        OUTPATIENT MEDICATIONS:  New Prescriptions    HYDROCODONE-ACETAMINOPHEN (NORCO) 5-325 MG TAB PER TABLET    Take 1-2 Tabs by mouth every 6 hours as needed (pain) for up to 5 days.         FINAL IMPRESSION  1. Hip pain, right    2. Right hip pain         The note accurately reflects work and decisions made by me.  Thomas Michael M.D.  2/13/2020  2:00 PM

## 2020-02-13 NOTE — DISCHARGE PLANNING
Met with patient to discuss discharge plans, she is agreeable to HH. Choice form signed for Johnston and faxed to [facility].

## 2020-02-13 NOTE — DISCHARGE PLANNING
Received Choice form at [phone]  Agency/Facility Name: [facility]  Referral sent per Choice form @ [phone]

## 2020-02-13 NOTE — FACE TO FACE
Face to Face Supporting Documentation - Home Health    The encounter with this patient was in whole or in part the primary reason for home health admission.    Date of encounter:   Patient:                    MRN:                       YOB: 2022  Priscilla Hdz  [id number]  1940     Home health to see patient for:  Physical Therapy evaluation and treatment and Occupational therapy evaluation and treatment    Skilled need for:  Recent Deterioration of Health Status Worsening hip pain from hip surgery    Skilled nursing interventions to include:  Comment: none    Homebound status evidenced by:  Needs the assistance of another person in order to leave the home. Leaving home requires a considerable and taxing effort. There is a normal inability to leave the home.    Community Physician to provide follow up care: Beth Friedman M.D.     Optional Interventions? No      I certify the face to face encounter for this home health care referral meets the CMS requirements and the encounter/clinical assessment with the patient was, in whole, or in part, for the medical condition(s) listed above, which is the primary reason for home health care. Based on my clinical findings: the service(s) are medically necessary, support the need for home health care, and the homebound criteria are met.  I certify that this patient has had a face to face encounter by myself.  Thomas Michael M.D. - NPI: [id number]

## 2020-02-20 ENCOUNTER — TELEPHONE (OUTPATIENT)
Dept: MEDICAL GROUP | Facility: MEDICAL CENTER | Age: 80
End: 2020-02-20

## 2020-02-20 NOTE — TELEPHONE ENCOUNTER
Neena a lymphedema specialist from [facility] called in regards to the patient. She stated that she would like a verbal order for her to continue to see the patient 3 times a week for the next 4 weeks for the patient lymphedema. Is that okay?

## 2020-04-07 ENCOUNTER — TELEPHONE (OUTPATIENT)
Dept: MEDICAL GROUP | Facility: MEDICAL CENTER | Age: 80
End: 2020-04-07

## 2020-04-07 NOTE — TELEPHONE ENCOUNTER
1. Caller Name: Priscilla Hdz             Call Back Number: [phone] (home)        Neena from [facility] called regarding patient's high BP today as a notification to you. Patient's BP was 160/93 which is abnormal to her usual range BP at 130/60. Nurse believes due to her 's hospital visit today could be a potential reason. Patient is at home with family. Thank You.

## 2020-09-22 ENCOUNTER — TELEPHONE (OUTPATIENT)
Dept: CARDIOLOGY | Facility: MEDICAL CENTER | Age: 80
End: 2020-09-22

## 2020-09-22 NOTE — TELEPHONE ENCOUNTER
Status of PMC:    Raiza Pryor, Med Ass't  [place] [place]   Cc: Gabriella Pizarro, Med Ass't             Patient has an appointment coming up with Dr. Bird Becker on 10.05.2020   Status of last PMC:     Date: 10/29/2019 Status: Comp   Appt Time: 3:20 PM Length: 20   Visit Type: PACER CHECK ONLY [[id number]] Copay: $0.00   Provider: DINORA Ordoñez [facility]   Referrals: [id number] (Closed)       She doesn't have anything scheduled in the near future, please f/v     Thank you,     Raiza

## 2020-10-05 ENCOUNTER — OFFICE VISIT (OUTPATIENT)
Dept: CARDIOLOGY | Facility: MEDICAL CENTER | Age: 80
End: 2020-10-05
Payer: MEDICARE

## 2020-10-05 VITALS
BODY MASS INDEX: 23.82 KG/M2 | SYSTOLIC BLOOD PRESSURE: 186 MMHG | HEART RATE: 100 BPM | DIASTOLIC BLOOD PRESSURE: 80 MMHG | WEIGHT: 143 LBS | HEIGHT: 65 IN | OXYGEN SATURATION: 97 %

## 2020-10-05 DIAGNOSIS — I44.2 COMPLETE HEART BLOCK BY ELECTROCARDIOGRAM (HCC): ICD-10-CM

## 2020-10-05 DIAGNOSIS — E78.5 DYSLIPIDEMIA: ICD-10-CM

## 2020-10-05 DIAGNOSIS — D64.9 ANEMIA, UNSPECIFIED TYPE: ICD-10-CM

## 2020-10-05 DIAGNOSIS — I10 ESSENTIAL HYPERTENSION: ICD-10-CM

## 2020-10-05 DIAGNOSIS — I87.2 VENOUS INSUFFICIENCY OF BOTH LOWER EXTREMITIES: ICD-10-CM

## 2020-10-05 DIAGNOSIS — Z95.0 CARDIAC PACEMAKER IN SITU: ICD-10-CM

## 2020-10-05 PROCEDURE — 99214 OFFICE O/P EST MOD 30 MIN: CPT | Performed by: INTERNAL MEDICINE

## 2020-10-05 RX ORDER — HYDROCHLOROTHIAZIDE 25 MG/1
TABLET ORAL
COMMUNITY
End: 2020-10-05

## 2020-10-05 RX ORDER — TRAMADOL HYDROCHLORIDE 50 MG/1
TABLET ORAL
COMMUNITY
End: 2020-10-05

## 2020-10-05 RX ORDER — HYDRALAZINE HYDROCHLORIDE 50 MG/1
50 TABLET, FILM COATED ORAL 3 TIMES DAILY
Qty: 270 TAB | Refills: 3 | Status: SHIPPED | OUTPATIENT
Start: 2020-10-05 | End: 2021-08-31 | Stop reason: SDUPTHER

## 2020-10-05 RX ORDER — MELOXICAM 7.5 MG/1
TABLET ORAL
COMMUNITY
End: 2020-10-05

## 2020-10-05 RX ORDER — LOSARTAN POTASSIUM 50 MG/1
50 TABLET ORAL 2 TIMES DAILY
Qty: 180 TAB | Refills: 3 | Status: SHIPPED | OUTPATIENT
Start: 2020-10-05 | End: 2021-08-31 | Stop reason: SDUPTHER

## 2020-10-05 RX ORDER — CIPROFLOXACIN 750 MG/1
TABLET, FILM COATED ORAL
COMMUNITY
End: 2020-10-05

## 2020-10-05 RX ORDER — AMLODIPINE BESYLATE 5 MG/1
TABLET ORAL
COMMUNITY
End: 2020-10-05

## 2020-10-05 RX ORDER — IBUPROFEN 600 MG/1
TABLET ORAL
COMMUNITY
End: 2020-10-05

## 2020-10-05 RX ORDER — CEPHALEXIN 500 MG/1
CAPSULE ORAL
COMMUNITY
End: 2020-10-05

## 2020-10-05 RX ORDER — HYDROCODONE BITARTRATE AND ACETAMINOPHEN 5; 325 MG/1; MG/1
TABLET ORAL
COMMUNITY
End: 2020-10-05

## 2020-10-05 RX ORDER — AMOXICILLIN AND CLAVULANATE POTASSIUM 875; 125 MG/1; MG/1
TABLET, FILM COATED ORAL
COMMUNITY
End: 2020-10-05

## 2020-10-05 RX ORDER — METOPROLOL TARTRATE 100 MG/1
TABLET ORAL
COMMUNITY
End: 2020-10-05

## 2020-10-05 RX ORDER — OXYCODONE HYDROCHLORIDE AND ACETAMINOPHEN 5; 325 MG/1; MG/1
TABLET ORAL
COMMUNITY
End: 2020-10-05

## 2020-10-05 ASSESSMENT — FIBROSIS 4 INDEX: FIB4 SCORE: 2.9

## 2020-10-05 NOTE — PATIENT INSTRUCTIONS
Please get non-fasting labs at your convenience.     Please increase hydralazine to 50mg three times a day.

## 2020-10-05 NOTE — PROGRESS NOTES
Cardiology Follow-up Consultation Note    Date of note:    10/5/2020  Primary Care Provider: Beth Friedman M.D.  Referring Provider: Dr. Erik Woodall    Patient Name: Priscilla Hdz   YOB: 1940  MRN:              [id number]    Chief Complaint: bradycardia    History of Present Illness: Priscilla Hdz is a 80 y.o. female whose current medical problems include complete heart block s/p dual chamber pacemaker 6/20/2019 with RV lead repositioning 8/9/2019, hypertension, dyslipidemia  who is here for follow-up.    At our visit, 10/11/2019:   having multiple medical problems, current in the hospital at [facility].     In terms of hypertension, poorly controlled.     Walks in a walker after falls and hip replacement x 2.     Interim Events:  BP this morning in the 130s. Sometimes in the 140s as well.     She is here today with her Son Lg. Still significant stress from her 's medical care.     Dyslipidemia well controlled.     Pacemaker functioning well.       Review of Systems   Musculoskeletal: Positive for joint pain.   Allergic/Immunologic: Positive for environmental allergies.   Constitution: Negative for chills, fever and night sweats.   HENT: Negative for nosebleeds.    Eyes: Negative for vision loss in left eye and vision loss in right eye.   Respiratory: Negative for hemoptysis.    Gastrointestinal: Negative for hematemesis, hematochezia and melena.   Genitourinary: Negative for hematuria.   Neurological: Negative for focal weakness, numbness and paresthesias.     All other systems reviewed and discussed using a comprehensive questionnaire and are negative.       Past Medical History:   Diagnosis Date   • Arthritis     hip, knee   • ASTHMA    • AV block, complete (HCC) 06/2019    Status post pacemaker implantation   • Cataract     Bilateral IOL   • Hyperlipidemia    • Hypertension    • Menopause    • Osteopenia    • Pedal edema          Past Surgical History:   Procedure  Laterality Date   • PACEMAKER INSERTION Left 06/20/2019    Medtronic Hyden S  MRI [id number] implanted by Dr. Mcginnis.   • HIP REVISION TOTAL Right 8/29/2018    Procedure: HIP REVISION TOTAL;  Surgeon: Oniel Goodson M.D.;  Location: SURGERY [facility];  Service: Orthopedics   • HIP ARTHROPLASTY TOTAL Right 8/9/2016    Procedure: HIP ARTHROPLASTY TOTAL;  Surgeon: Oniel Goodson M.D.;  Location: SURGERY [facility];  Service:    • CATARACT EXTRACTION WITH IOL Bilateral 2012         Current Outpatient Medications   Medication Sig Dispense Refill   • hydrALAZINE (APRESOLINE) 25 MG Tab Take 1 Tab by mouth 3 times a day. 270 Tab 3   • losartan (COZAAR) 50 MG Tab Take 1 Tab by mouth 2 Times a Day. 180 Tab 3   • triamterene/hctz (MAXZIDE-25/DYAZIDE) 37.5-25 MG Cap TAKE ONE CAPSULE BY MOUTH EVERY MORNING 90 Cap 3   • atorvastatin (LIPITOR) 20 MG Tab Take 1 Tab by mouth every day. 90 Tab 3   • diazePAM (VALIUM) 5 MG Tab Take 5 mg by mouth every 6 hours as needed for Anxiety.     • Ipratropium-Albuterol (COMBIVENT INH) Inhale  by mouth.       No current facility-administered medications for this visit.          Allergies   Allergen Reactions   • Other Environmental Cough     Pine, Dust         Family History   Problem Relation Age of Onset   • Heart Disease Father    • Hyperlipidemia Father    • Hypertension Father    • Heart Disease Brother    • Hypertension Mother    • Hyperlipidemia Mother          Social History     Socioeconomic History   • Marital status:      Spouse name: Not on file   • Number of children: Not on file   • Years of education: Not on file   • Highest education level: Not on file   Occupational History   • Not on file   Social Needs   • Financial resource strain: Not on file   • Food insecurity     Worry: Not on file     Inability: Not on file   • Transportation needs     Medical: Not on file     Non-medical: Not on file   Tobacco Use   • Smoking status: Never Smoker   •  "Smokeless tobacco: Never Used   Substance and Sexual Activity   • Alcohol use: Yes     Alcohol/week: 0.0 oz     Frequency: Monthly or less     Comment: once every 2-3 months.    • Drug use: No   • Sexual activity: Not Currently     Partners: Male   Lifestyle   • Physical activity     Days per week: Not on file     Minutes per session: Not on file   • Stress: Not on file   Relationships   • Social connections     Talks on phone: Not on file     Gets together: Not on file     Attends [employer] service: Not on file     Active member of club or organization: Not on file     Attends meetings of clubs or organizations: Not on file     Relationship status: Not on file   • Intimate partner violence     Fear of current or ex partner: Not on file     Emotionally abused: Not on file     Physically abused: Not on file     Forced sexual activity: Not on file   Other Topics Concern   • Not on file   Social History Narrative    Retired , [place].          Physical Exam:  Ambulatory Vitals  BP (!) 186/80 (BP Location: Left arm, Patient Position: Sitting)   Pulse 100   Ht 1.651 m (5' 5\")   Wt 64.9 kg (143 lb)   SpO2 97%    Oxygen Therapy:  Pulse Oximetry: 97 %  BP Readings from Last 4 Encounters:   10/05/20 (!) 186/80   02/13/20 143/53   10/29/19 152/70   10/11/19 144/82       Weight/BMI: Body mass index is 23.8 kg/m².  Wt Readings from Last 4 Encounters:   10/05/20 64.9 kg (143 lb)   02/13/20 62.6 kg (138 lb 0.1 oz)   10/29/19 62.6 kg (138 lb)   10/11/19 62.6 kg (138 lb)     General: No apparent distress  Eyes: nl conjunctiva  ENT: OP covered by mask.   Neck: JVP <8 cm H2O, no carotid bruits  Lungs: normal respiratory effort, CTAB  Heart: RRR, no murmurs, no rubs or gallops, 2+ edema bilateral lower extremities. No LV/RV heave on cardiac palpatation. 2+ bilateral radial pulses.  Well healed pacemaker insertion site.   Abdomen: soft, non tender, non distended, no masses, normal bowel sounds.  No " HSM.  Extremities/MSK: no clubbing, no cyanosis  Neurological: No focal sensory deficits  Psychiatric: Appropriate affect, A/O x 3, intact judgement and insight  Skin: Warm extremities    Exam repeated in full and unchanged except for as noted above.      Lab Data Review:  Lab Results   Component Value Date/Time    CHOLSTRLTOT 173 08/09/2018 09:17 AM     (H) 08/09/2018 09:17 AM    HDL 41 08/09/2018 09:17 AM    TRIGLYCERIDE 129 08/09/2018 09:17 AM       Lab Results   Component Value Date/Time    SODIUM 140 02/13/2020 11:00 AM    POTASSIUM 4.2 02/13/2020 11:00 AM    CHLORIDE 109 02/13/2020 11:00 AM    CO2 24 02/13/2020 11:00 AM    GLUCOSE 105 (H) 02/13/2020 11:00 AM    BUN 25 (H) 02/13/2020 11:00 AM    CREATININE 1.22 02/13/2020 11:00 AM    CREATININE 0.77 01/29/2013 07:49 AM    BUNCREATRAT 19 03/09/2016 09:37 AM    BUNCREATRAT 23 01/29/2013 07:49 AM    GLOMRATE >59 11/10/2010 09:26 AM     Lab Results   Component Value Date/Time    ALKPHOSPHAT 61 02/13/2020 11:00 AM    ASTSGOT 18 02/13/2020 11:00 AM    ALTSGPT 8 02/13/2020 11:00 AM    TBILIRUBIN 0.3 02/13/2020 11:00 AM      Lab Results   Component Value Date/Time    WBC 7.5 02/13/2020 11:00 AM     No components found for: HBGA1C  No components found for: TROPONIN  No components found for: BNP      Cardiac Imaging and Procedures Review:    EKG dated 8/11/2019:  ATRIAL-SENSED VENTRICULAR-PACED COMPLEXES   NO FURTHER ANALYSIS ATTEMPTED DUE TO PACED RHYTHM     Echo dated 6/19/2019:  Left Ventricle  Normal left ventricular chamber size. Normal left ventricular wall   thickness. Hyperdynamic left ventricular systolic function. Left   ventricular ejection fraction is visually estimated to be greater than   75%. Normal regional wall motion. Diastolic function is difficult to   assess.    Right Ventricle  The right ventricle was normal in size and function.    Right Atrium  The right atrium is normal in size.  Normal inferior vena cava size   without inspiratory  collapse.    Left Atrium  The left atrium is normal in size.  Left atrial volume index is 17   mL/sq m.    Mitral Valve  Mitral annular calcification. No mitral stenosis. Mild mitral   regurgitation.    Aortic Valve  Tricuspid aortic valve. Aortic sclerosis without stenosis. No aortic   insufficiency.    Tricuspid Valve  Structurally normal tricuspid valve without significant stenosis. Mild   tricuspid regurgitation. Estimated right ventricular systolic pressure    is 60 mmHg. Right atrial pressure is estimated to be 8 mmHg.    Pulmonic Valve  The pulmonic valve is not well visualized. No stenosis or regurgitation   seen.    Pericardium  Normal pericardium without effusion.    Aorta  The aortic root is normal.  Ascending aorta diameter is 2.8 cm.    By my personal review, RVSP 54mmHg. Nl RV size and systolic function, normal LV systolic and diastolic function. No significant valvular disease.     2016 TTE:  CONCLUSIONS  No prior study is available for comparison.   Normal left ventricular systolic function.  Left ventricular ejection fraction is visually estimated to be 65%.  Right heart pressures are normal.  No significant valve abnormalities.     Stress test 2016:  Myocardial Perfusion   Report   NUCLEAR IMAGING INTERPRETATION   Normal left ventricular size, ejection fraction, and wall motion.   No evidence of significant jeopardized viable myocardium or prior myocardial    infarction.   ECG INTERPRETATION   Negative stress ECG for ischemia.    Radiology test Review:  CXR:   Tubes and lines: Left transvenous electrodes project over the right atrium and right ventricle.    Elevated right hemidiaphragm again demonstrated.  Mild lung base interstitial opacities  No pleural effusion. No pneumothorax.    Stable cardiopericardial silhouette.      Medical Decision Makin. Essential hypertension  Poorly controlled.   -continue triamterene/hctz 37.5/25 once a day.   -continue losartan 50mg PO bid  -increase  hydralazine to 50mg PO tid, will try eventually to wean off, perhaps with coreg or bisoprolol. Will avoid CCBs due to baseline LE swelling. Will avoid further diuretics as she already feels like she's peeing too much.     2. Dyslipidemia  No clinical CVD, however stable  -continue lipitor 20mg PO Daily  -no current clinical indication for aspirin for primary prevention.    3. Complete heart block by electrocardiogram (HCC)  S/p pacemaker.  -f/u with Anahi Gan    4. Pulmonary hypertension (HCC)  Moderate (PASP 54mmHg by my read) and in the setting of severe hypertension during echo (SBP 180s). No current symptoms and likely PASP is normal when BP well controlled  -continue hypertension control  -removed pulmonary hypertension from problem list.   -repeat echo when normotensive if dyspnea occurs.    5. CKD (chronic kidney disease), stage III (HCC)  Stable  -recheck BMP along with CBC due to h/o anemia.     6. Stress - she is under a lot of stress due to her 's health. Discussed previously a therapy referral and she will let me know if she would like one.       Return in about 1 year (around 10/5/2021).      Bird Becker MD, [facility]  [facility], [address]  [address] [address]  Phone: [phone]  Fax: [phone]

## 2020-10-16 ENCOUNTER — TELEPHONE (OUTPATIENT)
Dept: CARDIOLOGY | Facility: MEDICAL CENTER | Age: 80
End: 2020-10-16

## 2020-10-16 NOTE — TELEPHONE ENCOUNTER
Per Dr. Becker, please schedule new patient with him. [employer] message sent to patient's mother asking that her son call the office to schedule.

## 2020-12-21 RX ORDER — ATORVASTATIN CALCIUM 20 MG/1
TABLET, FILM COATED ORAL
Qty: 90 TAB | Refills: 2 | Status: SHIPPED | OUTPATIENT
Start: 2020-12-21 | End: 2021-01-13

## 2021-01-11 DIAGNOSIS — Z23 NEED FOR VACCINATION: ICD-10-CM

## 2021-01-13 DIAGNOSIS — I10 ESSENTIAL HYPERTENSION: ICD-10-CM

## 2021-01-13 RX ORDER — ATORVASTATIN CALCIUM 20 MG/1
TABLET, FILM COATED ORAL
Qty: 90 TAB | Refills: 0 | Status: SHIPPED | OUTPATIENT
Start: 2021-01-13 | End: 2021-08-31 | Stop reason: SDUPTHER

## 2021-01-13 RX ORDER — TRIAMTERENE AND HYDROCHLOROTHIAZIDE 37.5; 25 MG/1; MG/1
CAPSULE ORAL
Qty: 90 CAP | Refills: 0 | Status: SHIPPED | OUTPATIENT
Start: 2021-01-13 | End: 2021-08-02 | Stop reason: SDUPTHER

## 2021-01-13 NOTE — TELEPHONE ENCOUNTER
I did speak to Pt to inform her that she is due for a follow up with you. Pt is aware and unfortunately she has no way of obtaining transportation to get here. Virtual offered and she only has a house phone and no reliable computer.  Would you like to attempt a telephone visit?    Received request via: Pharmacy    Was the patient seen in the last year in this department? No     Does the patient have an active prescription (recently filled or refills available) for medication(s) requested? No

## 2021-06-23 ENCOUNTER — APPOINTMENT (OUTPATIENT)
Dept: RADIOLOGY | Facility: MEDICAL CENTER | Age: 81
End: 2021-06-23
Attending: EMERGENCY MEDICINE
Payer: MEDICARE

## 2021-06-23 ENCOUNTER — HOSPITAL ENCOUNTER (EMERGENCY)
Facility: MEDICAL CENTER | Age: 81
End: 2021-06-23
Attending: EMERGENCY MEDICINE
Payer: MEDICARE

## 2021-06-23 VITALS
HEART RATE: 78 BPM | WEIGHT: 140 LBS | BODY MASS INDEX: 23.32 KG/M2 | SYSTOLIC BLOOD PRESSURE: 163 MMHG | TEMPERATURE: 97.8 F | DIASTOLIC BLOOD PRESSURE: 80 MMHG | RESPIRATION RATE: 16 BRPM | OXYGEN SATURATION: 93 % | HEIGHT: 65 IN

## 2021-06-23 DIAGNOSIS — S76.011A HIP STRAIN, RIGHT, INITIAL ENCOUNTER: ICD-10-CM

## 2021-06-23 DIAGNOSIS — M25.551 RIGHT HIP PAIN: ICD-10-CM

## 2021-06-23 LAB
ANION GAP SERPL CALC-SCNC: 11 MMOL/L (ref 7–16)
APTT PPP: 30.8 SEC (ref 24.7–36)
BASOPHILS # BLD AUTO: 0.3 % (ref 0–1.8)
BASOPHILS # BLD: 0.03 K/UL (ref 0–0.12)
BUN SERPL-MCNC: 26 MG/DL (ref 8–22)
CALCIUM SERPL-MCNC: 9.4 MG/DL (ref 8.4–10.2)
CHLORIDE SERPL-SCNC: 107 MMOL/L (ref 96–112)
CO2 SERPL-SCNC: 24 MMOL/L (ref 20–33)
CREAT SERPL-MCNC: 1.07 MG/DL (ref 0.5–1.4)
EOSINOPHIL # BLD AUTO: 0.05 K/UL (ref 0–0.51)
EOSINOPHIL NFR BLD: 0.5 % (ref 0–6.9)
ERYTHROCYTE [DISTWIDTH] IN BLOOD BY AUTOMATED COUNT: 46.6 FL (ref 35.9–50)
GLUCOSE SERPL-MCNC: 101 MG/DL (ref 65–99)
HCT VFR BLD AUTO: 41.1 % (ref 37–47)
HGB BLD-MCNC: 13.1 G/DL (ref 12–16)
IMM GRANULOCYTES # BLD AUTO: 0.03 K/UL (ref 0–0.11)
IMM GRANULOCYTES NFR BLD AUTO: 0.3 % (ref 0–0.9)
INR PPP: 1.05 (ref 0.87–1.13)
LYMPHOCYTES # BLD AUTO: 1.35 K/UL (ref 1–4.8)
LYMPHOCYTES NFR BLD: 13.6 % (ref 22–41)
MCH RBC QN AUTO: 29.8 PG (ref 27–33)
MCHC RBC AUTO-ENTMCNC: 31.9 G/DL (ref 33.6–35)
MCV RBC AUTO: 93.4 FL (ref 81.4–97.8)
MONOCYTES # BLD AUTO: 0.83 K/UL (ref 0–0.85)
MONOCYTES NFR BLD AUTO: 8.4 % (ref 0–13.4)
NEUTROPHILS # BLD AUTO: 7.65 K/UL (ref 2–7.15)
NEUTROPHILS NFR BLD: 76.9 % (ref 44–72)
NRBC # BLD AUTO: 0 K/UL
NRBC BLD-RTO: 0 /100 WBC
PLATELET # BLD AUTO: 187 K/UL (ref 164–446)
PMV BLD AUTO: 11.2 FL (ref 9–12.9)
POTASSIUM SERPL-SCNC: 4.7 MMOL/L (ref 3.6–5.5)
PROTHROMBIN TIME: 13.4 SEC (ref 12–14.6)
RBC # BLD AUTO: 4.4 M/UL (ref 4.2–5.4)
SODIUM SERPL-SCNC: 142 MMOL/L (ref 135–145)
WBC # BLD AUTO: 9.9 K/UL (ref 4.8–10.8)

## 2021-06-23 PROCEDURE — 72170 X-RAY EXAM OF PELVIS: CPT

## 2021-06-23 PROCEDURE — 700102 HCHG RX REV CODE 250 W/ 637 OVERRIDE(OP): Performed by: EMERGENCY MEDICINE

## 2021-06-23 PROCEDURE — 700111 HCHG RX REV CODE 636 W/ 250 OVERRIDE (IP): Performed by: EMERGENCY MEDICINE

## 2021-06-23 PROCEDURE — 85730 THROMBOPLASTIN TIME PARTIAL: CPT

## 2021-06-23 PROCEDURE — 96374 THER/PROPH/DIAG INJ IV PUSH: CPT

## 2021-06-23 PROCEDURE — 99285 EMERGENCY DEPT VISIT HI MDM: CPT

## 2021-06-23 PROCEDURE — 36415 COLL VENOUS BLD VENIPUNCTURE: CPT

## 2021-06-23 PROCEDURE — 80048 BASIC METABOLIC PNL TOTAL CA: CPT

## 2021-06-23 PROCEDURE — 85025 COMPLETE CBC W/AUTO DIFF WBC: CPT

## 2021-06-23 PROCEDURE — 96375 TX/PRO/DX INJ NEW DRUG ADDON: CPT

## 2021-06-23 PROCEDURE — 85610 PROTHROMBIN TIME: CPT

## 2021-06-23 PROCEDURE — A9270 NON-COVERED ITEM OR SERVICE: HCPCS | Performed by: EMERGENCY MEDICINE

## 2021-06-23 PROCEDURE — 73552 X-RAY EXAM OF FEMUR 2/>: CPT | Mod: RT

## 2021-06-23 RX ORDER — HYDROCODONE BITARTRATE AND ACETAMINOPHEN 5; 325 MG/1; MG/1
1 TABLET ORAL EVERY 6 HOURS PRN
Qty: 11 TABLET | Refills: 0 | Status: SHIPPED | OUTPATIENT
Start: 2021-06-23 | End: 2021-06-26

## 2021-06-23 RX ORDER — MORPHINE SULFATE 4 MG/ML
4 INJECTION, SOLUTION INTRAMUSCULAR; INTRAVENOUS ONCE
Status: COMPLETED | OUTPATIENT
Start: 2021-06-23 | End: 2021-06-23

## 2021-06-23 RX ORDER — ONDANSETRON 2 MG/ML
4 INJECTION INTRAMUSCULAR; INTRAVENOUS ONCE
Status: COMPLETED | OUTPATIENT
Start: 2021-06-23 | End: 2021-06-23

## 2021-06-23 RX ORDER — HYDROCODONE BITARTRATE AND ACETAMINOPHEN 5; 325 MG/1; MG/1
1 TABLET ORAL ONCE
Status: COMPLETED | OUTPATIENT
Start: 2021-06-23 | End: 2021-06-23

## 2021-06-23 RX ADMIN — HYDROCODONE BITARTRATE AND ACETAMINOPHEN 1 TABLET: 5; 325 TABLET ORAL at 14:10

## 2021-06-23 RX ADMIN — MORPHINE SULFATE 4 MG: 4 INJECTION INTRAVENOUS at 12:31

## 2021-06-23 RX ADMIN — ONDANSETRON 4 MG: 2 INJECTION INTRAMUSCULAR; INTRAVENOUS at 12:31

## 2021-06-23 ASSESSMENT — FIBROSIS 4 INDEX: FIB4 SCORE: 2.93

## 2021-06-23 NOTE — ED NOTES
Dc instructions and medications discussed with patient at bedside. All questions answered at this time. VSS. No IV in place at this time. Pt to lobby without incident. Son to drive patient home. Pt wheeled to lobby to wait for son.   Informed consent for narcs reviewed with patient.

## 2021-06-23 NOTE — ED TRIAGE NOTES
"Bib ems for above complaints.     Chief Complaint   Patient presents with   • Hip Pain     woke up complaining of non traumatic right hip pain. pt states she has had hip replacement, 4 dislocations and a hip revision to the right side. woke up with 11/10 pain, some subjective numbness to right lower leg. pt took 2-800mg ibuprofen PTA     BP (!) 169/84   Pulse 84   Temp 36.5 °C (97.7 °F) (Temporal)   Resp 16   Ht 1.651 m (5' 5\")   Wt 63.5 kg (140 lb)   SpO2 95%   Breastfeeding No   BMI 23.30 kg/m²     Pt denies known covid exposure   "

## 2021-06-23 NOTE — ED PROVIDER NOTES
ED Provider Note        Primary care provider: Beth Friedman M.D.    I verified that the patient was wearing a mask and I was wearing appropriate PPE every time I entered the room. The patient's mask was on the patient at all times during my encounter except for a brief view of the oropharynx.      CHIEF COMPLAINT  Chief Complaint   Patient presents with   • Hip Pain     woke up complaining of non traumatic right hip pain. pt states she has had hip replacement, 4 dislocations and a hip revision to the right side. woke up with 11/10 pain, some subjective numbness to right lower leg. pt took 2-800mg ibuprofen PTA       HPI  Priscilla Hdz is a 81 y.o. female who presents to the Emergency Department with chief complaint of hip pain.  Patient has previous right hip arthroplasty she reports that she has had several dislocations and 1 previous revision.  She reports that she awoke this morning was having some slight pain in the hip radiating into the femur.  She stated that with ambulation throughout the day the pain is gotten progressively worse she took 1600 mg ibuprofen prior to arrival with no relief.  She reports no falls or recent trauma no headache altered mental status cough congestion chest pain or shortness of breath the pain is currently rated as severe is worse with any palpation or manipulation of the hip but slightly better with rest.  No other acute symptoms or concerns at this time she was been otherwise well.  Chronic bilateral lower extremity edema without change.      REVIEW OF SYSTEMS  10 systems reviewed and otherwise negative, pertinent positives and negatives listed in the history of present illness.    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    SURGICAL HISTORY   has a past surgical history that includes cataract extraction with iol (Bilateral,  "2012); hip arthroplasty total (Right, 8/9/2016); hip revision total (Right, 8/29/2018); and pacemaker insertion (Left, 06/20/2019).    SOCIAL HISTORY  Social History     Tobacco Use   • Smoking status: Never Smoker   • Smokeless tobacco: Never Used   Vaping Use   • Vaping Use: Never used   Substance Use Topics   • Alcohol use: Yes     Alcohol/week: 0.0 oz     Comment: once every 2-3 months.    • Drug use: No      Social History     Substance and Sexual Activity   Drug Use No       FAMILY HISTORY  Non-Contributory    CURRENT MEDICATIONS  Home Medications    **Home medications have not yet been reviewed for this encounter**         ALLERGIES  Allergies   Allergen Reactions   • Other Environmental Cough     Pine, Dust       PHYSICAL EXAM  VITAL SIGNS: BP (!) 169/84   Pulse 84   Temp 36.5 °C (97.7 °F) (Temporal)   Resp 16   Ht 1.651 m (5' 5\")   Wt 63.5 kg (140 lb)   SpO2 95%   Breastfeeding No   BMI 23.30 kg/m²   Pulse ox interpretation: I interpret this pulse ox as normal.  Constitutional: Alert and oriented x 3, normal distress  HEENT: Atraumatic normocephalic, pupils are equal round, extraocular movements are intact. The nares is clear, external ears are normal, mouth shows moist mucous membranes  Neck: no obvious JVD or tracheal deviation  Cardiovascular: Regular rate and rhythm no murmur rub or gallop   Thorax & Lungs: No respiratory distress, no wheezes rales or rhonchi, No chest tenderness.   GI: Soft nontender nondistended positive bowel sounds, no peritoneal signs  Skin: Warm dry no obvious acute rash or lesion  Musculoskeletal: Upper extremities unremarkable in the left lower extremity for range and strength hip knee dorsiflexion plantarflexion in the right lower extremity she has tenderness palpation of the greater trochanter and the hip she has pain with flexion extension internal and external rotation of the hip she has no pain at the knee normal flexion extension of the knee normal dorsiflexion " plantarflexion of the ankle  Neurologic: Cranial nerves III through XII are grossly intact, no sensory deficit, no cerebellar dysfunction   Psychiatric: Appropriate affect for situation at this time      DIAGNOSTIC STUDIES / PROCEDURES  LABS      Results for orders placed or performed during the hospital encounter of 06/23/21   CBC w/ Differential   Result Value Ref Range    WBC 9.9 4.8 - 10.8 K/uL    RBC 4.40 4.20 - 5.40 M/uL    Hemoglobin 13.1 12.0 - 16.0 g/dL    Hematocrit 41.1 37.0 - 47.0 %    MCV 93.4 81.4 - 97.8 fL    MCH 29.8 27.0 - 33.0 pg    MCHC 31.9 (L) 33.6 - 35.0 g/dL    RDW 46.6 35.9 - 50.0 fL    Platelet Count 187 164 - 446 K/uL    MPV 11.2 9.0 - 12.9 fL    Neutrophils-Polys 76.90 (H) 44.00 - 72.00 %    Lymphocytes 13.60 (L) 22.00 - 41.00 %    Monocytes 8.40 0.00 - 13.40 %    Eosinophils 0.50 0.00 - 6.90 %    Basophils 0.30 0.00 - 1.80 %    Immature Granulocytes 0.30 0.00 - 0.90 %    Nucleated RBC 0.00 /100 WBC    Neutrophils (Absolute) 7.65 (H) 2.00 - 7.15 K/uL    Lymphs (Absolute) 1.35 1.00 - 4.80 K/uL    Monos (Absolute) 0.83 0.00 - 0.85 K/uL    Eos (Absolute) 0.05 0.00 - 0.51 K/uL    Baso (Absolute) 0.03 0.00 - 0.12 K/uL    Immature Granulocytes (abs) 0.03 0.00 - 0.11 K/uL    NRBC (Absolute) 0.00 K/uL   Basic Metabolic Panel (BMP)   Result Value Ref Range    Sodium 142 135 - 145 mmol/L    Potassium 4.7 3.6 - 5.5 mmol/L    Chloride 107 96 - 112 mmol/L    Co2 24 20 - 33 mmol/L    Glucose 101 (H) 65 - 99 mg/dL    Bun 26 (H) 8 - 22 mg/dL    Creatinine 1.07 0.50 - 1.40 mg/dL    Calcium 9.4 8.4 - 10.2 mg/dL    Anion Gap 11.0 7.0 - 16.0   Prothrombin (PT/INR)   Result Value Ref Range    PT 13.4 12.0 - 14.6 sec    INR 1.05 0.87 - 1.13   APTT   Result Value Ref Range    APTT 30.8 24.7 - 36.0 sec   ESTIMATED GFR   Result Value Ref Range    GFR If  59 (A) >60 mL/min/1.73 m 2    GFR If Non African American 49 (A) >60 mL/min/1.73 m 2       All labs reviewed by me.      RADIOLOGY  DX-FEMUR-2+  "RIGHT   Final Result      1.  Right hip arthroplasty within normal limits      2.  Small amount of adjacent heterotopic ossification      3.  Right knee osteoarthritis      DX-PELVIS-1 OR 2 VIEWS   Final Result      1.  No pelvic or proximal femoral fracture identified      2.  Right hip arthroplasty within normal limits      3.  osteoarthritis of lumbar spine facet joints, both sacroiliac joint, possibly the left hip joint        The radiologist's interpretation of all radiological studies have been reviewed by me.    COURSE & MEDICAL DECISION MAKING  Pertinent Labs & Imaging studies reviewed. (See chart for details)    12:18 PM - Patient seen and examined at bedside.         Patient noted to have slightly elevated blood pressure likely circumstantial secondary to presenting complaint. Referred to primary care physician for further evaluation.      Medical Decision Making: X-rays are unremarkable vital signs are unremarkable screening labs are unremarkable with the exception of slight elevation of BUN patient states she has not any water since last night minor dehydration.  Patient was given 1 dose of pain medicine she was able to ambulate on the affected extremity in the emergency department.  We discussed further evaluation with possible admission possible MRI patient prefers to be discharged with follow-up as an outpatient I am completely in agreement with this as her x-rays are negative and she is able to ambulate she will return for worsening pain numbness tingling weakness any other acute symptoms or concerns otherwise follow-up with her orthopedist at the next available time discharged in stable and improved condition.    BP (!) 169/84   Pulse 84   Temp 36.5 °C (97.7 °F) (Temporal)   Resp 16   Ht 1.651 m (5' 5\")   Wt 63.5 kg (140 lb)   SpO2 95%   Breastfeeding No   BMI 23.30 kg/m²     Oniel Goodson M.D.  [address]  [address]  [facility] [address]  [phone]    Schedule an " appointment as soon as possible for a visit       [facility], Emergency Dept  [address]  [place] [address] [phone]    in 12-24 hours if symptoms persist, immediately If symptoms worsen, or if you develop any other symptoms or concerns      New Prescriptions    HYDROCODONE-ACETAMINOPHEN (NORCO) 5-325 MG TAB PER TABLET    Take 1 tablet by mouth every 6 hours as needed for up to 3 days.       FINAL IMPRESSION  1. Right hip pain Active   2. Hip strain, right, initial encounter Active          This dictation has been created using voice recognition software and/or scribes. The accuracy of the dictation is limited by the abilities of the software and the expertise of the scribes. I expect there may be some errors of grammar and possibly content. I made every attempt to manually correct the errors within my dictation. However, errors related to voice recognition software and/or scribes may still exist and should be interpreted within the appropriate context.

## 2021-07-29 ENCOUNTER — TELEPHONE (OUTPATIENT)
Dept: CARDIOLOGY | Facility: MEDICAL CENTER | Age: 81
End: 2021-07-29

## 2021-07-29 NOTE — TELEPHONE ENCOUNTER
FYI:   Remote Transmission 7/27/2021:    24-NSVT episodes since 12/30/2019; most lasting 1-2 seconds with rates 160-219 bpm, 1 episode lasting 9 seconds.    Full report scanned into media.

## 2021-07-30 NOTE — TELEPHONE ENCOUNTER
Upon chart review, last OV was 10/2020 with ANNE.     Called and spoke to the patient.  Asked if she had any new signs or symptoms since last OV in October and she stated no.  She stated her  is back home from the nursing home as well and has an appt with ANNE in November and AB in August.  Advised he will review it and if anything needs to be adjusted I will notify her, otherwise to follow up with AB in August.  Answered all questions and concerns, appreciative of call.

## 2021-08-02 ENCOUNTER — OFFICE VISIT (OUTPATIENT)
Dept: MEDICAL GROUP | Facility: MEDICAL CENTER | Age: 81
End: 2021-08-02
Payer: MEDICARE

## 2021-08-02 VITALS
HEIGHT: 65 IN | HEART RATE: 116 BPM | OXYGEN SATURATION: 96 % | WEIGHT: 139.6 LBS | BODY MASS INDEX: 23.26 KG/M2 | SYSTOLIC BLOOD PRESSURE: 138 MMHG | TEMPERATURE: 98.3 F | DIASTOLIC BLOOD PRESSURE: 60 MMHG

## 2021-08-02 DIAGNOSIS — Z95.0 CARDIAC PACEMAKER IN SITU: ICD-10-CM

## 2021-08-02 DIAGNOSIS — I10 ESSENTIAL HYPERTENSION: ICD-10-CM

## 2021-08-02 DIAGNOSIS — E78.5 DYSLIPIDEMIA: ICD-10-CM

## 2021-08-02 DIAGNOSIS — R60.0 PEDAL EDEMA: ICD-10-CM

## 2021-08-02 DIAGNOSIS — Z23 NEED FOR VACCINATION: ICD-10-CM

## 2021-08-02 DIAGNOSIS — N18.32 STAGE 3B CHRONIC KIDNEY DISEASE: ICD-10-CM

## 2021-08-02 PROCEDURE — 90750 HZV VACC RECOMBINANT IM: CPT | Performed by: FAMILY MEDICINE

## 2021-08-02 PROCEDURE — 90471 IMMUNIZATION ADMIN: CPT | Performed by: FAMILY MEDICINE

## 2021-08-02 PROCEDURE — G0439 PPPS, SUBSEQ VISIT: HCPCS | Performed by: FAMILY MEDICINE

## 2021-08-02 RX ORDER — AMOXICILLIN 500 MG/1
CAPSULE ORAL
COMMUNITY
Start: 2021-07-29 | End: 2021-08-02

## 2021-08-02 RX ORDER — AMOXICILLIN 500 MG/1
CAPSULE ORAL
COMMUNITY
End: 2021-08-02

## 2021-08-02 RX ORDER — TRIAMTERENE AND HYDROCHLOROTHIAZIDE 37.5; 25 MG/1; MG/1
1 CAPSULE ORAL EVERY MORNING
Qty: 90 CAPSULE | Refills: 3 | Status: SHIPPED
Start: 2021-08-02 | End: 2022-06-03

## 2021-08-02 RX ORDER — OXYCODONE HYDROCHLORIDE 5 MG/1
TABLET ORAL
COMMUNITY
End: 2021-08-02

## 2021-08-02 ASSESSMENT — ENCOUNTER SYMPTOMS: GENERAL WELL-BEING: FAIR

## 2021-08-02 ASSESSMENT — PATIENT HEALTH QUESTIONNAIRE - PHQ9: CLINICAL INTERPRETATION OF PHQ2 SCORE: 0

## 2021-08-02 ASSESSMENT — ACTIVITIES OF DAILY LIVING (ADL): BATHING_REQUIRES_ASSISTANCE: 0

## 2021-08-02 ASSESSMENT — FIBROSIS 4 INDEX: FIB4 SCORE: 2.82

## 2021-08-02 NOTE — PROGRESS NOTES
Chief Complaint   Patient presents with   • Annual Wellness Visit         HPI:  Priscilla is a 81 y.o. here for Medicare Annual Wellness Visit    Stage 3b chronic kidney disease (HCC)  Chronic problem. GFR 49. Stable. Last check was June 2021.     Cardiac pacemaker in situ  Sees Dr. Becker in November  Has pacer interrogation later this month.     Essential hypertension  Chronic problem, currently on losartan 50 mg and triamterene/hydrochlorothiazide.  Blood pressure well controlled.    Pedal edema  Chronic problem, at baseline.    Dyslipidemia  Chronic problem, well-controlled on atorvastatin 20 mg.      Patient Active Problem List    Diagnosis Date Noted   • Stage 3b chronic kidney disease (HCC) 08/02/2021   • Cardiac pacemaker in situ 07/01/2019   • Pedal edema 06/19/2019   • Complete heart block by electrocardiogram ([facility]) 06/18/2019   • Venous insufficiency of both lower extremities 02/12/2019   • Anxiety 07/26/2017   • Essential hypertension    • Osteopenia    • Dyslipidemia        Current Outpatient Medications   Medication Sig Dispense Refill   • triamterene/hctz (MAXZIDE-25/DYAZIDE) 37.5-25 MG Cap Take 1 capsule by mouth every morning. 90 capsule 3   • atorvastatin (LIPITOR) 20 MG Tab TAKE ONE TABLET BY MOUTH EVERY DAY 90 Tab 0   • hydrALAZINE (APRESOLINE) 50 MG Tab Take 1 Tab by mouth 3 times a day. 270 Tab 3   • losartan (COZAAR) 50 MG Tab Take 1 Tab by mouth 2 Times a Day. 180 Tab 3   • Ipratropium-Albuterol (COMBIVENT INH) Inhale  by mouth.       No current facility-administered medications for this visit.        Patient is taking medications as noted in medication list.  Current supplements as per medication list.     Allergies: Other environmental    Current social contact/activities: Family/Friends    Is patient current with immunizations? No, due for SHINGRIX (Shingles). Patient is interested in receiving TDAP and NONE today.    She  reports that she has never smoked. She has never used smokeless  tobacco. She reports current alcohol use. She reports that she does not use drugs.  Counseling given: Not Answered        DPA/Advanced directive: Patient has Advanced Directive, but it is not on file. Instructed to bring in a copy to scan into their chart.    ROS:    Gait: Uses a walker   Ostomy: No   Other tubes: No   Amputations: No   Chronic oxygen use No   Last eye exam October 2020  Wears hearing aids: No   : Denies any urinary leakage during the last 6 months    Screening:     Depression Screening    Little interest or pleasure in doing things?  0 - not at all  Feeling down, depressed, or hopeless? 0 - not at all  Patient Health Questionnaire Score: 0    If depressive symptoms identified deferred to follow up visit unless specifically addressed in assessment and plan.    Interpretation of PHQ-9 Total Score   Score Severity   1-4 No Depression   5-9 Mild Depression   10-14 Moderate Depression   15-19 Moderately Severe Depression   20-27 Severe Depression    Screening for Cognitive Impairment    Three Minute Recall (captain, garden, picture)  2/3 Captain morelos   Leonides clock face with all 12 numbers and set the hands to show 5 past 8.  Yes    If cognitive concerns identified, deferred for follow up unless specifically addressed in assessment and plan.    Fall Risk Assessment    Has the patient had two or more falls in the last year or any fall with injury in the last year?  No  If fall risk identified, deferred for follow up unless specifically addressed in assessment and plan.    Safety Assessment    Throw rugs on floor.  No  Handrails on all stairs.  No  Good lighting in all hallways.  Yes  Difficulty hearing.  No  Patient counseled about all safety risks that were identified.    Functional Assessment ADLs    Are there any barriers preventing you from cooking for yourself or meeting nutritional needs?  No.    Are there any barriers preventing you from driving safely or obtaining transportation?  Yes.    Are  there any barriers preventing you from using a telephone or calling for help?  No.    Are there any barriers preventing you from shopping?  No.    Are there any barriers preventing you from taking care of your own finances?  No.    Are there any barriers preventing you from managing your medications?  No.    Are there any barriers preventing you from showering, bathing or dressing yourself?  No.    Are you currently engaging in any exercise or physical activity?  No.     What is your perception of your health?  Fair.    Health Maintenance Summary                Annual Wellness Visit Overdue 2/6/2014      Done 2/5/2013     BONE DENSITY Overdue 2/18/2018      Previously completed 2/18/2013      Patient has more history with this topic...    IMM INFLUENZA Next Due 9/1/2021      Done 10/7/2020 Imm Admin: Influenza, Unspecified - HISTORICAL DATA     Patient has more history with this topic...    IMM ZOSTER VACCINES Next Due 9/27/2021      Done 8/2/2021 Imm Admin: Zoster Vaccine Recombinant (RZV) (SHINGRIX)     Patient has more history with this topic...    IMM DTaP/Tdap/Td Vaccine Next Due 2/1/2022      Done 2/1/2012 Imm Admin: Tdap Vaccine    COLONOSCOPY Next Due 5/15/2022      Previously completed 5/15/2012           Patient Care Team:  Beth Friedman M.D. as PCP - General (Family Medicine)  Jay Long II, O.D. as Consulting Physician (Optometry)    Social History     Tobacco Use   • Smoking status: Never Smoker   • Smokeless tobacco: Never Used   Vaping Use   • Vaping Use: Never used   Substance Use Topics   • Alcohol use: Yes     Alcohol/week: 0.0 oz     Comment: once every 2-3 months.    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   Past Surgical History:   Procedure Laterality Date   • PACEMAKER INSERTION Left 06/20/2019    [employer]onic Kalie S DR MRI [id number] implanted by Dr. Mcginnis.   • HIP REVISION TOTAL Right 8/29/2018    Procedure: HIP REVISION TOTAL;  Surgeon: Oniel Goodson M.D.;  Location: SURGERY [facility];  Service: Orthopedics   • HIP ARTHROPLASTY TOTAL Right 8/9/2016    Procedure: HIP ARTHROPLASTY TOTAL;  Surgeon: Oniel Goodson M.D.;  Location: SURGERY [facility];  Service:    • CATARACT EXTRACTION WITH IOL Bilateral 2012           Exam:     /60 (BP Location: Right arm, Patient Position: Sitting, BP Cuff Size: Adult long)   Pulse (!) 116   Temp 36.8 °C (98.3 °F) (Temporal)   Ht 1.651 m (5' 5\")   Wt 63.3 kg (139 lb 9.6 oz)   SpO2 96%  Body mass index is 23.23 kg/m².    Hearing good.    Dentition good  Alert, oriented in no acute distress.  Eye contact is good, speech goal directed, affect calm      Assessment and Plan. The following treatment and monitoring plan is recommended:    1. Stage 3b chronic kidney disease (HCC)     2. Cardiac pacemaker in situ     3. Need for vaccination  Shingrix Vaccine   4. Essential hypertension  triamterene/hctz (MAXZIDE-25/DYAZIDE) 37.5-25 MG Cap   5. Pedal edema     6. Dyslipidemia           Services suggested: No services needed at this time  Health Care Screening recommendations as per orders if indicated.  Referrals offered: PT/OT/Nutrition counseling/Behavioral Health/Smoking cessation as per orders if indicated.    Discussion today about general wellness and lifestyle habits:    · Prevent falls and reduce trip hazards; Cautioned about securing or removing rugs.  · Have a working fire alarm and carbon monoxide detector;   · Engage in regular physical activity and social activities       Follow-up: No follow-ups on file.  "

## 2021-08-05 NOTE — ASSESSMENT & PLAN NOTE
Chronic problem, currently on losartan 50 mg and triamterene/hydrochlorothiazide.  Blood pressure well controlled.

## 2021-08-10 NOTE — TELEPHONE ENCOUNTER
Called and spoke to the patient, relayed JM recommendations. Advised to let us know if she has any new symptoms. Answered all questions and concerns, appreciative of call.

## 2021-08-10 NOTE — TELEPHONE ENCOUNTER
Spoke to ANNE and he stated no changes at this time, if she has symptoms she can let us know, otherwise he will further discuss at .

## 2021-08-31 ENCOUNTER — NON-PROVIDER VISIT (OUTPATIENT)
Dept: CARDIOLOGY | Facility: MEDICAL CENTER | Age: 81
End: 2021-08-31
Payer: MEDICARE

## 2021-08-31 VITALS
RESPIRATION RATE: 18 BRPM | BODY MASS INDEX: 23.32 KG/M2 | OXYGEN SATURATION: 95 % | HEIGHT: 65 IN | SYSTOLIC BLOOD PRESSURE: 138 MMHG | HEART RATE: 95 BPM | DIASTOLIC BLOOD PRESSURE: 88 MMHG | WEIGHT: 140 LBS

## 2021-08-31 DIAGNOSIS — E78.5 DYSLIPIDEMIA: ICD-10-CM

## 2021-08-31 DIAGNOSIS — I10 ESSENTIAL HYPERTENSION: ICD-10-CM

## 2021-08-31 DIAGNOSIS — Z95.0 CARDIAC PACEMAKER IN SITU: ICD-10-CM

## 2021-08-31 DIAGNOSIS — I44.2 COMPLETE HEART BLOCK BY ELECTROCARDIOGRAM (HCC): ICD-10-CM

## 2021-08-31 PROCEDURE — 93280 PM DEVICE PROGR EVAL DUAL: CPT | Performed by: NURSE PRACTITIONER

## 2021-08-31 RX ORDER — HYDRALAZINE HYDROCHLORIDE 50 MG/1
50 TABLET, FILM COATED ORAL 3 TIMES DAILY
Qty: 270 TABLET | Refills: 3 | Status: SHIPPED
Start: 2021-08-31 | End: 2022-05-03

## 2021-08-31 RX ORDER — LOSARTAN POTASSIUM 50 MG/1
50 TABLET ORAL 2 TIMES DAILY
Qty: 180 TABLET | Refills: 3 | Status: SHIPPED
Start: 2021-08-31 | End: 2022-05-03

## 2021-08-31 RX ORDER — ATORVASTATIN CALCIUM 20 MG/1
20 TABLET, FILM COATED ORAL
Qty: 90 TABLET | Refills: 3 | Status: SHIPPED
Start: 2021-08-31 | End: 2022-05-03

## 2021-08-31 ASSESSMENT — FIBROSIS 4 INDEX: FIB4 SCORE: 2.82

## 2021-08-31 NOTE — PROGRESS NOTES
Patient presents today for overdue PM interrogation. Carelink did  2 NSVT episodes (in May and June 2021) of NSVT (9 and 7 seconds, respectively); there was no change in treatment.    Device is working normally. 5 brief mode switching episodes (<0.1% of total time). 10 NSVT episodes (all 1-2 seconds each).  Normal sensing and capture of RA and RV leads; stable impedances. Battery longevity is 10.8 years.  No changes are made today.    She does see Dr. Becker in November 2021, and is encouraged to keep this appointment. Medications are renewed today.    FU in 6 months for next PM check with me.

## 2021-11-02 ENCOUNTER — OFFICE VISIT (OUTPATIENT)
Dept: CARDIOLOGY | Facility: MEDICAL CENTER | Age: 81
End: 2021-11-02
Payer: MEDICARE

## 2021-11-02 ENCOUNTER — HOSPITAL ENCOUNTER (OUTPATIENT)
Dept: LAB | Facility: MEDICAL CENTER | Age: 81
End: 2021-11-02
Attending: INTERNAL MEDICINE
Payer: MEDICARE

## 2021-11-02 VITALS
BODY MASS INDEX: 23.32 KG/M2 | WEIGHT: 140 LBS | RESPIRATION RATE: 16 BRPM | HEIGHT: 65 IN | SYSTOLIC BLOOD PRESSURE: 162 MMHG | HEART RATE: 114 BPM | DIASTOLIC BLOOD PRESSURE: 90 MMHG | OXYGEN SATURATION: 94 %

## 2021-11-02 DIAGNOSIS — I47.10 PAROXYSMAL SUPRAVENTRICULAR TACHYCARDIA (HCC): ICD-10-CM

## 2021-11-02 DIAGNOSIS — I87.2 VENOUS INSUFFICIENCY OF BOTH LOWER EXTREMITIES: ICD-10-CM

## 2021-11-02 DIAGNOSIS — I44.2 COMPLETE HEART BLOCK BY ELECTROCARDIOGRAM (HCC): ICD-10-CM

## 2021-11-02 DIAGNOSIS — N18.31 STAGE 3A CHRONIC KIDNEY DISEASE: ICD-10-CM

## 2021-11-02 DIAGNOSIS — E78.5 DYSLIPIDEMIA: ICD-10-CM

## 2021-11-02 DIAGNOSIS — I10 ESSENTIAL HYPERTENSION: ICD-10-CM

## 2021-11-02 DIAGNOSIS — Z95.0 CARDIAC PACEMAKER IN SITU: ICD-10-CM

## 2021-11-02 LAB
ALBUMIN SERPL BCP-MCNC: 4.2 G/DL (ref 3.2–4.9)
ALBUMIN/GLOB SERPL: 1.4 G/DL
ALP SERPL-CCNC: 80 U/L (ref 30–99)
ALT SERPL-CCNC: 10 U/L (ref 2–50)
ANION GAP SERPL CALC-SCNC: 12 MMOL/L (ref 7–16)
AST SERPL-CCNC: 28 U/L (ref 12–45)
BILIRUB SERPL-MCNC: 0.3 MG/DL (ref 0.1–1.5)
BUN SERPL-MCNC: 19 MG/DL (ref 8–22)
CALCIUM SERPL-MCNC: 9.1 MG/DL (ref 8.5–10.5)
CHLORIDE SERPL-SCNC: 107 MMOL/L (ref 96–112)
CO2 SERPL-SCNC: 24 MMOL/L (ref 20–33)
CREAT SERPL-MCNC: 0.89 MG/DL (ref 0.5–1.4)
ERYTHROCYTE [DISTWIDTH] IN BLOOD BY AUTOMATED COUNT: 48.1 FL (ref 35.9–50)
GLOBULIN SER CALC-MCNC: 3.1 G/DL (ref 1.9–3.5)
GLUCOSE SERPL-MCNC: 93 MG/DL (ref 65–99)
HCT VFR BLD AUTO: 40.9 % (ref 37–47)
HGB BLD-MCNC: 13.5 G/DL (ref 12–16)
MAGNESIUM SERPL-MCNC: 2.1 MG/DL (ref 1.5–2.5)
MCH RBC QN AUTO: 30.7 PG (ref 27–33)
MCHC RBC AUTO-ENTMCNC: 33 G/DL (ref 33.6–35)
MCV RBC AUTO: 93 FL (ref 81.4–97.8)
PLATELET # BLD AUTO: 192 K/UL (ref 164–446)
PMV BLD AUTO: 11.2 FL (ref 9–12.9)
POTASSIUM SERPL-SCNC: 4.2 MMOL/L (ref 3.6–5.5)
PROT SERPL-MCNC: 7.3 G/DL (ref 6–8.2)
RBC # BLD AUTO: 4.4 M/UL (ref 4.2–5.4)
SODIUM SERPL-SCNC: 143 MMOL/L (ref 135–145)
TSH SERPL DL<=0.005 MIU/L-ACNC: 1.33 UIU/ML (ref 0.38–5.33)
WBC # BLD AUTO: 7.3 K/UL (ref 4.8–10.8)

## 2021-11-02 PROCEDURE — 85027 COMPLETE CBC AUTOMATED: CPT

## 2021-11-02 PROCEDURE — 99214 OFFICE O/P EST MOD 30 MIN: CPT | Performed by: INTERNAL MEDICINE

## 2021-11-02 PROCEDURE — 36415 COLL VENOUS BLD VENIPUNCTURE: CPT

## 2021-11-02 PROCEDURE — 80053 COMPREHEN METABOLIC PANEL: CPT

## 2021-11-02 PROCEDURE — 84443 ASSAY THYROID STIM HORMONE: CPT

## 2021-11-02 PROCEDURE — 83735 ASSAY OF MAGNESIUM: CPT

## 2021-11-02 RX ORDER — CARVEDILOL 3.12 MG/1
3.12 TABLET ORAL 2 TIMES DAILY WITH MEALS
Qty: 180 TABLET | Refills: 3 | Status: SHIPPED | OUTPATIENT
Start: 2021-11-02 | End: 2021-11-19 | Stop reason: SDUPTHER

## 2021-11-02 ASSESSMENT — FIBROSIS 4 INDEX: FIB4 SCORE: 2.82

## 2021-11-02 NOTE — PROGRESS NOTES
Cardiology Follow-up Consultation Note    Date of note:    11/2/2021  Primary Care Provider: Beth Friedman M.D.  Referring Provider: Dr. Erik Woodall    Patient Name: Priscilla Hdz   YOB: 1940  MRN:              [id number]    Chief Complaint: bradycardia    History of Present Illness: Priscilla Hdz is a 81 y.o. female whose current medical problems include complete heart block s/p dual chamber pacemaker 6/20/2019 with RV lead repositioning 8/9/2019, hypertension, dyslipidemia  who is here for follow-up.    At our visit, 10/11/2019:   having multiple medical problems, current in the hospital at [facility].     In terms of hypertension, poorly controlled.     Walks in a walker after falls and hip replacement x 2.     At our visit, 10/5/2020:  BP this morning in the 130s. Sometimes in the 140s as well.     She is here today with her Son Lg. Still significant stress from her 's medical care.     Dyslipidemia well controlled.     Interim Events:  Continues to have stress from  being in and out of nursing homes and the hospital.     BP at home is in the 120s-150s.     Pacemaker functioning well. Has episodes of up to 9 beats of non-sustained VT, asymptomatic.    She presents today with [employer] who gave her a ride today.        Review of Systems   unchanged leg swelling.     Constitution: Negative for chills, fever and night sweats.   HENT: Negative for nosebleeds.    Eyes: Negative for vision loss in left eye and vision loss in right eye.   Respiratory: Negative for hemoptysis.    Gastrointestinal: Negative for hematemesis, hematochezia and melena.   Genitourinary: Negative for hematuria.   Neurological: Negative for focal weakness, numbness and paresthesias.     All other systems reviewed and discussed using a comprehensive questionnaire and are negative.       Past Medical History:   Diagnosis Date   • Arthritis     hip, knee   • ASTHMA    • AV block, complete (HCC)  06/2019    Status post pacemaker implantation   • Cataract     Bilateral IOL   • Hyperlipidemia    • Hypertension    • Menopause    • Osteopenia    • Pedal edema          Past Surgical History:   Procedure Laterality Date   • PACEMAKER INSERTION Left 06/20/2019    [employer] [place] S DR MRI [id number] implanted by Dr. Mcginnis.   • HIP REVISION TOTAL Right 8/29/2018    Procedure: HIP REVISION TOTAL;  Surgeon: Oniel Goodson M.D.;  Location: SURGERY [facility];  Service: Orthopedics   • HIP ARTHROPLASTY TOTAL Right 8/9/2016    Procedure: HIP ARTHROPLASTY TOTAL;  Surgeon: Oniel Goodson M.D.;  Location: SURGERY [facility];  Service:    • CATARACT EXTRACTION WITH IOL Bilateral 2012         Current Outpatient Medications   Medication Sig Dispense Refill   • hydrALAZINE (APRESOLINE) 50 MG Tab Take 1 Tablet by mouth 3 times a day. 270 Tablet 3   • losartan (COZAAR) 50 MG Tab Take 1 Tablet by mouth 2 times a day. 180 Tablet 3   • atorvastatin (LIPITOR) 20 MG Tab Take 1 Tablet by mouth every day. 90 Tablet 3   • triamterene/hctz (MAXZIDE-25/DYAZIDE) 37.5-25 MG Cap Take 1 capsule by mouth every morning. 90 capsule 3     No current facility-administered medications for this visit.         Allergies   Allergen Reactions   • Other Environmental Cough     Pine, Dust         Family History   Problem Relation Age of Onset   • Heart Disease Father    • Hyperlipidemia Father    • Hypertension Father    • Heart Disease Brother    • Hypertension Mother    • Hyperlipidemia Mother          Social History     Socioeconomic History   • Marital status:      Spouse name: Not on file   • Number of children: Not on file   • Years of education: Not on file   • Highest education level: Not on file   Occupational History   • Not on file   Tobacco Use   • Smoking status: Never Smoker   • Smokeless tobacco: Never Used   Vaping Use   • Vaping Use: Never used   Substance and Sexual Activity   • Alcohol use: Yes      "Alcohol/week: 0.6 oz     Types: 1 Glasses of wine per week     Comment: once every 2-3 months.    • Drug use: No   • Sexual activity: Not Currently     Partners: Male   Other Topics Concern   • Not on file   Social History Narrative    Retired , [place].      Social Determinants of Health     Financial Resource Strain:    • Difficulty of Paying Living Expenses:    Food Insecurity:    • Worried About Running Out of Food in the Last Year:    • Ran Out of Food in the Last Year:    Transportation Needs:    • Lack of Transportation (Medical):    • Lack of Transportation (Non-Medical):    Physical Activity:    • Days of Exercise per Week:    • Minutes of Exercise per Session:    Stress:    • Feeling of Stress :    Social Connections:    • Frequency of Communication with Friends and Family:    • Frequency of Social Gatherings with Friends and Family:    • Attends Confucianism Services:    • Active Member of Clubs or Organizations:    • Attends Club or Organization Meetings:    • Marital Status:    Intimate Partner Violence:    • Fear of Current or Ex-Partner:    • Emotionally Abused:    • Physically Abused:    • Sexually Abused:          Physical Exam:  Ambulatory Vitals  BP (!) 162/90 (BP Location: Left arm, Patient Position: Sitting, BP Cuff Size: Adult)   Pulse (!) 114   Resp 16   Ht 1.651 m (5' 5\")   Wt 63.5 kg (140 lb)   SpO2 94%    Oxygen Therapy:  Pulse Oximetry: 94 %  BP Readings from Last 4 Encounters:   11/02/21 (!) 162/90   08/31/21 138/88   08/02/21 138/60   06/23/21 (!) 163/80       Weight/BMI: Body mass index is 23.3 kg/m².  Wt Readings from Last 4 Encounters:   11/02/21 63.5 kg (140 lb)   08/31/21 63.5 kg (140 lb)   08/02/21 63.3 kg (139 lb 9.6 oz)   06/23/21 63.5 kg (140 lb)     General: No apparent distress  Eyes: nl conjunctiva  ENT: OP covered by mask.   Neck: JVP <8 cm H2O, no carotid bruits  Lungs: normal respiratory effort, CTAB  Heart: tachycardic, regular, no murmurs, no " rubs or gallops, 2-3+ edema bilateral lower extremities. No LV/RV heave on cardiac palpatation. 2+ bilateral radial pulses.  Well healed pacemaker insertion site.   Abdomen: soft, non tender, non distended, no masses, normal bowel sounds.  No HSM.  Extremities/MSK: no clubbing, no cyanosis  Neurological: No focal sensory deficits  Psychiatric: Appropriate affect, A/O x 3, intact judgement and insight  Skin: Warm extremities    Exam repeated in full and unchanged except for as noted above.        Lab Data Review:  Lab Results   Component Value Date/Time    CHOLSTRLTOT 173 08/09/2018 09:17 AM     (H) 08/09/2018 09:17 AM    HDL 41 08/09/2018 09:17 AM    TRIGLYCERIDE 129 08/09/2018 09:17 AM       Lab Results   Component Value Date/Time    SODIUM 142 06/23/2021 12:23 PM    POTASSIUM 4.7 06/23/2021 12:23 PM    CHLORIDE 107 06/23/2021 12:23 PM    CO2 24 06/23/2021 12:23 PM    GLUCOSE 101 (H) 06/23/2021 12:23 PM    BUN 26 (H) 06/23/2021 12:23 PM    CREATININE 1.07 06/23/2021 12:23 PM    CREATININE 0.77 01/29/2013 07:49 AM    BUNCREATRAT 19 03/09/2016 09:37 AM    BUNCREATRAT 23 01/29/2013 07:49 AM    GLOMRATE >59 11/10/2010 09:26 AM     Lab Results   Component Value Date/Time    ALKPHOSPHAT 61 02/13/2020 11:00 AM    ASTSGOT 18 02/13/2020 11:00 AM    ALTSGPT 8 02/13/2020 11:00 AM    TBILIRUBIN 0.3 02/13/2020 11:00 AM      Lab Results   Component Value Date/Time    WBC 9.9 06/23/2021 12:23 PM     No components found for: HBGA1C  No components found for: TROPONIN  No results found for: BNP      Cardiac Imaging and Procedures Review:    EKG dated 8/11/2019:  ATRIAL-SENSED VENTRICULAR-PACED COMPLEXES   NO FURTHER ANALYSIS ATTEMPTED DUE TO PACED RHYTHM     Echo dated 6/19/2019:  Left Ventricle  Normal left ventricular chamber size. Normal left ventricular wall   thickness. Hyperdynamic left ventricular systolic function. Left   ventricular ejection fraction is visually estimated to be greater than   75%. Normal regional  wall motion. Diastolic function is difficult to   assess.    Right Ventricle  The right ventricle was normal in size and function.    Right Atrium  The right atrium is normal in size.  Normal inferior vena cava size   without inspiratory collapse.    Left Atrium  The left atrium is normal in size.  Left atrial volume index is 17   mL/sq m.    Mitral Valve  Mitral annular calcification. No mitral stenosis. Mild mitral   regurgitation.    Aortic Valve  Tricuspid aortic valve. Aortic sclerosis without stenosis. No aortic   insufficiency.    Tricuspid Valve  Structurally normal tricuspid valve without significant stenosis. Mild   tricuspid regurgitation. Estimated right ventricular systolic pressure    is 60 mmHg. Right atrial pressure is estimated to be 8 mmHg.    Pulmonic Valve  The pulmonic valve is not well visualized. No stenosis or regurgitation   seen.    Pericardium  Normal pericardium without effusion.    Aorta  The aortic root is normal.  Ascending aorta diameter is 2.8 cm.    By my personal review, RVSP 54mmHg. Nl RV size and systolic function, normal LV systolic and diastolic function. No significant valvular disease.     2016 TTE:  CONCLUSIONS  No prior study is available for comparison.   Normal left ventricular systolic function.  Left ventricular ejection fraction is visually estimated to be 65%.  Right heart pressures are normal.  No significant valve abnormalities.     Stress test 8/2016:  Myocardial Perfusion   Report   NUCLEAR IMAGING INTERPRETATION   Normal left ventricular size, ejection fraction, and wall motion.   No evidence of significant jeopardized viable myocardium or prior myocardial    infarction.   ECG INTERPRETATION   Negative stress ECG for ischemia.    Radiology test Review:  CXR:   Tubes and lines: Left transvenous electrodes project over the right atrium and right ventricle.    Elevated right hemidiaphragm again demonstrated.  Mild lung base interstitial opacities  No pleural  effusion. No pneumothorax.    Stable cardiopericardial silhouette.      Medical Decision Makin. Essential hypertension  Poorly controlled.   -continue triamterene/hctz 37.5/25 once a day (although if labs show dehydration this may need to be discontinued)  -continue losartan 50mg PO bid  -increased hydralazine to 50mg PO tid previously.   -start coreg 3.125mg PO bid, uptitrate as tolerated, and we can try and decrease hydralazine. Will avoid CCBs due to baseline LE swelling. Will avoid further diuretics as she already feels like she's peeing too much and she is at risk of dehydration.   -BP check in two weeks, advised she get an arm cuff at home.     2. Dyslipidemia  No clinical CVD, however stable  -continue lipitor 20mg PO Daily  -no current clinical indication for aspirin for primary prevention.    3. Complete heart block by electrocardiogram (HCC)  S/p pacemaker.  -f/u with Anahi Gan    4. Pulmonary hypertension (HCC)  Moderate (PASP 54mmHg by my read) and in the setting of severe hypertension during echo (SBP 180s). No current symptoms and likely PASP is normal when BP well controlled  -continue hypertension control  -removed pulmonary hypertension from problem list.   -repeat echo when normotensive if dyspnea occurs.    5. CKD (chronic kidney disease), stage III (HCC)  Stable  -recheck BMP along with CBC due to h/o anemia.     6. Stress - she is under a lot of stress due to her 's health. Discussed previously a therapy referral (again today) however she does not have consistent transportation and does not want top use computers at home.     7. Tachycardia - concerning for dehydration. EKG personally reviewed today and showed sinus rhythm with a heart rate of 98. Could also be anxiety and stress.   -check CBC, CMP, TSH  -BP check and HR check in 2 weeks.   -ED precautions discussed.     8. Goals of Care -   -given advanced directive form. GOC conversation in the future.       Return in about 6  months (around 5/2/2022).      Bird Becker MD, [facility], [address]  [address] [address]  Phone: [phone]  Fax: [phone]

## 2021-11-02 NOTE — PATIENT INSTRUCTIONS
Please start carvedilol 3.125mg twice a day.     Please come back in 1-2 weeks for a blood pressure check.     Please get non-fasting blood tests at your convenience.

## 2021-11-04 LAB — EKG IMPRESSION: NORMAL

## 2021-11-04 PROCEDURE — 93000 ELECTROCARDIOGRAM COMPLETE: CPT | Performed by: INTERNAL MEDICINE

## 2021-11-17 ENCOUNTER — NON-PROVIDER VISIT (OUTPATIENT)
Dept: CARDIOLOGY | Facility: MEDICAL CENTER | Age: 81
End: 2021-11-17
Payer: MEDICARE

## 2021-11-17 ENCOUNTER — TELEPHONE (OUTPATIENT)
Dept: CARDIOLOGY | Facility: MEDICAL CENTER | Age: 81
End: 2021-11-17

## 2021-11-17 VITALS — HEART RATE: 83 BPM | DIASTOLIC BLOOD PRESSURE: 82 MMHG | SYSTOLIC BLOOD PRESSURE: 160 MMHG | OXYGEN SATURATION: 94 %

## 2021-11-17 DIAGNOSIS — I10 ESSENTIAL HYPERTENSION: ICD-10-CM

## 2021-11-17 PROCEDURE — 99999 PR NO CHARGE: CPT | Performed by: INTERNAL MEDICINE

## 2021-11-17 NOTE — Clinical Note
Patient was seen for a BP check per Dr. Becker. Blood pressure was check on both the left and right arm. Patient did not bring home BP cuff to the visit. Vitals were given to Balwinder RO. Balwinder states patient was okay to go home and will communicate with her with any concerns or recommendations after reviewing with Dr. Becker. Notified patient walked to check out.

## 2021-11-17 NOTE — TELEPHONE ENCOUNTER
----- Message from Marilee Bower, Sebastian Ass't sent at 11/17/2021 10:36 AM PST -----  Patient was seen for a BP check per Dr. Becker. Blood pressure was check on both the left and right arm. Patient did not bring home BP cuff to the visit. Vitals were given to Balwinder RO. Balwinder states patient was okay to go home and will communicate with her with any concerns or recommendations after reviewing with Dr. Becker. Notified patient walked to check out.

## 2021-11-19 RX ORDER — CARVEDILOL 6.25 MG/1
6.25 TABLET ORAL 2 TIMES DAILY WITH MEALS
Qty: 180 TABLET | Refills: 3 | Status: SHIPPED
Start: 2021-11-19 | End: 2022-05-03

## 2021-11-19 NOTE — TELEPHONE ENCOUNTER
Spoke to ANNE and he stated to increase coreg to 6.25mg PO BID and recheck BP in 2 weeks with home BP machine.

## 2021-11-19 NOTE — TELEPHONE ENCOUNTER
"Reordered medication for updated dose.     Called patient home number and notified the patient of JM recommendations. She stated she has a whole bottle of the 3.125 and I advised she can take 2 in the morning and 2 in the evening, but a new prescription was sent in. She stated her BP this morning was 115/68. Advised she needs to make a [facility] for another BP check in 2 weeks wit her machine and she stated \"Transportation is difficult in our household\". I advised it was up to her to make the appt, but to at least keep her home log with BP and HR and let us know. Advised to take BP/HR 2 hours after medications. She stated she would do that. Answered all questions and concerns, appreciative of call.   "

## 2022-03-01 ENCOUNTER — NON-PROVIDER VISIT (OUTPATIENT)
Dept: CARDIOLOGY | Facility: MEDICAL CENTER | Age: 82
End: 2022-03-01
Payer: MEDICARE

## 2022-03-01 VITALS
BODY MASS INDEX: 23.32 KG/M2 | HEIGHT: 65 IN | DIASTOLIC BLOOD PRESSURE: 70 MMHG | SYSTOLIC BLOOD PRESSURE: 136 MMHG | RESPIRATION RATE: 14 BRPM | WEIGHT: 140 LBS | HEART RATE: 90 BPM | OXYGEN SATURATION: 94 %

## 2022-03-01 DIAGNOSIS — Z95.0 CARDIAC PACEMAKER IN SITU: ICD-10-CM

## 2022-03-01 DIAGNOSIS — I44.2 COMPLETE HEART BLOCK BY ELECTROCARDIOGRAM (HCC): ICD-10-CM

## 2022-03-01 PROCEDURE — 93280 PM DEVICE PROGR EVAL DUAL: CPT | Performed by: NURSE PRACTITIONER

## 2022-03-01 ASSESSMENT — FIBROSIS 4 INDEX: FIB4 SCORE: 3.87

## 2022-03-01 NOTE — PROGRESS NOTES
Patient has been under some stress lately, as  is in a nursing home and was recently diagnosed with pneumonia.    Device is working normally. No mode switching episodes.  Normal sensing and capture of RA and RV leads; stable impedances. Battery longevity is 10.2 years.  No changes are made today.    Follow-up in 6 months for next PM check with me.

## 2022-05-03 ENCOUNTER — OFFICE VISIT (OUTPATIENT)
Dept: CARDIOLOGY | Facility: MEDICAL CENTER | Age: 82
End: 2022-05-03
Payer: MEDICARE

## 2022-05-03 VITALS
WEIGHT: 145 LBS | DIASTOLIC BLOOD PRESSURE: 68 MMHG | BODY MASS INDEX: 24.16 KG/M2 | OXYGEN SATURATION: 93 % | HEIGHT: 65 IN | SYSTOLIC BLOOD PRESSURE: 162 MMHG | RESPIRATION RATE: 16 BRPM | HEART RATE: 43 BPM

## 2022-05-03 DIAGNOSIS — Z95.0 CARDIAC PACEMAKER IN SITU: ICD-10-CM

## 2022-05-03 DIAGNOSIS — R73.01 IMPAIRED FASTING GLUCOSE: ICD-10-CM

## 2022-05-03 DIAGNOSIS — I87.2 VENOUS INSUFFICIENCY OF BOTH LOWER EXTREMITIES: ICD-10-CM

## 2022-05-03 DIAGNOSIS — I49.3 PVC'S (PREMATURE VENTRICULAR CONTRACTIONS): ICD-10-CM

## 2022-05-03 DIAGNOSIS — E78.5 DYSLIPIDEMIA: ICD-10-CM

## 2022-05-03 DIAGNOSIS — N18.32 STAGE 3B CHRONIC KIDNEY DISEASE: ICD-10-CM

## 2022-05-03 DIAGNOSIS — I10 ESSENTIAL HYPERTENSION: ICD-10-CM

## 2022-05-03 DIAGNOSIS — I27.20 PULMONARY HYPERTENSION (HCC): ICD-10-CM

## 2022-05-03 DIAGNOSIS — I44.2 COMPLETE HEART BLOCK BY ELECTROCARDIOGRAM (HCC): ICD-10-CM

## 2022-05-03 DIAGNOSIS — I10 ESSENTIAL HYPERTENSION, BENIGN: ICD-10-CM

## 2022-05-03 PROCEDURE — 99214 OFFICE O/P EST MOD 30 MIN: CPT | Mod: 25 | Performed by: INTERNAL MEDICINE

## 2022-05-03 PROCEDURE — 93000 ELECTROCARDIOGRAM COMPLETE: CPT | Performed by: INTERNAL MEDICINE

## 2022-05-03 RX ORDER — LOSARTAN POTASSIUM 50 MG/1
50 TABLET ORAL 2 TIMES DAILY
Qty: 180 TABLET | Refills: 3 | Status: SHIPPED | OUTPATIENT
Start: 2022-05-03 | End: 2023-05-25

## 2022-05-03 RX ORDER — CARVEDILOL 6.25 MG/1
6.25 TABLET ORAL 2 TIMES DAILY WITH MEALS
Qty: 180 TABLET | Refills: 3 | Status: SHIPPED
Start: 2022-05-03 | End: 2022-07-28

## 2022-05-03 RX ORDER — ATORVASTATIN CALCIUM 20 MG/1
20 TABLET, FILM COATED ORAL
Qty: 90 TABLET | Refills: 3 | Status: SHIPPED | OUTPATIENT
Start: 2022-05-03 | End: 2023-06-14 | Stop reason: SDUPTHER

## 2022-05-03 RX ORDER — HYDRALAZINE HYDROCHLORIDE 50 MG/1
50 TABLET, FILM COATED ORAL 3 TIMES DAILY
Qty: 270 TABLET | Refills: 3 | Status: SHIPPED
Start: 2022-05-03 | End: 2022-06-03

## 2022-05-03 ASSESSMENT — FIBROSIS 4 INDEX
FIB4 SCORE: 3.87
FIB4 SCORE: 3.87

## 2022-05-03 NOTE — PROGRESS NOTES
Cardiology Follow-up Consultation Note    Date of note:    5/3/2022  Primary Care Provider: Beth Friedman M.D.  Referring Provider: Dr. Erik Woodall    Patient Name: Priscilla Hdz   YOB: 1940  MRN:              [id number]    Chief Complaint: bradycardia    History of Present Illness: Priscilla Hdz is a 82 y.o. female whose current medical problems include complete heart block s/p dual chamber pacemaker 6/20/2019 with RV lead repositioning 8/9/2019, hypertension, dyslipidemia  who is here for follow-up.    At our visit, 10/11/2019:   having multiple medical problems, current in the hospital at [facility].     In terms of hypertension, poorly controlled.     Walks in a walker after falls and hip replacement x 2.     At our visit, 10/5/2020:  BP this morning in the 130s. Sometimes in the 140s as well.     She is here today with her Son Lg. Still significant stress from her 's medical care.     Dyslipidemia well controlled.     At our visit, 11/2/2021:  Continues to have stress from  being in and out of nursing homes and the hospital.     BP at home is in the 120s-150s.     Pacemaker functioning well. Has episodes of up to 9 beats of non-sustained VT, asymptomatic.    She presents today with [employer] who gave her a ride today.      Interim Events:  BP this morning was 120s/70s. Usually 110s-140s.    Feet and ankles continue to be swollen. Weight up around 5 pounds. No SOB. Walks with walker, does not know how far she can walk.     No palpitations, no chest pain.     Significant stress from  being sick.       ROSunchanged leg swelling.     Constitution: Negative for chills, fever and night sweats.   HENT: Negative for nosebleeds.    Eyes: Negative for vision loss in left eye and vision loss in right eye.   Respiratory: Negative for hemoptysis.    Gastrointestinal: Negative for hematemesis, hematochezia and melena.   Genitourinary: Negative for hematuria.    Neurological: Negative for focal weakness, numbness and paresthesias.     All other systems reviewed and discussed using a comprehensive questionnaire and are negative.       Past Medical History:   Diagnosis Date   • Arthritis     hip, knee   • ASTHMA    • AV block, complete (HCC) 06/2019    Status post pacemaker implantation   • Cataract     Bilateral IOL   • Hyperlipidemia    • Hypertension    • Menopause    • Osteopenia    • Pedal edema          Past Surgical History:   Procedure Laterality Date   • PACEMAKER INSERTION Left 06/20/2019    Medtronic [place] S DR MRI [id number] implanted by Dr. Mcginnis.   • HIP REVISION TOTAL Right 8/29/2018    Procedure: HIP REVISION TOTAL;  Surgeon: Oniel Goodson M.D.;  Location: SURGERY [facility];  Service: Orthopedics   • HIP ARTHROPLASTY TOTAL Right 8/9/2016    Procedure: HIP ARTHROPLASTY TOTAL;  Surgeon: Oniel Goodson M.D.;  Location: SURGERY [facility];  Service:    • CATARACT EXTRACTION WITH IOL Bilateral 2012         Current Outpatient Medications   Medication Sig Dispense Refill   • carvedilol (COREG) 6.25 MG Tab Take 1 Tablet by mouth 2 times a day with meals. 180 Tablet 3   • hydrALAZINE (APRESOLINE) 50 MG Tab Take 1 Tablet by mouth 3 times a day. 270 Tablet 3   • losartan (COZAAR) 50 MG Tab Take 1 Tablet by mouth 2 times a day. 180 Tablet 3   • atorvastatin (LIPITOR) 20 MG Tab Take 1 Tablet by mouth every day. 90 Tablet 3   • triamterene/hctz (MAXZIDE-25/DYAZIDE) 37.5-25 MG Cap Take 1 capsule by mouth every morning. 90 capsule 3     No current facility-administered medications for this visit.         Allergies   Allergen Reactions   • Other Environmental Cough     Pine, Dust         Family History   Problem Relation Age of Onset   • Heart Disease Father    • Hyperlipidemia Father    • Hypertension Father    • Heart Disease Brother    • Hypertension Mother    • Hyperlipidemia Mother          Social History     Socioeconomic History   • Marital  "status:      Spouse name: Not on file   • Number of children: Not on file   • Years of education: Not on file   • Highest education level: Not on file   Occupational History   • Not on file   Tobacco Use   • Smoking status: Never Smoker   • Smokeless tobacco: Never Used   Vaping Use   • Vaping Use: Never used   Substance and Sexual Activity   • Alcohol use: Yes     Alcohol/week: 0.6 oz     Types: 1 Glasses of wine per week     Comment: once every 2-3 months.    • Drug use: No   • Sexual activity: Not Currently     Partners: Male   Other Topics Concern   • Not on file   Social History Narrative    Retired , [employer].      Social Determinants of Health     Financial Resource Strain: Not on file   Food Insecurity: Not on file   Transportation Needs: Not on file   Physical Activity: Not on file   Stress: Not on file   Social Connections: Not on file   Intimate Partner Violence: Not on file   Housing Stability: Not on file         Physical Exam:  Ambulatory Vitals  BP (!) 162/68 (BP Location: Left arm, Patient Position: Sitting, BP Cuff Size: Adult)   Pulse (!) 43   Resp 16   Ht 1.651 m (5' 5\")   Wt 65.8 kg (145 lb)   SpO2 93%    Oxygen Therapy:     BP Readings from Last 4 Encounters:   05/03/22 (!) 162/68   03/01/22 136/70   11/17/21 160/82   11/02/21 (!) 162/90       Weight/BMI: Body mass index is 24.13 kg/m².  Wt Readings from Last 4 Encounters:   05/03/22 65.8 kg (145 lb)   03/01/22 63.5 kg (140 lb)   11/02/21 63.5 kg (140 lb)   08/31/21 63.5 kg (140 lb)     General: No apparent distress  Eyes: nl conjunctiva  ENT: OP covered by mask.   Neck: JVP <8 cm H2O  Lungs: normal respiratory effort, CTAB  Heart: bradycardic, regular no murmurs, no rubs or gallops, 2-3+ edema bilateral lower extremities. No LV/RV heave on cardiac palpatation. 2+ bilateral radial pulses.  Well healed pacemaker insertion site.   Abdomen: soft, non tender, non distended, no masses, normal bowel sounds.  No " HSM.  Extremities/MSK: no clubbing, no cyanosis  Neurological: No focal sensory deficits  Psychiatric: Appropriate affect, A/O x 3, intact judgement and insight  Skin: Warm extremities    Exam repeated in full and unchanged except for as noted above.        Lab Data Review:  Lab Results   Component Value Date/Time    CHOLSTRLTOT 173 08/09/2018 09:17 AM     (H) 08/09/2018 09:17 AM    HDL 41 08/09/2018 09:17 AM    TRIGLYCERIDE 129 08/09/2018 09:17 AM       Lab Results   Component Value Date/Time    SODIUM 143 11/02/2021 02:33 PM    POTASSIUM 4.2 11/02/2021 02:33 PM    CHLORIDE 107 11/02/2021 02:33 PM    CO2 24 11/02/2021 02:33 PM    GLUCOSE 93 11/02/2021 02:33 PM    BUN 19 11/02/2021 02:33 PM    CREATININE 0.89 11/02/2021 02:33 PM    CREATININE 0.77 01/29/2013 07:49 AM    BUNCREATRAT 19 03/09/2016 09:37 AM    BUNCREATRAT 23 01/29/2013 07:49 AM    GLOMRATE >59 11/10/2010 09:26 AM     Lab Results   Component Value Date/Time    ALKPHOSPHAT 80 11/02/2021 02:33 PM    ASTSGOT 28 11/02/2021 02:33 PM    ALTSGPT 10 11/02/2021 02:33 PM    TBILIRUBIN 0.3 11/02/2021 02:33 PM      Lab Results   Component Value Date/Time    WBC 7.3 11/02/2021 02:33 PM     No components found for: HBGA1C  No components found for: TROPONIN  No results found for: BNP      Cardiac Imaging and Procedures Review:    EKG dated 8/11/2019:  ATRIAL-SENSED VENTRICULAR-PACED COMPLEXES   NO FURTHER ANALYSIS ATTEMPTED DUE TO PACED RHYTHM     Echo dated 6/19/2019:  Left Ventricle  Normal left ventricular chamber size. Normal left ventricular wall   thickness. Hyperdynamic left ventricular systolic function. Left   ventricular ejection fraction is visually estimated to be greater than   75%. Normal regional wall motion. Diastolic function is difficult to   assess.    Right Ventricle  The right ventricle was normal in size and function.    Right Atrium  The right atrium is normal in size.  Normal inferior vena cava size   without inspiratory  collapse.    Left Atrium  The left atrium is normal in size.  Left atrial volume index is 17   mL/sq m.    Mitral Valve  Mitral annular calcification. No mitral stenosis. Mild mitral   regurgitation.    Aortic Valve  Tricuspid aortic valve. Aortic sclerosis without stenosis. No aortic   insufficiency.    Tricuspid Valve  Structurally normal tricuspid valve without significant stenosis. Mild   tricuspid regurgitation. Estimated right ventricular systolic pressure    is 60 mmHg. Right atrial pressure is estimated to be 8 mmHg.    Pulmonic Valve  The pulmonic valve is not well visualized. No stenosis or regurgitation   seen.    Pericardium  Normal pericardium without effusion.    Aorta  The aortic root is normal.  Ascending aorta diameter is 2.8 cm.    By my personal review, RVSP 54mmHg. Nl RV size and systolic function, normal LV systolic and diastolic function. No significant valvular disease.     2016 TTE:  CONCLUSIONS  No prior study is available for comparison.   Normal left ventricular systolic function.  Left ventricular ejection fraction is visually estimated to be 65%.  Right heart pressures are normal.  No significant valve abnormalities.     Stress test 2016:  Myocardial Perfusion   Report   NUCLEAR IMAGING INTERPRETATION   Normal left ventricular size, ejection fraction, and wall motion.   No evidence of significant jeopardized viable myocardium or prior myocardial    infarction.   ECG INTERPRETATION   Negative stress ECG for ischemia.    Radiology test Review:  CXR:   Tubes and lines: Left transvenous electrodes project over the right atrium and right ventricle.    Elevated right hemidiaphragm again demonstrated.  Mild lung base interstitial opacities  No pleural effusion. No pneumothorax.    Stable cardiopericardial silhouette.      Medical Decision Makin. Essential hypertension  Poorly controlled.   -continue triamterene/hctz 37.5/25 once a day (although if labs show dehydration this may need to  be discontinued)  -continue losartan 50mg PO bid  -increased hydralazine to 50mg PO tid previously.   -increase coreg to 6.25mg PO biduptitrate as tolerated, and we can try and decrease hydralazine. Will avoid CCBs due to baseline LE swelling. Will avoid further diuretics as she already feels like she's peeing too much and she is at risk of dehydration.   -BP checks at home, asked her to call or send us via [employer] her readings in a week.     2. Dyslipidemia  No clinical CVD, however stable  -continue lipitor 20mg PO Daily  -no current clinical indication for aspirin for primary prevention.    3. Complete heart block by electrocardiogram (HCC)  S/p pacemaker.  -f/u with Anahi Gan    4. Pulmonary hypertension (HCC)  Moderate (PASP 54mmHg by my read) and in the setting of severe hypertension during echo (SBP 180s). No current symptoms and likely PASP is normal when BP well controlled  -continue hypertension control  -removed pulmonary hypertension from problem list.   -repeat echo when normotensive if dyspnea occurs.    5. CKD (chronic kidney disease), stage III (HCC)  Stable  -recheck BMP along with CBC due to h/o anemia.     6. Stress - she is under a lot of stress due to her 's health. Previously discussed a therapy referral however she does not have consistent transportation and does not want top use computers at home.     7. Tachycardia - resolved.     8. Goals of Care -   -given advanced directive form. GOC conversation in the future.     9. Leg swelling - significant, likely venous insufficiency, but will rule out heart failure  -echo  -compression stockings  -leg elevation.     Return in about 3 months (around 8/3/2022). with AB.       Bird Becker MD, [facility]  [facility], [address]  [address] [address]  Phone: [phone]  Fax: [phone]

## 2022-05-03 NOTE — PATIENT INSTRUCTIONS
Please check your blood pressure at home once or twice a day for the next week and send us your blood pressure readings (or call us).     Please keep your legs elevated and wear your compression stockings.     Please schedule your echocardiogram.      Please get fasting blood tests.

## 2022-05-04 LAB — EKG IMPRESSION: NORMAL

## 2022-05-28 ENCOUNTER — HOSPITAL ENCOUNTER (OUTPATIENT)
Dept: LAB | Facility: MEDICAL CENTER | Age: 82
End: 2022-05-28
Attending: INTERNAL MEDICINE
Payer: MEDICARE

## 2022-05-28 DIAGNOSIS — I49.3 PVC'S (PREMATURE VENTRICULAR CONTRACTIONS): ICD-10-CM

## 2022-05-28 DIAGNOSIS — I27.20 PULMONARY HYPERTENSION (HCC): ICD-10-CM

## 2022-05-28 DIAGNOSIS — N18.32 STAGE 3B CHRONIC KIDNEY DISEASE: ICD-10-CM

## 2022-05-28 DIAGNOSIS — R73.01 IMPAIRED FASTING GLUCOSE: ICD-10-CM

## 2022-05-28 DIAGNOSIS — I10 ESSENTIAL HYPERTENSION, BENIGN: ICD-10-CM

## 2022-05-28 LAB
ANION GAP SERPL CALC-SCNC: 11 MMOL/L (ref 7–16)
BUN SERPL-MCNC: 45 MG/DL (ref 8–22)
CALCIUM SERPL-MCNC: 9.8 MG/DL (ref 8.5–10.5)
CHLORIDE SERPL-SCNC: 107 MMOL/L (ref 96–112)
CHOLEST SERPL-MCNC: 156 MG/DL (ref 100–199)
CO2 SERPL-SCNC: 23 MMOL/L (ref 20–33)
CREAT SERPL-MCNC: 1.1 MG/DL (ref 0.5–1.4)
EST. AVERAGE GLUCOSE BLD GHB EST-MCNC: 126 MG/DL
GFR SERPLBLD CREATININE-BSD FMLA CKD-EPI: 49 ML/MIN/1.73 M 2
GLUCOSE SERPL-MCNC: 100 MG/DL (ref 65–99)
HBA1C MFR BLD: 6 % (ref 4–5.6)
HDLC SERPL-MCNC: 49 MG/DL
LDLC SERPL CALC-MCNC: 90 MG/DL
MAGNESIUM SERPL-MCNC: 2.3 MG/DL (ref 1.5–2.5)
POTASSIUM SERPL-SCNC: 4.7 MMOL/L (ref 3.6–5.5)
SODIUM SERPL-SCNC: 141 MMOL/L (ref 135–145)
TRIGL SERPL-MCNC: 87 MG/DL (ref 0–149)

## 2022-05-28 PROCEDURE — 83036 HEMOGLOBIN GLYCOSYLATED A1C: CPT | Mod: GA

## 2022-05-28 PROCEDURE — 80048 BASIC METABOLIC PNL TOTAL CA: CPT

## 2022-05-28 PROCEDURE — 83735 ASSAY OF MAGNESIUM: CPT

## 2022-05-28 PROCEDURE — 36415 COLL VENOUS BLD VENIPUNCTURE: CPT

## 2022-05-28 PROCEDURE — 80061 LIPID PANEL: CPT

## 2022-06-03 ENCOUNTER — TELEPHONE (OUTPATIENT)
Dept: CARDIOLOGY | Facility: MEDICAL CENTER | Age: 82
End: 2022-06-03
Payer: MEDICARE

## 2022-06-03 RX ORDER — HYDRALAZINE HYDROCHLORIDE 100 MG/1
100 TABLET, FILM COATED ORAL 2 TIMES DAILY
COMMUNITY
End: 2023-08-18

## 2022-06-04 NOTE — TELEPHONE ENCOUNTER
Called pt to review MD recommendations.  She verbalizes understanding and states no other concerns or questions at this time.  Christopher states she will contact this office when prescription will need to be sent as she just picked up a refill of Hydralazine.  She is appreciative of information given.    Medication list updated.

## 2022-06-04 NOTE — TELEPHONE ENCOUNTER
Labs reviewed and stable except mild worsening of renal function likely due to dehydration. Let's have her stop her triamterine/hctz and increase her hydralazine to 100mg PO tid. She should monitor blood pressures twice a day and get back to us a week after the change. Thanks!

## 2022-06-17 ENCOUNTER — HOSPITAL ENCOUNTER (OUTPATIENT)
Dept: RADIOLOGY | Facility: MEDICAL CENTER | Age: 82
End: 2022-06-17
Attending: NURSE PRACTITIONER
Payer: MEDICARE

## 2022-06-17 ENCOUNTER — HOSPITAL ENCOUNTER (OUTPATIENT)
Dept: RADIOLOGY | Facility: MEDICAL CENTER | Age: 82
End: 2022-06-17
Attending: NURSE PRACTITIONER | Admitting: NURSE PRACTITIONER
Payer: MEDICARE

## 2022-06-17 VITALS — OXYGEN SATURATION: 92 % | HEART RATE: 100 BPM

## 2022-06-17 DIAGNOSIS — M54.16 LUMBAR RADICULOPATHY: ICD-10-CM

## 2022-06-17 DIAGNOSIS — M21.371 RIGHT FOOT DROP: ICD-10-CM

## 2022-06-17 PROCEDURE — 72110 X-RAY EXAM L-2 SPINE 4/>VWS: CPT

## 2022-06-17 PROCEDURE — 72148 MRI LUMBAR SPINE W/O DYE: CPT | Mod: MH

## 2022-06-17 PROCEDURE — 72146 MRI CHEST SPINE W/O DYE: CPT | Mod: MH

## 2022-06-17 NOTE — PROGRESS NOTES
MRI NURSING NOTE:      Epic Implant tab confirms leads and can are MRI conditional. Pt denies abandoned device(s) &/or lead(s). Pt educated when to alert MRI tech/Imaging RN. Pt vu.  Pt tolerated MRI T & L spine s contrast well s issue(s). Setting changes done via [employer] Deja s Rep.  Medtronic Pacer set to  per Medtr[employer] Deja.  HR, EKG, BP, pulse ox monitored during scan.  3+ bilat pedal edema noted - per Pt and son, MD aware and this is normal for Pt.    Pre-mri settings restored p MRI scan per [employer] Deja.   Pt and son, nikia Barnard. VU.      No printed report received from [employer] for this appt.    Patient taken to X Ray via w/c after Pre MRI pacer settings restored.

## 2022-06-28 ENCOUNTER — TELEPHONE (OUTPATIENT)
Dept: CARDIOLOGY | Facility: MEDICAL CENTER | Age: 82
End: 2022-06-28
Payer: MEDICARE

## 2022-06-28 NOTE — TELEPHONE ENCOUNTER
Called patient home number and spoke to the patient. Last few days staying the same, dont have compression stockings on today, but has been. Advised to start elevating legs.   6-8 glasses of water per day, usually drinks 30 ounces per day. Has SOB, when walking around.   Taking medications as prescribed.   Last BP reading 06/27 134/72.   Unsure about weight gain.   Knees down swollen, no redness or rash, no pain.   Weighing every morning is difficult for her due to walker and unable to bend down.   Advised to monitor swelling, will notify covering provider ANNE out of office and will let her know recommendations. Answered all questions and concerns, appreciative of call.     Routed to JOHN.

## 2022-06-28 NOTE — TELEPHONE ENCOUNTER
ANNE    Caller: Priscilla Hdz    Topic/issue: EDEMA    Patient states that her legs have started to swell over the last couple of days. Patient would like to know if there any suggestions to get swelling reduced. Please advise.    Thank you,  Chucho ESTRADA    Callback Number: [phone] (home)

## 2022-06-29 NOTE — TELEPHONE ENCOUNTER
Called patient home number and spoke to the patient.   Swelling has gone down, she will elevate legs and use compression stockings and let us know in a few days symptoms. Advised we may need to start another diuretic is swelling continues. Answered all questions and concerns, appreciative of call.

## 2022-07-24 ENCOUNTER — NON-PROVIDER VISIT (OUTPATIENT)
Dept: CARDIOLOGY | Facility: MEDICAL CENTER | Age: 82
End: 2022-07-24
Payer: MEDICARE

## 2022-07-24 PROCEDURE — 93294 REM INTERROG EVL PM/LDLS PM: CPT | Performed by: INTERNAL MEDICINE

## 2022-07-25 ENCOUNTER — TELEPHONE (OUTPATIENT)
Dept: CARDIOLOGY | Facility: MEDICAL CENTER | Age: 82
End: 2022-07-25
Payer: MEDICARE

## 2022-07-25 DIAGNOSIS — I47.10 PAROXYSMAL SUPRAVENTRICULAR TACHYCARDIA (HCC): ICD-10-CM

## 2022-07-25 NOTE — TELEPHONE ENCOUNTER
Patient transmitted 07/23/2022    Pt had episode NSVT (VRate: 167 bpm) on 07/17/2022 at 1:57 pm lasting 35 seconds.    To be scanned into media.

## 2022-07-25 NOTE — CARDIAC REMOTE MONITOR - SCAN
Device transmission reviewed. Device demonstrated appropriate function.   Some NSVT    Electronically Signed by: Erik Woodall M.D.    7/28/2022  8:35 AM

## 2022-07-28 RX ORDER — METOPROLOL TARTRATE 75 MG/1
75 TABLET, FILM COATED ORAL 2 TIMES DAILY
Qty: 180 TABLET | Refills: 3 | Status: SHIPPED | OUTPATIENT
Start: 2022-07-28 | End: 2023-08-18

## 2022-07-28 NOTE — TELEPHONE ENCOUNTER
Let's check and see if there was any symptoms. I would switch her from coreg 6.25mg PO bid to metoprolol tartrate 75mg PO bid to decrease potential future events. F/u as planned and let's do a remote interrogation in 1 month to check in. Thanks!

## 2022-07-28 NOTE — TELEPHONE ENCOUNTER
Called patient home number and spoke to the patient.   Denies any symptoms during episode.   Relayed JM recommendations, she is unable to  medications today, advised to continue Coreg until she picks up metoprolol and then stop the Coreg once she starts metoprolol, she repeated instructions back. Confirmed appt with AB on 09/06/22 for pacer and FV. She stated her pacer transmissions were automatic, she doesn't do anything with it.  Answered all questions and concerns, appreciative of call.    RX ordered.      Routed to device clinic.

## 2022-08-10 ENCOUNTER — TELEPHONE (OUTPATIENT)
Dept: SCHEDULING | Facility: IMAGING CENTER | Age: 82
End: 2022-08-10

## 2022-08-10 NOTE — TELEPHONE ENCOUNTER
Outcome: Left Message    [facility] [phone] when patient returns call.      Attempt # 1

## 2022-09-06 ENCOUNTER — NON-PROVIDER VISIT (OUTPATIENT)
Dept: CARDIOLOGY | Facility: MEDICAL CENTER | Age: 82
End: 2022-09-06
Payer: MEDICARE

## 2022-09-06 ENCOUNTER — OFFICE VISIT (OUTPATIENT)
Dept: CARDIOLOGY | Facility: MEDICAL CENTER | Age: 82
End: 2022-09-06
Payer: MEDICARE

## 2022-09-06 VITALS
BODY MASS INDEX: 24.09 KG/M2 | HEIGHT: 65 IN | RESPIRATION RATE: 16 BRPM | WEIGHT: 144.6 LBS | OXYGEN SATURATION: 92 % | HEART RATE: 78 BPM | SYSTOLIC BLOOD PRESSURE: 132 MMHG | DIASTOLIC BLOOD PRESSURE: 60 MMHG

## 2022-09-06 VITALS
SYSTOLIC BLOOD PRESSURE: 132 MMHG | HEART RATE: 78 BPM | WEIGHT: 144.6 LBS | RESPIRATION RATE: 16 BRPM | HEIGHT: 65 IN | BODY MASS INDEX: 24.09 KG/M2 | DIASTOLIC BLOOD PRESSURE: 60 MMHG | OXYGEN SATURATION: 92 %

## 2022-09-06 DIAGNOSIS — F41.9 ANXIETY: ICD-10-CM

## 2022-09-06 DIAGNOSIS — I44.2 COMPLETE HEART BLOCK BY ELECTROCARDIOGRAM (HCC): ICD-10-CM

## 2022-09-06 DIAGNOSIS — R60.0 PEDAL EDEMA: ICD-10-CM

## 2022-09-06 DIAGNOSIS — Z95.0 CARDIAC PACEMAKER IN SITU: ICD-10-CM

## 2022-09-06 DIAGNOSIS — E78.5 DYSLIPIDEMIA: ICD-10-CM

## 2022-09-06 DIAGNOSIS — I10 ESSENTIAL HYPERTENSION: ICD-10-CM

## 2022-09-06 DIAGNOSIS — N18.32 STAGE 3B CHRONIC KIDNEY DISEASE: ICD-10-CM

## 2022-09-06 DIAGNOSIS — I87.2 VENOUS INSUFFICIENCY OF BOTH LOWER EXTREMITIES: ICD-10-CM

## 2022-09-06 PROCEDURE — 99214 OFFICE O/P EST MOD 30 MIN: CPT | Mod: 25 | Performed by: NURSE PRACTITIONER

## 2022-09-06 PROCEDURE — 93280 PM DEVICE PROGR EVAL DUAL: CPT | Performed by: NURSE PRACTITIONER

## 2022-09-06 RX ORDER — FUROSEMIDE 20 MG/1
20 TABLET ORAL DAILY
Qty: 30 TABLET | Refills: 6 | Status: SHIPPED
Start: 2022-09-06 | End: 2023-08-18

## 2022-09-06 RX ORDER — POTASSIUM CHLORIDE 750 MG/1
10 CAPSULE, EXTENDED RELEASE ORAL DAILY
Qty: 30 CAPSULE | Refills: 6 | Status: SHIPPED
Start: 2022-09-06 | End: 2023-08-18

## 2022-09-06 ASSESSMENT — ENCOUNTER SYMPTOMS
MYALGIAS: 0
BACK PAIN: 1
COUGH: 0
NAUSEA: 0
ORTHOPNEA: 0
ABDOMINAL PAIN: 0
DIZZINESS: 0
PALPITATIONS: 0
HEADACHES: 0
SHORTNESS OF BREATH: 0
FEVER: 0
LOSS OF CONSCIOUSNESS: 0
PND: 0
INSOMNIA: 0
BRUISES/BLEEDS EASILY: 0
CHILLS: 0

## 2022-09-06 ASSESSMENT — FIBROSIS 4 INDEX
FIB4 SCORE: 3.87
FIB4 SCORE: 3.87

## 2022-09-06 NOTE — PROGRESS NOTES
Chief Complaint   Patient presents with    Follow-Up    Pacemaker Check/Dysfunction    AV Block Complete    HTN (Controlled)    Hyperlipidemia    Chronic Kidney Disease    Edema       Subjective     Priscilla Hdz is a 82 y.o. female who presents today for three month follow-up for PM check, AV block, HTN, hyperlipidemia, CKD and LE edema.    Priscilla is a 82 year old female with history of high AV block with PM since 2019, HTN (hard to control), hyperlipidemia, CKD and LE edema, normally followed by Dr. Becker, and last seen in June 2022. At that time, her BB was increased for better BP control; Triamterene/HCTZ was stopped due to declining renal function.    She is here today for three month follow-up. BP at home runs 120-130 systolic; she does admit to being quite stressed because her  is now in a nursing home, and his cognitive ability is declining. She denies any chest pain, pressure or discomfort; no palpitations; no shortness of breath, orthopnea or PND; no dizziness or syncope; LE edema seems to have worsened.     Past Medical History:   Diagnosis Date    Arthritis     hip, knee    ASTHMA     AV block, complete (HCC) 06/2019    Status post pacemaker implantation    Cataract     Bilateral IOL    Hyperlipidemia     Hypertension     Menopause     Osteopenia     Pedal edema      Past Surgical History:   Procedure Laterality Date    PACEMAKER INSERTION Left 06/20/2019    Medtronic [place] S  MRI [id number] implanted by Dr. Mcginnis.    HIP REVISION TOTAL Right 8/29/2018    Procedure: HIP REVISION TOTAL;  Surgeon: Oniel Goodson M.D.;  Location: [facility];  Service: Orthopedics    HIP ARTHROPLASTY TOTAL Right 8/9/2016    Procedure: HIP ARTHROPLASTY TOTAL;  Surgeon: Oniel Goodson M.D.;  Location: SURGERY [facility];  Service:     CATARACT EXTRACTION WITH IOL Bilateral 2012     Family History   Problem Relation Age of Onset    Heart Disease Father     Hyperlipidemia Father      Hypertension Father     Heart Disease Brother     Hypertension Mother     Hyperlipidemia Mother      Social History     Socioeconomic History    Marital status:      Spouse name: Not on file    Number of children: Not on file    Years of education: Not on file    Highest education level: Not on file   Occupational History    Not on file   Tobacco Use    Smoking status: Never    Smokeless tobacco: Never   Vaping Use    Vaping Use: Never used   Substance and Sexual Activity    Alcohol use: Yes     Alcohol/week: 0.6 oz     Types: 1 Glasses of wine per week     Comment: once every 2-3 months.     Drug use: No    Sexual activity: Not Currently     Partners: Male   Other Topics Concern    Not on file   Social History Narrative    Retired , [employer] [place].      Social Determinants of Health     Financial Resource Strain: Not on file   Food Insecurity: Not on file   Transportation Needs: Not on file   Physical Activity: Not on file   Stress: Not on file   Social Connections: Not on file   Intimate Partner Violence: Not on file   Housing Stability: Not on file     Allergies   Allergen Reactions    Other Environmental Cough     [place], Dust     Outpatient Encounter Medications as of 9/6/2022   Medication Sig Dispense Refill    furosemide (LASIX) 20 MG Tab Take 1 Tablet by mouth every day. 30 Tablet 6    potassium chloride (MICRO-K) 10 MEQ capsule Take 1 Capsule by mouth every day. 30 Capsule 6    Metoprolol Tartrate 75 MG Tab Take 75 mg by mouth 2 times a day. 180 Tablet 3    hydrALAZINE (APRESOLINE) 100 MG tablet Take 100 mg by mouth 2 times a day. Dose increase 6/3/2022      losartan (COZAAR) 50 MG Tab Take 1 Tablet by mouth 2 times a day. 180 Tablet 3    atorvastatin (LIPITOR) 20 MG Tab Take 1 Tablet by mouth every day. 90 Tablet 3     No facility-administered encounter medications on file as of 9/6/2022.     Review of Systems   Constitutional:  Positive for malaise/fatigue. Negative for chills and  "fever.   HENT:  Negative for congestion.    Respiratory:  Negative for cough and shortness of breath.    Cardiovascular:  Positive for leg swelling. Negative for chest pain, palpitations, orthopnea and PND.   Gastrointestinal:  Negative for abdominal pain and nausea.   Musculoskeletal:  Positive for back pain. Negative for myalgias.   Skin:  Negative for rash.   Neurological:  Negative for dizziness, loss of consciousness and headaches.   Endo/Heme/Allergies:  Does not bruise/bleed easily.   Psychiatric/Behavioral:  The patient does not have insomnia.             Objective     /60 (BP Location: Left arm, Patient Position: Sitting, BP Cuff Size: Adult)   Pulse 78   Resp 16   Ht 1.651 m (5' 5\")   Wt 65.6 kg (144 lb 9.6 oz)   SpO2 92%   BMI 24.06 kg/m²     Physical Exam  Constitutional:       Appearance: She is well-developed.      Comments: Ambulates with a walker.   HENT:      Head: Normocephalic.   Neck:      Vascular: No JVD.   Cardiovascular:      Rate and Rhythm: Normal rate and regular rhythm.      Heart sounds: Normal heart sounds.      Comments: PM in left chest wall.  Pulmonary:      Effort: Pulmonary effort is normal. No respiratory distress.      Breath sounds: Normal breath sounds. No wheezing or rales.   Abdominal:      General: Bowel sounds are normal. There is no distension.      Palpations: Abdomen is soft.      Tenderness: There is no abdominal tenderness.   Musculoskeletal:         General: Normal range of motion.      Cervical back: Normal range of motion and neck supple.      Right lower leg: Edema present.      Left lower leg: Edema present.      Comments: 1-2+ edema in the feet bilaterally.   Skin:     General: Skin is warm and dry.      Findings: No rash.   Neurological:      Mental Status: She is alert and oriented to person, place, and time.     PM interrogation today reveals normal function. No mode switching episodes. No changes are made today.    CONCLUSIONS OF ECHOCARDIOGRAM OF " 6/19/2019:  Prior Echo - 8/5/16Hyperdynamic left ventricular systolic function.  Left ventricular ejection fraction is visually estimated to be greater   than 75%.  Estimated right ventricular systolic pressure  is 60 mmHg.     Lab Results   Component Value Date/Time    SODIUM 141 05/28/2022 10:38 AM    POTASSIUM 4.7 05/28/2022 10:38 AM    CHLORIDE 107 05/28/2022 10:38 AM    CO2 23 05/28/2022 10:38 AM    GLUCOSE 100 (H) 05/28/2022 10:38 AM    BUN 45 (H) 05/28/2022 10:38 AM    CREATININE 1.10 05/28/2022 10:38 AM    CREATININE 0.77 01/29/2013 07:49 AM    BUNCREATRAT 19 03/09/2016 09:37 AM    BUNCREATRAT 23 01/29/2013 07:49 AM    GLOMRATE >59 11/10/2010 09:26 AM      Lab Results   Component Value Date/Time    CHOLSTRLTOT 156 05/28/2022 10:38 AM    LDL 90 05/28/2022 10:38 AM    HDL 49 05/28/2022 10:38 AM    TRIGLYCERIDE 87 05/28/2022 10:38 AM        Lab Results   Component Value Date/Time    ASTSGOT 28 11/02/2021 02:33 PM    ALTSGPT 10 11/02/2021 02:33 PM         Assessment & Plan     1. Cardiac pacemaker in situ        2. Complete heart block by electrocardiogram (HCC)        3. Essential hypertension        4. Dyslipidemia        5. Venous insufficiency of both lower extremities  furosemide (LASIX) 20 MG Tab    potassium chloride (MICRO-K) 10 MEQ capsule    Basic Metabolic Panel      6. Pedal edema        7. Stage 3b chronic kidney disease (HCC)        8. Anxiety            Medical Decision Making: Today's Assessment/Status/Plan:      1. High AV block, with PPM, which is working normally. No changes are made today.    2. Hypertension, treated with Metoprolol 75mg BID, Losartan 50mg BID and Hydralazine 100mg BID.    3. Hyperlipidemia, treated with Lipitor 20mg. Last lipid panel was good.    4. LE venous insufficiency and edema, to try adding Lasix 20mg and KCl 10mEq once daily. Repeat BP in 2 weeks.     5. CKD, stable.    6. Anxiety/stress, due to situation with  and his declining health.    Same medications for  now, and add Lasix and KCl. Keep track of BP at home. BMP in 2 weeks. Follow-up in 6 months for next PM check.

## 2022-09-20 ENCOUNTER — HOSPITAL ENCOUNTER (OUTPATIENT)
Dept: LAB | Facility: MEDICAL CENTER | Age: 82
End: 2022-09-20
Attending: NURSE PRACTITIONER
Payer: MEDICARE

## 2022-09-20 DIAGNOSIS — I87.2 VENOUS INSUFFICIENCY OF BOTH LOWER EXTREMITIES: ICD-10-CM

## 2022-09-20 LAB
ANION GAP SERPL CALC-SCNC: 10 MMOL/L (ref 7–16)
BUN SERPL-MCNC: 32 MG/DL (ref 8–22)
CALCIUM SERPL-MCNC: 9.1 MG/DL (ref 8.5–10.5)
CHLORIDE SERPL-SCNC: 105 MMOL/L (ref 96–112)
CO2 SERPL-SCNC: 27 MMOL/L (ref 20–33)
CREAT SERPL-MCNC: 0.82 MG/DL (ref 0.5–1.4)
GFR SERPLBLD CREATININE-BSD FMLA CKD-EPI: 70 ML/MIN/1.73 M 2
GLUCOSE SERPL-MCNC: 92 MG/DL (ref 65–99)
POTASSIUM SERPL-SCNC: 3.9 MMOL/L (ref 3.6–5.5)
SODIUM SERPL-SCNC: 142 MMOL/L (ref 135–145)

## 2022-09-20 PROCEDURE — 80048 BASIC METABOLIC PNL TOTAL CA: CPT

## 2022-09-20 PROCEDURE — 36415 COLL VENOUS BLD VENIPUNCTURE: CPT

## 2022-09-26 ENCOUNTER — TELEPHONE (OUTPATIENT)
Dept: CARDIOLOGY | Facility: MEDICAL CENTER | Age: 82
End: 2022-09-26
Payer: MEDICARE

## 2022-09-26 NOTE — LETTER
September 26, 2022        Priscilla Hdz  [address] [place] [address]          Dear Priscilla,    We have received the results of your recent:    LABS    Your test came back, Labs are all stable .  Please follow up as previously discussed with your physician.      Feel free to call us with any questions.        Sincerely,          Grace Jansen, Med Ass't to TATIANNA Ordoñez  Electronically Signed

## 2022-09-26 NOTE — TELEPHONE ENCOUNTER
----- Message from Trupti Simpson R.N. sent at 9/26/2022  2:55 PM PDT -----  To MA - Please notify pt of stable lab result

## 2022-10-18 ENCOUNTER — OFFICE VISIT (OUTPATIENT)
Dept: OPHTHALMOLOGY | Facility: MEDICAL CENTER | Age: 82
End: 2022-10-18
Payer: MEDICARE

## 2022-10-18 DIAGNOSIS — G90.2 HORNER SYNDROME: ICD-10-CM

## 2022-10-18 DIAGNOSIS — Z96.1 PSEUDOPHAKIA OF BOTH EYES: ICD-10-CM

## 2022-10-18 DIAGNOSIS — I67.1 NONRUPTURED CEREBRAL ANEURYSM: ICD-10-CM

## 2022-10-18 DIAGNOSIS — H40.003 GLAUCOMA SUSPECT OF BOTH EYES: ICD-10-CM

## 2022-10-18 DIAGNOSIS — G90.2 HORNER'S SYNDROME: ICD-10-CM

## 2022-10-18 DIAGNOSIS — H02.402 PTOSIS OF LEFT EYELID: ICD-10-CM

## 2022-10-18 DIAGNOSIS — H46.9 OPTIC NEUROPATHY: ICD-10-CM

## 2022-10-18 DIAGNOSIS — H35.30 AGE-RELATED MACULAR DEGENERATION: ICD-10-CM

## 2022-10-18 PROCEDURE — 99205 OFFICE O/P NEW HI 60 MIN: CPT | Mod: 25 | Performed by: OPHTHALMOLOGY

## 2022-10-18 PROCEDURE — 92250 FUNDUS PHOTOGRAPHY W/I&R: CPT | Performed by: OPHTHALMOLOGY

## 2022-10-18 RX ORDER — HYDRALAZINE HYDROCHLORIDE 50 MG/1
TABLET, FILM COATED ORAL
COMMUNITY
End: 2022-11-07

## 2022-10-18 RX ORDER — CARVEDILOL 3.12 MG/1
TABLET ORAL
COMMUNITY
End: 2022-11-07

## 2022-10-18 ASSESSMENT — CONF VISUAL FIELD
OS_INFERIOR_TEMPORAL_RESTRICTION: 0
OD_SUPERIOR_TEMPORAL_RESTRICTION: 0
OD_SUPERIOR_NASAL_RESTRICTION: 0
OD_INFERIOR_NASAL_RESTRICTION: 0
OS_SUPERIOR_TEMPORAL_RESTRICTION: 0
OD_INFERIOR_TEMPORAL_RESTRICTION: 0
OS_SUPERIOR_NASAL_RESTRICTION: 0
OS_NORMAL: 1
OD_NORMAL: 1
OS_INFERIOR_NASAL_RESTRICTION: 0

## 2022-10-18 ASSESSMENT — REFRACTION_MANIFEST
METHOD_AUTOREFRACTION: 1
OD_CYLINDER: +0.50
OD_AXIS: 011
OS_CYLINDER: +1.00
OS_AXIS: 006
OD_SPHERE: +0.00
OS_SPHERE: +1.25

## 2022-10-18 ASSESSMENT — CUP TO DISC RATIO
OD_RATIO: 0.0
OS_RATIO: 0.0

## 2022-10-18 ASSESSMENT — SLIT LAMP EXAM - LIDS
COMMENTS: NORMAL
COMMENTS: 2+ PTOSIS

## 2022-10-18 ASSESSMENT — VISUAL ACUITY
OD_SC: J5
OS_SC: 20/30
OD_SC: 20/20
OS_SC+: -1
OD_SC+: -1
OS_SC: J5
METHOD: SNELLEN - LINEAR

## 2022-10-18 ASSESSMENT — EXTERNAL EXAM - RIGHT EYE: OD_EXAM: NORMAL

## 2022-10-18 ASSESSMENT — TONOMETRY
IOP_METHOD: I-CARE
OD_IOP_MMHG: 15
OS_IOP_MMHG: 14

## 2022-10-18 ASSESSMENT — EXTERNAL EXAM - LEFT EYE: OS_EXAM: 1+ BROW PTOSIS

## 2022-10-18 NOTE — ASSESSMENT & PLAN NOTE
10/18/2022-bilateral macular drusen in both eyes consistent with age-related macular degeneration.  OCT nerve fiber layer thickness demonstrated the drusen there was no evidence of subretinal neovascular membrane.

## 2022-10-18 NOTE — PROGRESS NOTES
Peds/Neuro Ophthalmology:   Douglas Yuen M.D.    Date & Time note created:    10/18/2022   4:25 PM     Referring MD / APRN:  Beth Friedman M.D., No att. providers found    Patient ID:  Name:             Priscilla Hdz   YOB: 1940  Age:                 82 y.o.  female   MRN:               [id number]    Chief Complaint/Reason for Visit:     Ptosis (New patient evaluation for left eye)      History of Present Illness:    Priscilla Hdz is a 82 y.o. female   New patient for ptosis evaluation for the left eye. Pt states vision is stable only wears OTC readers for fine print. Pt say Dr. Byers and was told she need a neuro evaluation prior to surgery. Pt does not have any pain or discomfort in both eyes. Pt does seem to be bothered by the left eyelid being lower than the right eye. She noticed this happen about 6 months ago. Pt denies headaches.       Review of Systems:  Review of Systems   Eyes:         Ptosis KEVIN   All other systems reviewed and are negative.    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 Mcginnis.    HIP REVISION TOTAL Right 8/29/2018    Procedure: HIP REVISION TOTAL;  Surgeon: Oniel Goodson M.D.;  Location: SURGERY [facility];  Service: Orthopedics    HIP ARTHROPLASTY TOTAL Right 8/9/2016    Procedure: HIP ARTHROPLASTY TOTAL;  Surgeon: Oniel Goodson M.D.;  Location: SURGERY [facility];  Service:     CATARACT EXTRACTION WITH IOL Bilateral 2012       Current Outpatient Medications:  Current Outpatient Medications   Medication Sig Dispense  Refill    furosemide (LASIX) 20 MG Tab Take 1 Tablet by mouth every day. 30 Tablet 6    potassium chloride (MICRO-K) 10 MEQ capsule Take 1 Capsule by mouth every day. 30 Capsule 6    Metoprolol Tartrate 75 MG Tab Take 75 mg by mouth 2 times a day. 180 Tablet 3    hydrALAZINE (APRESOLINE) 100 MG tablet Take 100 mg by mouth 2 times a day. Dose increase 6/3/2022      losartan (COZAAR) 50 MG Tab Take 1 Tablet by mouth 2 times a day. 180 Tablet 3    atorvastatin (LIPITOR) 20 MG Tab Take 1 Tablet by mouth every day. 90 Tablet 3    carvedilol (COREG) 3.125 MG Tab carvedilol 3.125 mg tablet      hydrALAZINE (APRESOLINE) 50 MG Tab hydralazine 50 mg tablet (Patient not taking: Reported on 10/18/2022)       No current facility-administered medications for this visit.       Allergies:  Allergies   Allergen Reactions    Other Environmental Cough     [place], Dust       Family History:  Family History   Problem Relation Age of Onset    Heart Disease Father     Hyperlipidemia Father     Hypertension Father     Heart Disease Brother     Hypertension Mother     Hyperlipidemia Mother        Social History:  Social History     Socioeconomic History    Marital status:      Spouse name: Not on file    Number of children: Not on file    Years of education: Not on file    Highest education level: Not on file   Occupational History    Not on file   Tobacco Use    Smoking status: Never    Smokeless tobacco: Never   Vaping Use    Vaping Use: Never used   Substance and Sexual Activity    Alcohol use: Yes     Alcohol/week: 0.6 oz     Types: 1 Glasses of wine per week     Comment: once every 2-3 months.     Drug use: No    Sexual activity: Not Currently     Partners: Male   Other Topics Concern    Not on file   Social History Narrative    Retired , [place] [employer].      Social Determinants of Health     Financial Resource Strain: Not on file   Food Insecurity: Not on file   Transportation Needs: Not on file   Physical  Activity: Not on file   Stress: Not on file   Social Connections: Not on file   Intimate Partner Violence: Not on file   Housing Stability: Not on file          Physical Exam:  Physical Exam    Oriented x 3  Weight/BMI: There is no height or weight on file to calculate BMI.  There were no vitals taken for this visit.    Base Eye Exam       Visual Acuity (Snellen - Linear)         Right Left    Dist sc 20/20 -1 20/30 -1    Dist ph sc NI NI    Near sc J5 J5              Tonometry (i-care, 1:14 PM)         Right Left    Pressure 15 14              Pupils         Dark Light React    Right 4 3 Sluggish    Left 2 2 Minimal              Visual Fields         Right Left     Full Full              Extraocular Movement         Right Left     Full, Ortho Full, Ortho              Neuro/Psych       Oriented x3: Yes    Mood/Affect: Normal              Dilation       Both eyes: able to view without dilation @ 4:17 PM                  Additional Tests       Color         Right Left    [place] 12/12 11/12              Stereo       Fly: +    Animals: 3/3    Circles: 8/9                  Slit Lamp and Fundus Exam       External Exam         Right Left    External Normal 1+ Brow ptosis              Slit Lamp Exam         Right Left    Lids/Lashes Normal 2+ Ptosis    Conjunctiva/Sclera White and quiet White and quiet    Cornea Clear Clear    Anterior Chamber Deep and quiet Deep and quiet    Iris Round and reactive Round and reactive    Lens Posterior chamber intraocular lens Posterior chamber intraocular lens    Vitreous Normal Normal              Fundus Exam         Right Left    Disc Peripapillary atrophy Peripapillary atrophy    C/D Ratio 0.0 0.0    Macula Age related macular degeneration, Early age related macular degeneration Age related macular degeneration, Early age related macular degeneration    Vessels Normal Normal    Periphery Pigmentation Pigmentation                  Refraction       Manifest Refraction (Auto)          Sphere Cylinder Axis    Right +0.00 +0.50 011    Left +1.25 +1.00 006                    Pertinent Lab/Test/Imaging Review:  Since her review of notes by other providers in epic.  Review of notes from Dr. 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 Byers for evaluation of ptosis.  Although she does have a very high lid crease and evidence of some age-related ptosis and levator dehiscence, she was also noted to have anisocoria with the left pupil being smaller.  We therefore did an apraclonidine test that after 45 minutes demonstrated a reversal of the anisocoria.  This is consistent with some aspect of left Teresita syndrome, although this would not account for her full amount of ptosis.  She has had a pacemaker implanted on the left side so this could have caused the Teresita syndrome.  However for a complete work-up of the Teresita's I  recommended MRI scan of the head, MRA of the head and neck to rule out a dissection or a carotid artery aneurysm.    Ptosis of left eyelid  10/18/2022-she was referred by Dr. Byers for progressive ptosis especially involving the left upper lid.  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  She had good orbicularis strength and her extraocular motility was full, so I see no evidence of fatigability.  However due to to be completed we also obtained acetylcholine receptor antibody testing.    Age-related macular degeneration  10/18/2022-bilateral macular drusen in both eyes consistent with age-related macular degeneration.  OCT nerve fiber layer thickness demonstrated the drusen there was no evidence of subretinal neovascular membrane.    Glaucoma suspect of both eyes  10/18/2022-she does have some bilateral peripapillary atrophy.  There is no significant cupping.  OCT nerve fiber layer thickness 93  OS    Greater than 60 minute were  spent examining the patient, reviewing records from referring providers including MRI images if available and records within the EPIC system, counselling the patient and family regarding the examination findings, diagnostic testing preformed, recommendations for management, prognosis, further testing and referrals as appropriate and follow up. This in addition to time spent interpreting separate billable tests done during the visit.      Douglas Yuen M.D.

## 2022-10-18 NOTE — ASSESSMENT & PLAN NOTE
10/18/2022-she does have some bilateral peripapillary atrophy.  There is no significant cupping.  OCT nerve fiber layer thickness 93  OS

## 2022-10-18 NOTE — ASSESSMENT & PLAN NOTE
10/18/2022-she was referred by Dr. Byers for progressive ptosis especially involving the left upper lid.  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  She had good orbicularis strength and her extraocular motility was full, so I see no evidence of fatigability.  However due to to be completed we also obtained acetylcholine receptor antibody testing.

## 2022-10-18 NOTE — ASSESSMENT & PLAN NOTE
10/18/2022-very complex patient referred from Dr. Byers for evaluation of ptosis.  Although she does have a very high lid crease and evidence of some age-related ptosis and levator dehiscence, she was also noted to have anisocoria with the left pupil being smaller.  We therefore did an apraclonidine test that after 45 minutes demonstrated a reversal of the anisocoria.  This is consistent with some aspect of left Teresita syndrome, although this would not account for her full amount of ptosis.  She has had a pacemaker implanted on the left side so this could have caused the Teresita syndrome.  However for a complete work-up of the Teresita's I  recommended MRI scan of the head, MRA of the head and neck to rule out a dissection or a carotid artery aneurysm.

## 2022-10-23 ENCOUNTER — NON-PROVIDER VISIT (OUTPATIENT)
Dept: CARDIOLOGY | Facility: MEDICAL CENTER | Age: 82
End: 2022-10-23
Payer: MEDICARE

## 2022-10-23 PROCEDURE — 93294 REM INTERROG EVL PM/LDLS PM: CPT | Performed by: INTERNAL MEDICINE

## 2022-10-24 NOTE — CARDIAC REMOTE MONITOR - SCAN
Device transmission reviewed. Device demonstrated appropriate function.       Electronically Signed by: Justa Mcginnis M.D.    10/31/2022  9:03 AM

## 2022-10-26 ENCOUNTER — HOSPITAL ENCOUNTER (OUTPATIENT)
Dept: LAB | Facility: MEDICAL CENTER | Age: 82
End: 2022-10-26
Attending: OPHTHALMOLOGY
Payer: MEDICARE

## 2022-10-26 DIAGNOSIS — H02.402 PTOSIS OF LEFT EYELID: ICD-10-CM

## 2022-10-26 PROCEDURE — 86255 FLUORESCENT ANTIBODY SCREEN: CPT

## 2022-10-26 PROCEDURE — 36415 COLL VENOUS BLD VENIPUNCTURE: CPT

## 2022-10-26 PROCEDURE — 83519 RIA NONANTIBODY: CPT

## 2022-10-26 PROCEDURE — 83516 IMMUNOASSAY NONANTIBODY: CPT | Mod: XU

## 2022-10-28 LAB — STRIA MUS IGG SER QL IF: NORMAL

## 2022-10-29 LAB
ACHR BIND AB SER-SCNC: 0 NMOL/L (ref 0–0.4)
ACHR BLOCK AB/ACHR TOTAL SFR SER: 21 % (ref 0–26)

## 2022-10-31 NOTE — ASSESSMENT & PLAN NOTE
10/18/2022-she was referred by Dr. Byers for progressive ptosis especially involving the left upper lid.  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  She had good orbicularis strength and her extraocular motility was full, so I see no evidence of fatigability.  However due to to be completed we also obtained acetylcholine receptor antibody testing.  10/31/0222 - Myasthenia panel negative

## 2022-11-07 ENCOUNTER — OFFICE VISIT (OUTPATIENT)
Dept: MEDICAL GROUP | Facility: MEDICAL CENTER | Age: 82
End: 2022-11-07
Payer: MEDICARE

## 2022-11-07 VITALS
HEART RATE: 82 BPM | OXYGEN SATURATION: 98 % | SYSTOLIC BLOOD PRESSURE: 142 MMHG | BODY MASS INDEX: 23.99 KG/M2 | HEIGHT: 65 IN | WEIGHT: 144 LBS | TEMPERATURE: 97.7 F | DIASTOLIC BLOOD PRESSURE: 88 MMHG

## 2022-11-07 DIAGNOSIS — R60.0 PEDAL EDEMA: ICD-10-CM

## 2022-11-07 DIAGNOSIS — Z23 NEED FOR VACCINATION: ICD-10-CM

## 2022-11-07 DIAGNOSIS — E78.5 DYSLIPIDEMIA: ICD-10-CM

## 2022-11-07 DIAGNOSIS — G90.2 HORNER SYNDROME: ICD-10-CM

## 2022-11-07 DIAGNOSIS — I44.2 COMPLETE HEART BLOCK BY ELECTROCARDIOGRAM (HCC): ICD-10-CM

## 2022-11-07 DIAGNOSIS — I10 ESSENTIAL HYPERTENSION: ICD-10-CM

## 2022-11-07 DIAGNOSIS — M85.80 OSTEOPENIA, UNSPECIFIED LOCATION: ICD-10-CM

## 2022-11-07 DIAGNOSIS — Z78.0 POSTMENOPAUSAL: ICD-10-CM

## 2022-11-07 DIAGNOSIS — N18.32 STAGE 3B CHRONIC KIDNEY DISEASE: ICD-10-CM

## 2022-11-07 PROCEDURE — 99214 OFFICE O/P EST MOD 30 MIN: CPT | Mod: 25 | Performed by: FAMILY MEDICINE

## 2022-11-07 PROCEDURE — 90662 IIV NO PRSV INCREASED AG IM: CPT | Performed by: FAMILY MEDICINE

## 2022-11-07 PROCEDURE — G0008 ADMIN INFLUENZA VIRUS VAC: HCPCS | Performed by: FAMILY MEDICINE

## 2022-11-07 RX ORDER — CARVEDILOL 6.25 MG/1
TABLET ORAL
COMMUNITY
End: 2022-11-07

## 2022-11-07 ASSESSMENT — PATIENT HEALTH QUESTIONNAIRE - PHQ9: CLINICAL INTERPRETATION OF PHQ2 SCORE: 0

## 2022-11-07 ASSESSMENT — FIBROSIS 4 INDEX: FIB4 SCORE: 3.87

## 2022-11-07 NOTE — PROGRESS NOTES
Subjective:     CC: Diagnoses of Need for vaccination, Complete heart block by electrocardiogram ([facility]), Dyslipidemia, Teresita syndrome, Pedal edema, Stage 3b chronic kidney disease (HCC), Essential hypertension, Osteopenia, unspecified location, and Postmenopausal were pertinent to this visit.    HPI: Patient is a 82 y.o. female established patient who presents today for general follow up.       Complete heart block by electrocardiogram ([facility])  Resolved, s/p pacemaker  Followed by Dr. Becker    Dyslipidemia  Chronic problem. Well controlled on atoravastatin 20mg.     Pedal edema  Chronic problem. Currently at baseline. No pain. No skin breakdown. Wearing compression hose regularly.     Stage 3b chronic kidney disease (HCC)  Chronic problem. Mostly stable. GFR 70 on last labs.   Last check was September 2022.     Essential hypertension  Chronic problem. Currently on losartan 50mg.     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     Drug use: No       Current Outpatient Medications Ordered in Epic   Medication Sig Dispense Refill    furosemide (LASIX) 20 MG Tab Take 1 Tablet by mouth every day. 30 Tablet 6    potassium chloride (MICRO-K) 10 MEQ capsule Take 1 Capsule by mouth every day. 30 Capsule 6    Metoprolol Tartrate 75 MG Tab Take 75 mg by mouth 2 times a day. 180 Tablet 3    hydrALAZINE (APRESOLINE) 100 MG tablet Take 1 Tablet by mouth 2 times a day. Dose increase 6/3/2022      losartan (COZAAR) 50 MG Tab Take 1 Tablet by mouth 2  "times a day. 180 Tablet 3    atorvastatin (LIPITOR) 20 MG Tab Take 1 Tablet by mouth every day. 90 Tablet 3     No current Epic-ordered facility-administered medications on file.       Allergies:  Other environmental    Health Maintenance: Completed      Objective:       Exam:  BP (!) 142/88 (BP Location: Right arm, Patient Position: Sitting, BP Cuff Size: Adult long)   Pulse 82   Temp 36.5 °C (97.7 °F) (Temporal)   Ht 1.651 m (5' 5\")   Wt 65.3 kg (144 lb)   SpO2 98%   BMI 23.96 kg/m²  Body mass index is 23.96 kg/m².      General: Normal appearing. No distress.  HEAD: NCAT  EYES: conjunctiva clear, lids without ptosis, pupils equal  and reactive to light  EARS: ears normal shape and contour, canals are clear bilaterally, TMs clear  MOUTH: oropharynx is without erythema, edema or exudates.   Neck: Supple without masses. Thyroid is not enlarged. Normal ROM  Pulmonary: Clear to ausculation.  Normal effort. No rales, ronchi, or wheezing.  Cardiovascular: Regular rate and rhythm, no murmur. No LE edema  Neurologic: Grossly normal, no focal deficits  Lymph: No cervical or supraclavicular lymph nodes are palpable  Skin: Warm and dry.  No obvious lesions.  Musculoskeletal: Normal gait and station.   Psych: Normal mood and affect. Alert and oriented x3. Judgment and insight is normal.     Labs: 9/20/22 Results reviewed and discussed with the patient, questions answered.    Assessment & Plan:     82 y.o. female with the following -     1. Need for vaccination  - Influenza Vaccine, High Dose (65+ Only)    2. Complete heart block by electrocardiogram (HCC)  Chronic problem, now resolved s/p pacemaker.     3. Dyslipidemia  Chronic problem. Well controlled on statin. Reviewed recent labs.     4. Teresita syndrome  New problem. Working with optDr. Wilfrid hua.     5. Pedal edema  Chronic problem. Stable. Using compression hose and lasix.     6. Stage 3b chronic kidney disease (HCC)  Chronic problem. GFR stable.     7. " Essential hypertension  Chronic problem. Well controlled on losartan 50mg.     8. Osteopenia, unspecified location  - DS-BONE DENSITY STUDY (DEXA); Future    9. Postmenopausal  - DS-BONE DENSITY STUDY (DEXA); Future      Return in about 3 months (around 2/7/2023).    Please note that this dictation was created using voice recognition software. I have made every reasonable attempt to correct obvious errors, but I expect that there are errors of grammar and possibly content that I did not discover before finalizing the note.

## 2022-11-07 NOTE — ASSESSMENT & PLAN NOTE
Chronic problem. Currently at baseline. No pain. No skin breakdown. Wearing compression hose regularly.

## 2022-11-09 ENCOUNTER — PATIENT MESSAGE (OUTPATIENT)
Dept: HEALTH INFORMATION MANAGEMENT | Facility: OTHER | Age: 82
End: 2022-11-09

## 2022-12-02 ENCOUNTER — HOSPITAL ENCOUNTER (OUTPATIENT)
Dept: RADIOLOGY | Facility: MEDICAL CENTER | Age: 82
End: 2022-12-02
Attending: OPHTHALMOLOGY
Payer: MEDICARE

## 2022-12-02 VITALS — HEART RATE: 115 BPM | OXYGEN SATURATION: 94 %

## 2022-12-02 DIAGNOSIS — G90.2 HORNER'S SYNDROME: ICD-10-CM

## 2022-12-02 DIAGNOSIS — I67.1 NONRUPTURED CEREBRAL ANEURYSM: ICD-10-CM

## 2022-12-02 PROCEDURE — 70551 MRI BRAIN STEM W/O DYE: CPT

## 2022-12-02 PROCEDURE — 70544 MR ANGIOGRAPHY HEAD W/O DYE: CPT

## 2022-12-02 PROCEDURE — 70547 MR ANGIOGRAPHY NECK W/O DYE: CPT

## 2022-12-02 NOTE — PROGRESS NOTES
Pt's PPM set to MRI safe mode with Sure scan heart connect prior to MRI. During MRI scan, pt monitored with ECG and SPO2. Pt tolerated scan well. Discharged ambulatory after device reset to pre-MRI mode via sure scan. VSS/

## 2022-12-05 ENCOUNTER — DOCUMENTATION (OUTPATIENT)
Dept: OPHTHALMOLOGY | Facility: MEDICAL CENTER | Age: 82
End: 2022-12-05
Payer: MEDICARE

## 2022-12-05 DIAGNOSIS — G90.2 HORNER SYNDROME: ICD-10-CM

## 2022-12-05 NOTE — ASSESSMENT & PLAN NOTE
10/18/2022-very complex patient referred from Dr. Byers for evaluation of ptosis.  Although she does have a very high lid crease and evidence of some age-related ptosis and levator dehiscence, she was also noted to have anisocoria with the left pupil being smaller.  We therefore did an apraclonidine test that after 45 minutes demonstrated a reversal of the anisocoria.  This is consistent with some aspect of left Teresita syndrome, although this would not account for her full amount of ptosis.  She has had a pacemaker implanted on the left side so this could have caused the Teresita syndrome.  However for a complete work-up of the Teresita's I  recommended MRI scan of the head, MRA of the head and neck to rule out a dissection or a carotid artery aneurysm.  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 MRA neck - negative

## 2022-12-05 NOTE — PROGRESS NOTES
10/18/2022-very complex patient referred from Dr. Byers for evaluation of ptosis.  Although she does have a very high lid crease and evidence of some age-related ptosis and levator dehiscence, she was also noted to have anisocoria with the left pupil being smaller.  We therefore did an apraclonidine test that after 45 minutes demonstrated a reversal of the anisocoria.  This is consistent with some aspect of left Teresita syndrome, although this would not account for her full amount of ptosis.  She has had a pacemaker implanted on the left side so this could have caused the Teresita syndrome.  However for a complete work-up of the Teresita's I  recommended MRI scan of the head, MRA of the head and neck to rule out a dissection or a carotid artery aneurysm.  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 Wished to be referred back to Dr Byers for consideration of repair

## 2023-01-23 ENCOUNTER — NON-PROVIDER VISIT (OUTPATIENT)
Dept: CARDIOLOGY | Facility: MEDICAL CENTER | Age: 83
End: 2023-01-23
Payer: MEDICARE

## 2023-01-23 PROCEDURE — 93294 REM INTERROG EVL PM/LDLS PM: CPT | Performed by: INTERNAL MEDICINE

## 2023-01-24 NOTE — CARDIAC REMOTE MONITOR - SCAN
Device transmission reviewed. Device demonstrated appropriate function. Short NSVT      Electronically Signed by: Erik Woodall M.D.    1/26/2023  9:30 AM

## 2023-04-17 ENCOUNTER — OFFICE VISIT (OUTPATIENT)
Dept: CARDIOLOGY | Facility: MEDICAL CENTER | Age: 83
End: 2023-04-17
Payer: MEDICARE

## 2023-04-17 ENCOUNTER — NON-PROVIDER VISIT (OUTPATIENT)
Dept: CARDIOLOGY | Facility: MEDICAL CENTER | Age: 83
End: 2023-04-17
Payer: MEDICARE

## 2023-04-17 VITALS
HEART RATE: 98 BPM | DIASTOLIC BLOOD PRESSURE: 70 MMHG | WEIGHT: 148 LBS | RESPIRATION RATE: 18 BRPM | SYSTOLIC BLOOD PRESSURE: 140 MMHG | BODY MASS INDEX: 24.66 KG/M2 | HEIGHT: 65 IN | OXYGEN SATURATION: 94 %

## 2023-04-17 VITALS
RESPIRATION RATE: 18 BRPM | HEART RATE: 98 BPM | HEIGHT: 65 IN | BODY MASS INDEX: 24.66 KG/M2 | SYSTOLIC BLOOD PRESSURE: 140 MMHG | OXYGEN SATURATION: 94 % | DIASTOLIC BLOOD PRESSURE: 70 MMHG | WEIGHT: 148 LBS

## 2023-04-17 DIAGNOSIS — Z95.0 CARDIAC PACEMAKER IN SITU: ICD-10-CM

## 2023-04-17 DIAGNOSIS — I44.2 COMPLETE HEART BLOCK BY ELECTROCARDIOGRAM (HCC): ICD-10-CM

## 2023-04-17 DIAGNOSIS — R53.83 OTHER FATIGUE: ICD-10-CM

## 2023-04-17 DIAGNOSIS — I10 ESSENTIAL HYPERTENSION: ICD-10-CM

## 2023-04-17 DIAGNOSIS — R60.0 PEDAL EDEMA: ICD-10-CM

## 2023-04-17 DIAGNOSIS — N18.32 STAGE 3B CHRONIC KIDNEY DISEASE: ICD-10-CM

## 2023-04-17 DIAGNOSIS — E78.5 DYSLIPIDEMIA: ICD-10-CM

## 2023-04-17 PROCEDURE — 93280 PM DEVICE PROGR EVAL DUAL: CPT | Performed by: NURSE PRACTITIONER

## 2023-04-17 PROCEDURE — 99214 OFFICE O/P EST MOD 30 MIN: CPT | Mod: 25 | Performed by: NURSE PRACTITIONER

## 2023-04-17 ASSESSMENT — ENCOUNTER SYMPTOMS
HEADACHES: 0
CHILLS: 0
LOSS OF CONSCIOUSNESS: 0
SHORTNESS OF BREATH: 0
ABDOMINAL PAIN: 0
FEVER: 0
INSOMNIA: 0
ORTHOPNEA: 0
MYALGIAS: 0
NAUSEA: 0
DIZZINESS: 0
PND: 0
BACK PAIN: 0
COUGH: 0
PALPITATIONS: 0
BRUISES/BLEEDS EASILY: 0

## 2023-04-17 ASSESSMENT — FIBROSIS 4 INDEX
FIB4 SCORE: 3.92
FIB4 SCORE: 3.92

## 2023-04-17 NOTE — PROGRESS NOTES
Chief Complaint   Patient presents with    Follow-Up    Pacemaker Check/Dysfunction    AV Block Complete    HTN (Controlled)    Hyperlipidemia    Edema       Subjective     Priscilla Hdz is a 83 y.o. female who presents today for six month follow-up for PM check, AV block, HTN hyperlipidemia, CKD and LE edema.    Priscilla is a 83 year old female with history of high AV block with PM since 2019, HTN (hard to control), hyperlipidemia, CKD and LE edema, last seen by me in September 2022.      She is here today for six month follow-up. BP at home still runs 120-130 systolic; she does admit to ongoing stress because her  is now in a nursing home, and his cognitive ability is declining. She denies any chest pain, pressure, tightness or discomfort; no palpitations; no shortness of breath, orthopnea or PND; no dizziness or syncope; LE edema seems to have worsened. She does wear compression stockings.    Past Medical History:   Diagnosis Date    Arthritis     hip, knee    ASTHMA     AV block, complete (HCC) 06/2019    Status post pacemaker implantation    Cataract     Bilateral IOL    Hyperlipidemia     Hypertension     Menopause     Osteopenia     Pedal edema      Past Surgical History:   Procedure Laterality Date    PACEMAKER INSERTION Left 06/20/2019    [employer] S DR MRI [id number] implanted by Dr. Mcginnis.    HIP REVISION TOTAL Right 8/29/2018    Procedure: HIP REVISION TOTAL;  Surgeon: Oniel Goodson M.D.;  Location: SURGERY [facility];  Service: Orthopedics    HIP ARTHROPLASTY TOTAL Right 8/9/2016    Procedure: HIP ARTHROPLASTY TOTAL;  Surgeon: Oniel Goodson M.D.;  Location: SURGERY [facility];  Service:     CATARACT EXTRACTION WITH IOL Bilateral 2012     Family History   Problem Relation Age of Onset    Heart Disease Father     Hyperlipidemia Father     Hypertension Father     Heart Disease Brother     Hypertension Mother     Hyperlipidemia Mother      Social History      Socioeconomic History    Marital status:      Spouse name: Not on file    Number of children: Not on file    Years of education: Not on file    Highest education level: Not on file   Occupational History    Not on file   Tobacco Use    Smoking status: Never    Smokeless tobacco: Never   Vaping Use    Vaping Use: Never used   Substance and Sexual Activity    Alcohol use: Yes     Alcohol/week: 0.6 oz     Types: 1 Glasses of wine per week     Comment: once every 2-3 months.     Drug use: No    Sexual activity: Not Currently     Partners: Male   Other Topics Concern    Not on file   Social History Narrative    Retired , [place].      Social Determinants of Health     Financial Resource Strain: Not on file   Food Insecurity: Not on file   Transportation Needs: Not on file   Physical Activity: Not on file   Stress: Not on file   Social Connections: Not on file   Intimate Partner Violence: Not on file   Housing Stability: Not on file     Allergies   Allergen Reactions    Other Environmental Cough     [place], Dust     Outpatient Encounter Medications as of 4/17/2023   Medication Sig Dispense Refill    furosemide (LASIX) 20 MG Tab Take 1 Tablet by mouth every day. 30 Tablet 6    potassium chloride (MICRO-K) 10 MEQ capsule Take 1 Capsule by mouth every day. 30 Capsule 6    Metoprolol Tartrate 75 MG Tab Take 75 mg by mouth 2 times a day. 180 Tablet 3    hydrALAZINE (APRESOLINE) 100 MG tablet Take 1 Tablet by mouth 2 times a day. Dose increase 6/3/2022      losartan (COZAAR) 50 MG Tab Take 1 Tablet by mouth 2 times a day. 180 Tablet 3    atorvastatin (LIPITOR) 20 MG Tab Take 1 Tablet by mouth every day. 90 Tablet 3     No facility-administered encounter medications on file as of 4/17/2023.     Review of Systems   Constitutional:  Positive for malaise/fatigue. Negative for chills and fever.   HENT:  Negative for congestion.    Respiratory:  Negative for cough and shortness of breath.   "  Cardiovascular:  Positive for leg swelling. Negative for chest pain, palpitations, orthopnea and PND.   Gastrointestinal:  Negative for abdominal pain and nausea.   Musculoskeletal:  Negative for back pain and myalgias.   Skin:  Negative for rash.   Neurological:  Negative for dizziness, loss of consciousness and headaches.   Endo/Heme/Allergies:  Does not bruise/bleed easily.   Psychiatric/Behavioral:  The patient does not have insomnia.             Objective     BP (!) 140/70 (BP Location: Left arm, Patient Position: Sitting, BP Cuff Size: Adult)   Pulse 98   Resp 18   Ht 1.651 m (5' 5\")   Wt 67.1 kg (148 lb)   SpO2 94%   BMI 24.63 kg/m²     Physical Exam  Constitutional:       Appearance: She is well-developed.      Comments: Ambulates with a walker.   HENT:      Head: Normocephalic.   Neck:      Vascular: No JVD.   Cardiovascular:      Rate and Rhythm: Normal rate and regular rhythm.      Heart sounds: Normal heart sounds.      Comments: PM in left chest wall.  Pulmonary:      Effort: Pulmonary effort is normal. No respiratory distress.      Breath sounds: Normal breath sounds. No wheezing or rales.   Abdominal:      General: Bowel sounds are normal. There is no distension.      Palpations: Abdomen is soft.      Tenderness: There is no abdominal tenderness.   Musculoskeletal:         General: Normal range of motion.      Cervical back: Normal range of motion and neck supple.      Right lower leg: Edema present.      Left lower leg: Edema present.      Comments: 1-2+ edema in the feet bilaterally.   Skin:     General: Skin is warm and dry.      Findings: No rash.   Neurological:      Mental Status: She is alert and oriented to person, place, and time.     PM interrogation today reveals normal function. 7 brief NSVT episodes (all 1-2 seconds); no mode switching episodes.    CONCLUSIONS OF ECHOCARDIOGRAM OF 6/19/2019:  Prior Echo - 8/5/16Hyperdynamic left ventricular systolic function.  Left ventricular " ejection fraction is visually estimated to be greater   than 75%.  Estimated right ventricular systolic pressure  is 60 mmHg.     Lab Results   Component Value Date/Time    SODIUM 142 09/20/2022 12:17 PM    POTASSIUM 3.9 09/20/2022 12:17 PM    CHLORIDE 105 09/20/2022 12:17 PM    CO2 27 09/20/2022 12:17 PM    GLUCOSE 92 09/20/2022 12:17 PM    BUN 32 (H) 09/20/2022 12:17 PM    CREATININE 0.82 09/20/2022 12:17 PM    CREATININE 0.77 01/29/2013 07:49 AM    BUNCREATRAT 19 03/09/2016 09:37 AM    BUNCREATRAT 23 01/29/2013 07:49 AM    GLOMRATE >59 11/10/2010 09:26 AM      Lab Results   Component Value Date/Time    WBC 7.3 11/02/2021 02:33 PM    RBC 4.40 11/02/2021 02:33 PM    HEMOGLOBIN 13.5 11/02/2021 02:33 PM    HEMATOCRIT 40.9 11/02/2021 02:33 PM    MCV 93.0 11/02/2021 02:33 PM    MCH 30.7 11/02/2021 02:33 PM    MCHC 33.0 (L) 11/02/2021 02:33 PM    MPV 11.2 11/02/2021 02:33 PM       Lab Results   Component Value Date/Time    CHOLSTRLTOT 156 05/28/2022 10:38 AM    LDL 90 05/28/2022 10:38 AM    HDL 49 05/28/2022 10:38 AM    TRIGLYCERIDE 87 05/28/2022 10:38 AM          Assessment & Plan     1. Cardiac pacemaker in situ        2. Complete heart block by electrocardiogram (HCC)        3. Essential hypertension  CBC WITHOUT DIFFERENTIAL    EC-ECHOCARDIOGRAM COMPLETE W/O CONT      4. Dyslipidemia  Comp Metabolic Panel    Lipid Profile      5. Stage 3b chronic kidney disease (HCC)  Comp Metabolic Panel      6. Pedal edema  Comp Metabolic Panel    EC-ECHOCARDIOGRAM COMPLETE W/O CONT      7. Other fatigue  TSH          Medical Decision Making: Today's Assessment/Status/Plan:      1. High AV block with PPM, which is working normally. No mode switching episodes, and no changes are made today.    2. Hypertension, treated with Losartan and Metoprolol and Hydralazine. BP was initially elevated today, but came down. BP at home runs 120-130 systolic.    3. Hyperlipidemia, treated with Lipitor 20mg. To check fasting labs.    4. CKD, stage  3b, to repeat CMP.    5. LE edema, uses Lasix and KCl, and compression stockings.    6. Fatigue, to check CBC and TSH.    Same medications for now. Labs as above. Echo was order in May 2022, but not completed. New order is entered. Follow-up in 6 months.

## 2023-04-24 ENCOUNTER — NON-PROVIDER VISIT (OUTPATIENT)
Dept: CARDIOLOGY | Facility: MEDICAL CENTER | Age: 83
End: 2023-04-24
Payer: MEDICARE

## 2023-04-24 PROCEDURE — 93294 REM INTERROG EVL PM/LDLS PM: CPT | Performed by: INTERNAL MEDICINE

## 2023-04-24 NOTE — CARDIAC REMOTE MONITOR - SCAN
Device transmission reviewed. Device demonstrated appropriate function.       Electronically Signed by: Oleksandr Gardner M.D.    4/24/2023  10:30 AM

## 2023-05-08 ENCOUNTER — TELEPHONE (OUTPATIENT)
Dept: CARDIOLOGY | Facility: MEDICAL CENTER | Age: 83
End: 2023-05-08
Payer: MEDICARE

## 2023-05-08 NOTE — TELEPHONE ENCOUNTER
Eye lid surgery is not high risk, so she can proceed.     There are no medications that need to be held/stopped.    Echo has been ordered twice for her, and ideally, she should get this done (but it won't preclude her from having surgery).    Thanks, AB

## 2023-05-08 NOTE — LETTER
Dear Ophthalmology,       Thank you for your interest in coordinating the cardiovascular care of our mutual patient Priscilla Hdz 1940. We have reviewed their [facility] records; and to the best of our understanding our patient has not had stenting, ablation, cardiothoracic surgery or hospitalization for cardiovascular reasons in the past 6 months. Priscilla Hdz has seen us within the past 18 months and is having a low-risk procedure or surgery. They are considered to have nonmodifiable risk and may proceed with the proposed procedure or surgery.  The patient MAY NOT hold their antiplatelet or anticoagulation.   If you have specific concerns for continuing antiplatelets or anticoagulation in the periprocedural or perioperative period, please reach out to us at [phone].       Thank you       [facility]

## 2023-05-08 NOTE — TELEPHONE ENCOUNTER
Last OV: 4/17/23  Proposed Surgery: PTOSIS REPAIR   Surgery Date: 6/28/23  Requesting Office Name: [facility]   Fax Number: [phone]  Preference of Location (default is surgery center unless specified by Cardiologist or YAKOV)  Prior Clearance Addressed: No      Anticoags/Antiplatelets: Other none  Outstanding Cardiac Imaging : Yes  Echo.   Clearance to provider to review  Stent, Cardiac Devices, or Catheterization: Yes  Date : 06/20/19   Ablation, TAVR, Cardioversion: No  Recent Cardiac Hospitalization: No            When: N/A  History (cardiac history):   Past Medical History:   Diagnosis Date    Arthritis     hip, knee    ASTHMA     AV block, complete (HCC) 06/2019    Status post pacemaker implantation    Cataract     Bilateral IOL    Hyperlipidemia     Hypertension     Menopause     Osteopenia     Pedal edema              Surgical Clearance Letter Sent: NO. Provider to advise.  To AB, Pt has pending Echo, pt also has device, Okay to proceed?   **Scan clearance request letter into [facility].**

## 2023-05-22 DIAGNOSIS — I10 ESSENTIAL HYPERTENSION: ICD-10-CM

## 2023-05-23 NOTE — TELEPHONE ENCOUNTER
Is the patient due for a refill? Yes    Was the patient seen the past year? Yes    Date of last office visit: 4/17/2023    Does the patient have an upcoming appointment?  Yes   If yes, When? 6/20/2023    Provider to refill:AB    Does the patients insurance require a 100 day supply?  No

## 2023-05-25 RX ORDER — LOSARTAN POTASSIUM 50 MG/1
TABLET ORAL
Qty: 180 TABLET | Refills: 0 | Status: SHIPPED
Start: 2023-05-25 | End: 2023-08-18

## 2023-05-25 RX ORDER — HYDRALAZINE HYDROCHLORIDE 50 MG/1
TABLET, FILM COATED ORAL
Qty: 270 TABLET | Refills: 0 | Status: SHIPPED | OUTPATIENT
Start: 2023-05-25 | End: 2023-06-20

## 2023-05-25 NOTE — TELEPHONE ENCOUNTER
Reviewed chart, noted at last OV with AB on 4/17/23--pt to continue losartan and hydralazine. 90 day courtesy Rx with no refills sent as pt overdue for lab work ordered at that visit. Mikal msg sent to pt to remind her to complete labs.

## 2023-06-13 ENCOUNTER — HOSPITAL ENCOUNTER (OUTPATIENT)
Dept: LAB | Facility: MEDICAL CENTER | Age: 83
End: 2023-06-13
Attending: NURSE PRACTITIONER
Payer: MEDICARE

## 2023-06-13 ENCOUNTER — HOSPITAL ENCOUNTER (OUTPATIENT)
Dept: LAB | Facility: MEDICAL CENTER | Age: 83
End: 2023-06-13
Attending: OPHTHALMOLOGY
Payer: MEDICARE

## 2023-06-13 DIAGNOSIS — N18.32 STAGE 3B CHRONIC KIDNEY DISEASE: ICD-10-CM

## 2023-06-13 DIAGNOSIS — E78.5 DYSLIPIDEMIA: ICD-10-CM

## 2023-06-13 DIAGNOSIS — R53.83 OTHER FATIGUE: ICD-10-CM

## 2023-06-13 DIAGNOSIS — I10 ESSENTIAL HYPERTENSION: ICD-10-CM

## 2023-06-13 DIAGNOSIS — R60.0 PEDAL EDEMA: ICD-10-CM

## 2023-06-13 LAB
ALBUMIN SERPL BCP-MCNC: 4.1 G/DL (ref 3.2–4.9)
ALBUMIN/GLOB SERPL: 1.5 G/DL
ALP SERPL-CCNC: 80 U/L (ref 30–99)
ALT SERPL-CCNC: 12 U/L (ref 2–50)
ANION GAP SERPL CALC-SCNC: 13 MMOL/L (ref 7–16)
AST SERPL-CCNC: 18 U/L (ref 12–45)
BILIRUB SERPL-MCNC: 0.4 MG/DL (ref 0.1–1.5)
BUN SERPL-MCNC: 26 MG/DL (ref 8–22)
CALCIUM ALBUM COR SERPL-MCNC: 9 MG/DL (ref 8.5–10.5)
CALCIUM SERPL-MCNC: 9.1 MG/DL (ref 8.5–10.5)
CHLORIDE SERPL-SCNC: 108 MMOL/L (ref 96–112)
CHOLEST SERPL-MCNC: 214 MG/DL (ref 100–199)
CO2 SERPL-SCNC: 24 MMOL/L (ref 20–33)
CREAT SERPL-MCNC: 0.93 MG/DL (ref 0.5–1.4)
ERYTHROCYTE [DISTWIDTH] IN BLOOD BY AUTOMATED COUNT: 49.8 FL (ref 35.9–50)
FASTING STATUS PATIENT QL REPORTED: NORMAL
GFR SERPLBLD CREATININE-BSD FMLA CKD-EPI: 60 ML/MIN/1.73 M 2
GLOBULIN SER CALC-MCNC: 2.7 G/DL (ref 1.9–3.5)
GLUCOSE SERPL-MCNC: 95 MG/DL (ref 65–99)
HCT VFR BLD AUTO: 40.2 % (ref 37–47)
HDLC SERPL-MCNC: 40 MG/DL
HGB BLD-MCNC: 12.9 G/DL (ref 12–16)
LDLC SERPL CALC-MCNC: 149 MG/DL
MCH RBC QN AUTO: 30.6 PG (ref 27–33)
MCHC RBC AUTO-ENTMCNC: 32.1 G/DL (ref 32.2–35.5)
MCV RBC AUTO: 95.5 FL (ref 81.4–97.8)
PLATELET # BLD AUTO: 195 K/UL (ref 164–446)
PMV BLD AUTO: 11.7 FL (ref 9–12.9)
POTASSIUM SERPL-SCNC: 4.1 MMOL/L (ref 3.6–5.5)
PROT SERPL-MCNC: 6.8 G/DL (ref 6–8.2)
RBC # BLD AUTO: 4.21 M/UL (ref 4.2–5.4)
SODIUM SERPL-SCNC: 145 MMOL/L (ref 135–145)
TRIGL SERPL-MCNC: 126 MG/DL (ref 0–149)
TSH SERPL DL<=0.005 MIU/L-ACNC: 2.2 UIU/ML (ref 0.38–5.33)
WBC # BLD AUTO: 8 K/UL (ref 4.8–10.8)

## 2023-06-13 PROCEDURE — 80053 COMPREHEN METABOLIC PANEL: CPT

## 2023-06-13 PROCEDURE — 84443 ASSAY THYROID STIM HORMONE: CPT

## 2023-06-13 PROCEDURE — 85027 COMPLETE CBC AUTOMATED: CPT

## 2023-06-13 PROCEDURE — 36415 COLL VENOUS BLD VENIPUNCTURE: CPT

## 2023-06-13 PROCEDURE — 80061 LIPID PANEL: CPT

## 2023-06-14 DIAGNOSIS — E78.5 DYSLIPIDEMIA: ICD-10-CM

## 2023-06-14 RX ORDER — ATORVASTATIN CALCIUM 20 MG/1
20 TABLET, FILM COATED ORAL
Qty: 90 TABLET | Refills: 3 | Status: SHIPPED | OUTPATIENT
Start: 2023-06-14

## 2023-06-20 ENCOUNTER — OFFICE VISIT (OUTPATIENT)
Dept: CARDIOLOGY | Facility: MEDICAL CENTER | Age: 83
End: 2023-06-20
Attending: INTERNAL MEDICINE
Payer: MEDICARE

## 2023-06-20 VITALS
DIASTOLIC BLOOD PRESSURE: 80 MMHG | HEIGHT: 65 IN | WEIGHT: 149 LBS | RESPIRATION RATE: 16 BRPM | BODY MASS INDEX: 24.83 KG/M2 | HEART RATE: 83 BPM | SYSTOLIC BLOOD PRESSURE: 166 MMHG | OXYGEN SATURATION: 96 %

## 2023-06-20 DIAGNOSIS — I44.2 COMPLETE HEART BLOCK BY ELECTROCARDIOGRAM (HCC): ICD-10-CM

## 2023-06-20 DIAGNOSIS — Z01.810 ENCOUNTER FOR PRE-OPERATIVE CARDIOVASCULAR CLEARANCE: ICD-10-CM

## 2023-06-20 DIAGNOSIS — I50.33 ACUTE ON CHRONIC DIASTOLIC HEART FAILURE (HCC): ICD-10-CM

## 2023-06-20 DIAGNOSIS — I87.2 VENOUS INSUFFICIENCY OF BOTH LOWER EXTREMITIES: ICD-10-CM

## 2023-06-20 DIAGNOSIS — E78.5 DYSLIPIDEMIA: ICD-10-CM

## 2023-06-20 DIAGNOSIS — I10 ESSENTIAL HYPERTENSION: ICD-10-CM

## 2023-06-20 DIAGNOSIS — N18.32 STAGE 3B CHRONIC KIDNEY DISEASE: ICD-10-CM

## 2023-06-20 PROCEDURE — 99215 OFFICE O/P EST HI 40 MIN: CPT | Performed by: INTERNAL MEDICINE

## 2023-06-20 PROCEDURE — 3077F SYST BP >= 140 MM HG: CPT | Performed by: INTERNAL MEDICINE

## 2023-06-20 PROCEDURE — 99212 OFFICE O/P EST SF 10 MIN: CPT | Performed by: INTERNAL MEDICINE

## 2023-06-20 PROCEDURE — 93005 ELECTROCARDIOGRAM TRACING: CPT | Performed by: INTERNAL MEDICINE

## 2023-06-20 PROCEDURE — 3079F DIAST BP 80-89 MM HG: CPT | Performed by: INTERNAL MEDICINE

## 2023-06-20 RX ORDER — SPIRONOLACTONE 25 MG/1
12.5 TABLET ORAL DAILY
Qty: 45 TABLET | Refills: 3 | Status: SHIPPED | OUTPATIENT
Start: 2023-06-20

## 2023-06-20 RX ORDER — AMOXICILLIN 500 MG/1
1000 CAPSULE ORAL
COMMUNITY
Start: 2023-06-08

## 2023-06-20 ASSESSMENT — FIBROSIS 4 INDEX: FIB4 SCORE: 2.26

## 2023-06-20 ASSESSMENT — ENCOUNTER SYMPTOMS
DEPRESSION: 1
BACK PAIN: 1

## 2023-06-20 NOTE — PATIENT INSTRUCTIONS
Please start spironolactone 12.5mg once a day.     Please get non-fasting blood tests in 1-2 weeks.     Please schedule your echocardiogram (heart ultrasound).

## 2023-06-20 NOTE — PROGRESS NOTES
Cardiology Follow-up Consultation Note    Date of note: 6/20/2023  Primary Care Provider: Beth Friedman M.D.  Referring Provider: Dr. Erik Woodall    Patient Name: Priscilla Hdz   YOB: 1940  MRN:              [id number]    Chief Complaint: bradycardia    History of Present Illness: Priscilla Hdz is a 83 y.o. female whose current medical problems include complete heart block s/p dual chamber pacemaker 6/20/2019 with RV lead repositioning 8/9/2019, hypertension, dyslipidemia  who is here for follow-up.    At our visit, 10/11/2019:   having multiple medical problems, current in the hospital at [facility].     In terms of hypertension, poorly controlled.     Walks in a walker after falls and hip replacement x 2.     At our visit, 10/5/2020:  BP this morning in the 130s. Sometimes in the 140s as well.     She is here today with her Son Lg. Still significant stress from her 's medical care.     Dyslipidemia well controlled.     At our visit, 11/2/2021:  Continues to have stress from  being in and out of nursing homes and the hospital.     BP at home is in the 120s-150s.     Pacemaker functioning well. Has episodes of up to 9 beats of non-sustained VT, asymptomatic.    She presents today with [employer] who gave her a ride today.      At our visit, 5/3/2022:  BP this morning was 120s/70s. Usually 110s-140s.    Feet and ankles continue to be swollen. Weight up around 5 pounds. No SOB. Walks with walker, does not know how far she can walk.     No palpitations, no chest pain.     Significant stress from  being sick.     Interim Events:  Still with feet swelling.     BP in the 120s-140s at home.     Patient denies chest pain, palpitations, dyspnea on exertion, pre-syncope, syncope, orthopnea, PND or recent weight gain.           Review of Systems   Musculoskeletal:  Positive for back pain.   Psychiatric/Behavioral:  Positive for depression.      Constitution: Negative for  chills, fever and night sweats.   HENT: Negative for nosebleeds.    Eyes: Negative for vision loss in left eye and vision loss in right eye.   Respiratory: Negative for hemoptysis.    Gastrointestinal: Negative for hematemesis, hematochezia and melena.   Genitourinary: Negative for hematuria.   Neurological: Negative for focal weakness, numbness and paresthesias.       Past Medical History:   Diagnosis Date    Arthritis     hip, knee    ASTHMA     AV block, complete (HCC) 06/2019    Status post pacemaker implantation    Cataract     Bilateral IOL    Hyperlipidemia     Hypertension     Menopause     Osteopenia     Pedal edema          Past Surgical History:   Procedure Laterality Date    PACEMAKER INSERTION Left 06/20/2019    [employer]Friend[employer] S DR MRI [id number] implanted by Dr. Mcginnis.    HIP REVISION TOTAL Right 8/29/2018    Procedure: HIP REVISION TOTAL;  Surgeon: Oniel Goodson M.D.;  Location: SURGERY [facility];  Service: Orthopedics    HIP ARTHROPLASTY TOTAL Right 8/9/2016    Procedure: HIP ARTHROPLASTY TOTAL;  Surgeon: Oniel Goodson M.D.;  Location: SURGERY [facility];  Service:     CATARACT EXTRACTION WITH IOL Bilateral 2012         Current Outpatient Medications   Medication Sig Dispense Refill    atorvastatin (LIPITOR) 20 MG Tab Take 1 Tablet by mouth every day. 90 Tablet 3    losartan (COZAAR) 50 MG Tab TAKE ONE TABLET BY MOUTH TWICE A  Tablet 0    furosemide (LASIX) 20 MG Tab Take 1 Tablet by mouth every day. 30 Tablet 6    potassium chloride (MICRO-K) 10 MEQ capsule Take 1 Capsule by mouth every day. 30 Capsule 6    Metoprolol Tartrate 75 MG Tab Take 75 mg by mouth 2 times a day. 180 Tablet 3    hydrALAZINE (APRESOLINE) 100 MG tablet Take 1 Tablet by mouth 2 times a day. Dose increase 6/3/2022      amoxicillin (AMOXIL) 500 MG Cap        No current facility-administered medications for this visit.         Allergies   Allergen Reactions    Other Environmental Cough     Pine, Dust  "        Family History   Problem Relation Age of Onset    Heart Disease Father     Hyperlipidemia Father     Hypertension Father     Heart Disease Brother     Hypertension Mother     Hyperlipidemia Mother          Social History     Socioeconomic History    Marital status:      Spouse name: Not on file    Number of children: Not on file    Years of education: Not on file    Highest education level: Not on file   Occupational History    Not on file   Tobacco Use    Smoking status: Never    Smokeless tobacco: Never   Vaping Use    Vaping Use: Never used   Substance and Sexual Activity    Alcohol use: Yes     Alcohol/week: 0.6 oz     Types: 1 Glasses of wine per week     Comment: once every 2-3 months.     Drug use: No    Sexual activity: Not Currently     Partners: Male   Other Topics Concern    Not on file   Social History Narrative    Retired , [employer]      Social Determinants of Health     Financial Resource Strain: Not on file   Food Insecurity: Not on file   Transportation Needs: Not on file   Physical Activity: Not on file   Stress: Not on file   Social Connections: Not on file   Intimate Partner Violence: Not on file   Housing Stability: Not on file         Physical Exam:  Ambulatory Vitals  BP (!) 166/80 (BP Location: Left arm, Patient Position: Sitting, BP Cuff Size: Adult)   Pulse 83   Resp 16   Ht 1.651 m (5' 5\")   Wt 67.6 kg (149 lb)   SpO2 96%    Oxygen Therapy:  Pulse Oximetry: 96 %  BP Readings from Last 4 Encounters:   06/20/23 (!) 166/80   04/17/23 (!) 140/70   04/17/23 (!) 140/70   11/07/22 (!) 142/88       Weight/BMI: Body mass index is 24.79 kg/m².  Wt Readings from Last 4 Encounters:   06/20/23 67.6 kg (149 lb)   04/17/23 67.1 kg (148 lb)   04/17/23 67.1 kg (148 lb)   11/07/22 65.3 kg (144 lb)     General: No apparent distress  Eyes: nl conjunctiva  Neck: JVP 12 cm H2O  Lungs: normal respiratory effort, CTAB  Heart: bradycardic, regular no murmurs, no rubs or " gallops, 3-4+ edema bilateral lower extremities. No LV/RV heave on cardiac palpatation. 2+ bilateral radial pulses.  Well healed pacemaker insertion site.   Abdomen: soft, non tender, non distended, no masses, normal bowel sounds.  No HSM.  Extremities/MSK: no clubbing, no cyanosis  Neurological: No focal sensory deficits  Psychiatric: Appropriate affect, A/O x 3, intact judgement and insight  Skin: Warm extremities    Exam repeated in full and unchanged except for as noted above.        Lab Data Review:  Lab Results   Component Value Date/Time    CHOLSTRLTOT 214 (H) 06/13/2023 12:57 PM     (H) 06/13/2023 12:57 PM    HDL 40 06/13/2023 12:57 PM    TRIGLYCERIDE 126 06/13/2023 12:57 PM       Lab Results   Component Value Date/Time    SODIUM 145 06/13/2023 12:57 PM    POTASSIUM 4.1 06/13/2023 12:57 PM    CHLORIDE 108 06/13/2023 12:57 PM    CO2 24 06/13/2023 12:57 PM    GLUCOSE 95 06/13/2023 12:57 PM    BUN 26 (H) 06/13/2023 12:57 PM    CREATININE 0.93 06/13/2023 12:57 PM    CREATININE 0.77 01/29/2013 07:49 AM    BUNCREATRAT 19 03/09/2016 09:37 AM    BUNCREATRAT 23 01/29/2013 07:49 AM    GLOMRATE >59 11/10/2010 09:26 AM     Lab Results   Component Value Date/Time    ALKPHOSPHAT 80 06/13/2023 12:57 PM    ASTSGOT 18 06/13/2023 12:57 PM    ALTSGPT 12 06/13/2023 12:57 PM    TBILIRUBIN 0.4 06/13/2023 12:57 PM      Lab Results   Component Value Date/Time    WBC 8.0 06/13/2023 12:57 PM     No components found for: HBGA1C  No components found for: TROPONIN  No results found for: BNP      Cardiac Imaging and Procedures Review:    EKG dated 8/11/2019:  ATRIAL-SENSED VENTRICULAR-PACED COMPLEXES   NO FURTHER ANALYSIS ATTEMPTED DUE TO PACED RHYTHM     Echo dated 6/19/2019:  Left Ventricle  Normal left ventricular chamber size. Normal left ventricular wall   thickness. Hyperdynamic left ventricular systolic function. Left   ventricular ejection fraction is visually estimated to be greater than   75%. Normal regional wall  motion. Diastolic function is difficult to   assess.    Right Ventricle  The right ventricle was normal in size and function.    Right Atrium  The right atrium is normal in size.  Normal inferior vena cava size   without inspiratory collapse.    Left Atrium  The left atrium is normal in size.  Left atrial volume index is 17   mL/sq m.    Mitral Valve  Mitral annular calcification. No mitral stenosis. Mild mitral   regurgitation.    Aortic Valve  Tricuspid aortic valve. Aortic sclerosis without stenosis. No aortic   insufficiency.    Tricuspid Valve  Structurally normal tricuspid valve without significant stenosis. Mild   tricuspid regurgitation. Estimated right ventricular systolic pressure    is 60 mmHg. Right atrial pressure is estimated to be 8 mmHg.    Pulmonic Valve  The pulmonic valve is not well visualized. No stenosis or regurgitation   seen.    Pericardium  Normal pericardium without effusion.    Aorta  The aortic root is normal.  Ascending aorta diameter is 2.8 cm.    By my personal review, RVSP 54mmHg. Nl RV size and systolic function, normal LV systolic and diastolic function. No significant valvular disease.     2016 TTE:  CONCLUSIONS  No prior study is available for comparison.   Normal left ventricular systolic function.  Left ventricular ejection fraction is visually estimated to be 65%.  Right heart pressures are normal.  No significant valve abnormalities.     Stress test 8/2016:  Myocardial Perfusion   Report   NUCLEAR IMAGING INTERPRETATION   Normal left ventricular size, ejection fraction, and wall motion.   No evidence of significant jeopardized viable myocardium or prior myocardial    infarction.   ECG INTERPRETATION   Negative stress ECG for ischemia.    Radiology test Review:  CXR:   Tubes and lines: Left transvenous electrodes project over the right atrium and right ventricle.    Elevated right hemidiaphragm again demonstrated.  Mild lung base interstitial opacities  No pleural effusion. No  pneumothorax.    Stable cardiopericardial silhouette.    Brain/neck MRI/MRA     IMPRESSION:        Normal cervical MRA.    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     No definite aneurysm seen.      Medical Decision Makin. Essential hypertension  Poorly controlled.   -continue lasix 20mg PO Daily  -start spironolactone 12.5mg PO daily, high risk of nephrotoxicity,need to monitor renal function closely, BMP ordered for 1 week from now.   -continue losartan 50mg PO bid and hydralazine 100mg PO bid.   -continue metoprolol, I did previously prescribe coreg but she was switched back to metoprolol, unclear why.   -Will avoid CCBs due to baseline LE swelling.       2. Dyslipidemia  No clinical CVD, however stable  -continue lipitor 20mg PO Daily  -no current clinical indication for aspirin for primary prevention.    3. Complete heart block by electrocardiogram (HCC)  S/p pacemaker.  -f/u with Anahi Gan    4. Pulmonary hypertension (HCC)  Moderate (PASP 54mmHg by my read) and in the setting of severe hypertension during echo (SBP 180s). No current symptoms and likely PASP is normal when BP well controlled  -continue hypertension control  -removed pulmonary hypertension from problem list.   -repeat echo when normotensive if dyspnea occurs.    5. CKD (chronic kidney disease), stage III (HCC)  Stable  -recheck BMP along with CBC due to h/o anemia.     6. Stress - she is under a lot of stress due to her 's health. Previously discussed a therapy referral however she does not have consistent transportation and does not want top use computers at home.     7. Tachycardia - resolved.     8. Goals of Care -   -given advanced directive form. GOC conversation in the future.     9. Chronic diastolic heart failure - NYHA class III, stage C.  -continue lasix  -start  spironolactone  -discussed leg elevation, compression stockings. Lower salt diet.    -schedule echo.     10. Pre-operative Evaluation  -Patient is moderate risk of cardiovascular complications for low to moderate risk surgery or procedures.  she is medically optimized based on my last in person assessment, and if her symptoms have not changed, surgery should proceed without further cardiac work-up.          11. Premature Ventricular Contractions (PVCs):  Asymptomatic, noted on EKG. Continue metoprolol.     F/u in October with Anahi, December with myself.  Return in about 6 months (around 12/20/2023).       Bird Becker MD, [facility]  [facility], [address]  [address] [address]  Phone: [phone]  Fax: [phone]

## 2023-06-20 NOTE — Clinical Note
Good afternoon! Can we call or message Priscilla in a week and get new BP readings? If below 140/90 please enter into flowsheet. If not please let me know and we can adjust their meds, thanks!

## 2023-06-21 ENCOUNTER — TELEPHONE (OUTPATIENT)
Dept: CARDIOLOGY | Facility: MEDICAL CENTER | Age: 83
End: 2023-06-21
Payer: MEDICARE

## 2023-06-21 NOTE — TELEPHONE ENCOUNTER
----- Message from Bird Becker M.D. sent at 6/20/2023  1:25 PM PDT -----  Good afternoon! Can we call or message Priscilla in a week and get new BP readings? If below 140/90 please enter into flowsheet. If not please let me know and we can adjust their meds, thanks!

## 2023-06-22 LAB — EKG IMPRESSION: NORMAL

## 2023-06-22 PROCEDURE — 93010 ELECTROCARDIOGRAM REPORT: CPT | Performed by: INTERNAL MEDICINE

## 2023-06-28 NOTE — TELEPHONE ENCOUNTER
Called pt [phone]  Pt currently unavailable due to upcoming surgery today. Pt has requested a call back on 6/29/23 after 1pm.

## 2023-07-07 NOTE — TELEPHONE ENCOUNTER
Phone Number Called: [phone]    Call outcome: Spoke to patient regarding message below.    Message: Called to get most recent BP from patient. Patient does not know what her blood pressure is right now and does not know what her most recent numbers are. She will check when she gets a chance and call back with the numbers.

## 2023-07-07 NOTE — TELEPHONE ENCOUNTER
ANNE    Caller: Priscilla Hdz    Topic/issue: BP READING    Priscilla states that she was instructed to provide her BP reading, which is the following.    139/81 HR 93    Please advise.    Thank you,  Chucho ESTRADA    Callback Number: [phone] (home)

## 2023-07-10 VITALS — HEART RATE: 93 BPM | DIASTOLIC BLOOD PRESSURE: 81 MMHG | SYSTOLIC BLOOD PRESSURE: 139 MMHG

## 2023-07-10 NOTE — TELEPHONE ENCOUNTER
Abstracted BP in flowsheet. Routed to JM for review since it is right at the border.    To JM: BP is right at the border- 139/81, HR 93. Please advise if you have different recommendations for her, or if you would like for her to continue to monitor. Thanks!

## 2023-07-10 NOTE — TELEPHONE ENCOUNTER
Phone Number Called: [phone]    Call outcome: Spoke to patient regarding message below.    Message:     Spoke to pt and let her know that ANNE is ok with her BPs for now. Educated pt to continue to check her BPs 1-2 hours after she takes her medications to see how her BP is. Pt verbalized understanding. Pt also aware to let us know if her SBP is above 140 consistently, as ANNE will want to know about it to make adjustments to medications. Pt very appreciative of call back.

## 2023-08-15 ENCOUNTER — TELEPHONE (OUTPATIENT)
Dept: CARDIOLOGY | Facility: MEDICAL CENTER | Age: 83
End: 2023-08-15
Payer: MEDICARE

## 2023-08-15 DIAGNOSIS — I87.2 VENOUS INSUFFICIENCY OF BOTH LOWER EXTREMITIES: ICD-10-CM

## 2023-08-15 DIAGNOSIS — I47.10 PAROXYSMAL SUPRAVENTRICULAR TACHYCARDIA (HCC): ICD-10-CM

## 2023-08-15 DIAGNOSIS — I50.33 ACUTE ON CHRONIC DIASTOLIC HEART FAILURE (HCC): ICD-10-CM

## 2023-08-15 DIAGNOSIS — I10 ESSENTIAL HYPERTENSION: ICD-10-CM

## 2023-08-16 RX ORDER — FUROSEMIDE 20 MG/1
20 TABLET ORAL
Qty: 90 TABLET | Refills: 3 | OUTPATIENT
Start: 2023-08-16

## 2023-08-16 RX ORDER — POTASSIUM CHLORIDE 750 MG/1
10 CAPSULE, EXTENDED RELEASE ORAL
Qty: 90 CAPSULE | Refills: 3 | OUTPATIENT
Start: 2023-08-16

## 2023-08-16 NOTE — TELEPHONE ENCOUNTER
Called pt [phone]  Advised of labs not yet completed. Labs required for close renal monitoring per  last OV note. Pt reports being unaware of labs pending. Pt reports transportation issues and need to arrange with family. Pt states she will try to complete labs ASAP. Pt reports 2 days of medications left.          OV note dated 6/20/23:  1. Essential hypertension  Poorly controlled.   -continue lasix 20mg PO Daily  -start spironolactone 12.5mg PO daily, high risk of nephrotoxicity,need to monitor renal function closely, BMP ordered for 1 week from now.   -continue losartan 50mg PO bid and hydralazine 100mg PO bid.   -continue metoprolol, I did previously prescribe coreg but she was switched back to metoprolol, unclear why.   -Will avoid CCBs due to baseline LE swelling.

## 2023-08-18 RX ORDER — POTASSIUM CHLORIDE 750 MG/1
10 CAPSULE, EXTENDED RELEASE ORAL DAILY
Qty: 100 CAPSULE | Refills: 3 | Status: SHIPPED | OUTPATIENT
Start: 2023-08-18

## 2023-08-18 RX ORDER — HYDRALAZINE HYDROCHLORIDE 100 MG/1
100 TABLET, FILM COATED ORAL 2 TIMES DAILY
Qty: 200 TABLET | Refills: 3 | Status: SHIPPED | OUTPATIENT
Start: 2023-08-18

## 2023-08-18 RX ORDER — METOPROLOL TARTRATE 75 MG/1
75 TABLET, FILM COATED ORAL 2 TIMES DAILY
Qty: 200 TABLET | Refills: 3 | Status: SHIPPED
Start: 2023-08-18 | End: 2023-12-21

## 2023-08-18 RX ORDER — LOSARTAN POTASSIUM 50 MG/1
50 TABLET ORAL 2 TIMES DAILY
Qty: 200 TABLET | Refills: 3 | Status: SHIPPED | OUTPATIENT
Start: 2023-08-18

## 2023-08-18 RX ORDER — FUROSEMIDE 20 MG/1
20 TABLET ORAL DAILY
Qty: 100 TABLET | Refills: 3 | Status: SHIPPED | OUTPATIENT
Start: 2023-08-18

## 2023-08-18 NOTE — TELEPHONE ENCOUNTER
I refilled meds and notified patient. We can follow-up on labs but I never want patients to run out of meds. Thank you!

## 2023-08-23 ENCOUNTER — HOSPITAL ENCOUNTER (OUTPATIENT)
Dept: LAB | Facility: MEDICAL CENTER | Age: 83
End: 2023-08-23
Attending: INTERNAL MEDICINE
Payer: MEDICARE

## 2023-08-23 DIAGNOSIS — I50.33 ACUTE ON CHRONIC DIASTOLIC HEART FAILURE (HCC): ICD-10-CM

## 2023-08-23 LAB
ANION GAP SERPL CALC-SCNC: 12 MMOL/L (ref 7–16)
BUN SERPL-MCNC: 32 MG/DL (ref 8–22)
CALCIUM SERPL-MCNC: 9.5 MG/DL (ref 8.5–10.5)
CHLORIDE SERPL-SCNC: 107 MMOL/L (ref 96–112)
CO2 SERPL-SCNC: 24 MMOL/L (ref 20–33)
CREAT SERPL-MCNC: 0.98 MG/DL (ref 0.5–1.4)
GFR SERPLBLD CREATININE-BSD FMLA CKD-EPI: 56 ML/MIN/1.73 M 2
GLUCOSE SERPL-MCNC: 79 MG/DL (ref 65–99)
MAGNESIUM SERPL-MCNC: 2.2 MG/DL (ref 1.5–2.5)
POTASSIUM SERPL-SCNC: 4.3 MMOL/L (ref 3.6–5.5)
SODIUM SERPL-SCNC: 143 MMOL/L (ref 135–145)

## 2023-08-23 PROCEDURE — 36415 COLL VENOUS BLD VENIPUNCTURE: CPT

## 2023-08-23 PROCEDURE — 80048 BASIC METABOLIC PNL TOTAL CA: CPT

## 2023-08-23 PROCEDURE — 83735 ASSAY OF MAGNESIUM: CPT

## 2023-10-17 ENCOUNTER — HOSPITAL ENCOUNTER (OUTPATIENT)
Dept: CARDIOLOGY | Facility: MEDICAL CENTER | Age: 83
End: 2023-10-17
Attending: NURSE PRACTITIONER
Payer: MEDICARE

## 2023-10-17 DIAGNOSIS — I10 ESSENTIAL HYPERTENSION: ICD-10-CM

## 2023-10-17 DIAGNOSIS — R60.0 PEDAL EDEMA: ICD-10-CM

## 2023-10-17 PROCEDURE — 93306 TTE W/DOPPLER COMPLETE: CPT

## 2023-10-18 LAB
LV EJECT FRACT  99904: 69
LV EJECT FRACT MOD 2C 99903: 71.71
LV EJECT FRACT MOD 4C 99902: 64.36
LV EJECT FRACT MOD BP 99901: 68.93

## 2023-10-18 PROCEDURE — 93306 TTE W/DOPPLER COMPLETE: CPT | Mod: 26 | Performed by: INTERNAL MEDICINE

## 2023-10-23 ENCOUNTER — NON-PROVIDER VISIT (OUTPATIENT)
Dept: CARDIOLOGY | Facility: MEDICAL CENTER | Age: 83
End: 2023-10-23

## 2023-10-23 ENCOUNTER — OFFICE VISIT (OUTPATIENT)
Dept: CARDIOLOGY | Facility: MEDICAL CENTER | Age: 83
End: 2023-10-23
Attending: NURSE PRACTITIONER
Payer: MEDICARE

## 2023-10-23 VITALS
HEIGHT: 65 IN | OXYGEN SATURATION: 97 % | RESPIRATION RATE: 16 BRPM | HEART RATE: 94 BPM | BODY MASS INDEX: 24.49 KG/M2 | DIASTOLIC BLOOD PRESSURE: 60 MMHG | SYSTOLIC BLOOD PRESSURE: 132 MMHG | WEIGHT: 147 LBS

## 2023-10-23 VITALS
OXYGEN SATURATION: 97 % | HEIGHT: 65 IN | HEART RATE: 94 BPM | BODY MASS INDEX: 24.49 KG/M2 | WEIGHT: 147 LBS | SYSTOLIC BLOOD PRESSURE: 132 MMHG | DIASTOLIC BLOOD PRESSURE: 60 MMHG | RESPIRATION RATE: 16 BRPM

## 2023-10-23 DIAGNOSIS — Z95.0 CARDIAC PACEMAKER IN SITU: ICD-10-CM

## 2023-10-23 DIAGNOSIS — I44.2 COMPLETE HEART BLOCK BY ELECTROCARDIOGRAM (HCC): ICD-10-CM

## 2023-10-23 DIAGNOSIS — I10 ESSENTIAL HYPERTENSION: ICD-10-CM

## 2023-10-23 DIAGNOSIS — I87.2 VENOUS INSUFFICIENCY OF BOTH LOWER EXTREMITIES: ICD-10-CM

## 2023-10-23 DIAGNOSIS — E78.5 DYSLIPIDEMIA: ICD-10-CM

## 2023-10-23 DIAGNOSIS — N18.32 STAGE 3B CHRONIC KIDNEY DISEASE: ICD-10-CM

## 2023-10-23 PROCEDURE — 3078F DIAST BP <80 MM HG: CPT | Performed by: NURSE PRACTITIONER

## 2023-10-23 PROCEDURE — 2023F DILAT RTA XM W/O RTNOPTHY: CPT | Performed by: NURSE PRACTITIONER

## 2023-10-23 PROCEDURE — 99214 OFFICE O/P EST MOD 30 MIN: CPT | Performed by: NURSE PRACTITIONER

## 2023-10-23 PROCEDURE — 93288 INTERROG EVL PM/LDLS PM IP: CPT | Mod: 26 | Performed by: NURSE PRACTITIONER

## 2023-10-23 PROCEDURE — 3075F SYST BP GE 130 - 139MM HG: CPT | Performed by: NURSE PRACTITIONER

## 2023-10-23 PROCEDURE — 99212 OFFICE O/P EST SF 10 MIN: CPT

## 2023-10-23 PROCEDURE — 93288 INTERROG EVL PM/LDLS PM IP: CPT | Performed by: NURSE PRACTITIONER

## 2023-10-23 PROCEDURE — 93294 REM INTERROG EVL PM/LDLS PM: CPT | Performed by: INTERNAL MEDICINE

## 2023-10-23 ASSESSMENT — ENCOUNTER SYMPTOMS
HEADACHES: 0
NAUSEA: 0
LOSS OF CONSCIOUSNESS: 0
BACK PAIN: 0
BRUISES/BLEEDS EASILY: 0
PND: 0
MYALGIAS: 0
INSOMNIA: 0
PALPITATIONS: 0
CHILLS: 0
ABDOMINAL PAIN: 0
SHORTNESS OF BREATH: 0
ORTHOPNEA: 0
DIZZINESS: 0
FEVER: 0
COUGH: 0

## 2023-10-23 ASSESSMENT — FIBROSIS 4 INDEX
FIB4 SCORE: 2.26
FIB4 SCORE: 2.26

## 2023-10-23 NOTE — CARDIAC REMOTE MONITOR - SCAN
Device transmission reviewed. Device demonstrated appropriate function. Some [facility] oversensing on atrial lead.      Electronically Signed by: Justa Mcginnis M.D.    11/20/2023  2:16 PM

## 2023-10-23 NOTE — PROGRESS NOTES
Chief Complaint   Patient presents with    Follow-Up    Pacemaker Check/Dysfunction    AV Block Complete    HTN (Controlled)    Hyperlipidemia       Subjective     Priscilla Hdz is a 83 y.o. female who presents today for six month follow-up for PM check, HTN, hyperlipidemia, chronic venous insufficiency, pulmonary HTN and CKD.    Priscilla is a 83 year old female with history of high AV block with PM since 2019, HTN (hard to control), hyperlipidemia, chronic venous insufficiency, pulmonary hypertension, and CKD, last seen by me in April 2023; she was seen by Dr. Becker in June 2023, and Aldactone 12.5mg once daily was added for elevated RVSP.     She is here today for six month follow-up. BP is running 120-130s systolic over 60-80s diastolic.    She does admit to ongoing stress because her  is now in a nursing home, and his cognitive ability is declining, and his care is rather sporadic. She denies any chest pain, pressure, tightness or discomfort; no symptomatic palpitations; no shortness of breath, orthopnea or PND; no dizziness or syncope; she does still have some LE edema, and wears compression socks. She tries to watch her salt intake too.    Past Medical History:   Diagnosis Date    Arthritis     hip, knee    ASTHMA     AV block, complete (HCC) 06/2019    Status post pacemaker implantation    Cataract     Bilateral IOL    Hyperlipidemia     Hypertension 10/2023    Echocardiogram with normal LV size, LVEF 69%. Normal LA and RV, enlarged RA. Trace MR. RVSP 35mmHg.    Menopause     Osteopenia     Pedal edema      Past Surgical History:   Procedure Laterality Date    PACEMAKER INSERTION Left 06/20/2019    Medtronic [place] S  MRI [id number] implanted by Dr. Mcginnis.    HIP REVISION TOTAL Right 8/29/2018    Procedure: HIP REVISION TOTAL;  Surgeon: Oniel Goodson M.D.;  Location: SURGERY [facility];  Service: Orthopedics    HIP ARTHROPLASTY TOTAL Right 8/9/2016    Procedure: HIP ARTHROPLASTY TOTAL;   Surgeon: Oniel Goodson M.D.;  Location: SURGERY [facility];  Service:     CATARACT EXTRACTION WITH IOL Bilateral 2012     Family History   Problem Relation Age of Onset    Heart Disease Father     Hyperlipidemia Father     Hypertension Father     Heart Disease Brother     Hypertension Mother     Hyperlipidemia Mother      Social History     Socioeconomic History    Marital status:      Spouse name: Not on file    Number of children: Not on file    Years of education: Not on file    Highest education level: Not on file   Occupational History    Not on file   Tobacco Use    Smoking status: Never    Smokeless tobacco: Never   Vaping Use    Vaping Use: Never used   Substance and Sexual Activity    Alcohol use: Yes     Alcohol/week: 0.6 oz     Types: 1 Glasses of wine per week     Comment: once every 2-3 months.     Drug use: No    Sexual activity: Not Currently     Partners: Male   Other Topics Concern    Not on file   Social History Narrative    Retired , [employer].      Social Determinants of Health     Financial Resource Strain: Not on file   Food Insecurity: Not on file   Transportation Needs: Not on file   Physical Activity: Not on file   Stress: Not on file   Social Connections: Not on file   Intimate Partner Violence: Not on file   Housing Stability: Not on file     Allergies   Allergen Reactions    Other Environmental Cough     [place], Dust     Outpatient Encounter Medications as of 10/23/2023   Medication Sig Dispense Refill    hydrALAZINE (APRESOLINE) 100 MG tablet Take 1 Tablet by mouth 2 times a day. Dose increase 6/3/2022 200 Tablet 3    furosemide (LASIX) 20 MG Tab Take 1 Tablet by mouth every day. 100 Tablet 3    losartan (COZAAR) 50 MG Tab Take 1 Tablet by mouth 2 times a day. 200 Tablet 3    Metoprolol Tartrate 75 MG Tab Take 75 mg by mouth 2 times a day. 200 Tablet 3    potassium chloride (MICRO-K) 10 MEQ capsule Take 1 Capsule by mouth every day. 100 Capsule 3  "   amoxicillin (AMOXIL) 500 MG Cap       spironolactone (ALDACTONE) 25 MG Tab Take 0.5 Tablets by mouth every day. 45 Tablet 3    atorvastatin (LIPITOR) 20 MG Tab Take 1 Tablet by mouth every day. 90 Tablet 3     No facility-administered encounter medications on file as of 10/23/2023.     Review of Systems   Constitutional:  Positive for malaise/fatigue. Negative for chills and fever.   HENT:  Negative for congestion.    Respiratory:  Negative for cough and shortness of breath.    Cardiovascular:  Positive for leg swelling. Negative for chest pain, palpitations, orthopnea and PND.   Gastrointestinal:  Negative for abdominal pain and nausea.   Musculoskeletal:  Negative for back pain and myalgias.   Skin:  Negative for rash.   Neurological:  Negative for dizziness, loss of consciousness and headaches.   Endo/Heme/Allergies:  Does not bruise/bleed easily.   Psychiatric/Behavioral:  The patient does not have insomnia.               Objective     BP (!) 148/68 (BP Location: Left arm, Patient Position: Sitting, BP Cuff Size: Adult)   Pulse 94   Resp 16   Ht 1.651 m (5' 5\")   Wt 66.7 kg (147 lb)   SpO2 97%   BMI 24.46 kg/m²     Physical Exam  Constitutional:       Appearance: She is well-developed.      Comments: Ambulates with a walker.   HENT:      Head: Normocephalic.   Neck:      Vascular: No JVD.   Cardiovascular:      Rate and Rhythm: Normal rate and regular rhythm.      Heart sounds: Normal heart sounds.      Comments: PM in left chest wall.  Pulmonary:      Effort: Pulmonary effort is normal. No respiratory distress.      Breath sounds: Normal breath sounds. No wheezing or rales.   Abdominal:      General: Bowel sounds are normal. There is no distension.      Palpations: Abdomen is soft.      Tenderness: There is no abdominal tenderness.   Musculoskeletal:         General: Normal range of motion.      Cervical back: Normal range of motion and neck supple.      Right lower leg: Edema present.      Left lower " leg: Edema present.      Comments: 1-2+ edema in the feet bilaterally.  Wearing compression stockings.   Skin:     General: Skin is warm and dry.      Findings: No rash.   Neurological:      Mental Status: She is alert and oriented to person, place, and time.       PM Interrogation today reveals normal function. 1 mode switching episode lasting 18 seconds, <0.1% of total time. No changes are made today.    CONCLUSIONS OF TTE OF 10/17/2023:  Normal left ventricular size, thickness, and systolic function.  Normal right ventricular size and systolic function.  Normal left atrial size.  Normal pericardium without effusion.  Compared to the prior study on 6/19/19, improved estimated right   ventricular systolic function.    CONCLUSIONS OF ECHOCARDIOGRAM OF 6/19/2019:  Prior Echo - 8/5/16Hyperdynamic left ventricular systolic function.  Left ventricular ejection fraction is visually estimated to be greater   than 75%.  Estimated right ventricular systolic pressure  is 60 mmHg.     Lab Results   Component Value Date/Time    SODIUM 143 08/23/2023 12:45 PM    POTASSIUM 4.3 08/23/2023 12:45 PM    CHLORIDE 107 08/23/2023 12:45 PM    CO2 24 08/23/2023 12:45 PM    GLUCOSE 79 08/23/2023 12:45 PM    BUN 32 (H) 08/23/2023 12:45 PM    CREATININE 0.98 08/23/2023 12:45 PM    CREATININE 0.77 01/29/2013 07:49 AM    BUNCREATRAT 19 03/09/2016 09:37 AM    BUNCREATRAT 23 01/29/2013 07:49 AM    GLOMRATE >59 11/10/2010 09:26 AM      Lab Results   Component Value Date/Time    CHOLSTRLTOT 214 (H) 06/13/2023 12:57 PM     (H) 06/13/2023 12:57 PM    HDL 40 06/13/2023 12:57 PM    TRIGLYCERIDE 126 06/13/2023 12:57 PM        Lab Results   Component Value Date/Time    ASTSGOT 18 06/13/2023 12:57 PM    ALTSGPT 12 06/13/2023 12:57 PM       Lab Results   Component Value Date/Time    WBC 8.0 06/13/2023 12:57 PM    RBC 4.21 06/13/2023 12:57 PM    HEMOGLOBIN 12.9 06/13/2023 12:57 PM    HEMATOCRIT 40.2 06/13/2023 12:57 PM    MCV 95.5 06/13/2023 12:57  PM    MCH 30.6 06/13/2023 12:57 PM    MCHC 32.1 (L) 06/13/2023 12:57 PM    MPV 11.7 06/13/2023 12:57 PM         Assessment & Plan     1. Cardiac pacemaker in situ        2. Complete heart block by electrocardiogram (HCC)        3. Essential hypertension        4. Dyslipidemia        5. Venous insufficiency of both lower extremities        6. Stage 3b chronic kidney disease (HCC)            Medical Decision Making: Today's Assessment/Status/Plan:      1. High AV block with PPM, which is working normally. One brief mode switching episodes (18 seconds), no changes are made today. Battery is good for 8.8 years.    2. Hypertension, treated with Metoprolol 75mg twice daily, Losartan 50mg twice daily, Lasix/Aldactone and Hydralazine 100mg twice daily. BP is better; still not quite optimal, but definitely improved.    3. Hyperlipidemia, treated with Lipitor 20mg.    4. Chronic venous insufficiency, on Lasix 20mg and Aldactone 12.5mg once daily. Discussed periodically taking extra dose of both, if LE edema worsens; she is not as inclined to do this. Continue compression stockings and elevate legs; watch fluid and salt intake.    5. Pulmonary hypertension, with RVSP improved at 35mmHg. Continue Lasix and Aldactone.    6. CKD, stage 3, stable.    7. Situational stress, given  is in a nursing home, and she often feels guilty, but can't control 's care. We discussed communication with caregivers, and setting priorities.    She remains stable at this time. Same medications for now. We reviewed echocardiogram results. She does have follow-up with Dr. Becker in December 2023; follow-up in April 2024 for next PM check.

## 2023-11-29 ENCOUNTER — PATIENT MESSAGE (OUTPATIENT)
Dept: HEALTH INFORMATION MANAGEMENT | Facility: OTHER | Age: 83
End: 2023-11-29

## 2023-12-21 ENCOUNTER — OFFICE VISIT (OUTPATIENT)
Dept: CARDIOLOGY | Facility: MEDICAL CENTER | Age: 83
End: 2023-12-21
Attending: INTERNAL MEDICINE
Payer: MEDICARE

## 2023-12-21 VITALS
SYSTOLIC BLOOD PRESSURE: 138 MMHG | RESPIRATION RATE: 16 BRPM | OXYGEN SATURATION: 94 % | WEIGHT: 145 LBS | DIASTOLIC BLOOD PRESSURE: 62 MMHG | HEIGHT: 65 IN | BODY MASS INDEX: 24.16 KG/M2 | HEART RATE: 88 BPM

## 2023-12-21 DIAGNOSIS — I87.2 VENOUS INSUFFICIENCY OF BOTH LOWER EXTREMITIES: ICD-10-CM

## 2023-12-21 DIAGNOSIS — E78.5 DYSLIPIDEMIA: ICD-10-CM

## 2023-12-21 DIAGNOSIS — I10 ESSENTIAL HYPERTENSION: ICD-10-CM

## 2023-12-21 DIAGNOSIS — I44.2 COMPLETE HEART BLOCK BY ELECTROCARDIOGRAM (HCC): ICD-10-CM

## 2023-12-21 DIAGNOSIS — N18.32 STAGE 3B CHRONIC KIDNEY DISEASE: ICD-10-CM

## 2023-12-21 DIAGNOSIS — Z95.0 CARDIAC PACEMAKER IN SITU: ICD-10-CM

## 2023-12-21 PROCEDURE — 99214 OFFICE O/P EST MOD 30 MIN: CPT | Performed by: INTERNAL MEDICINE

## 2023-12-21 PROCEDURE — 3075F SYST BP GE 130 - 139MM HG: CPT | Performed by: INTERNAL MEDICINE

## 2023-12-21 PROCEDURE — 99212 OFFICE O/P EST SF 10 MIN: CPT | Performed by: INTERNAL MEDICINE

## 2023-12-21 PROCEDURE — 3078F DIAST BP <80 MM HG: CPT | Performed by: INTERNAL MEDICINE

## 2023-12-21 RX ORDER — CARVEDILOL 12.5 MG/1
12.5 TABLET ORAL 2 TIMES DAILY WITH MEALS
Qty: 200 TABLET | Refills: 3 | Status: SHIPPED | OUTPATIENT
Start: 2023-12-21

## 2023-12-21 ASSESSMENT — FIBROSIS 4 INDEX: FIB4 SCORE: 2.26

## 2023-12-21 NOTE — PROGRESS NOTES
Cardiology Follow-up Consultation Note    Date of note: 12/21/2023  Primary Care Provider: Beth Friedman M.D.  Referring Provider: Dr. Erik Woodall    Patient Name: Priscilla Hdz   YOB: 1940  MRN:              [id number]    Chief Complaint: bradycardia    History of Present Illness: Priscilla Hdz is a 83 y.o. female whose current medical problems include complete heart block s/p dual chamber pacemaker 6/20/2019 with RV lead repositioning 8/9/2019, hypertension, dyslipidemia  who is here for follow-up.    At our visit, 10/11/2019:   having multiple medical problems, current in the hospital at [facility].     In terms of hypertension, poorly controlled.     Walks in a walker after falls and hip replacement x 2.     At our visit, 10/5/2020:  BP this morning in the 130s. Sometimes in the 140s as well.     She is here today with her Son Lg. Still significant stress from her 's medical care.     Dyslipidemia well controlled.     At our visit, 11/2/2021:  Continues to have stress from  being in and out of nursing homes and the hospital.     BP at home is in the 120s-150s.     Pacemaker functioning well. Has episodes of up to 9 beats of non-sustained VT, asymptomatic.    She presents today with [employer] who gave her a ride today.      At our visit, 5/3/2022:  BP this morning was 120s/70s. Usually 110s-140s.    Feet and ankles continue to be swollen. Weight up around 5 pounds. No SOB. Walks with walker, does not know how far she can walk.     No palpitations, no chest pain.     Significant stress from  being sick.     At our visit, 6/20/2023:  Still with feet swelling.     BP in the 120s-140s at home.     Interim Events:  Echo showed improved RV function.     Bps in the 120s-140s at home.     Patient denies chest pain, palpitations, dyspnea on exertion, pre-syncope, syncope, lower extremity swelling, orthopnea, PND or recent weight gain.     Home alone so using a walker.  Not exercising much.       ROS  No fevers, bleeding issues, CVA symptoms.       Past Medical History:   Diagnosis Date    Arthritis     hip, knee    ASTHMA     AV block, complete (HCC) 06/2019    Status post pacemaker implantation    Cataract     Bilateral IOL    Hyperlipidemia     Hypertension 10/2023    Echocardiogram with normal LV size, LVEF 69%. Normal LA and RV, enlarged RA. Trace MR. RVSP 35mmHg.    Menopause     Osteopenia     Pedal edema          Past Surgical History:   Procedure Laterality Date    PACEMAKER INSERTION Left 06/20/2019    Medtronic [place] S DR MRI [id number] implanted by Dr. Mcginnis.    HIP REVISION TOTAL Right 8/29/2018    Procedure: HIP REVISION TOTAL;  Surgeon: Oniel Goodson M.D.;  Location: SURGERY [facility];  Service: Orthopedics    HIP ARTHROPLASTY TOTAL Right 8/9/2016    Procedure: HIP ARTHROPLASTY TOTAL;  Surgeon: Oniel Goodson M.D.;  Location: SURGERY [facility];  Service:     CATARACT EXTRACTION WITH IOL Bilateral 2012         Current Outpatient Medications   Medication Sig Dispense Refill    carvedilol (COREG) 12.5 MG Tab Take 1 Tablet by mouth 2 times a day with meals. 200 Tablet 3    hydrALAZINE (APRESOLINE) 100 MG tablet Take 1 Tablet by mouth 2 times a day. Dose increase 6/3/2022 200 Tablet 3    furosemide (LASIX) 20 MG Tab Take 1 Tablet by mouth every day. 100 Tablet 3    losartan (COZAAR) 50 MG Tab Take 1 Tablet by mouth 2 times a day. 200 Tablet 3    potassium chloride (MICRO-K) 10 MEQ capsule Take 1 Capsule by mouth every day. 100 Capsule 3    amoxicillin (AMOXIL) 500 MG Cap Take 1,000 mg by mouth. Take 4 tablets 1 hour prior to dental appt.      spironolactone (ALDACTONE) 25 MG Tab Take 0.5 Tablets by mouth every day. 45 Tablet 3    atorvastatin (LIPITOR) 20 MG Tab Take 1 Tablet by mouth every day. 90 Tablet 3     No current facility-administered medications for this visit.         Allergies   Allergen Reactions    Other Environmental Cough     [place],  "Dust         Family History   Problem Relation Age of Onset    Heart Disease Father     Hyperlipidemia Father     Hypertension Father     Heart Disease Brother     Hypertension Mother     Hyperlipidemia Mother          Social History     Socioeconomic History    Marital status:      Spouse name: Not on file    Number of children: Not on file    Years of education: Not on file    Highest education level: Not on file   Occupational History    Not on file   Tobacco Use    Smoking status: Never    Smokeless tobacco: Never   Vaping Use    Vaping Use: Never used   Substance and Sexual Activity    Alcohol use: Yes     Alcohol/week: 0.6 oz     Types: 1 Glasses of wine per week     Comment: once every 2-3 months.     Drug use: No    Sexual activity: Not Currently     Partners: Male   Other Topics Concern    Not on file   Social History Narrative    Retired , [employer]      Social Determinants of Health     Financial Resource Strain: Not on file   Food Insecurity: Not on file   Transportation Needs: Not on file   Physical Activity: Not on file   Stress: Not on file   Social Connections: Not on file   Intimate Partner Violence: Not on file   Housing Stability: Not on file         Physical Exam:  Ambulatory Vitals  /62 (BP Location: Left arm, Patient Position: Sitting)   Pulse 88   Resp 16   Ht 1.651 m (5' 5\")   Wt 65.8 kg (145 lb)   SpO2 94%    Oxygen Therapy:     BP Readings from Last 4 Encounters:   12/21/23 138/62   10/23/23 132/60   10/23/23 132/60   07/10/23 139/81       Weight/BMI: Body mass index is 24.13 kg/m².  Wt Readings from Last 4 Encounters:   12/21/23 65.8 kg (145 lb)   10/23/23 66.7 kg (147 lb)   10/23/23 66.7 kg (147 lb)   06/20/23 67.6 kg (149 lb)     General: No apparent distress  Eyes: nl conjunctiva  Neck: JVP<8 cm H2O  Lungs: normal respiratory effort, CTAB  Heart: bradycardic, regular no murmurs, no rubs or gallops, 2-3+ edema bilateral lower extremities. No LV/RV " "heave on cardiac palpatation. 2+ bilateral radial pulses.  Well healed pacemaker insertion site.   Abdomen: soft, non tender, non distended, no masses, normal bowel sounds.  No HSM.  Extremities/MSK: no clubbing, no cyanosis  Neurological: No focal sensory deficits  Psychiatric: Appropriate affect, A/O x 3, intact judgement and insight  Skin: Warm extremities    Exam repeated in full and unchanged except for as noted above.          Lab Data Review:  Lab Results   Component Value Date/Time    CHOLSTRLTOT 214 (H) 06/13/2023 12:57 PM     (H) 06/13/2023 12:57 PM    HDL 40 06/13/2023 12:57 PM    TRIGLYCERIDE 126 06/13/2023 12:57 PM       Lab Results   Component Value Date/Time    SODIUM 143 08/23/2023 12:45 PM    POTASSIUM 4.3 08/23/2023 12:45 PM    CHLORIDE 107 08/23/2023 12:45 PM    CO2 24 08/23/2023 12:45 PM    GLUCOSE 79 08/23/2023 12:45 PM    BUN 32 (H) 08/23/2023 12:45 PM    CREATININE 0.98 08/23/2023 12:45 PM    CREATININE 0.77 01/29/2013 07:49 AM    BUNCREATRAT 19 03/09/2016 09:37 AM    BUNCREATRAT 23 01/29/2013 07:49 AM    GLOMRATE >59 11/10/2010 09:26 AM     Lab Results   Component Value Date/Time    ALKPHOSPHAT 80 06/13/2023 12:57 PM    ASTSGOT 18 06/13/2023 12:57 PM    ALTSGPT 12 06/13/2023 12:57 PM    TBILIRUBIN 0.4 06/13/2023 12:57 PM      Lab Results   Component Value Date/Time    WBC 8.0 06/13/2023 12:57 PM    HEMOGLOBIN 12.9 06/13/2023 12:57 PM     No components found for: \"HBGA1C\"  No components found for: \"TROPONIN\"  No results found for: \"BNP\"      Cardiac Imaging and Procedures Review:    EKG dated 8/11/2019:  ATRIAL-SENSED VENTRICULAR-PACED COMPLEXES   NO FURTHER ANALYSIS ATTEMPTED DUE TO PACED RHYTHM     Echo dated 6/19/2019:  By my personal review, RVSP 54mmHg. Nl RV size and systolic function, normal LV systolic and diastolic function. No significant valvular disease.     TTE 10/2023:  CONCLUSIONS  Normal left ventricular size, thickness, and systolic function.  Normal right ventricular " size and systolic function.  Normal left atrial size.  Normal pericardium without effusion.     Compared to the prior study on 19, improved estimated right   ventricular systolic function.    2016 TTE:  CONCLUSIONS  No prior study is available for comparison.   Normal left ventricular systolic function.  Left ventricular ejection fraction is visually estimated to be 65%.  Right heart pressures are normal.  No significant valve abnormalities.     Stress test 2016:  Myocardial Perfusion   Report   NUCLEAR IMAGING INTERPRETATION   Normal left ventricular size, ejection fraction, and wall motion.   No evidence of significant jeopardized viable myocardium or prior myocardial    infarction.   ECG INTERPRETATION   Negative stress ECG for ischemia.    Radiology test Review:  CXR:   Tubes and lines: Left transvenous electrodes project over the right atrium and right ventricle.    Elevated right hemidiaphragm again demonstrated.  Mild lung base interstitial opacities  No pleural effusion. No pneumothorax.    Stable cardiopericardial silhouette.    Brain/neck MRI/MRA     IMPRESSION:        Normal cervical MRA.    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     No definite aneurysm seen.      Medical Decision Makin. Essential hypertension  White coat hypertension.   -continue lasix 20mg PO Daily  -continue spironolactone 12.5mg PO daily, high risk of nephrotoxicity,need to monitor renal function closely.   -continue losartan 50mg PO bid and hydralazine 100mg PO bid.   -switch metoprolol to carvedilol 12.5mg PO bid.   -Will avoid CCBs due to baseline LE swelling.       2. Dyslipidemia  No clinical CVD, however stable  -continue lipitor 20mg PO Daily  -no current clinical indication for aspirin for primary prevention.    3. Complete heart block by electrocardiogram  (HCC)  S/p pacemaker.  -f/u with Anahi Gan    4. Pulmonary hypertension (HCC)  Moderate (PASP 54mmHg by my read) and in the setting of severe hypertension during echo (SBP 180s). No current symptoms and likely PASP is normal when BP well controlled  -continue hypertension control  -removed pulmonary hypertension from problem list.   -repeat echo when normotensive if dyspnea occurs.    5. CKD (chronic kidney disease), stage III (HCC)  Stable  -recheck per PCP.     6. Stress - she is under a lot of stress due to her 's health. Previously discussed a therapy referral however she does not have consistent transportation and does not want top use computers at home.     7. Tachycardia - resolved.     8. Goals of Care -   -given advanced directive form. GOC conversation in the future.     9. Chronic diastolic heart failure - NYHA class III, stage C.  -continue lasix, spironolactone  -discussed leg elevation, compression stockings. Lower salt diet.    -exercise to mobilize fluid in legs.     10. Pre-operative Evaluation  -Patient is moderate risk of cardiovascular complications for low to moderate risk surgery or procedures.  she is medically optimized based on my last in person assessment, and if her symptoms have not changed, surgery should proceed without further cardiac work-up.      11. Premature Ventricular Contractions (PVCs):  Asymptomatic, noted on EKG. Continue metoprolol.       Return in about 1 year (around 12/21/2024). And she already has f/u with Anahi scheduled in April.        Bird Becker MD, [facility]  [facility], [address]  [address] [address]  Phone: [phone]  Fax: [phone]

## 2024-01-22 ENCOUNTER — NON-PROVIDER VISIT (OUTPATIENT)
Dept: CARDIOLOGY | Facility: MEDICAL CENTER | Age: 84
End: 2024-01-22
Payer: MEDICARE

## 2024-01-22 PROCEDURE — 93294 REM INTERROG EVL PM/LDLS PM: CPT | Performed by: INTERNAL MEDICINE

## 2024-01-23 NOTE — CARDIAC REMOTE MONITOR - SCAN
Device transmission reviewed. Device demonstrated appropriate function.       Electronically Signed by: Justa Mcginnis M.D.    1/25/2024  3:33 PM

## 2024-04-22 ENCOUNTER — NON-PROVIDER VISIT (OUTPATIENT)
Dept: CARDIOLOGY | Facility: MEDICAL CENTER | Age: 84
End: 2024-04-22
Payer: MEDICARE

## 2024-04-23 ENCOUNTER — OFFICE VISIT (OUTPATIENT)
Dept: CARDIOLOGY | Facility: MEDICAL CENTER | Age: 84
End: 2024-04-23
Attending: NURSE PRACTITIONER
Payer: MEDICARE

## 2024-04-23 VITALS
HEART RATE: 85 BPM | DIASTOLIC BLOOD PRESSURE: 60 MMHG | SYSTOLIC BLOOD PRESSURE: 124 MMHG | WEIGHT: 145 LBS | RESPIRATION RATE: 16 BRPM | BODY MASS INDEX: 24.16 KG/M2 | HEIGHT: 65 IN | OXYGEN SATURATION: 95 %

## 2024-04-23 VITALS
WEIGHT: 145 LBS | RESPIRATION RATE: 16 BRPM | HEIGHT: 65 IN | DIASTOLIC BLOOD PRESSURE: 60 MMHG | HEART RATE: 85 BPM | BODY MASS INDEX: 24.16 KG/M2 | SYSTOLIC BLOOD PRESSURE: 124 MMHG | OXYGEN SATURATION: 95 %

## 2024-04-23 DIAGNOSIS — Z95.0 CARDIAC PACEMAKER IN SITU: ICD-10-CM

## 2024-04-23 DIAGNOSIS — E78.5 DYSLIPIDEMIA: ICD-10-CM

## 2024-04-23 DIAGNOSIS — I44.2 COMPLETE HEART BLOCK BY ELECTROCARDIOGRAM (HCC): ICD-10-CM

## 2024-04-23 DIAGNOSIS — I50.33 ACUTE ON CHRONIC DIASTOLIC HEART FAILURE (HCC): ICD-10-CM

## 2024-04-23 DIAGNOSIS — I10 ESSENTIAL HYPERTENSION: ICD-10-CM

## 2024-04-23 DIAGNOSIS — I87.2 VENOUS INSUFFICIENCY OF BOTH LOWER EXTREMITIES: ICD-10-CM

## 2024-04-23 PROCEDURE — 2023F DILAT RTA XM W/O RTNOPTHY: CPT | Performed by: NURSE PRACTITIONER

## 2024-04-23 PROCEDURE — 3074F SYST BP LT 130 MM HG: CPT | Performed by: NURSE PRACTITIONER

## 2024-04-23 PROCEDURE — 93294 REM INTERROG EVL PM/LDLS PM: CPT | Performed by: INTERNAL MEDICINE

## 2024-04-23 PROCEDURE — 3078F DIAST BP <80 MM HG: CPT | Performed by: NURSE PRACTITIONER

## 2024-04-23 PROCEDURE — 99214 OFFICE O/P EST MOD 30 MIN: CPT | Performed by: NURSE PRACTITIONER

## 2024-04-23 PROCEDURE — 99212 OFFICE O/P EST SF 10 MIN: CPT | Performed by: NURSE PRACTITIONER

## 2024-04-23 PROCEDURE — 93288 INTERROG EVL PM/LDLS PM IP: CPT | Performed by: NURSE PRACTITIONER

## 2024-04-23 RX ORDER — FUROSEMIDE 20 MG/1
20 TABLET ORAL DAILY
Qty: 100 TABLET | Refills: 3 | Status: SHIPPED | OUTPATIENT
Start: 2024-04-23

## 2024-04-23 RX ORDER — HYDRALAZINE HYDROCHLORIDE 100 MG/1
100 TABLET, FILM COATED ORAL 2 TIMES DAILY
Qty: 200 TABLET | Refills: 3 | Status: SHIPPED | OUTPATIENT
Start: 2024-04-23

## 2024-04-23 RX ORDER — CARVEDILOL 12.5 MG/1
12.5 TABLET ORAL 2 TIMES DAILY WITH MEALS
Qty: 200 TABLET | Refills: 3 | Status: SHIPPED | OUTPATIENT
Start: 2024-04-23

## 2024-04-23 RX ORDER — SPIRONOLACTONE 25 MG/1
12.5 TABLET ORAL DAILY
Qty: 50 TABLET | Refills: 3 | Status: SHIPPED | OUTPATIENT
Start: 2024-04-23

## 2024-04-23 RX ORDER — LOSARTAN POTASSIUM 50 MG/1
50 TABLET ORAL 2 TIMES DAILY
Qty: 200 TABLET | Refills: 3 | Status: SHIPPED | OUTPATIENT
Start: 2024-04-23

## 2024-04-23 RX ORDER — ATORVASTATIN CALCIUM 20 MG/1
20 TABLET, FILM COATED ORAL
Qty: 100 TABLET | Refills: 3 | Status: SHIPPED | OUTPATIENT
Start: 2024-04-23

## 2024-04-23 RX ORDER — POTASSIUM CHLORIDE 750 MG/1
10 CAPSULE, EXTENDED RELEASE ORAL DAILY
Qty: 100 CAPSULE | Refills: 3 | Status: SHIPPED | OUTPATIENT
Start: 2024-04-23

## 2024-04-23 ASSESSMENT — ENCOUNTER SYMPTOMS
COUGH: 0
ORTHOPNEA: 0
HEADACHES: 0
PND: 0
CHILLS: 0
INSOMNIA: 0
MYALGIAS: 0
BRUISES/BLEEDS EASILY: 0
SHORTNESS OF BREATH: 0
ABDOMINAL PAIN: 0
BACK PAIN: 0
DIZZINESS: 0
NAUSEA: 0
LOSS OF CONSCIOUSNESS: 0
FEVER: 0
PALPITATIONS: 0

## 2024-04-23 ASSESSMENT — FIBROSIS 4 INDEX
FIB4 SCORE: 2.29
FIB4 SCORE: 2.29

## 2024-04-23 NOTE — PROGRESS NOTES
Chief Complaint   Patient presents with    Follow-Up    Pacemaker Check/Dysfunction    AV Block Complete    CHF (Compensated)    HTN (Controlled)    Hyperlipidemia       Subjective     Priscilla Hdz is a 84 y.o. female who presents today for six month follow-up for PM check, AV block, HFpEF, HTN and hyperlipidemia.    Priscilla is a 84 year old female with history of high AV block with PM since 2019, HFpEF, HTN (hard to control), hyperlipidemia, chronic venous insufficiency, pulmonary hypertension, and CKD, last seen by me in October 2023.    At follow-up with Dr. Becker in December 2023, Metoprolol was changed to Coreg.     She is here today for six month follow-up.   She does have ongoing stress because her  is in a nursing home, and his cognitive ability is declining, and his care is rather sporadic. They are looking for a new care facility.    She denies any chest pain, pressure, tightness or discomfort; no symptomatic palpitations; no shortness of breath, orthopnea or PND; no dizziness or syncope; she does still have some LE edema, and wears compression socks. BP has been up and down.     Past Medical History:   Diagnosis Date    Arthritis     hip, knee    ASTHMA     AV block, complete (HCC) 06/2019    Status post pacemaker implantation    Cataract     Bilateral IOL    Hyperlipidemia     Hypertension 10/2023    Echocardiogram with normal LV size, LVEF 69%. Normal LA and RV, enlarged RA. Trace MR. RVSP 35mmHg.    Menopause     Osteopenia     Pedal edema      Past Surgical History:   Procedure Laterality Date    PACEMAKER INSERTION Left 06/20/2019    Medtronic Kalie S  MRI [id number] implanted by Dr. Mcginnis.    HIP REVISION TOTAL Right 8/29/2018    Procedure: HIP REVISION TOTAL;  Surgeon: Oniel Goodson M.D.;  Location: SURGERY [facility];  Service: Orthopedics    HIP ARTHROPLASTY TOTAL Right 8/9/2016    Procedure: HIP ARTHROPLASTY TOTAL;  Surgeon: Oniel Goodson M.D.;  Location: SURGERY  [facility];  Service:     CATARACT EXTRACTION WITH IOL Bilateral 2012     Family History   Problem Relation Age of Onset    Heart Disease Father     Hyperlipidemia Father     Hypertension Father     Heart Disease Brother     Hypertension Mother     Hyperlipidemia Mother      Social History     Socioeconomic History    Marital status:      Spouse name: Not on file    Number of children: Not on file    Years of education: Not on file    Highest education level: Not on file   Occupational History    Not on file   Tobacco Use    Smoking status: Never    Smokeless tobacco: Never   Vaping Use    Vaping Use: Never used   Substance and Sexual Activity    Alcohol use: Yes     Alcohol/week: 0.6 oz     Types: 1 Glasses of wine per week     Comment: once every 2-3 months.     Drug use: No    Sexual activity: Not Currently     Partners: Male   Other Topics Concern    Not on file   Social History Narrative    Retired , [employer]      Social Determinants of Health     Financial Resource Strain: Not on file   Food Insecurity: Not on file   Transportation Needs: Not on file   Physical Activity: Not on file   Stress: Not on file   Social Connections: Not on file   Intimate Partner Violence: Not on file   Housing Stability: Not on file     Allergies   Allergen Reactions    Other Environmental Cough     [place], Dust     Outpatient Encounter Medications as of 4/23/2024   Medication Sig Dispense Refill    losartan (COZAAR) 50 MG Tab Take 1 Tablet by mouth 2 times a day. 200 Tablet 3    atorvastatin (LIPITOR) 20 MG Tab Take 1 Tablet by mouth every day. 100 Tablet 3    furosemide (LASIX) 20 MG Tab Take 1 Tablet by mouth every day. 100 Tablet 3    spironolactone (ALDACTONE) 25 MG Tab Take 0.5 Tablets by mouth every day. 50 Tablet 3    carvedilol (COREG) 12.5 MG Tab Take 1 Tablet by mouth 2 times a day with meals. 200 Tablet 3    hydrALAZINE (APRESOLINE) 100 MG tablet Take 1 Tablet by mouth 2 times a day.  "Dose increase 6/3/2022 200 Tablet 3    potassium chloride (MICRO-K) 10 MEQ capsule Take 1 Capsule by mouth every day. 100 Capsule 3    [DISCONTINUED] carvedilol (COREG) 12.5 MG Tab Take 1 Tablet by mouth 2 times a day with meals. 200 Tablet 3    [DISCONTINUED] hydrALAZINE (APRESOLINE) 100 MG tablet Take 1 Tablet by mouth 2 times a day. Dose increase 6/3/2022 200 Tablet 3    [DISCONTINUED] furosemide (LASIX) 20 MG Tab Take 1 Tablet by mouth every day. 100 Tablet 3    [DISCONTINUED] losartan (COZAAR) 50 MG Tab Take 1 Tablet by mouth 2 times a day. 200 Tablet 3    [DISCONTINUED] potassium chloride (MICRO-K) 10 MEQ capsule Take 1 Capsule by mouth every day. 100 Capsule 3    amoxicillin (AMOXIL) 500 MG Cap Take 1,000 mg by mouth. Take 4 tablets 1 hour prior to dental appt.      [DISCONTINUED] spironolactone (ALDACTONE) 25 MG Tab Take 0.5 Tablets by mouth every day. 45 Tablet 3    [DISCONTINUED] atorvastatin (LIPITOR) 20 MG Tab Take 1 Tablet by mouth every day. 90 Tablet 3     No facility-administered encounter medications on file as of 4/23/2024.     Review of Systems   Constitutional:  Positive for malaise/fatigue. Negative for chills and fever.   HENT:  Negative for congestion.    Respiratory:  Negative for cough and shortness of breath.    Cardiovascular:  Positive for leg swelling. Negative for chest pain, palpitations, orthopnea and PND.   Gastrointestinal:  Negative for abdominal pain and nausea.   Musculoskeletal:  Negative for back pain and myalgias.   Skin:  Negative for rash.   Neurological:  Negative for dizziness, loss of consciousness and headaches.   Endo/Heme/Allergies:  Does not bruise/bleed easily.   Psychiatric/Behavioral:  The patient does not have insomnia.               Objective     /60 (BP Location: Left arm, Patient Position: Sitting, BP Cuff Size: Adult)   Pulse 85   Resp 16   Ht 1.651 m (5' 5\")   Wt 65.8 kg (145 lb)   SpO2 95%   BMI 24.13 kg/m²     Physical Exam  Constitutional:      "  Appearance: She is well-developed.      Comments: Ambulates with a walker.   HENT:      Head: Normocephalic.   Neck:      Vascular: No JVD.   Cardiovascular:      Rate and Rhythm: Normal rate and regular rhythm.      Heart sounds: Normal heart sounds.      Comments: PM in left chest wall.  Pulmonary:      Effort: Pulmonary effort is normal. No respiratory distress.      Breath sounds: Normal breath sounds. No wheezing or rales.   Abdominal:      General: Bowel sounds are normal. There is no distension.      Palpations: Abdomen is soft.      Tenderness: There is no abdominal tenderness.   Musculoskeletal:         General: Normal range of motion.      Cervical back: Normal range of motion and neck supple.      Right lower leg: Edema present.      Left lower leg: Edema present.      Comments: 1-2+ edema in the feet bilaterally.  Wearing compression stockings.   Skin:     General: Skin is warm and dry.      Findings: No rash.   Neurological:      Mental Status: She is alert and oriented to person, place, and time.     PM Interrogation today reveals normal function. No mode switching episodes. No changes are made today. Battery is good for 8.2 years.     CONCLUSIONS OF TTE OF 10/17/2023:  Normal left ventricular size, thickness, and systolic function.  Normal right ventricular size and systolic function.  Normal left atrial size.  Normal pericardium without effusion.  Compared to the prior study on 6/19/19, improved estimated right   ventricular systolic function.     CONCLUSIONS OF ECHOCARDIOGRAM OF 6/19/2019:  Prior Echo - 8/5/16Hyperdynamic left ventricular systolic function.  Left ventricular ejection fraction is visually estimated to be greater   than 75%.  Estimated right ventricular systolic pressure  is 60 mmHg.       Lab Results   Component Value Date/Time    SODIUM 143 08/23/2023 12:45 PM    POTASSIUM 4.3 08/23/2023 12:45 PM    CHLORIDE 107 08/23/2023 12:45 PM    CO2 24 08/23/2023 12:45 PM    GLUCOSE 79  08/23/2023 12:45 PM    BUN 32 (H) 08/23/2023 12:45 PM    CREATININE 0.98 08/23/2023 12:45 PM    CREATININE 0.77 01/29/2013 07:49 AM    BUNCREATRAT 19 03/09/2016 09:37 AM    BUNCREATRAT 23 01/29/2013 07:49 AM    GLOMRATE >59 11/10/2010 09:26 AM      Lab Results   Component Value Date/Time    CHOLSTRLTOT 214 (H) 06/13/2023 12:57 PM     (H) 06/13/2023 12:57 PM    HDL 40 06/13/2023 12:57 PM    TRIGLYCERIDE 126 06/13/2023 12:57 PM       Lab Results   Component Value Date/Time    ASTSGOT 18 06/13/2023 12:57 PM    ALTSGPT 12 06/13/2023 12:57 PM       Lab Results   Component Value Date/Time    WBC 8.0 06/13/2023 12:57 PM    RBC 4.21 06/13/2023 12:57 PM    HEMOGLOBIN 12.9 06/13/2023 12:57 PM    HEMATOCRIT 40.2 06/13/2023 12:57 PM    MCV 95.5 06/13/2023 12:57 PM    MCH 30.6 06/13/2023 12:57 PM    MCHC 32.1 (L) 06/13/2023 12:57 PM    MPV 11.7 06/13/2023 12:57 PM           Assessment & Plan     1. Cardiac pacemaker in situ        2. Complete heart block by electrocardiogram (HCC)        3. Acute on chronic diastolic heart failure (HCC)  spironolactone (ALDACTONE) 25 MG Tab      4. Essential hypertension  CBC WITHOUT DIFFERENTIAL    losartan (COZAAR) 50 MG Tab    carvedilol (COREG) 12.5 MG Tab    hydrALAZINE (APRESOLINE) 100 MG tablet      5. Dyslipidemia  Comp Metabolic Panel    Lipid Profile    atorvastatin (LIPITOR) 20 MG Tab      6. Venous insufficiency of both lower extremities  furosemide (LASIX) 20 MG Tab    potassium chloride (MICRO-K) 10 MEQ capsule          Medical Decision Making: Today's Assessment/Status/Plan:        High AV block, with PPM, which is working normally. No mode switching episodes. Battery is good for 8.2 years. No changes are made today.    2. Chronic diastolic HFpEF, stage C, class II, LVEF 69%. She does have ongoing LE edema. Continue:  Losartan 50mg twice daily  Lipitor 20mg once daily  Lasix 20mg once daily  Aldactone 12.5mg once daily  Coreg 12.5mg twice daily  KCl 10mEq once daily  To  check CMP    3. Hypertension, treated with Coreg, Losartan, Lasix/Aldactone and Hydralazine. BP is stable today.    4. Hyperlipidemia, treated with Lipitor 20mg. To check fasting CMP and lipid panel.    5. Chronic venous insufficiency, on Lasix/Aldactone. Continue with compression stockings.    Same medications for now. Labs as above.  Follow-up with me in 6 months, sooner if clinical condition changes.

## 2024-07-22 ENCOUNTER — NON-PROVIDER VISIT (OUTPATIENT)
Dept: CARDIOLOGY | Facility: MEDICAL CENTER | Age: 84
End: 2024-07-22
Payer: MEDICARE

## 2024-10-21 ENCOUNTER — NON-PROVIDER VISIT (OUTPATIENT)
Dept: CARDIOLOGY | Facility: MEDICAL CENTER | Age: 84
End: 2024-10-21
Payer: MEDICARE

## 2024-10-22 PROCEDURE — 93294 REM INTERROG EVL PM/LDLS PM: CPT | Performed by: INTERNAL MEDICINE

## 2024-10-29 ENCOUNTER — NON-PROVIDER VISIT (OUTPATIENT)
Dept: CARDIOLOGY | Facility: MEDICAL CENTER | Age: 84
End: 2024-10-29
Attending: NURSE PRACTITIONER
Payer: MEDICARE

## 2024-10-29 VITALS
DIASTOLIC BLOOD PRESSURE: 62 MMHG | OXYGEN SATURATION: 93 % | BODY MASS INDEX: 24.49 KG/M2 | BODY MASS INDEX: 24.49 KG/M2 | HEIGHT: 65 IN | RESPIRATION RATE: 16 BRPM | HEIGHT: 65 IN | SYSTOLIC BLOOD PRESSURE: 178 MMHG | DIASTOLIC BLOOD PRESSURE: 62 MMHG | HEART RATE: 71 BPM | RESPIRATION RATE: 16 BRPM | WEIGHT: 147 LBS | HEART RATE: 71 BPM | WEIGHT: 147 LBS | OXYGEN SATURATION: 93 % | SYSTOLIC BLOOD PRESSURE: 178 MMHG

## 2024-10-29 DIAGNOSIS — N18.32 STAGE 3B CHRONIC KIDNEY DISEASE: ICD-10-CM

## 2024-10-29 DIAGNOSIS — I44.2 COMPLETE HEART BLOCK BY ELECTROCARDIOGRAM (HCC): ICD-10-CM

## 2024-10-29 DIAGNOSIS — R53.83 OTHER FATIGUE: ICD-10-CM

## 2024-10-29 DIAGNOSIS — I10 ESSENTIAL HYPERTENSION: ICD-10-CM

## 2024-10-29 DIAGNOSIS — Z95.0 CARDIAC PACEMAKER IN SITU: ICD-10-CM

## 2024-10-29 DIAGNOSIS — I87.2 VENOUS INSUFFICIENCY OF BOTH LOWER EXTREMITIES: ICD-10-CM

## 2024-10-29 DIAGNOSIS — E78.5 DYSLIPIDEMIA: ICD-10-CM

## 2024-10-29 PROCEDURE — 99212 OFFICE O/P EST SF 10 MIN: CPT | Performed by: NURSE PRACTITIONER

## 2024-10-29 PROCEDURE — 93288 INTERROG EVL PM/LDLS PM IP: CPT | Performed by: NURSE PRACTITIONER

## 2024-10-29 RX ORDER — CARVEDILOL 25 MG/1
25 TABLET ORAL 2 TIMES DAILY WITH MEALS
Qty: 200 TABLET | Refills: 3 | Status: SHIPPED | OUTPATIENT
Start: 2024-10-29

## 2024-10-29 ASSESSMENT — ENCOUNTER SYMPTOMS
INSOMNIA: 0
NAUSEA: 0
PND: 0
PALPITATIONS: 0
DIZZINESS: 0
BRUISES/BLEEDS EASILY: 0
ORTHOPNEA: 0
MYALGIAS: 0
FEVER: 0
BACK PAIN: 0
SHORTNESS OF BREATH: 0
ABDOMINAL PAIN: 0
COUGH: 0
LOSS OF CONSCIOUSNESS: 0
CHILLS: 0
HEADACHES: 0

## 2024-10-29 ASSESSMENT — FIBROSIS 4 INDEX
FIB4 SCORE: 2.29
FIB4 SCORE: 2.29

## 2024-11-14 ENCOUNTER — HOSPITAL ENCOUNTER (OUTPATIENT)
Dept: LAB | Facility: MEDICAL CENTER | Age: 84
End: 2024-11-14
Attending: NURSE PRACTITIONER
Payer: MEDICARE

## 2024-11-14 DIAGNOSIS — I10 ESSENTIAL HYPERTENSION: ICD-10-CM

## 2024-11-14 DIAGNOSIS — R53.83 OTHER FATIGUE: ICD-10-CM

## 2024-11-14 DIAGNOSIS — E78.5 DYSLIPIDEMIA: ICD-10-CM

## 2024-11-14 LAB
ERYTHROCYTE [DISTWIDTH] IN BLOOD BY AUTOMATED COUNT: 49 FL (ref 35.9–50)
HCT VFR BLD AUTO: 39.8 % (ref 37–47)
HGB BLD-MCNC: 12.9 G/DL (ref 12–16)
MCH RBC QN AUTO: 30.6 PG (ref 27–33)
MCHC RBC AUTO-ENTMCNC: 32.4 G/DL (ref 32.2–35.5)
MCV RBC AUTO: 94.3 FL (ref 81.4–97.8)
PLATELET # BLD AUTO: 168 K/UL (ref 164–446)
PMV BLD AUTO: 11.4 FL (ref 9–12.9)
RBC # BLD AUTO: 4.22 M/UL (ref 4.2–5.4)
WBC # BLD AUTO: 8.4 K/UL (ref 4.8–10.8)

## 2024-11-14 PROCEDURE — 80061 LIPID PANEL: CPT

## 2024-11-14 PROCEDURE — 80053 COMPREHEN METABOLIC PANEL: CPT

## 2024-11-14 PROCEDURE — 84443 ASSAY THYROID STIM HORMONE: CPT

## 2024-11-14 PROCEDURE — 85027 COMPLETE CBC AUTOMATED: CPT

## 2024-11-14 PROCEDURE — 36415 COLL VENOUS BLD VENIPUNCTURE: CPT

## 2024-11-15 LAB
ALBUMIN SERPL BCP-MCNC: 4.1 G/DL (ref 3.2–4.9)
ALBUMIN/GLOB SERPL: 1.3 G/DL
ALP SERPL-CCNC: 70 U/L (ref 30–99)
ALT SERPL-CCNC: 8 U/L (ref 2–50)
ANION GAP SERPL CALC-SCNC: 13 MMOL/L (ref 7–16)
AST SERPL-CCNC: 19 U/L (ref 12–45)
BILIRUB SERPL-MCNC: 0.5 MG/DL (ref 0.1–1.5)
BUN SERPL-MCNC: 29 MG/DL (ref 8–22)
CALCIUM ALBUM COR SERPL-MCNC: 9.2 MG/DL (ref 8.5–10.5)
CALCIUM SERPL-MCNC: 9.3 MG/DL (ref 8.5–10.5)
CHLORIDE SERPL-SCNC: 106 MMOL/L (ref 96–112)
CHOLEST SERPL-MCNC: 151 MG/DL (ref 100–199)
CO2 SERPL-SCNC: 23 MMOL/L (ref 20–33)
CREAT SERPL-MCNC: 0.9 MG/DL (ref 0.5–1.4)
FASTING STATUS PATIENT QL REPORTED: NORMAL
GFR SERPLBLD CREATININE-BSD FMLA CKD-EPI: 62 ML/MIN/1.73 M 2
GLOBULIN SER CALC-MCNC: 3.1 G/DL (ref 1.9–3.5)
GLUCOSE SERPL-MCNC: 95 MG/DL (ref 65–99)
HDLC SERPL-MCNC: 35 MG/DL
LDLC SERPL CALC-MCNC: 94 MG/DL
POTASSIUM SERPL-SCNC: 4.4 MMOL/L (ref 3.6–5.5)
PROT SERPL-MCNC: 7.2 G/DL (ref 6–8.2)
SODIUM SERPL-SCNC: 142 MMOL/L (ref 135–145)
TRIGL SERPL-MCNC: 111 MG/DL (ref 0–149)
TSH SERPL-ACNC: 1.73 UIU/ML (ref 0.35–5.5)

## 2024-12-03 ENCOUNTER — OFFICE VISIT (OUTPATIENT)
Dept: CARDIOLOGY | Facility: MEDICAL CENTER | Age: 84
End: 2024-12-03
Attending: NURSE PRACTITIONER
Payer: MEDICARE

## 2024-12-03 VITALS
SYSTOLIC BLOOD PRESSURE: 126 MMHG | WEIGHT: 143 LBS | OXYGEN SATURATION: 95 % | HEART RATE: 78 BPM | HEIGHT: 65 IN | DIASTOLIC BLOOD PRESSURE: 60 MMHG | BODY MASS INDEX: 23.82 KG/M2

## 2024-12-03 DIAGNOSIS — R60.0 PEDAL EDEMA: ICD-10-CM

## 2024-12-03 DIAGNOSIS — E78.5 DYSLIPIDEMIA: ICD-10-CM

## 2024-12-03 DIAGNOSIS — I10 ESSENTIAL HYPERTENSION: ICD-10-CM

## 2024-12-03 DIAGNOSIS — Z95.0 CARDIAC PACEMAKER IN SITU: ICD-10-CM

## 2024-12-03 DIAGNOSIS — I44.2 COMPLETE HEART BLOCK BY ELECTROCARDIOGRAM (HCC): ICD-10-CM

## 2024-12-03 DIAGNOSIS — I87.2 VENOUS INSUFFICIENCY OF BOTH LOWER EXTREMITIES: ICD-10-CM

## 2024-12-03 PROCEDURE — 99214 OFFICE O/P EST MOD 30 MIN: CPT | Performed by: NURSE PRACTITIONER

## 2024-12-03 PROCEDURE — 3074F SYST BP LT 130 MM HG: CPT | Performed by: NURSE PRACTITIONER

## 2024-12-03 PROCEDURE — 99212 OFFICE O/P EST SF 10 MIN: CPT | Performed by: NURSE PRACTITIONER

## 2024-12-03 PROCEDURE — 3078F DIAST BP <80 MM HG: CPT | Performed by: NURSE PRACTITIONER

## 2024-12-03 ASSESSMENT — FIBROSIS 4 INDEX: FIB4 SCORE: 3.44

## 2024-12-03 ASSESSMENT — ENCOUNTER SYMPTOMS
FEVER: 0
HEADACHES: 0
DIZZINESS: 0
BACK PAIN: 0
PALPITATIONS: 0
LOSS OF CONSCIOUSNESS: 0
NAUSEA: 0
ABDOMINAL PAIN: 0
BRUISES/BLEEDS EASILY: 0
INSOMNIA: 0
PND: 0
COUGH: 0
CHILLS: 0
SHORTNESS OF BREATH: 0
MYALGIAS: 0
ORTHOPNEA: 0

## 2024-12-03 NOTE — PROGRESS NOTES
Chief Complaint   Patient presents with    Follow-Up    HTN (Controlled)    Evaluation Of Medication Change       Subjective     Priscilla Hdz is a 84 y.o. female who presents today for four week follow-up of elevated BP and medication titration response.    Priscilla is a 84 year old female with history of high AV block with PM since 2019, HFpEF, HTN (hard to control), hyperlipidemia, chronic venous insufficiency, pulmonary hypertension, and CKD, last seen by me in October 2024.     At that visit, BP was running higher; Coreg was increased from 12.5mg to 25mg twice daily.    She is here today for 4 week follow-up. She is tolerating this without any problems; BP seems to be better. She denies any chest pain, pressure, tightness or discomfort; no symptomatic palpitations; no shortness of breath, orthopnea or PND; no dizziness or syncope.    She does still have some LE edema, and wears compression socks. She does have follow-up at [facility] to have LE US done, as well as see a specialist for chronic venous stasis.          Past Medical History:   Diagnosis Date    Arthritis     Hip, knee    ASTHMA     AV block, complete (HCC) 06/2019    Status post pacemaker implantation    Cataract     Bilateral IOL    CKD (chronic kidney disease)     Hyperlipidemia     Hypertension 10/2023    Echocardiogram with normal LV size, LVEF 69%. Normal LA and RV, enlarged RA. Trace MR. RVSP 35mmHg.    Menopause     Osteopenia     Pedal edema      Past Surgical History:   Procedure Laterality Date    PACEMAKER INSERTION Left 06/20/2019    Medtronic Kalie S  MRI [id number] implanted by Dr. Mcginnis.    HIP REVISION TOTAL Right 8/29/2018    Procedure: HIP REVISION TOTAL;  Surgeon: Oniel Goodson M.D.;  Location: SURGERY [facility];  Service: Orthopedics    HIP ARTHROPLASTY TOTAL Right 8/9/2016    Procedure: HIP ARTHROPLASTY TOTAL;  Surgeon: Oniel Goodson M.D.;  Location: SURGERY [facility];  Service:     CATARACT EXTRACTION  WITH IOL Bilateral 2012     Family History   Problem Relation Age of Onset    Heart Disease Father     Hyperlipidemia Father     Hypertension Father     Heart Disease Brother     Hypertension Mother     Hyperlipidemia Mother      Social History     Socioeconomic History    Marital status:      Spouse name: Not on file    Number of children: Not on file    Years of education: Not on file    Highest education level: Not on file   Occupational History    Not on file   Tobacco Use    Smoking status: Never    Smokeless tobacco: Never   Vaping Use    Vaping status: Never Used   Substance and Sexual Activity    Alcohol use: Yes     Alcohol/week: 0.6 oz     Types: 1 Glasses of wine per week     Comment: once every 2-3 months.     Drug use: No    Sexual activity: Not Currently     Partners: Male   Other Topics Concern    Not on file   Social History Narrative    Retired , [employer].      Social Drivers of Health     Financial Resource Strain: Not on file   Food Insecurity: Not on file   Transportation Needs: Not on file   Physical Activity: Not on file   Stress: Not on file   Social Connections: Not on file   Intimate Partner Violence: Not on file   Housing Stability: Not on file     Allergies   Allergen Reactions    Other Environmental Cough     [place], Dust     Outpatient Encounter Medications as of 12/3/2024   Medication Sig Dispense Refill    carvedilol (COREG) 25 MG Tab Take 1 Tablet by mouth 2 times a day with meals. 200 Tablet 3    losartan (COZAAR) 50 MG Tab Take 1 Tablet by mouth 2 times a day. 200 Tablet 3    atorvastatin (LIPITOR) 20 MG Tab Take 1 Tablet by mouth every day. 100 Tablet 3    furosemide (LASIX) 20 MG Tab Take 1 Tablet by mouth every day. 100 Tablet 3    spironolactone (ALDACTONE) 25 MG Tab Take 0.5 Tablets by mouth every day. 50 Tablet 3    hydrALAZINE (APRESOLINE) 100 MG tablet Take 1 Tablet by mouth 2 times a day. Dose increase 6/3/2022 200 Tablet 3    potassium chloride  "(MICRO-K) 10 MEQ capsule Take 1 Capsule by mouth every day. 100 Capsule 3    [DISCONTINUED] amoxicillin (AMOXIL) 500 MG Cap Take 1,000 mg by mouth. Take 4 tablets 1 hour prior to dental appt. (Patient not taking: Reported on 12/3/2024)       No facility-administered encounter medications on file as of 12/3/2024.     Review of Systems   Constitutional:  Positive for malaise/fatigue. Negative for chills and fever.   HENT:  Negative for congestion.    Respiratory:  Negative for cough and shortness of breath.    Cardiovascular:  Positive for leg swelling. Negative for chest pain, palpitations, orthopnea and PND.   Gastrointestinal:  Negative for abdominal pain and nausea.   Musculoskeletal:  Negative for back pain and myalgias.   Skin:  Negative for rash.   Neurological:  Negative for dizziness, loss of consciousness and headaches.   Endo/Heme/Allergies:  Does not bruise/bleed easily.   Psychiatric/Behavioral:  The patient does not have insomnia.               Objective     /60 (BP Location: Left arm, Patient Position: Sitting, BP Cuff Size: Adult)   Pulse 78   Ht 1.651 m (5' 5\")   Wt 64.9 kg (143 lb)   SpO2 95%   BMI 23.80 kg/m²     Physical Exam  Constitutional:       Appearance: She is well-developed.      Comments: Ambulates with a walker.   HENT:      Head: Normocephalic.   Neck:      Vascular: No JVD.   Cardiovascular:      Rate and Rhythm: Normal rate and regular rhythm.      Heart sounds: Normal heart sounds.      Comments: PM in left chest wall.  Pulmonary:      Effort: Pulmonary effort is normal. No respiratory distress.      Breath sounds: Normal breath sounds. No wheezing or rales.   Abdominal:      General: Bowel sounds are normal. There is no distension.      Palpations: Abdomen is soft.      Tenderness: There is no abdominal tenderness.   Musculoskeletal:         General: Normal range of motion.      Cervical back: Normal range of motion and neck supple.      Right lower leg: Edema present.     "  Left lower leg: Edema present.      Comments: 1-2+ edema in the feet bilaterally.  Wearing compression stockings.   Skin:     General: Skin is warm and dry.      Findings: No rash.   Neurological:      Mental Status: She is alert and oriented to person, place, and time.       CONCLUSIONS OF TTE OF 10/17/2023:  Normal left ventricular size, thickness, and systolic function.  Normal right ventricular size and systolic function.  Normal left atrial size.  Normal pericardium without effusion.     CONCLUSIONS OF ECHOCARDIOGRAM OF 6/19/2019:  Prior Echo - 8/5/16  Hyperdynamic left ventricular systolic function.  Left ventricular ejection fraction is visually estimated to be greater than 75%.  Estimated right ventricular systolic pressure is 60 mmHg.    Component      Latest Ref Rng 11/14/2024   TSH      0.350 - 5.500 uIU/mL 1.730         Lab Results   Component Value Date/Time    CHOLSTRLTOT 151 11/14/2024 11:14 AM    LDL 94 11/14/2024 11:14 AM    HDL 35 (A) 11/14/2024 11:14 AM    TRIGLYCERIDE 111 11/14/2024 11:14 AM        Lab Results   Component Value Date/Time    SODIUM 142 11/14/2024 11:14 AM    POTASSIUM 4.4 11/14/2024 11:14 AM    CHLORIDE 106 11/14/2024 11:14 AM    CO2 23 11/14/2024 11:14 AM    GLUCOSE 95 11/14/2024 11:14 AM    BUN 29 (H) 11/14/2024 11:14 AM    CREATININE 0.90 11/14/2024 11:14 AM    CREATININE 0.77 01/29/2013 07:49 AM    BUNCREATRAT 19 03/09/2016 09:37 AM    BUNCREATRAT 23 01/29/2013 07:49 AM    GLOMRATE >59 11/10/2010 09:26 AM      Lab Results   Component Value Date/Time    ASTSGOT 19 11/14/2024 11:14 AM    ALTSGPT 8 11/14/2024 11:14 AM         Assessment & Plan     1. Essential hypertension        2. Cardiac pacemaker in situ        3. Complete heart block by electrocardiogram (HCC)        4. Dyslipidemia        5. Venous insufficiency of both lower extremities        6. Pedal edema            Medical Decision Making: Today's Assessment/Status/Plan:        Hypertension, treated and improved with  higher dose of Coreg. Continue:  Coreg 25mg twice daily  Losartan 50mg twice daily  Lasix 20mg once daily  Aldactone 12.5mg once daily  Hydralazine 100mg twice daily    2. High AV block, with PPM, which was working normally in October 2024. Due for next PM check in 6 months.    3. Hyperlipidemia, treated with Lipitor 20mg. Last lipid panel was good.    4. Chronic venous stasis/LE edema, on Lasix/Aldactone/KCl, to be seen by vascular specialist later this month.    BP is much better today.  Same medications for now.    Keep follow-up with other providers.    Follow-up with us in 6 months for next PM check with me, and routine cardiology follow-up with Dr. Urena (to establish care; former patient of Dr. Becker).

## 2024-12-09 NOTE — ED NOTES
Pt updated on plan of care and states no changes in condition and no needs at this time.   Writer left voicemail for pt to contact office back.    Writer sending portal message.

## 2025-01-20 ENCOUNTER — NON-PROVIDER VISIT (OUTPATIENT)
Dept: CARDIOLOGY | Facility: MEDICAL CENTER | Age: 85
End: 2025-01-20
Payer: MEDICARE

## 2025-01-21 PROCEDURE — 93294 REM INTERROG EVL PM/LDLS PM: CPT | Performed by: INTERNAL MEDICINE

## 2025-01-21 NOTE — CARDIAC REMOTE MONITOR - SCAN
Device transmission reviewed. Device demonstrated appropriate function.       Electronically Signed by: Erik Woodall M.D.    1/21/2025  4:32 PM

## 2025-02-27 NOTE — Clinical Note
REFERRAL APPROVAL NOTICE         Sent on February 27, 2025                   Priscilla Hdz  [address] [place] [address]                   Dear Ms. Hdz,    After a careful review of the medical information and benefit coverage, [facility] has processed your referral. See below for additional details.    If applicable, you must be actively enrolled with your insurance for coverage of the authorized service. If you have any questions regarding your coverage, please contact your insurance directly.    REFERRAL INFORMATION   Referral #:  [id number]  Referred-To Department    Referred-By Provider:  Physical Therapy    DINORA Barlow   [facility] [address]      [address]  [place] [id number]  [phone] [address]  [address]  [place] [id number] [phone]    Referral Start Date:  02/27/2025  Referral End Date:   02/27/2026             SCHEDULING  If you do not already have an appointment, please call [phone] to make an appointment.     MORE INFORMATION  If you do not already have a [employer] [facility] account, sign up at: [email or web address]  You can access your medical information, make appointments, see lab results, billing information, and more.  If you have questions regarding this referral, please contact  the [facility] Referrals department at:             [phone]. Monday - Friday 8:00AM - 5:00PM.     Sincerely,    [facility]

## 2025-04-08 ENCOUNTER — APPOINTMENT (OUTPATIENT)
Dept: RADIOLOGY | Facility: MEDICAL CENTER | Age: 85
DRG: 378 | End: 2025-04-08
Attending: EMERGENCY MEDICINE
Payer: MEDICARE

## 2025-04-08 ENCOUNTER — HOSPITAL ENCOUNTER (INPATIENT)
Facility: MEDICAL CENTER | Age: 85
LOS: 6 days | DRG: 378 | End: 2025-04-14
Attending: EMERGENCY MEDICINE | Admitting: STUDENT IN AN ORGANIZED HEALTH CARE EDUCATION/TRAINING PROGRAM
Payer: MEDICARE

## 2025-04-08 DIAGNOSIS — K92.2 LOWER GI BLEED: ICD-10-CM

## 2025-04-08 DIAGNOSIS — D62 ACUTE BLOOD LOSS ANEMIA: ICD-10-CM

## 2025-04-08 PROBLEM — R79.89 ELEVATED TROPONIN: Status: ACTIVE | Noted: 2025-04-08

## 2025-04-08 PROBLEM — E87.20 NORMAL ANION GAP METABOLIC ACIDOSIS: Status: ACTIVE | Noted: 2025-04-08

## 2025-04-08 LAB
ABO + RH BLD: NORMAL
ABO GROUP BLD: ABNORMAL
ALBUMIN SERPL BCP-MCNC: 2.5 G/DL (ref 3.2–4.9)
ALBUMIN/GLOB SERPL: 0.8 G/DL
ALP SERPL-CCNC: 69 U/L (ref 30–99)
ALT SERPL-CCNC: 20 U/L (ref 2–50)
ANION GAP SERPL CALC-SCNC: 8 MMOL/L (ref 7–16)
APTT PPP: 29.7 SEC (ref 24.7–36)
AST SERPL-CCNC: 38 U/L (ref 12–45)
BARCODED ABORH UBTYP: 5100
BARCODED ABORH UBTYP: 5100
BARCODED PRD CODE UBPRD: ABNORMAL
BARCODED PRD CODE UBPRD: ABNORMAL
BARCODED UNIT NUM UBUNT: ABNORMAL
BARCODED UNIT NUM UBUNT: ABNORMAL
BASOPHILS # BLD AUTO: 0.2 % (ref 0–1.8)
BASOPHILS # BLD: 0.01 K/UL (ref 0–0.12)
BILIRUB SERPL-MCNC: 0.2 MG/DL (ref 0.1–1.5)
BLD GP AB SCN SERPL QL: ABNORMAL
BUN SERPL-MCNC: 24 MG/DL (ref 8–22)
CALCIUM ALBUM COR SERPL-MCNC: 9.3 MG/DL (ref 8.5–10.5)
CALCIUM SERPL-MCNC: 8.1 MG/DL (ref 8.5–10.5)
CHLORIDE SERPL-SCNC: 114 MMOL/L (ref 96–112)
CO2 SERPL-SCNC: 19 MMOL/L (ref 20–33)
COMPONENT R 8504R: ABNORMAL
COMPONENT R 8504R: ABNORMAL
CREAT SERPL-MCNC: 0.83 MG/DL (ref 0.5–1.4)
EKG IMPRESSION: NORMAL
EOSINOPHIL # BLD AUTO: 0.08 K/UL (ref 0–0.51)
EOSINOPHIL NFR BLD: 1.2 % (ref 0–6.9)
ERYTHROCYTE [DISTWIDTH] IN BLOOD BY AUTOMATED COUNT: 51 FL (ref 35.9–50)
ERYTHROCYTE [DISTWIDTH] IN BLOOD BY AUTOMATED COUNT: 51.3 FL (ref 35.9–50)
FERRITIN SERPL-MCNC: 342 NG/ML (ref 10–291)
GFR SERPLBLD CREATININE-BSD FMLA CKD-EPI: 68 ML/MIN/1.73 M 2
GLOBULIN SER CALC-MCNC: 3.3 G/DL (ref 1.9–3.5)
GLUCOSE SERPL-MCNC: 116 MG/DL (ref 65–99)
HCT VFR BLD AUTO: 20.3 % (ref 37–47)
HCT VFR BLD AUTO: 24.8 % (ref 37–47)
HGB BLD-MCNC: 6.4 G/DL (ref 12–16)
HGB BLD-MCNC: 7.7 G/DL (ref 12–16)
IMM GRANULOCYTES # BLD AUTO: 0.07 K/UL (ref 0–0.11)
IMM GRANULOCYTES NFR BLD AUTO: 1.1 % (ref 0–0.9)
INR PPP: 1.32 (ref 0.87–1.13)
IRON SATN MFR SERPL: 9 % (ref 15–55)
IRON SERPL-MCNC: 14 UG/DL (ref 40–170)
LIPASE SERPL-CCNC: 18 U/L (ref 11–82)
LYMPHOCYTES # BLD AUTO: 0.59 K/UL (ref 1–4.8)
LYMPHOCYTES NFR BLD: 9.2 % (ref 22–41)
MAGNESIUM SERPL-MCNC: 2.1 MG/DL (ref 1.5–2.5)
MCH RBC QN AUTO: 28.4 PG (ref 27–33)
MCH RBC QN AUTO: 28.4 PG (ref 27–33)
MCHC RBC AUTO-ENTMCNC: 31 G/DL (ref 32.2–35.5)
MCHC RBC AUTO-ENTMCNC: 31.5 G/DL (ref 32.2–35.5)
MCV RBC AUTO: 90.2 FL (ref 81.4–97.8)
MCV RBC AUTO: 91.5 FL (ref 81.4–97.8)
MONOCYTES # BLD AUTO: 0.29 K/UL (ref 0–0.85)
MONOCYTES NFR BLD AUTO: 4.5 % (ref 0–13.4)
NEUTROPHILS # BLD AUTO: 5.39 K/UL (ref 1.82–7.42)
NEUTROPHILS NFR BLD: 83.8 % (ref 44–72)
NRBC # BLD AUTO: 0 K/UL
NRBC BLD-RTO: 0 /100 WBC (ref 0–0.2)
PLATELET # BLD AUTO: 210 K/UL (ref 164–446)
PLATELET # BLD AUTO: 240 K/UL (ref 164–446)
PMV BLD AUTO: 10.2 FL (ref 9–12.9)
PMV BLD AUTO: 10.4 FL (ref 9–12.9)
POTASSIUM SERPL-SCNC: 4.4 MMOL/L (ref 3.6–5.5)
PRODUCT TYPE UPROD: ABNORMAL
PRODUCT TYPE UPROD: ABNORMAL
PROT SERPL-MCNC: 5.8 G/DL (ref 6–8.2)
PROTHROMBIN TIME: 16.4 SEC (ref 12–14.6)
RBC # BLD AUTO: 2.25 M/UL (ref 4.2–5.4)
RBC # BLD AUTO: 2.71 M/UL (ref 4.2–5.4)
RH BLD: ABNORMAL
SODIUM SERPL-SCNC: 141 MMOL/L (ref 135–145)
TIBC SERPL-MCNC: 156 UG/DL (ref 250–450)
TROPONIN T SERPL-MCNC: 49 NG/L (ref 6–19)
TROPONIN T SERPL-MCNC: 51 NG/L (ref 6–19)
UIBC SERPL-MCNC: 142 UG/DL (ref 110–370)
UNIT STATUS USTAT: ABNORMAL
UNIT STATUS USTAT: ABNORMAL
WBC # BLD AUTO: 6.2 K/UL (ref 4.8–10.8)
WBC # BLD AUTO: 6.4 K/UL (ref 4.8–10.8)

## 2025-04-08 PROCEDURE — 93005 ELECTROCARDIOGRAM TRACING: CPT | Mod: TC | Performed by: EMERGENCY MEDICINE

## 2025-04-08 PROCEDURE — 83735 ASSAY OF MAGNESIUM: CPT

## 2025-04-08 PROCEDURE — 83550 IRON BINDING TEST: CPT

## 2025-04-08 PROCEDURE — 86850 RBC ANTIBODY SCREEN: CPT

## 2025-04-08 PROCEDURE — A9270 NON-COVERED ITEM OR SERVICE: HCPCS | Performed by: STUDENT IN AN ORGANIZED HEALTH CARE EDUCATION/TRAINING PROGRAM

## 2025-04-08 PROCEDURE — 74174 CTA ABD&PLVS W/CONTRAST: CPT

## 2025-04-08 PROCEDURE — 86901 BLOOD TYPING SEROLOGIC RH(D): CPT

## 2025-04-08 PROCEDURE — 99285 EMERGENCY DEPT VISIT HI MDM: CPT

## 2025-04-08 PROCEDURE — 85610 PROTHROMBIN TIME: CPT

## 2025-04-08 PROCEDURE — 85027 COMPLETE CBC AUTOMATED: CPT

## 2025-04-08 PROCEDURE — 83540 ASSAY OF IRON: CPT

## 2025-04-08 PROCEDURE — 80053 COMPREHEN METABOLIC PANEL: CPT

## 2025-04-08 PROCEDURE — 99223 1ST HOSP IP/OBS HIGH 75: CPT | Mod: AI | Performed by: STUDENT IN AN ORGANIZED HEALTH CARE EDUCATION/TRAINING PROGRAM

## 2025-04-08 PROCEDURE — 36415 COLL VENOUS BLD VENIPUNCTURE: CPT

## 2025-04-08 PROCEDURE — 85025 COMPLETE CBC W/AUTO DIFF WBC: CPT

## 2025-04-08 PROCEDURE — 84484 ASSAY OF TROPONIN QUANT: CPT

## 2025-04-08 PROCEDURE — 86923 COMPATIBILITY TEST ELECTRIC: CPT

## 2025-04-08 PROCEDURE — 86900 BLOOD TYPING SEROLOGIC ABO: CPT | Mod: 91

## 2025-04-08 PROCEDURE — 700102 HCHG RX REV CODE 250 W/ 637 OVERRIDE(OP): Performed by: STUDENT IN AN ORGANIZED HEALTH CARE EDUCATION/TRAINING PROGRAM

## 2025-04-08 PROCEDURE — 99222 1ST HOSP IP/OBS MODERATE 55: CPT | Performed by: SPECIALIST

## 2025-04-08 PROCEDURE — 770020 HCHG ROOM/CARE - TELE (206)

## 2025-04-08 PROCEDURE — 30233N1 TRANSFUSION OF NONAUTOLOGOUS RED BLOOD CELLS INTO PERIPHERAL VEIN, PERCUTANEOUS APPROACH: ICD-10-PCS | Performed by: EMERGENCY MEDICINE

## 2025-04-08 PROCEDURE — 36430 TRANSFUSION BLD/BLD COMPNT: CPT

## 2025-04-08 PROCEDURE — 700101 HCHG RX REV CODE 250: Performed by: SPECIALIST

## 2025-04-08 PROCEDURE — 82728 ASSAY OF FERRITIN: CPT

## 2025-04-08 PROCEDURE — 700117 HCHG RX CONTRAST REV CODE 255: Performed by: EMERGENCY MEDICINE

## 2025-04-08 PROCEDURE — 85730 THROMBOPLASTIN TIME PARTIAL: CPT

## 2025-04-08 PROCEDURE — P9016 RBC LEUKOCYTES REDUCED: HCPCS

## 2025-04-08 PROCEDURE — 83690 ASSAY OF LIPASE: CPT

## 2025-04-08 PROCEDURE — 700105 HCHG RX REV CODE 258: Performed by: EMERGENCY MEDICINE

## 2025-04-08 RX ORDER — ONDANSETRON 2 MG/ML
4 INJECTION INTRAMUSCULAR; INTRAVENOUS EVERY 4 HOURS PRN
Status: DISCONTINUED | OUTPATIENT
Start: 2025-04-08 | End: 2025-04-14 | Stop reason: HOSPADM

## 2025-04-08 RX ORDER — ACETAMINOPHEN 325 MG/1
650 TABLET ORAL EVERY 6 HOURS PRN
Status: DISCONTINUED | OUTPATIENT
Start: 2025-04-08 | End: 2025-04-14 | Stop reason: HOSPADM

## 2025-04-08 RX ORDER — CARVEDILOL 25 MG/1
25 TABLET ORAL 2 TIMES DAILY WITH MEALS
Status: ON HOLD | COMMUNITY
End: 2025-04-23

## 2025-04-08 RX ORDER — SODIUM CHLORIDE 9 MG/ML
INJECTION, SOLUTION INTRAVENOUS CONTINUOUS
Status: ACTIVE | OUTPATIENT
Start: 2025-04-08 | End: 2025-04-09

## 2025-04-08 RX ORDER — ONDANSETRON 4 MG/1
4 TABLET, ORALLY DISINTEGRATING ORAL EVERY 4 HOURS PRN
Status: DISCONTINUED | OUTPATIENT
Start: 2025-04-08 | End: 2025-04-14 | Stop reason: HOSPADM

## 2025-04-08 RX ORDER — SODIUM CHLORIDE 9 MG/ML
1000 INJECTION, SOLUTION INTRAVENOUS ONCE
Status: COMPLETED | OUTPATIENT
Start: 2025-04-08 | End: 2025-04-08

## 2025-04-08 RX ORDER — ATORVASTATIN CALCIUM 20 MG/1
20 TABLET, FILM COATED ORAL
Status: DISCONTINUED | OUTPATIENT
Start: 2025-04-08 | End: 2025-04-14 | Stop reason: HOSPADM

## 2025-04-08 RX ADMIN — SODIUM CHLORIDE 1000 ML: 9 INJECTION, SOLUTION INTRAVENOUS at 18:19

## 2025-04-08 RX ADMIN — ATORVASTATIN CALCIUM 20 MG: 20 TABLET, FILM COATED ORAL at 22:42

## 2025-04-08 RX ADMIN — SODIUM CHLORIDE: 9 INJECTION, SOLUTION INTRAVENOUS at 22:40

## 2025-04-08 RX ADMIN — IOHEXOL 90 ML: 350 INJECTION, SOLUTION INTRAVENOUS at 19:15

## 2025-04-08 RX ADMIN — POLYETHYLENE GLYCOL-3350 AND ELECTROLYTES 4 L: 236; 6.74; 5.86; 2.97; 22.74 POWDER, FOR SOLUTION ORAL at 22:41

## 2025-04-08 ASSESSMENT — ENCOUNTER SYMPTOMS
DOUBLE VISION: 0
CONSTIPATION: 0
SHORTNESS OF BREATH: 1
DIZZINESS: 0
BLURRED VISION: 0
DIARRHEA: 0
NAUSEA: 0
HEADACHES: 0
EYE PAIN: 0
BACK PAIN: 0
NECK PAIN: 0
FEVER: 0
PALPITATIONS: 0
BLOOD IN STOOL: 1
CHILLS: 0
WHEEZING: 0
ABDOMINAL PAIN: 0
COUGH: 0
VOMITING: 0

## 2025-04-08 ASSESSMENT — PAIN DESCRIPTION - PAIN TYPE: TYPE: ACUTE PAIN

## 2025-04-08 ASSESSMENT — FIBROSIS 4 INDEX: FIB4 SCORE: 3.48

## 2025-04-08 NOTE — ED NOTES
Pt resting more comfortably.  Changed one brief with blood and a few clots in last hour.      Lab contacted about missing CBC and CMP.  Lab reports they cannot find the specimens, new ones sent.

## 2025-04-08 NOTE — ED NOTES
Med Rec completed per patient's home pharmacy ([facility])   Allergies reviewed  No ORAL antibiotics in last 30 days  Dispense history available in EPIC? no    Patient is not taking anticoagulants     Patient states that she hasn't taken her medications in a few days

## 2025-04-08 NOTE — ED TRIAGE NOTES
Chief Complaint   Patient presents with    Bloody Stools     Pt BIB EMS from home. Per report pt started bleeding rectally today, dark red clots. Pt denies hx of blood thinners/hemorrhoids. Pt reports not taking lasix for the past 4 days, somewhat SOB w/leg swelling as well.     Shortness of Breath

## 2025-04-08 NOTE — ED PROVIDER NOTES
"ED Provider Note    CHIEF COMPLAINT  Chief Complaint   Patient presents with    Bloody Stools     Pt BIB EMS from home. Per report pt started bleeding rectally today, dark red clots. Pt denies hx of blood thinners/hemorrhoids. Pt reports not taking lasix for the past 4 days, somewhat SOB w/leg swelling as well.     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 She last had an echocardiogram October 2023 with a normal EF.     HPI/ROS  LIMITATION TO HISTORY   Select: : None  OUTSIDE HISTORIAN(S):  None    Priscilla Hdz is a 85 y.o. female who presents to the Emergency Department with bloody stools onset today. Patient reports she had one episode of bloody stools earlier today, she describes as \"dark red blood\" with a small amount of stool, however mostly blood.  She states she had minor bloody stool on Friday but has since worsened. She reports this has never happened before. She adds that she has had previously colonoscopy with no issues. Patient states she is not on any blood thinners or aspirin however does take ibuprofen. Patient also reports she has been experiencing shortness of breath today. She also states she has swelling on her lower extremities and uses compression socks for at least 1 hour a day but adds that she has not used them in over a week. Patient states that her right leg is worse than her left. Patient also reports pain in the left upper quadrant of her abdominal area. She also reports falling three times causing her bruising on her knees.  No history of head trauma.    PAST MEDICAL HISTORY  Past Medical History:   Diagnosis Date    Arthritis     Hip, knee    ASTHMA     AV block, complete (HCC) 06/2019    Status post pacemaker implantation    Cataract     Bilateral IOL    CKD (chronic kidney disease)     Hyperlipidemia     Hypertension 10/2023    " "Echocardiogram with normal LV size, LVEF 69%. Normal LA and RV, enlarged RA. Trace MR. RVSP 35mmHg.    Menopause     Osteopenia     Pedal edema         SURGICAL HISTORY  Past Surgical History:   Procedure Laterality Date    PACEMAKER INSERTION Left 06/20/2019    [employer] Kalie S  MRI [id number] implanted by Dr. Mcginnis.    HIP REVISION TOTAL Right 8/29/2018    Procedure: HIP REVISION TOTAL;  Surgeon: Oniel Goodson M.D.;  Location: SURGERY [facility];  Service: Orthopedics    HIP ARTHROPLASTY TOTAL Right 8/9/2016    Procedure: HIP ARTHROPLASTY TOTAL;  Surgeon: Oniel Goodson M.D.;  Location: SURGERY [facility];  Service:     CATARACT EXTRACTION WITH IOL Bilateral 2012        FAMILY HISTORY  Family History   Problem Relation Age of Onset    Heart Disease Father     Hyperlipidemia Father     Hypertension Father     Heart Disease Brother     Hypertension Mother     Hyperlipidemia Mother        SOCIAL HISTORY   reports that she has never smoked. She has never used smokeless tobacco. She reports current alcohol use of about 0.6 oz of alcohol per week. She reports that she does not use drugs.    CURRENT MEDICATIONS  Previous Medications    ATORVASTATIN (LIPITOR) 20 MG TAB    Take 1 Tablet by mouth every day.    CARVEDILOL (COREG) 12.5 MG TAB    Take 12.5 mg by mouth 2 times a day with meals.    FUROSEMIDE (LASIX) 20 MG TAB    Take 1 Tablet by mouth every day.    HYDRALAZINE (APRESOLINE) 100 MG TABLET    Take 1 Tablet by mouth 2 times a day. Dose increase 6/3/2022    LOSARTAN (COZAAR) 50 MG TAB    Take 1 Tablet by mouth 2 times a day.    POTASSIUM CHLORIDE (MICRO-K) 10 MEQ CAPSULE    Take 1 Capsule by mouth every day.    SPIRONOLACTONE (ALDACTONE) 25 MG TAB    Take 0.5 Tablets by mouth every day.       ALLERGIES  Other environmental    PHYSICAL EXAM  BP (!) 159/97   Pulse 60   Temp 36.3 °C (97.4 °F) (Temporal)   Resp 18   Ht 1.651 m (5' 5\")   Wt 64.9 kg (143 lb)   SpO2 97%      Constitutional: " Nontoxic appearing. Alert in no apparent distress.  HENT: Normocephalic, Atraumatic. Bilateral external ears normal. Nose normal.  Moist mucous membranes.  Oropharynx clear.  Eyes: Pupils are equal and reactive. Conjunctiva normal.   Neck: Supple, full range of motion  Heart: Regular rate and rhythm.  No murmurs.    Lungs: No respiratory distress, normal work of breathing. Scattered crackles to bilateral lungs and it is worse on the right than the left.  Abdomen Soft, no distention.  No tenderness to palpation.  Rectal exam: Moderate amount of gross red blood coming from the rectum with small clots, small non thrombosed external hemorrhoid.     Musculoskeletal: Severe pitting edema up to the knees  Atraumatic. No obvious deformities noted.   Skin: Pale, Mild bruising present to bilateral shins, Warm, Dry.  No erythema, No rash.   Neurologic: Alert and oriented x3. Moving all extremities spontaneously without focal deficits.  Psychiatric: Affect normal, Mood normal, Appears appropriate and not intoxicated.      DIAGNOSTIC STUDIES / PROCEDURES    EKG  I have independently interpreted this EKG  Results for orders placed or performed during the hospital encounter of 25   EKG   Result Value Ref Range    Report       [facility] Emergency Dept.    Test Date:  2025  Pt Name:    MOISE MENCHACA                Department: ER  MRN:        [id number]                      Room:       [facility]  Gender:     Female                       Technician: [id number]  :        1940                   Requested By:ER TRIAGE PROTOCOL  Order #:    [id number]                    Reading MD: Annalee Kline MD    Measurements  Intervals                                Axis  Rate:       100                          P:          95  KS:         75                           QRS:        110  QRSD:       154                          T:          -56  QT:         376  QTc:        485    Interpretive Statements  Intermittent  Atrial-sensed ventricular-paced complexes  No further rhythm analysis attempted due to paced rhythm  Abnormal T wave inversions in lateral leads  No ST change  Compared to ECG 06/20/2023 10:27:52  T wave inversions in lateral leads appear new    Electronically Signed On 04- 15:37:00 PD T by Annalee Kline MD         LABS  Labs Reviewed   CBC WITH DIFFERENTIAL - Abnormal; Notable for the following components:       Result Value    RBC 2.71 (*)     Hemoglobin 7.7 (*)     Hematocrit 24.8 (*)     MCHC 31.0 (*)     RDW 51.3 (*)     Neutrophils-Polys 83.80 (*)     Lymphocytes 9.20 (*)     Immature Granulocytes 1.10 (*)     Lymphs (Absolute) 0.59 (*)     All other components within normal limits   COMP METABOLIC PANEL - Abnormal; Notable for the following components:    Chloride 114 (*)     Co2 19 (*)     Glucose 116 (*)     Bun 24 (*)     Calcium 8.1 (*)     Albumin 2.5 (*)     Total Protein 5.8 (*)     All other components within normal limits   PROTHROMBIN TIME - Abnormal; Notable for the following components:    PT 16.4 (*)     INR 1.32 (*)     All other components within normal limits   TROPONIN - Abnormal; Notable for the following components:    Troponin T 51 (*)     All other components within normal limits   FERRITIN - Abnormal; Notable for the following components:    Ferritin 342.0 (*)     All other components within normal limits   IRON/TOTAL IRON BIND - Abnormal; Notable for the following components:    Iron 14 (*)     Total Iron Binding 156 (*)     % Saturation 9 (*)     All other components within normal limits   CBC WITHOUT DIFFERENTIAL - Abnormal; Notable for the following components:    RBC 2.25 (*)     Hemoglobin 6.4 (*)     Hematocrit 20.3 (*)     MCHC 31.5 (*)     RDW 51.0 (*)     All other components within normal limits   LIPASE   APTT   COD (ADULT)   ABO RH CONFIRM   ESTIMATED GFR   URINALYSIS   TROPONIN   MAGNESIUM         RADIOLOGY  I have independently interpreted the diagnostic imaging  associated with this visit and am waiting the final reading from the radiologist.   My preliminary interpretation is as follows: no obstruction or free fluid    Radiologist interpretation:  CTA ABDOMEN PELVIS W & W/O POST PROCESS   Final Result      1.  No CT evidence of active gastrointestinal bleeding.   2.  Colonic diverticulosis.   3.  Right lower lobe consolidation, concerning for pneumonia.               COURSE & MEDICAL DECISION MAKING    3:15 PM - The patient presents to the ED for hematochezia and shortness of breath. Patient seen and examined at bedside. Discussed plan of care, including lab work, and imaging to evaluate symptoms. Patient agrees to the plan of care. The patient will be resuscitated with 1L NS IV. Ordered for UA, Lipase, CMP, CBC w/Diff, EKG to evaluate her symptoms.    3:28 PM - Rectal exam performed by me at this time with nurse chaperon present.     ASSESSMENT, COURSE AND PLAN  Care Narrative: Elderly patient, not on anticoagulation, who presents with acute onset of lower GI bleeding today.  She is afebrile with normal vital signs on arrival without tachycardia or hypotension.  Her exam demonstrates moderate amount of gross bright red blood on exam.  There was some delay in her initial labs however they eventually returned without evidence of leukocytosis.  She has acute blood loss anemia with a hemoglobin of 7.7 from 12.  No evidence of significant coagulopathy however INR is 1.32.  She has a mild metabolic acidosis however no renal dysfunction or electrolyte abnormality.  No transaminitis or elevated lipase concerning for pancreatitis.  EKG shows an intermittently paced rhythm with evidence of some lateral T wave changes however no history of chest pain or concerning symptoms for ACS.  Initial troponin is 51.  CTA of the abdomen was performed without evidence of active gastrointestinal bleeding however she does have evidence of diverticulosis which could be the source.  No other acute  abnormalities identified.    6:30PM - I discussed the case with Dr. Arnold with gastroenterology.  They will plan to evaluate the patient for colonoscopy.    7:44 PM - Upon reassessment, patient is resting comfortably with normal vital signs.  No new complaints at this time.  Discussed results with patient and/or family as well as plan of care for admission. Patient verbalizes understanding and agreement to this plan of care.    7:26 PM - I discussed the patient's case and the above findings with Dr. Mitchell (Hospitalist) who agrees to evaluate the patient for hospitalization.     8:04 PM - Repeat hemoglobin returned with a hemoglobin of 6.4.  Will order for blood transfusion of 2U PRBCs    Transfusion: I have explained to the patient the risks and benefits of transfusion of blood products.  This includes, as appropriate, the risk of mild allergic reaction, hemolytic reaction, transfusion-associated lung injury, febrile reactions, circulatory or iron overload, and infection.    We discussed possible alternatives and their risks, including directed donation, autologous transfusion, and no transfusion, including IV or oral iron supplementation, as appropriate.  I believe the patient understands the risks and benefits and was able to express understanding.      ADDITIONAL PROBLEM LIST  Problem #1: Acute lower GI bleed -discussed with gastroenterology, plan for admission for monitoring    Problem #2: Acute blood loss anemia - patient to be given 2 units PRBC due hemoglobin of 6.4 and ongoing bleeding    Problem #3: Elevated troponin -continue to monitor    DISPOSITION AND DISCUSSIONS  I have discussed management of the patient with the following physicians and YAKOV's:    Dr. Arnold (gastroenterology)  Dr. Mitchell (Hospitalist)    CRITICAL CARE  The very real possibilty of a deterioration of this patient's condition required the highest level of my preparedness for sudden, emergent intervention.  I provided  critical care services, which included medication orders, frequent reevaluations of the patient's condition and response to treatment, ordering and reviewing test results, and discussing the case with various consultants.  The critical care time associated with the care of the patient was 45 minutes. Review chart for interventions. This time is exclusive of any other billable procedures.       DISPOSITION:  Patient will be hospitalized by Dr. Mitchell in guarded condition.     FINAL DIAGNOSIS  1. Lower GI bleed    2. Acute blood loss anemia        The note accurately reflects work and decisions made by me.  Annalee Kline M.D.  4/8/2025  8:08 PM     Florinda MCNEILL (nAgélica), am scribing for, and in the presence of, Annalee Kline M.D..    Electronically signed by: Florinda Oliveira (Angélica), 4/8/2025    Annalee MCNEILL M.D. personally performed the services described in this documentation, as scribed by Florinda Oliveira in my presence, and it is both accurate and complete.

## 2025-04-09 ENCOUNTER — ANESTHESIA EVENT (OUTPATIENT)
Dept: SURGERY | Facility: MEDICAL CENTER | Age: 85
DRG: 378 | End: 2025-04-09
Payer: MEDICARE

## 2025-04-09 ENCOUNTER — ANESTHESIA (OUTPATIENT)
Dept: SURGERY | Facility: MEDICAL CENTER | Age: 85
DRG: 378 | End: 2025-04-09
Payer: MEDICARE

## 2025-04-09 LAB
ALBUMIN SERPL BCP-MCNC: 2.5 G/DL (ref 3.2–4.9)
ALBUMIN/GLOB SERPL: 0.8 G/DL
ALP SERPL-CCNC: 54 U/L (ref 30–99)
ALT SERPL-CCNC: 14 U/L (ref 2–50)
ANION GAP SERPL CALC-SCNC: 9 MMOL/L (ref 7–16)
AST SERPL-CCNC: 34 U/L (ref 12–45)
BILIRUB SERPL-MCNC: 0.7 MG/DL (ref 0.1–1.5)
BUN SERPL-MCNC: 20 MG/DL (ref 8–22)
CALCIUM ALBUM COR SERPL-MCNC: 8.7 MG/DL (ref 8.5–10.5)
CALCIUM SERPL-MCNC: 7.5 MG/DL (ref 8.5–10.5)
CHLORIDE SERPL-SCNC: 110 MMOL/L (ref 96–112)
CO2 SERPL-SCNC: 21 MMOL/L (ref 20–33)
CREAT SERPL-MCNC: 0.76 MG/DL (ref 0.5–1.4)
ERYTHROCYTE [DISTWIDTH] IN BLOOD BY AUTOMATED COUNT: 49.5 FL (ref 35.9–50)
GFR SERPLBLD CREATININE-BSD FMLA CKD-EPI: 76 ML/MIN/1.73 M 2
GLOBULIN SER CALC-MCNC: 3 G/DL (ref 1.9–3.5)
GLUCOSE SERPL-MCNC: 90 MG/DL (ref 65–99)
HCT VFR BLD AUTO: 28.2 % (ref 37–47)
HGB BLD-MCNC: 9 G/DL (ref 12–16)
MCH RBC QN AUTO: 28.7 PG (ref 27–33)
MCHC RBC AUTO-ENTMCNC: 31.9 G/DL (ref 32.2–35.5)
MCV RBC AUTO: 89.8 FL (ref 81.4–97.8)
PATHOLOGY CONSULT NOTE: NORMAL
PLATELET # BLD AUTO: 211 K/UL (ref 164–446)
PMV BLD AUTO: 10 FL (ref 9–12.9)
POTASSIUM SERPL-SCNC: 4.2 MMOL/L (ref 3.6–5.5)
PROCALCITONIN SERPL-MCNC: 0.42 NG/ML
PROT SERPL-MCNC: 5.5 G/DL (ref 6–8.2)
RBC # BLD AUTO: 3.14 M/UL (ref 4.2–5.4)
SODIUM SERPL-SCNC: 140 MMOL/L (ref 135–145)
WBC # BLD AUTO: 7.3 K/UL (ref 4.8–10.8)

## 2025-04-09 PROCEDURE — 160203 HCHG ENDO MINUTES - 1ST 30 MINS LEVEL 4: Performed by: SPECIALIST

## 2025-04-09 PROCEDURE — 700105 HCHG RX REV CODE 258: Performed by: STUDENT IN AN ORGANIZED HEALTH CARE EDUCATION/TRAINING PROGRAM

## 2025-04-09 PROCEDURE — A9270 NON-COVERED ITEM OR SERVICE: HCPCS | Performed by: STUDENT IN AN ORGANIZED HEALTH CARE EDUCATION/TRAINING PROGRAM

## 2025-04-09 PROCEDURE — 43239 EGD BIOPSY SINGLE/MULTIPLE: CPT | Performed by: SPECIALIST

## 2025-04-09 PROCEDURE — 700102 HCHG RX REV CODE 250 W/ 637 OVERRIDE(OP): Performed by: STUDENT IN AN ORGANIZED HEALTH CARE EDUCATION/TRAINING PROGRAM

## 2025-04-09 PROCEDURE — 700111 HCHG RX REV CODE 636 W/ 250 OVERRIDE (IP)

## 2025-04-09 PROCEDURE — 0DB98ZX EXCISION OF DUODENUM, VIA NATURAL OR ARTIFICIAL OPENING ENDOSCOPIC, DIAGNOSTIC: ICD-10-PCS | Performed by: SPECIALIST

## 2025-04-09 PROCEDURE — 700111 HCHG RX REV CODE 636 W/ 250 OVERRIDE (IP): Performed by: STUDENT IN AN ORGANIZED HEALTH CARE EDUCATION/TRAINING PROGRAM

## 2025-04-09 PROCEDURE — 99233 SBSQ HOSP IP/OBS HIGH 50: CPT | Performed by: STUDENT IN AN ORGANIZED HEALTH CARE EDUCATION/TRAINING PROGRAM

## 2025-04-09 PROCEDURE — 84145 PROCALCITONIN (PCT): CPT

## 2025-04-09 PROCEDURE — 85027 COMPLETE CBC AUTOMATED: CPT

## 2025-04-09 PROCEDURE — 36415 COLL VENOUS BLD VENIPUNCTURE: CPT

## 2025-04-09 PROCEDURE — 160015 HCHG STAT PREOP MINUTES: Performed by: SPECIALIST

## 2025-04-09 PROCEDURE — 770020 HCHG ROOM/CARE - TELE (206)

## 2025-04-09 PROCEDURE — 160009 HCHG ANES TIME/MIN: Performed by: SPECIALIST

## 2025-04-09 PROCEDURE — 306313 ANTI-EMBOLISM STOCKINGS XXXLG REG: Performed by: STUDENT IN AN ORGANIZED HEALTH CARE EDUCATION/TRAINING PROGRAM

## 2025-04-09 PROCEDURE — 86923 COMPATIBILITY TEST ELECTRIC: CPT

## 2025-04-09 PROCEDURE — 88305 TISSUE EXAM BY PATHOLOGIST: CPT | Mod: 26 | Performed by: PATHOLOGY

## 2025-04-09 PROCEDURE — 700101 HCHG RX REV CODE 250: Performed by: SPECIALIST

## 2025-04-09 PROCEDURE — 160048 HCHG OR STATISTICAL LEVEL 1-5: Performed by: SPECIALIST

## 2025-04-09 PROCEDURE — 160035 HCHG PACU - 1ST 60 MINS PHASE I: Performed by: SPECIALIST

## 2025-04-09 PROCEDURE — 160002 HCHG RECOVERY MINUTES (STAT): Performed by: SPECIALIST

## 2025-04-09 PROCEDURE — 88305 TISSUE EXAM BY PATHOLOGIST: CPT | Performed by: PATHOLOGY

## 2025-04-09 PROCEDURE — 700111 HCHG RX REV CODE 636 W/ 250 OVERRIDE (IP): Mod: JZ | Performed by: STUDENT IN AN ORGANIZED HEALTH CARE EDUCATION/TRAINING PROGRAM

## 2025-04-09 PROCEDURE — 700101 HCHG RX REV CODE 250: Performed by: STUDENT IN AN ORGANIZED HEALTH CARE EDUCATION/TRAINING PROGRAM

## 2025-04-09 PROCEDURE — 36430 TRANSFUSION BLD/BLD COMPNT: CPT

## 2025-04-09 PROCEDURE — P9016 RBC LEUKOCYTES REDUCED: HCPCS

## 2025-04-09 PROCEDURE — 97602 WOUND(S) CARE NON-SELECTIVE: CPT

## 2025-04-09 PROCEDURE — 80053 COMPREHEN METABOLIC PANEL: CPT

## 2025-04-09 RX ORDER — LABETALOL HYDROCHLORIDE 5 MG/ML
5 INJECTION, SOLUTION INTRAVENOUS
Status: DISCONTINUED | OUTPATIENT
Start: 2025-04-09 | End: 2025-04-10 | Stop reason: HOSPADM

## 2025-04-09 RX ORDER — LIDOCAINE HYDROCHLORIDE 20 MG/ML
INJECTION, SOLUTION EPIDURAL; INFILTRATION; INTRACAUDAL; PERINEURAL PRN
Status: DISCONTINUED | OUTPATIENT
Start: 2025-04-09 | End: 2025-04-09 | Stop reason: SURG

## 2025-04-09 RX ORDER — SODIUM CHLORIDE, SODIUM LACTATE, POTASSIUM CHLORIDE, CALCIUM CHLORIDE 600; 310; 30; 20 MG/100ML; MG/100ML; MG/100ML; MG/100ML
INJECTION, SOLUTION INTRAVENOUS CONTINUOUS
Status: DISCONTINUED | OUTPATIENT
Start: 2025-04-09 | End: 2025-04-10 | Stop reason: HOSPADM

## 2025-04-09 RX ORDER — ALBUTEROL SULFATE 5 MG/ML
2.5 SOLUTION RESPIRATORY (INHALATION)
Status: DISCONTINUED | OUTPATIENT
Start: 2025-04-09 | End: 2025-04-10 | Stop reason: HOSPADM

## 2025-04-09 RX ORDER — HYDRALAZINE HYDROCHLORIDE 20 MG/ML
5 INJECTION INTRAMUSCULAR; INTRAVENOUS
Status: DISCONTINUED | OUTPATIENT
Start: 2025-04-09 | End: 2025-04-10 | Stop reason: HOSPADM

## 2025-04-09 RX ORDER — HALOPERIDOL 5 MG/ML
1 INJECTION INTRAMUSCULAR
Status: DISCONTINUED | OUTPATIENT
Start: 2025-04-09 | End: 2025-04-10 | Stop reason: HOSPADM

## 2025-04-09 RX ORDER — OXYCODONE HCL 5 MG/5 ML
10 SOLUTION, ORAL ORAL
Status: DISCONTINUED | OUTPATIENT
Start: 2025-04-09 | End: 2025-04-10 | Stop reason: HOSPADM

## 2025-04-09 RX ORDER — DIPHENHYDRAMINE HYDROCHLORIDE 50 MG/ML
12.5 INJECTION, SOLUTION INTRAMUSCULAR; INTRAVENOUS
Status: DISCONTINUED | OUTPATIENT
Start: 2025-04-09 | End: 2025-04-10 | Stop reason: HOSPADM

## 2025-04-09 RX ORDER — HYDROMORPHONE HYDROCHLORIDE 1 MG/ML
0.4 INJECTION, SOLUTION INTRAMUSCULAR; INTRAVENOUS; SUBCUTANEOUS
Status: DISCONTINUED | OUTPATIENT
Start: 2025-04-09 | End: 2025-04-10 | Stop reason: HOSPADM

## 2025-04-09 RX ORDER — HYDROMORPHONE HYDROCHLORIDE 1 MG/ML
0.1 INJECTION, SOLUTION INTRAMUSCULAR; INTRAVENOUS; SUBCUTANEOUS
Status: DISCONTINUED | OUTPATIENT
Start: 2025-04-09 | End: 2025-04-10 | Stop reason: HOSPADM

## 2025-04-09 RX ORDER — PANTOPRAZOLE SODIUM 40 MG/10ML
40 INJECTION, POWDER, LYOPHILIZED, FOR SOLUTION INTRAVENOUS 2 TIMES DAILY
Status: DISCONTINUED | OUTPATIENT
Start: 2025-04-09 | End: 2025-04-12

## 2025-04-09 RX ORDER — SODIUM CHLORIDE, SODIUM LACTATE, POTASSIUM CHLORIDE, CALCIUM CHLORIDE 600; 310; 30; 20 MG/100ML; MG/100ML; MG/100ML; MG/100ML
INJECTION, SOLUTION INTRAVENOUS
Status: DISCONTINUED | OUTPATIENT
Start: 2025-04-09 | End: 2025-04-09 | Stop reason: SURG

## 2025-04-09 RX ORDER — CARVEDILOL 25 MG/1
25 TABLET ORAL 2 TIMES DAILY WITH MEALS
Status: DISCONTINUED | OUTPATIENT
Start: 2025-04-09 | End: 2025-04-14 | Stop reason: HOSPADM

## 2025-04-09 RX ORDER — LOSARTAN POTASSIUM 50 MG/1
50 TABLET ORAL 2 TIMES DAILY
Status: DISCONTINUED | OUTPATIENT
Start: 2025-04-09 | End: 2025-04-14 | Stop reason: HOSPADM

## 2025-04-09 RX ORDER — FUROSEMIDE 20 MG/1
20 TABLET ORAL DAILY
Status: DISCONTINUED | OUTPATIENT
Start: 2025-04-10 | End: 2025-04-14 | Stop reason: HOSPADM

## 2025-04-09 RX ORDER — ONDANSETRON 2 MG/ML
4 INJECTION INTRAMUSCULAR; INTRAVENOUS
Status: DISCONTINUED | OUTPATIENT
Start: 2025-04-09 | End: 2025-04-10 | Stop reason: HOSPADM

## 2025-04-09 RX ORDER — HYDROMORPHONE HYDROCHLORIDE 1 MG/ML
0.2 INJECTION, SOLUTION INTRAMUSCULAR; INTRAVENOUS; SUBCUTANEOUS
Status: DISCONTINUED | OUTPATIENT
Start: 2025-04-09 | End: 2025-04-10 | Stop reason: HOSPADM

## 2025-04-09 RX ORDER — OXYCODONE HCL 5 MG/5 ML
5 SOLUTION, ORAL ORAL
Status: DISCONTINUED | OUTPATIENT
Start: 2025-04-09 | End: 2025-04-10 | Stop reason: HOSPADM

## 2025-04-09 RX ORDER — AZITHROMYCIN 250 MG/1
500 TABLET, FILM COATED ORAL DAILY
Status: COMPLETED | OUTPATIENT
Start: 2025-04-09 | End: 2025-04-11

## 2025-04-09 RX ADMIN — AMPICILLIN SODIUM, SULBACTAM SODIUM 3 G: 2; 1 INJECTION, POWDER, FOR SOLUTION INTRAMUSCULAR; INTRAVENOUS at 17:47

## 2025-04-09 RX ADMIN — PROPOFOL 70 MG: 10 INJECTION, EMULSION INTRAVENOUS at 08:51

## 2025-04-09 RX ADMIN — ATORVASTATIN CALCIUM 20 MG: 20 TABLET, FILM COATED ORAL at 19:50

## 2025-04-09 RX ADMIN — PANTOPRAZOLE SODIUM 40 MG: 40 INJECTION, POWDER, FOR SOLUTION INTRAVENOUS at 06:30

## 2025-04-09 RX ADMIN — AZITHROMYCIN DIHYDRATE 500 MG: 250 TABLET ORAL at 11:56

## 2025-04-09 RX ADMIN — CARVEDILOL 25 MG: 25 TABLET, FILM COATED ORAL at 18:25

## 2025-04-09 RX ADMIN — POLYETHYLENE GLYCOL-3350 AND ELECTROLYTES 4 L: 236; 6.74; 5.86; 2.97; 22.74 POWDER, FOR SOLUTION ORAL at 17:46

## 2025-04-09 RX ADMIN — LOSARTAN POTASSIUM 50 MG: 50 TABLET, FILM COATED ORAL at 18:25

## 2025-04-09 RX ADMIN — AMPICILLIN SODIUM, SULBACTAM SODIUM 3 G: 2; 1 INJECTION, POWDER, FOR SOLUTION INTRAMUSCULAR; INTRAVENOUS at 11:55

## 2025-04-09 RX ADMIN — PANTOPRAZOLE SODIUM 40 MG: 40 INJECTION, POWDER, FOR SOLUTION INTRAVENOUS at 17:42

## 2025-04-09 RX ADMIN — SODIUM CHLORIDE, POTASSIUM CHLORIDE, SODIUM LACTATE AND CALCIUM CHLORIDE: 600; 310; 30; 20 INJECTION, SOLUTION INTRAVENOUS at 08:47

## 2025-04-09 RX ADMIN — LIDOCAINE HYDROCHLORIDE 60 MG: 20 INJECTION, SOLUTION EPIDURAL; INFILTRATION; INTRACAUDAL; PERINEURAL at 08:51

## 2025-04-09 RX ADMIN — PROPOFOL 30 MG: 10 INJECTION, EMULSION INTRAVENOUS at 08:57

## 2025-04-09 RX ADMIN — PROPOFOL 50 MG: 10 INJECTION, EMULSION INTRAVENOUS at 09:01

## 2025-04-09 ASSESSMENT — COGNITIVE AND FUNCTIONAL STATUS - GENERAL
STANDING UP FROM CHAIR USING ARMS: A LOT
MOVING FROM LYING ON BACK TO SITTING ON SIDE OF FLAT BED: A LITTLE
HELP NEEDED FOR BATHING: A LITTLE
CLIMB 3 TO 5 STEPS WITH RAILING: A LOT
SUGGESTED CMS G CODE MODIFIER MOBILITY: CK
MOBILITY SCORE: 15
MOVING TO AND FROM BED TO CHAIR: A LOT
DAILY ACTIVITIY SCORE: 16
DRESSING REGULAR UPPER BODY CLOTHING: A LOT
SUGGESTED CMS G CODE MODIFIER DAILY ACTIVITY: CK
DRESSING REGULAR LOWER BODY CLOTHING: A LOT
PERSONAL GROOMING: A LITTLE
TOILETING: A LOT
WALKING IN HOSPITAL ROOM: A LOT

## 2025-04-09 ASSESSMENT — PAIN DESCRIPTION - PAIN TYPE
TYPE: SURGICAL PAIN
TYPE: ACUTE PAIN
TYPE: SURGICAL PAIN
TYPE: ACUTE PAIN
TYPE: SURGICAL PAIN

## 2025-04-09 ASSESSMENT — PAIN SCALES - GENERAL: PAIN_LEVEL: 0

## 2025-04-09 ASSESSMENT — FIBROSIS 4 INDEX: FIB4 SCORE: 3.52

## 2025-04-09 NOTE — ED NOTES
Bedside report received from off going RN/tech: Jocelyne HOFF RN. Reginaldo BENITEZ RN assumed care of patient.  POC discussed with patient. Call light within reach, all needs addressed at this time.       Fall risk interventions in place: Keep floor surfaces clean and dry (all applicable per [place] Fall risk assessment)   Continuous monitoring: Cardiac Leads, Pulse Ox, or Blood Pressure  IVF/IV medications: Infusion per MAR (List Med(s)) NS  Oxygen: How many liters 3L  Bedside sitter: Not Applicable   Isolation: Not Applicable

## 2025-04-09 NOTE — PROCEDURES
OPERATIVE REPORT    PATIENT:   Priscilla Hdz   1940       PREOPERATIVE DIAGNOSES/INDICATIONS: Hematochezia, iron def anemia    POSTOPERATIVE DIAGNOSIS: gastric erosions, hiatal hernia, non-obstructing Schatzki ring    PROCEDURE:  ESOPHAGOGASTRODUODENOSCOPY with biopsy    PHYSICIAN:  Ly Arnold MD    ANESTHESIA:  Per anesthesiologist.    ESTIMATED BLOOD LOSS:nil    LOCATION: [facility]    CONSENT:  OBTAINED. The risks, benefits and alternatives of the procedure were discussed in details. The risks include and are not limited to bleeding, infection, perforation, missed lesions, and sedations risks (cardiopulmonary compromise and allergic reaction to medications).    DESCRIPTION: The patient presented to the procedure room.  After routine checkup was performed, patient was brought into the endoscopy suite.  Patient was placed on his left lateral decubitus position. A bite block was placed in patient's mouth. Patient was sedated by anesthesia.  Vital signs were monitored throughout the procedure.  Oxygenation support was provided throughout the procedure. Upper endoscope was inserted into patient's mouth and advanced to the second portion of the duodenum under direct visualization.      Once the site was reached and examined, the upper endoscope was withdrawn.  Retroflexion was made within the stomach.  The stomach was decompressed, scope was withdrawn and the procedure was terminated.     The patient tolerated the procedure well.  There were no immediate complications.    OPERATIVE FINDINGS:    1. Esophagus: normal, except for non-obstructing Schatzki ring  2. Stomach:  3 cm hiatal hernia, gastric erosions in the antrum  3. Duodenum: normal to second portion. Biopsy for celiac sprue due to iron def anemia    IMPRESSION/RECOMMENDATIONS:  Non-obstructing Schatzki ring  Hiatal hernia  Gastric erosion  PPI daily  H. Pylori in stool - please check  Await biopsy      This note has been transcribed with digital voice  recognition software and although it has been reviewed may contain grammatical or word errors

## 2025-04-09 NOTE — H&P
Hospital Medicine History & Physical Note    Date of Service  4/8/2025    Primary Care Physician  Beth Friedman M.D.    Consultants  GI    Specialist Names: Dr. Arnold    Code Status  Full Code    Chief Complaint  Chief Complaint   Patient presents with    Bloody Stools     Pt BIB EMS from home. Per report pt started bleeding rectally today, dark red clots. Pt denies hx of blood thinners/hemorrhoids. Pt reports not taking lasix for the past 4 days, somewhat SOB w/leg swelling as well.     Shortness of Breath       History of Presenting Illness  Patient is an 85-year-old female with past medical history of HLD, HTN, complete heart block status post PPM 2019, CKD 3B that presents with 1 day history of bright red blood per rectum.    Patient states that earlier today around 1300 they noticed blood on their down after standing up.  Subsequently went to the restroom with passage of bright red blood per rectum and possible clots.     In the ED, BP 100s to 150s/50s to 90s, HR 60s to 90s, RR 18-39, saturating well on room air.  Received NS 1 L bolus x 1.  WBC 6.4, hemoglobin 7.7>>6.4, MCV 91.5, , sodium 141, potassium 4.4, bicarb 19, anion gap 8, BUN 24, creatinine 0.83, corrected calcium 9.3, albumin 2.5, iron 14, percent saturation 9, ferritin 342, troponin 51, INR 1.32.  EKG V paced , QTc 485, without ST changes, relatively unchanged from previous.  CTA abdomen pelvis with and without without evidence of active GI bleeding, colonic diverticulosis, right lower lobe consolidation.    I discussed the plan of care with patient.    Review of Systems  Review of Systems   Constitutional:  Negative for chills and fever.   HENT:  Negative for ear pain.    Eyes:  Negative for blurred vision, double vision and pain.   Respiratory:  Positive for shortness of breath. Negative for cough and wheezing.    Cardiovascular:  Negative for chest pain, palpitations and leg swelling.   Gastrointestinal:  Positive for blood in  stool. Negative for abdominal pain, constipation, diarrhea, melena, nausea and vomiting.   Genitourinary:  Negative for dysuria, frequency and hematuria.   Musculoskeletal:  Negative for back pain, joint pain and neck pain.   Neurological:  Negative for dizziness and headaches.       Past Medical History   has a past medical history of Arthritis, ASTHMA, AV block, complete (HCC) (06/2019), Cataract, CKD (chronic kidney disease), Hyperlipidemia, Hypertension (10/2023), Menopause, Osteopenia, and Pedal edema.    Surgical History   has a past surgical history that includes cataract extraction with iol (Bilateral, 2012); hip arthroplasty total (Right, 8/9/2016); hip revision total (Right, 8/29/2018); and pacemaker insertion (Left, 06/20/2019).     Family History  family history includes Heart Disease in her brother and father; Hyperlipidemia in her father and mother; Hypertension in her father and mother.   Family history reviewed with patient. There is no family history that is pertinent to the chief complaint.     Social History   reports that she has never smoked. She has never used smokeless tobacco. She reports current alcohol use of about 0.6 oz of alcohol per week. She reports that she does not use drugs.    Allergies  Allergies   Allergen Reactions    Other Environmental Cough     [place], Dust       Medications  Prior to Admission Medications   Prescriptions Last Dose Informant Patient Reported? Taking?   atorvastatin (LIPITOR) 20 MG Tab Unknown Patient's Home Pharmacy No No   Sig: Take 1 Tablet by mouth every day.   carvedilol (COREG) 12.5 MG Tab Unknown Patient's Home Pharmacy Yes No   Sig: Take 12.5 mg by mouth 2 times a day with meals.   furosemide (LASIX) 20 MG Tab Unknown Patient's Home Pharmacy No No   Sig: Take 1 Tablet by mouth every day.   hydrALAZINE (APRESOLINE) 100 MG tablet Unknown Patient's Home Pharmacy No No   Sig: Take 1 Tablet by mouth 2 times a day. Dose increase 6/3/2022   losartan (COZAAR) 50  MG Tab Unknown Patient's Home Pharmacy No No   Sig: Take 1 Tablet by mouth 2 times a day.   potassium chloride (MICRO-K) 10 MEQ capsule Unknown Patient's Home Pharmacy No No   Sig: Take 1 Capsule by mouth every day.   spironolactone (ALDACTONE) 25 MG Tab Unknown Patient's Home Pharmacy No No   Sig: Take 0.5 Tablets by mouth every day.      Facility-Administered Medications: None       Physical Exam  Temp:  [36.3 °C (97.4 °F)] 36.3 °C (97.4 °F)  Pulse:  [60-90] 88  Resp:  [18-39] 25  BP: (107-159)/(56-97) 118/58  SpO2:  [89 %-97 %] 95 %  Blood Pressure : 118/58   Temperature: 36.3 °C (97.4 °F)   Pulse: 88   Respiration: (!) 25   Pulse Oximetry: 95 %       Physical Exam  Vitals reviewed.   Constitutional:       General: She is not in acute distress.     Appearance: Normal appearance. She is not ill-appearing, toxic-appearing or diaphoretic.   HENT:      Head: Normocephalic and atraumatic.      Mouth/Throat:      Mouth: Mucous membranes are moist.      Pharynx: No oropharyngeal exudate or posterior oropharyngeal erythema.   Eyes:      General: No scleral icterus.     Extraocular Movements: Extraocular movements intact.      Conjunctiva/sclera: Conjunctivae normal.   Cardiovascular:      Rate and Rhythm: Normal rate and regular rhythm.      Heart sounds: Normal heart sounds. No murmur heard.     No friction rub. No gallop.   Pulmonary:      Effort: Pulmonary effort is normal. No respiratory distress.      Breath sounds: Normal breath sounds. No stridor. No wheezing, rhonchi or rales.   Abdominal:      General: Abdomen is flat. There is no distension.      Palpations: Abdomen is soft. There is no mass.      Tenderness: There is no abdominal tenderness. There is no guarding or rebound.      Hernia: No hernia is present.   Musculoskeletal:         General: No swelling or tenderness. Normal range of motion.      Cervical back: Neck supple. No rigidity.      Right lower leg: Edema present.      Left lower leg: Edema present.  "     Comments: Bilateral lower extremity 2+ pitting edema   Lymphadenopathy:      Cervical: No cervical adenopathy.   Skin:     General: Skin is warm and dry.      Coloration: Skin is not jaundiced.   Neurological:      Mental Status: She is alert and oriented to person, place, and time. Mental status is at baseline.      Cranial Nerves: No cranial nerve deficit.         Laboratory:  Recent Labs     04/08/25  1640 04/08/25  1950   WBC 6.4 6.2   RBC 2.71* 2.25*   HEMOGLOBIN 7.7* 6.4*   HEMATOCRIT 24.8* 20.3*   MCV 91.5 90.2   MCH 28.4 28.4   MCHC 31.0* 31.5*   RDW 51.3* 51.0*   PLATELETCT 240 210   MPV 10.4 10.2     Recent Labs     04/08/25  1640   SODIUM 141   POTASSIUM 4.4   CHLORIDE 114*   CO2 19*   GLUCOSE 116*   BUN 24*   CREATININE 0.83   CALCIUM 8.1*     Recent Labs     04/08/25  1640   ALTSGPT 20   ASTSGOT 38   ALKPHOSPHAT 69   TBILIRUBIN 0.2   LIPASE 18   GLUCOSE 116*     Recent Labs     04/08/25  1640   APTT 29.7   INR 1.32*     No results for input(s): \"NTPROBNP\" in the last 72 hours.      Recent Labs     04/08/25  1640   TROPONINT 51*       Imaging:  CTA ABDOMEN PELVIS W & W/O POST PROCESS   Final Result      1.  No CT evidence of active gastrointestinal bleeding.   2.  Colonic diverticulosis.   3.  Right lower lobe consolidation, concerning for pneumonia.             EKG:  I have personally reviewed the images and compared with prior images.    Assessment/Plan:  Justification for Admission Status  I anticipate this patient will require at least two midnights for appropriate medical management, necessitating inpatient admission because lower GI bleed requiring GI consultation    Patient will need a Med/Surg bed on MEDICAL service .  The need is secondary to lower GI bleed requiring GI consultation.    * Lower GI bleed- (present on admission)  Assessment & Plan  Hemoglobin 7.7>>6.4, decreased from 12.9 on 11/14/2024.  Anemia likely in the setting of acute blood loss, possible component of iron deficiency " with iron level of 14 and percent sat of 9, ferritin 342.  Patient states that earlier today around 1300 they noticed blood on their down after standing up.  Subsequently went to the restroom with passage of bright red blood per rectum and possible clots. CTA abdomen pelvis with and without without evidence of active GI bleeding, colonic diverticulosis, right lower lobe consolidation.    Lower GI: Diverticulosis, AVM, malignancy, hemorrhoids     -1u pRBC ordered in ED  -GI consulted in the ED, Dr. Arnold  -NPO  -Avoid NSAIDs  -2 large bore IVs  -Transfuse PRN Hgb<7 and PLT<50  -Consider IV iron infusion and/or starting on oral ferrous sulfate prior to discharge    Elevated troponin- (present on admission)  Assessment & Plan  Troponin 51, likely nonischemic myocardial injury in the setting of GI bleed.  EKG without ST changes, denies chest pain.    -Trend troponin    Normal anion gap metabolic acidosis- (present on admission)  Assessment & Plan  Bicarb 19 and anion gap 8.    -Daily BMP    Stage 3b chronic kidney disease- (present on admission)  Assessment & Plan  Creatinine 0.83, around baseline.    -Avoid nephrotoxic medications    Cardiac pacemaker in situ- (present on admission)  Assessment & Plan  Complete heart block status post PPM 2019    Complete heart block by electrocardiogram (HCC)- (present on admission)  Assessment & Plan  Status post PPM 2019    Venous insufficiency of both lower extremities- (present on admission)  Assessment & Plan  -Hold home Lasix and spironolactone in the setting of GI bleed    Essential hypertension- (present on admission)  Assessment & Plan  -Hold home losartan, carvedilol, hydralazine, spironolactone, Lasix in the setting of GI bleed    Dyslipidemia- (present on admission)  Assessment & Plan  -Continue home atorvastatin        VTE prophylaxis: SCDs/TEDs

## 2025-04-09 NOTE — CONSULTS
"GASTROENTEROLOGY CONSULTATION    PATIENT NAME: Priscilla Hdz  : 1940  CSN: [id number]  MRN:  [id number]     CONSULTATION DATE:  2025    PRIMARY CARE PROVIDER:  Beth Friedman M.D.      REASON FOR CONSULT:  hematochezia  Consult requested by Dr. Annalee Kline    HISTORY OF PRESENT ILLNESS:  Priscilla Hdz is a 85 y.o. female who presents to the Emergency Department with bloody stools onset today. Patient reports she had one episode of bloody stools earlier today, she describes as \"dark red blood\" with a small amount of stool, however mostly blood.  She states she had minor bloody stool on Friday but has since worsened. She reports this has never happened before. She adds that she has had previously colonoscopy with no issues. She is not sure about the timing of colonoscopy. Patient states she is not on any blood thinners or aspirin however does take ibuprofen. Patient also reports she has been experiencing shortness of breath today. She also states she has swelling on her lower extremities and uses compression socks for at least 1 hour a day but adds that she has not used them in over a week. Patient states that her right leg is worse than her left. Patient also reports pain in the left upper quadrant of her abdominal area. She also reports falling three times causing her bruising on her knees.  No history of head trauma. I ordered iron studies and she is iron deficient    PAST MEDICAL HISTORY:  Past Medical History:   Diagnosis Date    Arthritis     Hip, knee    ASTHMA     AV block, complete (HCC) 2019    Status post pacemaker implantation    Cataract     Bilateral IOL    CKD (chronic kidney disease)     Hyperlipidemia     Hypertension 10/2023    Echocardiogram with normal LV size, LVEF 69%. Normal LA and RV, enlarged RA. Trace MR. RVSP 35mmHg.    Menopause     Osteopenia     Pedal edema        PAST SURGICAL HISTORY:  Past Surgical History:   Procedure Laterality Date    PACEMAKER INSERTION Left " 06/20/2019    [employer]  MRI [id number] implanted by Dr. Mcginnis.    HIP REVISION TOTAL Right 8/29/2018    Procedure: HIP REVISION TOTAL;  Surgeon: Oniel Goodson M.D.;  Location: SURGERY [facility];  Service: Orthopedics    HIP ARTHROPLASTY TOTAL Right 8/9/2016    Procedure: HIP ARTHROPLASTY TOTAL;  Surgeon: Oniel Goodson M.D.;  Location: SURGERY [facility];  Service:     CATARACT EXTRACTION WITH IOL Bilateral 2012        CURRENT MEDS:  Current Facility-Administered Medications   Medication Dose Route Frequency Provider Last Rate Last Admin    acetaminophen (Tylenol) tablet 650 mg  650 mg Oral Q6HRS PRN Basilio Mitchell M.D.        ondansetron (Zofran) syringe/vial injection 4 mg  4 mg Intravenous Q4HRS PRN Basilio Mitchell M.D.        Or    ondansetron (Zofran ODT) dispertab 4 mg  4 mg Oral Q4HRS PRN Basilio Mitchell M.D.        atorvastatin (Lipitor) tablet 20 mg  20 mg Oral QDAY Basilio Mitchell M.D.        polyethylene glycol-electrolytes (Golytely) solution 4 L  4 L Oral Once Ly Arnold M.D.        NS infusion   Intravenous Continuous Annalee Kline M.D.            ALLERGIES:  Allergies   Allergen Reactions    Other Environmental Cough     [place], Dust       SOCIAL HISTORY:  Social History     Socioeconomic History    Marital status:      Spouse name: Not on file    Number of children: Not on file    Years of education: Not on file    Highest education level: Not on file   Occupational History    Not on file   Tobacco Use    Smoking status: Never    Smokeless tobacco: Never   Vaping Use    Vaping status: Never Used   Substance and Sexual Activity    Alcohol use: Yes     Alcohol/week: 0.6 oz     Types: 1 Glasses of wine per week     Comment: once every 2-3 months.     Drug use: No    Sexual activity: Not Currently     Partners: Male   Other Topics Concern    Not on file   Social History Narrative    Retired , [place].      Social  "Drivers of Health     Financial Resource Strain: Not on file   Food Insecurity: Not on file   Transportation Needs: Not on file   Physical Activity: Not on file   Stress: Not on file   Social Connections: Not on file   Intimate Partner Violence: Not on file   Housing Stability: Not on file       FAMILY HISTORY:  Family History   Problem Relation Age of Onset    Heart Disease Father     Hyperlipidemia Father     Hypertension Father     Heart Disease Brother     Hypertension Mother     Hyperlipidemia Mother         REVIEW OF SYSTEMS:  General ROS: Negative for - chills, fever, night sweats or weight loss.  HEENT ROS: Negative  Respiratory ROS: Negative for - cough or shortness of breath.  Cardiovascular ROS:  Negative for - chest pain or palpitations.  Gastrointestinal ROS: As per the history of present illness.  Genito-Urinary ROS: Negative  Musculoskeletal ROS: Negative.  Neurological ROS: Negative  Skin ROS: negative  Hematology ROS: negative  Endocrinology ROS: Negative        PHYSICAL EXAM:  VITALS: /46   Pulse 74   Temp 37.2 °C (99 °F) (Temporal)   Resp 18   Ht 1.651 m (5' 5\")   Wt 64.9 kg (143 lb)   SpO2 98%   BMI 23.80 kg/m²   GEN:  Priscilla Hdz is a 85 y.o. female in no acute distress.  HEENT: Mucous membranes pink and moist.  Sclera anicteric.    NECK:    Neck supple without lymphadenopathy or thyromegaly.  LUNGS: Clear to auscultation posteriorly.  HEART: Regular rate and rhythm. S1 and S2 normal. No murmurs, gallops  ABD:  + Bsnt/ nd -hsm   RECTAL: Not done at this time.  EXT:  Without cyanosis, deformity or pitting edema.  SKIN:  Pink, warm, dry.  NEURO: Grossly intact, A/OR.    LABS:  Recent Labs     04/08/25  1640 04/08/25  1950   WBC 6.4 6.2   MCV 91.5 90.2     Recent Labs     04/08/25  1640   GLUCOSE 116*   BUN 24*   CO2 19*     Lab Results   Component Value Date    INR 1.32 (H) 04/08/2025    INR 1.05 06/23/2021    INR 1.04 08/09/2019     No components found for: \"ALT\", \"AST\", \"GGT\", " "\"ALKPHOS\"  No results found for: \"BILINEO\"      @LASTGCAT(XP0873)@     @LASTGCAT(JM7050)@       IMPRESSION/PLAN:  Iron def anemia  Hematochezia  History of complete heart block with pacemaker in place      EGD/ Colonoscopy in am  NPO after midnight  Golytely  Transfuse  Trend h/h       Ly Arnold M.D.  Gastroenterology      "

## 2025-04-09 NOTE — ASSESSMENT & PLAN NOTE
Hemoglobin 7.7>>6.4, decreased from 12.9 on 11/14/2024.  Anemia likely in the setting of acute blood loss, possible component of iron deficiency with iron level of 14 and percent sat of 9, ferritin 342.  Patient states that earlier today around 1300 they noticed blood on their down after standing up.  Subsequently went to the restroom with passage of bright red blood per rectum and possible clots. CTA abdomen pelvis with and without without evidence of active GI bleeding, colonic diverticulosis, right lower lobe consolidation.    Lower GI: Diverticulosis, AVM, malignancy, hemorrhoids     -1u pRBC ordered in ED  -GI consulted in the ED, Dr. Arnold  -NPO  -Avoid NSAIDs  -2 large bore IVs  -Transfuse PRN Hgb<7 and PLT<50  -Consider IV iron infusion and/or starting on oral ferrous sulfate prior to discharge

## 2025-04-09 NOTE — PROGRESS NOTES
Consulted by ER to evaluate Ms. Hdz who is a 85 year old with hematochezia. She says there were clots and she was weak and SOB. Hemoglobin is 7.7. I have added iron and ferritin to previous blood to look for iron def. CTA shows diverticulosis but no active bleeding. We will do colonoscopy tomorrow. I would recommend transfusing her because she will drop her hemoglobin with hydration. She also has possible pneumonia. I would recommend an 85 year old with LGI be placed on telemetry. Trend h/h

## 2025-04-09 NOTE — ANESTHESIA POSTPROCEDURE EVALUATION
Patient: Priscilla Hdz    Procedure Summary       Date: 04/09/25 Room / Location: [facility] ROOM 26 / SURGERY SAME DAY [facility]    Anesthesia Start: 0847 Anesthesia Stop: 0909    Procedures:       COLONOSCOPY (Anus)      GASTROSCOPY, WITH BIOPSY (Esophagus) Diagnosis: (Hiatal hernia, Gastric Polyps, Gastric Erosion, Poor Prep, Colon Polyp, Diverticulosis,)    Surgeons: Ly Arnold M.D. Responsible Provider: Darren Magaña M.D.    Anesthesia Type: MAC ASA Status: 3 - Emergent            Final Anesthesia Type: MAC  Last vitals  BP   Blood Pressure : 101/49    Temp   36.4 °C (97.6 °F)    Pulse   78   Resp   16    SpO2   99 %      Anesthesia Post Evaluation    Patient location during evaluation: PACU  Patient participation: complete - patient participated  Level of consciousness: sleepy but conscious  Pain score: 0    Airway patency: patent  Anesthetic complications: no  Cardiovascular status: hemodynamically stable  Respiratory status: acceptable  Hydration status: euvolemic    PONV: none          No notable events documented.     Nurse Pain Score: 0 (NPRS)

## 2025-04-09 NOTE — PROGRESS NOTES
Monitor summary:        Rhythm: paced  Rate: 75-91  Ectopy: (F)PVC, couplet, bigeminy  Measurements: .20/.13/.46        12hr chart check

## 2025-04-09 NOTE — PROGRESS NOTES
4 Eyes Skin Assessment Completed by JAYJAY Razo and JAYJAY Martinez.    Head WDL  Ears WDL  Nose WDL  Mouth WDL  Neck WDL  Breast/Chest WDL  Shoulder Blades WDL  Spine WDL  (R) Arm/Elbow/Hand WDL  (L) Arm/Elbow/Hand WDL  Abdomen WDL  Groin WDL  Scrotum/Coccyx/Buttocks Redness and Blanching, open    (R) Leg Bruising and Swelling  (L) Leg Bruising and Swelling  (R) Heel/Foot/Toe Redness and Blanching  (L) Heel/Foot/Toe Redness and Blanching          Devices In Places Pulse Ox, Pacer, and Nasal Cannula      Interventions In Place Gray Ear Foams, Chair Waffle, TAP System, Pillows, Q2 Turns, and Barrier Cream    Possible Skin Injury Yes    Pictures Uploaded Into Epic Yes  Wound Consult Placed Yes  RN Wound Prevention Protocol Ordered Yes

## 2025-04-09 NOTE — CARE PLAN
The patient is Watcher - Medium risk of patient condition declining or worsening    Shift Goals  Clinical Goals: wound care, monitor bowel movements and hemoglobin  Patient Goals: updates, comfort  Family Goals: updates    Progress made toward(s) clinical / shift goals:    Problem: Knowledge Deficit - Standard  Goal: Patient and family/care givers will demonstrate understanding of plan of care, disease process/condition, diagnostic tests and medications  Outcome: Progressing     Problem: Skin Integrity  Goal: Skin integrity is maintained or improved  Outcome: Progressing     Problem: Fall Risk  Goal: Patient will remain free from falls  Outcome: Progressing     Problem: Risk for Fluid Volume Deficit Related to Bleeding  Goal: Fluid volume balance will be maintained  Outcome: Progressing  Note: Bowel prep for anticipated procedure  Goal: Patient will show no signs and symptoms of excessive bleeding  Outcome: Progressing     Problem: Risk for Bleeding  Goal: Patient will take measures to prevent bleeding and recognizes signs of bleeding that need to be reported immediately to a health care professional  Outcome: Progressing  Goal: Patient will not experience bleeding as evidenced by normal blood pressure, stable hematocrit and hemoglobin levels and desired ranges for coagulation profiles  Outcome: Progressing       Patient is not progressing towards the following goals:

## 2025-04-09 NOTE — ED NOTES
Waffle pad placed under pt bottom. Pt tolerated placement well and expressed that the waffle pad helped her comfort levels. Pt also provided with ice water. Pt reports no additional needs at this time.

## 2025-04-09 NOTE — DISCHARGE PLANNING
Case Management Discharge Planning    Admission Date: 4/8/2025  GMLOS: 2.9  ALOS: 1    6-Clicks ADL Score: 16  6-Clicks Mobility Score: 15  PT and/or OT Eval ordered: Yes  Post-acute Referrals Ordered: Yes  Post-acute Choice Obtained: No  Has referral(s) been sent to post-acute provider:  No      Anticipated Discharge Dispo: Discharge Disposition: Discharged to home/self care (01)    DME Needed: No    Action(s) Taken: Updated Provider/Nurse on Discharge Plan and DC Assessment Complete (See below)    1305, Pt was visited at room to obtain assessment information and discuss discharge planning.     Escalations Completed: None    Medically Clear: No    Next Steps: CM will continue to assist Pt with discharge needs.      Barriers to Discharge: Medical clearance and Pending Placement    Is the patient up for discharge tomorrow: No      Care Transition Team Assessment  RN ONEIDA met with Pt at bedside to obtain assessment informations. Demographics verified. RN CM introduced self and purpose of visit.   Pt lives with son in a 1 story house with 3 steps to main entrance.  Pt has son for support.  Pt has MURALI MONTALVO M.D for PCP  Pt was independent with ADLs and IADLs prior to hospitalization.  Pt has 4WW at home.    Information Source  Orientation Level: Oriented X4  Information Given By: Patient    Elopement Risk  Legal Hold: No  Ambulatory or Self Mobile in Wheelchair: Yes  Disoriented: No  Psychiatric Symptoms: None  History of Wandering: No  Elopement this Admit: No  Vocalizing Wanting to Leave: No  Displays Behaviors, Body Language Wanting to Leave: No-Not at Risk for Elopement  Elopement Risk: Not at Risk for Elopement    Interdisciplinary Discharge Planning  Primary Care Physician: MURALI MONTALVO M.D  Lives with - Patient's Self Care Capacity: Adult Children  Support Systems: Children (son)  Housing / Facility: 1 Story House (with 3 steps to main entrance)    Discharge Preparedness  What is your plan after  discharge?: Home with help  What are your discharge supports?: Child  Prior Functional Level: Independent with Activities of Daily Living, Independent with Medication Management    Functional Assesment  Prior Functional Level: Independent with Activities of Daily Living, Independent with Medication Management    Finances  Financial Barriers to Discharge: No  Prescription Coverage: Yes    Domestic Abuse  Have you ever been the victim of abuse or violence?: No    Psychological Assessment  History of Substance Abuse: None  History of Psychiatric Problems: No    Anticipated Discharge Information  Discharge Disposition: Discharged to home/self care (01)

## 2025-04-09 NOTE — OR NURSING
*This is a Running Note*    0906 Pt to PACU 4 from OR. Bedside report from anesthesiologist and RN.  Attached to monitoring, VSS, breathing is calm and unlabored, Patient is asleep currently. Remains on 6 L oxygen via mask with an Oral airway in place.      0920 Pt denies pain or nausea at this time.    Pt Now on 3l nc BL, and has had some water, tolerating well.    0944 Criteria met to transition patient to phase 2 recovery. Called report to [facility]. Transport order placed.    0952 Pt in route to room, with family, RN updated.

## 2025-04-09 NOTE — ANESTHESIA PREPROCEDURE EVALUATION
Case: [id number] Date/Time: 04/09/25 1119    Procedure: COLONOSCOPY (Anus)    Anesthesia type: MAC    Pre-op diagnosis: Hematochezia    Location: [facility] ROOM 26 / SURGERY SAME DAY [facility]    Surgeons: Ly Arnold M.D.            Relevant Problems   CARDIAC   (positive) Cardiac pacemaker in situ   (positive) Essential hypertension   (positive) Venous insufficiency of both lower extremities         (positive) Stage 3b chronic kidney disease      Other   (positive) Complete heart block by electrocardiogram (HCC)   (positive) Lower GI bleed     No prior anesthetic complications. Appropriately NPO.    Vitals:    04/09/25 0809   BP: (!) 178/91   Pulse: 82   Resp: 16   Temp: (!) 35.8 °C (96.4 °F)   SpO2: 97%        Recent Labs     04/08/25  1640 04/08/25  1950 04/09/25  0746   WBC 6.4 6.2 7.3   RBC 2.71* 2.25* 3.14*   HEMOGLOBIN 7.7* 6.4* 9.0*   HEMATOCRIT 24.8* 20.3* 28.2*   MCV 91.5 90.2 89.8   MCH 28.4 28.4 28.7   RDW 51.3* 51.0* 49.5   PLATELETCT 240 210 211   MPV 10.4 10.2 10.0   NEUTSPOLYS 83.80*  --   --    LYMPHOCYTES 9.20*  --   --    MONOCYTES 4.50  --   --    EOSINOPHILS 1.20  --   --    BASOPHILS 0.20  --   --        Recent Labs     04/08/25  1640   SODIUM 141   POTASSIUM 4.4   CHLORIDE 114*   CO2 19*   GLUCOSE 116*   BUN 24*       Physical Exam    Airway   Mallampati: II  TM distance: >3 FB  Neck ROM: full       Cardiovascular - normal exam  Rhythm: regular  Rate: normal  (-) murmur     Dental - normal exam           Pulmonary - normal exam  Breath sounds clear to auscultation     Abdominal    Neurological - normal exam                   Anesthesia Plan    ASA 3- EMERGENT (acute bleed)   ASA physical status 3 criteria: implanted pacemaker    Plan - MAC               Induction: intravenous      Pertinent diagnostic labs and testing reviewed    Informed Consent:    Anesthetic plan and risks discussed with patient.    Use of blood products discussed with: patient whom consented to blood products.

## 2025-04-09 NOTE — PROGRESS NOTES
4 Eyes Skin Assessment Completed by JAYJAY Monroy and Kade, JESSICA    Head WDL  Ears WDL  Nose WDL  Mouth WDL  Neck WDL  Breast/Chest WDL  Shoulder Blades WDL  Spine WDL  (R) Arm/Elbow/Hand WDL  (L) Arm/Elbow/Hand WDL  Abdomen WDL  Groin WDL  Scrotum/Coccyx/Buttocks Wound, redness, excoriation.   (R) Leg Redness, swelling  (L) Leg Redness and Swelling  (R) Heel/Foot/Toe Redness and Swelling  (L) Heel/Foot/Toe Redness and Swelling          Devices In Places Tele Box and Pulse Ox      Interventions In Place Pillows, Q2 Turns, and Heels Loaded W/Pillows    Possible Skin Injury Yes    Pictures Uploaded Into Epic Yes  Wound Consult Placed Yes  RN Wound Prevention Protocol Ordered Yes

## 2025-04-09 NOTE — WOUND TEAM
[facility]  Inpatient Services  Initial Wound and Skin Care Evaluation    Admission Date: 4/8/2025     Last order of IP CONSULT TO WOUND CARE was found on 4/8/2025 from Hospital Encounter on 4/8/2025     HPI, PMH, SH: Reviewed    Past Surgical History:   Procedure Laterality Date    KY UPPER GI ENDOSCOPY,BIOPSY N/A 4/9/2025    Procedure: GASTROSCOPY, WITH BIOPSY;  Surgeon: Ly Arnold M.D.;  Location: SURGERY SAME DAY [facility];  Service: Gastroenterology    COLONOSCOPY N/A 4/9/2025    Procedure: COLONOSCOPY;  Surgeon: Ly Arnold M.D.;  Location: SURGERY SAME DAY [facility];  Service: Gastroenterology    PACEMAKER INSERTION Left 06/20/2019    [employer] Kalie S DR MRI [id number] implanted by Dr. Mcginnis.    HIP REVISION TOTAL Right 8/29/2018    Procedure: HIP REVISION TOTAL;  Surgeon: Oniel Goodson M.D.;  Location: SURGERY [facility];  Service: Orthopedics    HIP ARTHROPLASTY TOTAL Right 8/9/2016    Procedure: HIP ARTHROPLASTY TOTAL;  Surgeon: Oniel Goodson M.D.;  Location: SURGERY [facility];  Service:     CATARACT EXTRACTION WITH IOL Bilateral 2012     Social History     Tobacco Use    Smoking status: Never    Smokeless tobacco: Never   Substance Use Topics    Alcohol use: Yes     Alcohol/week: 0.6 oz     Types: 1 Glasses of wine per week     Comment: once every 2-3 months.      Chief Complaint   Patient presents with    Bloody Stools     Pt BIB EMS from home. Per report pt started bleeding rectally today, dark red clots. Pt denies hx of blood thinners/hemorrhoids. Pt reports not taking lasix for the past 4 days, somewhat SOB w/leg swelling as well.     Shortness of Breath     Diagnosis: Lower GI bleed [K92.2]    Unit where seen by Wound Team: T729/01     WOUND CONSULT RELATED TO:  Coccyx    WOUND TEAM PLAN OF CARE - Frequency of Follow-up:   Nursing to follow dressing orders written for wound care. Contact wound team if area fails to progress, deteriorates or with any  "questions/concerns if something comes up before next scheduled follow up (See below as to whether wound is following and frequency of wound follow up)   Bi-Monthly - Coccyx    WOUND HISTORY:   Per H&P: \"Patient is an 85-year-old female with past medical history of HLD, HTN, complete heart block status post PPM 2019, CKD 3B that presents with 1 day history of bright red blood per rectum.     Patient states that earlier today around 1300 they noticed blood on their down after standing up.  Subsequently went to the restroom with passage of bright red blood per rectum and possible clots.\"       WOUND ASSESSMENT/LDA  Wound 04/08/25 Pressure Injury Coccyx Bilateral POA Unstageable (Active)   Date First Assessed/Time First Assessed: 04/08/25 1505   Present on Original Admission: Yes  Hand Hygiene Completed: Yes  Primary Wound Type: Pressure Injury  Location: Coccyx  Laterality: Bilateral  Wound Description (Comments): POA Unstageable      Assessments 4/9/2025 11:15 AM   Wound Image     Site Assessment Slough;Painful;Pink   Periwound Assessment Pink;Blanchable erythema   Margins Defined edges;Attached edges   Closure Open to air   Drainage Amount Scant   Drainage Description Serosanguineous   Treatments Cleansed;Offloading   Periwound Protectant Barrier Paste   Dressing Status Open to Air   NEXT Weekly Photo (Inpatient Only) 04/16/25   Wound Team Following Bi-Monthly   Pressure Injury Stage Unstageable   Shape Irregular, scattered   Wound Odor None   Pulses N/A   Exposed Structures NILSON   WOUND NURSE ONLY - Time Spent with Patient (mins) 45        Vascular:    AMANDA:   No results found.    Lab Values:    Lab Results   Component Value Date/Time    WBC 7.3 04/09/2025 07:46 AM    RBC 3.14 (L) 04/09/2025 07:46 AM    HEMOGLOBIN 9.0 (L) 04/09/2025 07:46 AM    HEMATOCRIT 28.2 (L) 04/09/2025 07:46 AM    HBA1C 6.0 (H) 05/28/2022 10:38 AM         Culture Results show:  No results found for this or any previous visit (from the past 720 " hours).    Pain Level/Medicated:   Tenderness reported upon palpation, tolerated without pharmacological intervention        INTERVENTIONS BY WOUND TEAM:  Chart and images reviewed. Discussed with bedside RN. All areas of concern (based on picture review, LDA review and discussion with bedside RN) have been thoroughly assessed. Documentation of areas based on significant findings. This RN in to assess patient. Performed standard wound care which includes appropriate positioning, dressing removal and non-selective debridement. Pictures and measurements obtained weekly if/when required.    Wound:  Coccyx  Cleansed/Non-selectively Debrided with:  Perineal Wipes (Barrier wipes)  Primary Dressing:  Barrier paste, NETO    Advanced Wound Care Discharge Planning  Number of Clinicians necessary to complete wound care: 1  Is patient requiring IV pain medications for dressing changes:  No   Length of time for dressing change 15 min. (This does not include chart review, pre-medication time, set up, clean up or time spent charting.)    Interdisciplinary consultation: Patient, Bedside RN ([facility])    EVALUATION / RATIONALE FOR TREATMENT:     Date:  04/09/25  Wound Status:  Initial evaluation    Coccyx with POA unstageable pressure injury. Due to close proximity to anus and current incontinence, barrier paste applied.         Goals: Steady decrease in wound area and depth weekly.    NURSING PLAN OF CARE ORDERS:  RN Prevention Protocol    NUTRITION RECOMMENDATIONS   Wound Team Recommendations:  N/A    DIET ORDERS (From admission to next 24h)       Start     Ordered    04/09/25 4838  Diet NPO Restrict to: Sips with Medications  AT MIDNIGHT      Comments: No Red Foods. Sips of clear liquids to take medications up to 2 hours prior to procedure.   Question:  Diet NPO Restrict to:  Answer:  Sips with Medications    04/09/25 0924 04/09/25 0925  Diet Order Diet: Clear Liquid; Miscellaneous modifications: (optional): No Red  ALL MEALS         Question Answer Comment   Diet: Clear Liquid    Miscellaneous modifications: (optional) No Red        04/09/25 0929                    PREVENTATIVE INTERVENTIONS:    Q shift Ezequiel - performed per nursing policy  Q shift pressure point assessments - performed per nursing policy    Surface/Positioning  Standard/trauma mattress - Currently in Place  Reposition q 2 hours - Currently in Place  TAPs Turning system - Ordered    Offloading/Redistribution  Heel offloading dressing (Silicone dressing) - Currently in Place  Float Heels off Bed with Pillows - Currently in Place         Containment/Moisture Prevention    Barrier wipes - Currently in Place  Barrier paste - Applied this Visit    Anticipated discharge plans:  TBD        Vac Discharge Needs:  Vac Discharge plan is purely a recommendation from wound team and not a requirement for discharge unless otherwise stated by physician.  Not Applicable Pt not on a wound vac

## 2025-04-09 NOTE — CARE PLAN
Problem: Knowledge Deficit - Standard  Goal: Patient and family/care givers will demonstrate understanding of plan of care, disease process/condition, diagnostic tests and medications  Description: Target End Date:  1-3 days or as soon as patient condition allowsDocument in Patient Education1.  Patient and family/caregiver oriented to unit, equipment, visitation policy and means for communicating concern2.  Complete/review Learning Assessment3.  Assess knowledge level of disease process/condition, treatment plan, diagnostic tests and medications4.  Explain disease process/condition, treatment plan, diagnostic tests and medications  Outcome: Progressing     Problem: Skin Integrity  Goal: Skin integrity is maintained or improved  Description: Target End Date:  Prior to discharge or change in level of careDocument interventions on Skin Risk/Ezequiel flowsheet groups and corresponding LDA1.  Assess and monitor skin integrity, appearance and/or temperature2.  Assess risk factors for impaired skin integrity and/or pressures ulcers3.  Implement precautions to protect skin integrity in collaboration with interdisciplinary team4.  Implement pressure ulcer prevention protocol if at risk for skin breakdown5.  Confirm wound care consult if at risk for skin breakdown6.  Ensure patient use of pressure relieving devices  (Low air loss bed, waffle overlay, heel protectors, ROHO cushion, etc)  Outcome: Progressing     Problem: Fall Risk  Goal: Patient will remain free from falls  Description: Target End Date:  Prior to discharge or change in level of careDocument interventions on the [place] Fall Risk Assessment1.  Assess for fall risk factors2.  Implement fall precautions  Outcome: Progressing     Problem: Communication  Goal: The ability to communicate needs accurately and effectively will improve  Outcome: Progressing     Problem: Safety  Goal: Will remain free from injury  Outcome: Progressing  Goal: Will remain free from  falls  Outcome: Progressing     Problem: Pain Management  Goal: Pain level will decrease to patient's comfort goal  Outcome: Progressing     Problem: Infection  Goal: Will remain free from infection  Outcome: Progressing     Problem: Venous Thromboembolism (VTW)/Deep Vein Thrombosis (DVT) Prevention:  Goal: Patient will participate in Venous Thrombosis (VTE)/Deep Vein Thrombosis (DVT)Prevention Measures  Outcome: Progressing     Problem: Psychosocial Needs:  Goal: Level of anxiety will decrease  Outcome: Progressing     Problem: Fluid Volume:  Goal: Will maintain balanced intake and output  Outcome: Progressing     Problem: Respiratory:  Goal: Respiratory status will improve  Outcome: Progressing     Problem: Bowel/Gastric:  Goal: Normal bowel function is maintained or improved  Outcome: Progressing  Goal: Will not experience complications related to bowel motility  Outcome: Progressing     Problem: Urinary Elimination:  Goal: Ability to reestablish a normal urinary elimination pattern will improve  Outcome: Progressing     Problem: Skin Integrity  Goal: Risk for impaired skin integrity will decrease  Outcome: Progressing     Problem: Mobility  Goal: Risk for activity intolerance will decrease  Outcome: Progressing     Problem: Medication  Goal: Compliance with prescribed medication will improve  Outcome: Progressing     Problem: Knowledge Deficit  Goal: Knowledge of disease process/condition, treatment plan, diagnostic tests, and medications will improve  Outcome: Progressing  Goal: Knowledge of the prescribed therapeutic regimen will improve  Outcome: Progressing     Problem: Discharge Barriers/Planning  Goal: Patient's continuum of care needs will be met  Outcome: Progressing     The patient is Stable - Low risk of patient condition declining or worsening

## 2025-04-09 NOTE — ASSESSMENT & PLAN NOTE
Troponin 51, likely nonischemic myocardial injury in the setting of GI bleed.  EKG without ST changes, denies chest pain.    -Trend troponin

## 2025-04-09 NOTE — ANESTHESIA TIME REPORT
Anesthesia Start and Stop Event Times       Date Time Event    4/9/2025 0845 Ready for Procedure     0847 Anesthesia Start     0909 Anesthesia Stop          Responsible Staff  04/09/25      Name Role Begin End    Darren Magaña M.D. Anesth 0847 0909          Overtime Reason:  no overtime (within assigned shift)    Comments:

## 2025-04-10 ENCOUNTER — ANESTHESIA EVENT (OUTPATIENT)
Dept: SURGERY | Facility: MEDICAL CENTER | Age: 85
DRG: 378 | End: 2025-04-10
Payer: MEDICARE

## 2025-04-10 ENCOUNTER — ANESTHESIA (OUTPATIENT)
Dept: SURGERY | Facility: MEDICAL CENTER | Age: 85
DRG: 378 | End: 2025-04-10
Payer: MEDICARE

## 2025-04-10 LAB
ANION GAP SERPL CALC-SCNC: 11 MMOL/L (ref 7–16)
BASOPHILS # BLD AUTO: 0.3 % (ref 0–1.8)
BASOPHILS # BLD: 0.02 K/UL (ref 0–0.12)
BUN SERPL-MCNC: 13 MG/DL (ref 8–22)
CALCIUM SERPL-MCNC: 7.5 MG/DL (ref 8.5–10.5)
CHLORIDE SERPL-SCNC: 109 MMOL/L (ref 96–112)
CO2 SERPL-SCNC: 21 MMOL/L (ref 20–33)
CREAT SERPL-MCNC: 0.74 MG/DL (ref 0.5–1.4)
EOSINOPHIL # BLD AUTO: 0.14 K/UL (ref 0–0.51)
EOSINOPHIL NFR BLD: 2.3 % (ref 0–6.9)
ERYTHROCYTE [DISTWIDTH] IN BLOOD BY AUTOMATED COUNT: 50.8 FL (ref 35.9–50)
GFR SERPLBLD CREATININE-BSD FMLA CKD-EPI: 78 ML/MIN/1.73 M 2
GLUCOSE SERPL-MCNC: 121 MG/DL (ref 65–99)
HCT VFR BLD AUTO: 26.5 % (ref 37–47)
HGB BLD-MCNC: 8.7 G/DL (ref 12–16)
IMM GRANULOCYTES # BLD AUTO: 0.12 K/UL (ref 0–0.11)
IMM GRANULOCYTES NFR BLD AUTO: 1.9 % (ref 0–0.9)
LYMPHOCYTES # BLD AUTO: 0.72 K/UL (ref 1–4.8)
LYMPHOCYTES NFR BLD: 11.6 % (ref 22–41)
MAGNESIUM SERPL-MCNC: 1.8 MG/DL (ref 1.5–2.5)
MCH RBC QN AUTO: 29.2 PG (ref 27–33)
MCHC RBC AUTO-ENTMCNC: 32.8 G/DL (ref 32.2–35.5)
MCV RBC AUTO: 88.9 FL (ref 81.4–97.8)
MONOCYTES # BLD AUTO: 0.36 K/UL (ref 0–0.85)
MONOCYTES NFR BLD AUTO: 5.8 % (ref 0–13.4)
NEUTROPHILS # BLD AUTO: 4.84 K/UL (ref 1.82–7.42)
NEUTROPHILS NFR BLD: 78.1 % (ref 44–72)
NRBC # BLD AUTO: 0 K/UL
NRBC BLD-RTO: 0 /100 WBC (ref 0–0.2)
PHOSPHATE SERPL-MCNC: 2.9 MG/DL (ref 2.5–4.5)
PLATELET # BLD AUTO: 206 K/UL (ref 164–446)
PMV BLD AUTO: 10.1 FL (ref 9–12.9)
POTASSIUM SERPL-SCNC: 3.3 MMOL/L (ref 3.6–5.5)
RBC # BLD AUTO: 2.98 M/UL (ref 4.2–5.4)
SODIUM SERPL-SCNC: 141 MMOL/L (ref 135–145)
WBC # BLD AUTO: 6.2 K/UL (ref 4.8–10.8)

## 2025-04-10 PROCEDURE — 700111 HCHG RX REV CODE 636 W/ 250 OVERRIDE (IP): Mod: JZ | Performed by: STUDENT IN AN ORGANIZED HEALTH CARE EDUCATION/TRAINING PROGRAM

## 2025-04-10 PROCEDURE — 160201 HCHG ENDO MINUTES - 1ST 30 MINS LEVEL 2: Performed by: INTERNAL MEDICINE

## 2025-04-10 PROCEDURE — 85025 COMPLETE CBC W/AUTO DIFF WBC: CPT

## 2025-04-10 PROCEDURE — A9270 NON-COVERED ITEM OR SERVICE: HCPCS | Performed by: STUDENT IN AN ORGANIZED HEALTH CARE EDUCATION/TRAINING PROGRAM

## 2025-04-10 PROCEDURE — 160015 HCHG STAT PREOP MINUTES: Performed by: INTERNAL MEDICINE

## 2025-04-10 PROCEDURE — 80048 BASIC METABOLIC PNL TOTAL CA: CPT

## 2025-04-10 PROCEDURE — 36415 COLL VENOUS BLD VENIPUNCTURE: CPT

## 2025-04-10 PROCEDURE — 770001 HCHG ROOM/CARE - MED/SURG/GYN PRIV*

## 2025-04-10 PROCEDURE — 700102 HCHG RX REV CODE 250 W/ 637 OVERRIDE(OP): Performed by: STUDENT IN AN ORGANIZED HEALTH CARE EDUCATION/TRAINING PROGRAM

## 2025-04-10 PROCEDURE — 83735 ASSAY OF MAGNESIUM: CPT

## 2025-04-10 PROCEDURE — 160035 HCHG PACU - 1ST 60 MINS PHASE I: Performed by: INTERNAL MEDICINE

## 2025-04-10 PROCEDURE — 700111 HCHG RX REV CODE 636 W/ 250 OVERRIDE (IP): Performed by: STUDENT IN AN ORGANIZED HEALTH CARE EDUCATION/TRAINING PROGRAM

## 2025-04-10 PROCEDURE — 99233 SBSQ HOSP IP/OBS HIGH 50: CPT | Performed by: STUDENT IN AN ORGANIZED HEALTH CARE EDUCATION/TRAINING PROGRAM

## 2025-04-10 PROCEDURE — 700111 HCHG RX REV CODE 636 W/ 250 OVERRIDE (IP)

## 2025-04-10 PROCEDURE — 45378 DIAGNOSTIC COLONOSCOPY: CPT | Performed by: INTERNAL MEDICINE

## 2025-04-10 PROCEDURE — 0DJD8ZZ INSPECTION OF LOWER INTESTINAL TRACT, VIA NATURAL OR ARTIFICIAL OPENING ENDOSCOPIC: ICD-10-PCS | Performed by: INTERNAL MEDICINE

## 2025-04-10 PROCEDURE — 84100 ASSAY OF PHOSPHORUS: CPT

## 2025-04-10 PROCEDURE — 700105 HCHG RX REV CODE 258: Performed by: STUDENT IN AN ORGANIZED HEALTH CARE EDUCATION/TRAINING PROGRAM

## 2025-04-10 PROCEDURE — 700101 HCHG RX REV CODE 250: Performed by: STUDENT IN AN ORGANIZED HEALTH CARE EDUCATION/TRAINING PROGRAM

## 2025-04-10 PROCEDURE — 160009 HCHG ANES TIME/MIN: Performed by: INTERNAL MEDICINE

## 2025-04-10 PROCEDURE — 160002 HCHG RECOVERY MINUTES (STAT): Performed by: INTERNAL MEDICINE

## 2025-04-10 PROCEDURE — 160048 HCHG OR STATISTICAL LEVEL 1-5: Performed by: INTERNAL MEDICINE

## 2025-04-10 RX ORDER — ONDANSETRON 2 MG/ML
4 INJECTION INTRAMUSCULAR; INTRAVENOUS
Status: DISCONTINUED | OUTPATIENT
Start: 2025-04-10 | End: 2025-04-10 | Stop reason: HOSPADM

## 2025-04-10 RX ORDER — POTASSIUM CHLORIDE 1500 MG/1
40 TABLET, EXTENDED RELEASE ORAL 2 TIMES DAILY
Status: COMPLETED | OUTPATIENT
Start: 2025-04-10 | End: 2025-04-11

## 2025-04-10 RX ORDER — HYDROMORPHONE HYDROCHLORIDE 1 MG/ML
0.4 INJECTION, SOLUTION INTRAMUSCULAR; INTRAVENOUS; SUBCUTANEOUS
Status: DISCONTINUED | OUTPATIENT
Start: 2025-04-10 | End: 2025-04-10 | Stop reason: HOSPADM

## 2025-04-10 RX ORDER — HYDROMORPHONE HYDROCHLORIDE 1 MG/ML
0.2 INJECTION, SOLUTION INTRAMUSCULAR; INTRAVENOUS; SUBCUTANEOUS
Status: DISCONTINUED | OUTPATIENT
Start: 2025-04-10 | End: 2025-04-10 | Stop reason: HOSPADM

## 2025-04-10 RX ORDER — ONDANSETRON 2 MG/ML
INJECTION INTRAMUSCULAR; INTRAVENOUS PRN
Status: DISCONTINUED | OUTPATIENT
Start: 2025-04-10 | End: 2025-04-10 | Stop reason: SURG

## 2025-04-10 RX ORDER — DIPHENHYDRAMINE HYDROCHLORIDE 50 MG/ML
12.5 INJECTION, SOLUTION INTRAMUSCULAR; INTRAVENOUS
Status: DISCONTINUED | OUTPATIENT
Start: 2025-04-10 | End: 2025-04-10 | Stop reason: HOSPADM

## 2025-04-10 RX ORDER — HYDROMORPHONE HYDROCHLORIDE 1 MG/ML
0.1 INJECTION, SOLUTION INTRAMUSCULAR; INTRAVENOUS; SUBCUTANEOUS
Status: DISCONTINUED | OUTPATIENT
Start: 2025-04-10 | End: 2025-04-10 | Stop reason: HOSPADM

## 2025-04-10 RX ORDER — EPHEDRINE SULFATE 50 MG/ML
5 INJECTION, SOLUTION INTRAVENOUS
Status: DISCONTINUED | OUTPATIENT
Start: 2025-04-10 | End: 2025-04-10 | Stop reason: HOSPADM

## 2025-04-10 RX ORDER — LABETALOL HYDROCHLORIDE 5 MG/ML
5 INJECTION, SOLUTION INTRAVENOUS
Status: DISCONTINUED | OUTPATIENT
Start: 2025-04-10 | End: 2025-04-10 | Stop reason: HOSPADM

## 2025-04-10 RX ORDER — OXYCODONE HCL 5 MG/5 ML
5 SOLUTION, ORAL ORAL
Status: DISCONTINUED | OUTPATIENT
Start: 2025-04-10 | End: 2025-04-10 | Stop reason: HOSPADM

## 2025-04-10 RX ORDER — SODIUM CHLORIDE, SODIUM LACTATE, POTASSIUM CHLORIDE, CALCIUM CHLORIDE 600; 310; 30; 20 MG/100ML; MG/100ML; MG/100ML; MG/100ML
INJECTION, SOLUTION INTRAVENOUS CONTINUOUS
Status: DISCONTINUED | OUTPATIENT
Start: 2025-04-10 | End: 2025-04-10 | Stop reason: HOSPADM

## 2025-04-10 RX ORDER — LIDOCAINE HYDROCHLORIDE 20 MG/ML
INJECTION, SOLUTION EPIDURAL; INFILTRATION; INTRACAUDAL; PERINEURAL PRN
Status: DISCONTINUED | OUTPATIENT
Start: 2025-04-10 | End: 2025-04-10 | Stop reason: SURG

## 2025-04-10 RX ORDER — HALOPERIDOL 5 MG/ML
1 INJECTION INTRAMUSCULAR
Status: DISCONTINUED | OUTPATIENT
Start: 2025-04-10 | End: 2025-04-10 | Stop reason: HOSPADM

## 2025-04-10 RX ORDER — HYDRALAZINE HYDROCHLORIDE 20 MG/ML
5 INJECTION INTRAMUSCULAR; INTRAVENOUS
Status: DISCONTINUED | OUTPATIENT
Start: 2025-04-10 | End: 2025-04-10 | Stop reason: HOSPADM

## 2025-04-10 RX ORDER — SODIUM CHLORIDE, SODIUM LACTATE, POTASSIUM CHLORIDE, CALCIUM CHLORIDE 600; 310; 30; 20 MG/100ML; MG/100ML; MG/100ML; MG/100ML
INJECTION, SOLUTION INTRAVENOUS
Status: DISCONTINUED | OUTPATIENT
Start: 2025-04-10 | End: 2025-04-10 | Stop reason: SURG

## 2025-04-10 RX ORDER — OXYCODONE HCL 5 MG/5 ML
10 SOLUTION, ORAL ORAL
Status: DISCONTINUED | OUTPATIENT
Start: 2025-04-10 | End: 2025-04-10 | Stop reason: HOSPADM

## 2025-04-10 RX ADMIN — PROPOFOL 20 MG: 10 INJECTION, EMULSION INTRAVENOUS at 08:23

## 2025-04-10 RX ADMIN — AMPICILLIN SODIUM, SULBACTAM SODIUM 3 G: 2; 1 INJECTION, POWDER, FOR SOLUTION INTRAMUSCULAR; INTRAVENOUS at 01:17

## 2025-04-10 RX ADMIN — CARVEDILOL 25 MG: 25 TABLET, FILM COATED ORAL at 09:55

## 2025-04-10 RX ADMIN — PROPOFOL 20 MG: 10 INJECTION, EMULSION INTRAVENOUS at 08:17

## 2025-04-10 RX ADMIN — AMPICILLIN SODIUM, SULBACTAM SODIUM 3 G: 2; 1 INJECTION, POWDER, FOR SOLUTION INTRAMUSCULAR; INTRAVENOUS at 12:55

## 2025-04-10 RX ADMIN — SODIUM CHLORIDE, POTASSIUM CHLORIDE, SODIUM LACTATE AND CALCIUM CHLORIDE: 600; 310; 30; 20 INJECTION, SOLUTION INTRAVENOUS at 08:12

## 2025-04-10 RX ADMIN — PANTOPRAZOLE SODIUM 40 MG: 40 INJECTION, POWDER, FOR SOLUTION INTRAVENOUS at 16:46

## 2025-04-10 RX ADMIN — ACETAMINOPHEN 650 MG: 325 TABLET ORAL at 09:54

## 2025-04-10 RX ADMIN — PROPOFOL 40 MG: 10 INJECTION, EMULSION INTRAVENOUS at 08:16

## 2025-04-10 RX ADMIN — PROPOFOL 10 MG: 10 INJECTION, EMULSION INTRAVENOUS at 08:28

## 2025-04-10 RX ADMIN — ONDANSETRON 4 MG: 2 INJECTION INTRAMUSCULAR; INTRAVENOUS at 08:16

## 2025-04-10 RX ADMIN — CARVEDILOL 25 MG: 25 TABLET, FILM COATED ORAL at 16:46

## 2025-04-10 RX ADMIN — LOSARTAN POTASSIUM 50 MG: 50 TABLET, FILM COATED ORAL at 21:24

## 2025-04-10 RX ADMIN — FUROSEMIDE 20 MG: 20 TABLET ORAL at 05:25

## 2025-04-10 RX ADMIN — AMPICILLIN SODIUM, SULBACTAM SODIUM 3 G: 2; 1 INJECTION, POWDER, FOR SOLUTION INTRAMUSCULAR; INTRAVENOUS at 16:54

## 2025-04-10 RX ADMIN — ATORVASTATIN CALCIUM 20 MG: 20 TABLET, FILM COATED ORAL at 21:23

## 2025-04-10 RX ADMIN — POTASSIUM CHLORIDE 40 MEQ: 1500 TABLET, EXTENDED RELEASE ORAL at 16:46

## 2025-04-10 RX ADMIN — LOSARTAN POTASSIUM 50 MG: 50 TABLET, FILM COATED ORAL at 10:00

## 2025-04-10 RX ADMIN — LIDOCAINE HYDROCHLORIDE 40 MG: 20 INJECTION, SOLUTION EPIDURAL; INFILTRATION; INTRACAUDAL; PERINEURAL at 08:16

## 2025-04-10 RX ADMIN — AZITHROMYCIN DIHYDRATE 500 MG: 250 TABLET ORAL at 05:25

## 2025-04-10 RX ADMIN — AMPICILLIN SODIUM, SULBACTAM SODIUM 3 G: 2; 1 INJECTION, POWDER, FOR SOLUTION INTRAMUSCULAR; INTRAVENOUS at 05:24

## 2025-04-10 RX ADMIN — PANTOPRAZOLE SODIUM 40 MG: 40 INJECTION, POWDER, FOR SOLUTION INTRAVENOUS at 05:25

## 2025-04-10 ASSESSMENT — FIBROSIS 4 INDEX: FIB4 SCORE: 3.75

## 2025-04-10 ASSESSMENT — PAIN DESCRIPTION - PAIN TYPE
TYPE: ACUTE PAIN
TYPE: SURGICAL PAIN
TYPE: ACUTE PAIN
TYPE: ACUTE PAIN

## 2025-04-10 NOTE — THERAPY
Physical Therapy Contact Note    Patient Name: Priscilla Hdz  Age:  85 y.o., Sex:  female  Medical Record #: [id number]  Today's Date: 4/10/2025    PT consult received and chart reviewed. Pt off the floor this morning for colonoscopy. Will follow up as able and appropriate.

## 2025-04-10 NOTE — ANESTHESIA PREPROCEDURE EVALUATION
Case: [id number] Date/Time: 04/10/25 1000    Procedure: COLONOSCOPY    Pre-op diagnosis: Lower GI bleed    Location: [facility] ROOM 26 / SURGERY SAME DAY [facility]    Surgeons: Juan Manuel James M.D.            Relevant Problems   CARDIAC   (positive) Cardiac pacemaker in situ   (positive) Essential hypertension   (positive) Venous insufficiency of both lower extremities         (positive) Stage 3b chronic kidney disease      Other   (positive) Complete heart block by electrocardiogram (HCC)   (positive) Lower GI bleed     Denies previous issues with anesthesia.  Had colonoscopy attempt yesterday but bowel prep was inadequate.    Pacemaker for complete heart block.    Brand: MDT  Mode: DDD  AP 39%   81%  Last interrogation 01/2025    Lab Results   Component Value Date/Time    SODIUM 140 04/09/2025 07:46 AM    POTASSIUM 4.2 04/09/2025 07:46 AM    CHLORIDE 110 04/09/2025 07:46 AM    CO2 21 04/09/2025 07:46 AM    GLUCOSE 90 04/09/2025 07:46 AM    BUN 20 04/09/2025 07:46 AM    CREATININE 0.76 04/09/2025 07:46 AM    CREATININE 0.77 01/29/2013 07:49 AM    BUNCREATRAT 19 03/09/2016 09:37 AM    BUNCREATRAT 23 01/29/2013 07:49 AM    GLOMRATE >59 11/10/2010 09:26 AM      Lab Results   Component Value Date/Time    WBC 7.3 04/09/2025 07:46 AM    RBC 3.14 (L) 04/09/2025 07:46 AM    HEMOGLOBIN 9.0 (L) 04/09/2025 07:46 AM    HEMATOCRIT 28.2 (L) 04/09/2025 07:46 AM    MCV 89.8 04/09/2025 07:46 AM    MCH 28.7 04/09/2025 07:46 AM    MCHC 31.9 (L) 04/09/2025 07:46 AM    MPV 10.0 04/09/2025 07:46 AM    NEUTSPOLYS 83.80 (H) 04/08/2025 04:40 PM    LYMPHOCYTES 9.20 (L) 04/08/2025 04:40 PM    MONOCYTES 4.50 04/08/2025 04:40 PM    EOSINOPHILS 1.20 04/08/2025 04:40 PM    BASOPHILS 0.20 04/08/2025 04:40 PM          Physical Exam    Airway   Mallampati: II  TM distance: >3 FB  Neck ROM: full       Cardiovascular - normal exam  Rhythm: regular  Rate: normal  (-) murmur     Dental - normal exam           Pulmonary - normal exam  Breath sounds clear to  auscultation     Abdominal    Neurological - normal exam                 Anesthesia Plan    ASA 3   ASA physical status 3 criteria: implanted pacemaker    Plan - MAC               Induction: intravenous    Postoperative Plan: Postoperative administration of opioids is intended.    Pertinent diagnostic labs and testing reviewed    Informed Consent:    Anesthetic plan and risks discussed with patient.    Use of blood products discussed with: patient whom consented to blood products.

## 2025-04-10 NOTE — PROGRESS NOTES
Monitor Summary  Rhythm: Paced   Rate: 70-86  Ectopy: PVC (F), Coup (R)  Measurements: .15/.38/  ---12 hr Chart Review---

## 2025-04-10 NOTE — THERAPY
Occupational Therapy Contact Note    Patient Name: Priscilla Hdz  Age:  85 y.o., Sex:  female  Medical Record #: [id number]  Today's Date: 4/10/2025       04/10/25 0752   Treatment Variance   Reason For Missed Therapy Medical - Other (Please Comment)   Interdisciplinary Plan of Care Collaboration   IDT Collaboration with  Other (See Comments)  (EMR)   Collaboration Comments OT consult rec'd. pt currently off floor for colonscopy, will re-attempt when pt available and appropriate   Session Information   Date / Session Number  4/10, OT attempt, needs eval

## 2025-04-10 NOTE — ANESTHESIA POSTPROCEDURE EVALUATION
Patient: Priscilla Hdz    Procedure Summary       Date: 04/10/25 Room / Location: [facility] ROOM 26 / SURGERY SAME DAY [facility]    Anesthesia Start: 0812 Anesthesia Stop: 0837    Procedure: COLONOSCOPY (Anus) Diagnosis: (diverticulosis, no bleeding)    Surgeons: Juan Manuel James M.D. Responsible Provider: Ashleigh Bass M.D.    Anesthesia Type: MAC ASA Status: 3            Final Anesthesia Type: MAC  Last vitals  BP   Blood Pressure : 97/46    Temp   37 °C (98.6 °F)    Pulse   68   Resp   18    SpO2   96 %      Anesthesia Post Evaluation    Patient location during evaluation: PACU  Patient participation: complete - patient participated  Level of consciousness: awake and alert    Airway patency: patent  Anesthetic complications: no  Cardiovascular status: hemodynamically stable  Respiratory status: acceptable  Hydration status: euvolemic    PONV: none        No notable events documented.     Nurse Pain Score: 0 (NPRS)

## 2025-04-10 NOTE — ANESTHESIA TIME REPORT
Anesthesia Start and Stop Event Times       Date Time Event    4/10/2025 0755 Ready for Procedure     0812 Anesthesia Start     0837 Anesthesia Stop          Responsible Staff  04/10/25      Name Role Begin End    Ashleigh Bass M.D. Anesth 0812 0837          Overtime Reason:  no overtime (within assigned shift)    Comments:

## 2025-04-10 NOTE — PROGRESS NOTES
Hospital Medicine Daily Progress Note    Date of Service  4/9/2025    Chief Complaint  Priscilla Hdz is a 85 y.o. female admitted 4/8/2025 with GI bleed    Hospital Course  Patient is an 85-year-old female with past medical history of HLD, HTN, complete heart block status post PPM 2019, CKD 3B that presents with 1 day history of bright red blood per rectum.     Patient states that earlier today around 1300 they noticed blood on their down after standing up.  Subsequently went to the restroom with passage of bright red blood per rectum and possible clots.     In the ED, BP 100s to 150s/50s to 90s, HR 60s to 90s, RR 18-39, saturating well on room air.  Received NS 1 L bolus x 1.  WBC 6.4, hemoglobin 7.7>>6.4, MCV 91.5, , sodium 141, potassium 4.4, bicarb 19, anion gap 8, BUN 24, creatinine 0.83, corrected calcium 9.3, albumin 2.5, iron 14, percent saturation 9, ferritin 342, troponin 51, INR 1.32.  EKG V paced , QTc 485, without ST changes, relatively unchanged from previous.  CTA abdomen pelvis with and without without evidence of active GI bleeding, colonic diverticulosis, right lower lobe consolidation.     Interval Problem Update  4/9  Afebrile pulse 70s to 105 normal respiratory rate systolic blood pressure 101-192 pulse ox 93 to 99% wean down to 1 L nasal cannula.  Leukocytosis, anemia up to 9 after transfusions CMP albumin 2.5 total protein 5.5 otherwise unremarkable.  Procalcitonin 0.42.  Restarted home Coreg, Lasix, losartan.  EGD performed showed nonobstructing Schatzki ring in the esophagus, hiatal hernia, gastric erosions, recommending PPI daily and checking H. pylori and stool.  Colonoscopy had poor prep, has n.p.o. orders placed following the procedure for what I suspect is a repeat colonoscopy tomorrow.    I have discussed this patient's plan of care and discharge plan at IDT rounds today with Case Management, Nursing, Nursing leadership, and other members of the IDT  team.    Consultants/Specialty  GI    Code Status  Full Code    Disposition  The patient is not medically cleared for discharge to home or a post-acute facility.      I have placed the appropriate orders for post-discharge needs.    Review of Systems  ROS   Review of Systems   Constitutional:  Negative for chills and fever.   HENT:  Negative for ear pain.    Eyes:  Negative for blurred vision, double vision and pain.   Respiratory:  Positive for shortness of breath. Negative for cough and wheezing.    Cardiovascular:  Negative for chest pain, palpitations and leg swelling.   Gastrointestinal:  Positive for blood in stool. Negative for abdominal pain, constipation, diarrhea, melena, nausea and vomiting.   Genitourinary:  Negative for dysuria, frequency and hematuria.   Musculoskeletal:  Negative for back pain, joint pain and neck pain.   Neurological:  Negative for dizziness and headaches.     Physical Exam  Temp:  [35.8 °C (96.4 °F)-37.2 °C (99 °F)] 36.5 °C (97.7 °F)  Pulse:  [] 99  Resp:  [1-20] 17  BP: (101-192)/(44-94) 179/90  SpO2:  [93 %-99 %] 93 %    Physical Exam  Vitals and nursing note reviewed. Exam conducted with a chaperone present.   Constitutional:       General: She is not in acute distress.     Appearance: She is not toxic-appearing.   HENT:      Head: Normocephalic and atraumatic.      Nose: Nose normal. No rhinorrhea.      Mouth/Throat:      Mouth: Mucous membranes are dry.      Pharynx: Oropharynx is clear.   Eyes:      General: No scleral icterus.     Extraocular Movements: Extraocular movements intact.      Conjunctiva/sclera: Conjunctivae normal.   Cardiovascular:      Rate and Rhythm: Normal rate and regular rhythm.      Pulses: Normal pulses.      Heart sounds: No murmur heard.  Pulmonary:      Effort: Pulmonary effort is normal. No respiratory distress.      Breath sounds: Normal breath sounds. No wheezing, rhonchi or rales.   Abdominal:      General: There is no distension.       Palpations: Abdomen is soft.      Tenderness: There is no abdominal tenderness. There is no guarding or rebound.   Musculoskeletal:         General: Normal range of motion.      Cervical back: Normal range of motion and neck supple. No rigidity.      Right lower leg: Edema present.      Left lower leg: Edema present.   Skin:     General: Skin is warm and dry.      Capillary Refill: Capillary refill takes less than 2 seconds.   Neurological:      General: No focal deficit present.      Mental Status: She is alert and oriented to person, place, and time. Mental status is at baseline.      Cranial Nerves: No cranial nerve deficit.      Sensory: No sensory deficit.      Motor: No weakness.   Psychiatric:         Mood and Affect: Mood normal.         Behavior: Behavior normal.         Thought Content: Thought content normal.         Judgment: Judgment normal.         Fluids    Intake/Output Summary (Last 24 hours) at 4/9/2025 1932  Last data filed at 4/9/2025 1100  Gross per 24 hour   Intake 1566.34 ml   Output --   Net 1566.34 ml        Laboratory  Recent Labs     04/08/25  1640 04/08/25  1950 04/09/25  0746   WBC 6.4 6.2 7.3   RBC 2.71* 2.25* 3.14*   HEMOGLOBIN 7.7* 6.4* 9.0*   HEMATOCRIT 24.8* 20.3* 28.2*   MCV 91.5 90.2 89.8   MCH 28.4 28.4 28.7   MCHC 31.0* 31.5* 31.9*   RDW 51.3* 51.0* 49.5   PLATELETCT 240 210 211   MPV 10.4 10.2 10.0     Recent Labs     04/08/25  1640 04/09/25  0746   SODIUM 141 140   POTASSIUM 4.4 4.2   CHLORIDE 114* 110   CO2 19* 21   GLUCOSE 116* 90   BUN 24* 20   CREATININE 0.83 0.76   CALCIUM 8.1* 7.5*     Recent Labs     04/08/25  1640   APTT 29.7   INR 1.32*               Imaging  CTA ABDOMEN PELVIS W & W/O POST PROCESS   Final Result      1.  No CT evidence of active gastrointestinal bleeding.   2.  Colonic diverticulosis.   3.  Right lower lobe consolidation, concerning for pneumonia.              Assessment/Plan  * Lower GI bleed- (present on admission)  Assessment & Plan  Hemoglobin  7.7>>6.4, decreased from 12.9 on 11/14/2024.  Anemia likely in the setting of acute blood loss, possible component of iron deficiency with iron level of 14 and percent sat of 9, ferritin 342.  Patient states that earlier today around 1300 they noticed blood on their down after standing up.  Subsequently went to the restroom with passage of bright red blood per rectum and possible clots. CTA abdomen pelvis with and without without evidence of active GI bleeding, colonic diverticulosis, right lower lobe consolidation.    Lower GI: Diverticulosis, AVM, malignancy, hemorrhoids     -1u pRBC ordered in ED  -GI consulted in the ED, Dr. Arnold  -NPO  -Avoid NSAIDs  -2 large bore IVs  -Transfuse PRN Hgb<7 and PLT<50  -Consider IV iron infusion and/or starting on oral ferrous sulfate prior to discharge    Elevated troponin- (present on admission)  Assessment & Plan  Troponin 51, likely nonischemic myocardial injury in the setting of GI bleed.  EKG without ST changes, denies chest pain.    -Trend troponin    Normal anion gap metabolic acidosis- (present on admission)  Assessment & Plan  Bicarb 19 and anion gap 8.    -Daily BMP    Stage 3b chronic kidney disease- (present on admission)  Assessment & Plan  Creatinine 0.83, around baseline.    -Avoid nephrotoxic medications    Cardiac pacemaker in situ- (present on admission)  Assessment & Plan  Complete heart block status post PPM 2019    Complete heart block by electrocardiogram (HCC)- (present on admission)  Assessment & Plan  Status post PPM 2019    Venous insufficiency of both lower extremities- (present on admission)  Assessment & Plan  -Hold home Lasix and spironolactone in the setting of GI bleed    Essential hypertension- (present on admission)  Assessment & Plan  -Hold home losartan, carvedilol, hydralazine, spironolactone, Lasix in the setting of GI bleed    Dyslipidemia- (present on admission)  Assessment & Plan  -Continue home atorvastatin         VTE prophylaxis:    SCDs/TEDs      I have performed a physical exam and reviewed and updated ROS and Plan today (4/9/2025). In review of yesterday's note (4/8/2025), there are no changes except as documented above.  Total time spent 53 minutes. I spent greater than 50% of the time for patient care, counseling, and coordination on this date, including unit/floor time, and face-to-face time with the patient as per interval events, my own review of patient's imaging and lab analysis and developing my assessment and plan above.

## 2025-04-10 NOTE — PROCEDURES
OPERATIVE REPORT        PATIENT: Priscilla Hdz  1940      PREOPERATIVE DIAGNOSIS/INDICATION: GI bleeding.     POSTOPERATIVE DIAGNOSES:   No bleeding was noted. Yellow stool in terminal ileum.     PROCEDURE: COLONOSCOPY    PHYSICIAN: Curtis James MD MPH    CONSENT:  OBTAINED. The risks, benefits and alternatives of the procedure were discussed in details. The risks include and are not limited to bleeding, infection, perforation, missed lesions, and sedations risks (cardiopulmonary compromise and allergic reaction to medications).    ANESTHESIA:  Per anesthesiologist/ICU/ER providers.    LOCATION: [facility]    DESCRIPTION:  The patient presented to the procedure room.  After routine checkup was performed, patient was brought into endoscopy suite.  Patient was placed on his left lateral decubitus position.  Patient was sedated by anesthesia. Vital signs were monitored throughout procedure.  Oxygenation support was provided throughout procedure. Digital rectal examination was performed.  Then, a colonoscope was inserted into patient's anus, advanced to the terminal ileum under direct visualization.  Once the site was reached and examined, the colonoscope was withdrawn and procedure was terminated. Withdrawal time was at least 6 minutes to ensure adequate examination. (8:23 cecum, 8:30 end).     The patient tolerated the procedure well.  There were no immediate complications.    The quality of the bowel preparation was fairly adequate.    Estimated blood loss from procedure: < 5cc.     FINDINGS:    [place]  Right:2  Mid:3  Left:2    Not able to investigate small polyp due to prep.   Cecal intubation yes. Two times of cecal intubation done.  Retroflexion done.    Multiple diverticula, but no bleeding.   Mild hemorrhoid.       RECOMMENDATIONS:  No evidence of GI bleeding. No further GI procedure at this time.   Resume eating.     GI signs off.     This note has been transcribed with digital voice recognition software  and although it has been reviewed may contain grammatical or word errors

## 2025-04-10 NOTE — OR NURSING
0834 - Pt to PACU 1 from OR. Bedside report from anesthesiologist and RN. Attached to monitoring, VSS, breathing is calm and unlabored. 6L mask, no signs pain or nausea.     0844 - Report to bedside RN Binta.  Pt on 3L NC, no pain or nausea.  Dr. James was bedside to update patient.     0854 - Called and updated pt's son Akil Hannah - Pt taken back to room on [facility] with transport.  No belongings with patient, order in place that patient can be off telemetry for transport.  On 3L NC.

## 2025-04-11 LAB
ANION GAP SERPL CALC-SCNC: 7 MMOL/L (ref 7–16)
BUN SERPL-MCNC: 17 MG/DL (ref 8–22)
CALCIUM SERPL-MCNC: 7.6 MG/DL (ref 8.5–10.5)
CHLORIDE SERPL-SCNC: 111 MMOL/L (ref 96–112)
CO2 SERPL-SCNC: 22 MMOL/L (ref 20–33)
CREAT SERPL-MCNC: 0.85 MG/DL (ref 0.5–1.4)
ERYTHROCYTE [DISTWIDTH] IN BLOOD BY AUTOMATED COUNT: 53.7 FL (ref 35.9–50)
GFR SERPLBLD CREATININE-BSD FMLA CKD-EPI: 66 ML/MIN/1.73 M 2
GLUCOSE SERPL-MCNC: 95 MG/DL (ref 65–99)
HCT VFR BLD AUTO: 27.4 % (ref 37–47)
HGB BLD-MCNC: 8.4 G/DL (ref 12–16)
MAGNESIUM SERPL-MCNC: 1.9 MG/DL (ref 1.5–2.5)
MCH RBC QN AUTO: 28.4 PG (ref 27–33)
MCHC RBC AUTO-ENTMCNC: 30.7 G/DL (ref 32.2–35.5)
MCV RBC AUTO: 92.6 FL (ref 81.4–97.8)
PHOSPHATE SERPL-MCNC: 3.6 MG/DL (ref 2.5–4.5)
PLATELET # BLD AUTO: 193 K/UL (ref 164–446)
PMV BLD AUTO: 10.1 FL (ref 9–12.9)
POTASSIUM SERPL-SCNC: 4.7 MMOL/L (ref 3.6–5.5)
RBC # BLD AUTO: 2.96 M/UL (ref 4.2–5.4)
SODIUM SERPL-SCNC: 140 MMOL/L (ref 135–145)
WBC # BLD AUTO: 5.5 K/UL (ref 4.8–10.8)

## 2025-04-11 PROCEDURE — 99233 SBSQ HOSP IP/OBS HIGH 50: CPT | Performed by: STUDENT IN AN ORGANIZED HEALTH CARE EDUCATION/TRAINING PROGRAM

## 2025-04-11 PROCEDURE — 700102 HCHG RX REV CODE 250 W/ 637 OVERRIDE(OP): Performed by: STUDENT IN AN ORGANIZED HEALTH CARE EDUCATION/TRAINING PROGRAM

## 2025-04-11 PROCEDURE — A9270 NON-COVERED ITEM OR SERVICE: HCPCS | Performed by: STUDENT IN AN ORGANIZED HEALTH CARE EDUCATION/TRAINING PROGRAM

## 2025-04-11 PROCEDURE — 84100 ASSAY OF PHOSPHORUS: CPT

## 2025-04-11 PROCEDURE — 97166 OT EVAL MOD COMPLEX 45 MIN: CPT

## 2025-04-11 PROCEDURE — A9270 NON-COVERED ITEM OR SERVICE: HCPCS

## 2025-04-11 PROCEDURE — 36415 COLL VENOUS BLD VENIPUNCTURE: CPT

## 2025-04-11 PROCEDURE — 700105 HCHG RX REV CODE 258: Performed by: STUDENT IN AN ORGANIZED HEALTH CARE EDUCATION/TRAINING PROGRAM

## 2025-04-11 PROCEDURE — 80048 BASIC METABOLIC PNL TOTAL CA: CPT

## 2025-04-11 PROCEDURE — 700111 HCHG RX REV CODE 636 W/ 250 OVERRIDE (IP): Performed by: STUDENT IN AN ORGANIZED HEALTH CARE EDUCATION/TRAINING PROGRAM

## 2025-04-11 PROCEDURE — 700102 HCHG RX REV CODE 250 W/ 637 OVERRIDE(OP)

## 2025-04-11 PROCEDURE — 85027 COMPLETE CBC AUTOMATED: CPT

## 2025-04-11 PROCEDURE — 700111 HCHG RX REV CODE 636 W/ 250 OVERRIDE (IP)

## 2025-04-11 PROCEDURE — 97535 SELF CARE MNGMENT TRAINING: CPT

## 2025-04-11 PROCEDURE — 83735 ASSAY OF MAGNESIUM: CPT

## 2025-04-11 PROCEDURE — 770001 HCHG ROOM/CARE - MED/SURG/GYN PRIV*

## 2025-04-11 RX ORDER — POTASSIUM CHLORIDE 1500 MG/1
40 TABLET, EXTENDED RELEASE ORAL 2 TIMES DAILY
Status: COMPLETED | OUTPATIENT
Start: 2025-04-11 | End: 2025-04-11

## 2025-04-11 RX ORDER — OXYCODONE HYDROCHLORIDE 5 MG/1
5 TABLET ORAL
Refills: 0 | Status: DISCONTINUED | OUTPATIENT
Start: 2025-04-11 | End: 2025-04-14 | Stop reason: HOSPADM

## 2025-04-11 RX ORDER — MAGNESIUM SULFATE HEPTAHYDRATE 40 MG/ML
2 INJECTION, SOLUTION INTRAVENOUS ONCE
Status: COMPLETED | OUTPATIENT
Start: 2025-04-11 | End: 2025-04-11

## 2025-04-11 RX ADMIN — MAGNESIUM SULFATE HEPTAHYDRATE 2 G: 2 INJECTION, SOLUTION INTRAVENOUS at 09:24

## 2025-04-11 RX ADMIN — PANTOPRAZOLE SODIUM 40 MG: 40 INJECTION, POWDER, FOR SOLUTION INTRAVENOUS at 17:26

## 2025-04-11 RX ADMIN — AMPICILLIN SODIUM, SULBACTAM SODIUM 3 G: 2; 1 INJECTION, POWDER, FOR SOLUTION INTRAMUSCULAR; INTRAVENOUS at 23:37

## 2025-04-11 RX ADMIN — POTASSIUM CHLORIDE 40 MEQ: 1500 TABLET, EXTENDED RELEASE ORAL at 06:01

## 2025-04-11 RX ADMIN — POTASSIUM CHLORIDE 40 MEQ: 1500 TABLET, EXTENDED RELEASE ORAL at 17:29

## 2025-04-11 RX ADMIN — AMPICILLIN SODIUM, SULBACTAM SODIUM 3 G: 2; 1 INJECTION, POWDER, FOR SOLUTION INTRAMUSCULAR; INTRAVENOUS at 17:32

## 2025-04-11 RX ADMIN — AMPICILLIN SODIUM, SULBACTAM SODIUM 3 G: 2; 1 INJECTION, POWDER, FOR SOLUTION INTRAMUSCULAR; INTRAVENOUS at 12:15

## 2025-04-11 RX ADMIN — PANTOPRAZOLE SODIUM 40 MG: 40 INJECTION, POWDER, FOR SOLUTION INTRAVENOUS at 06:00

## 2025-04-11 RX ADMIN — OXYCODONE HYDROCHLORIDE 5 MG: 5 TABLET ORAL at 06:01

## 2025-04-11 RX ADMIN — AZITHROMYCIN DIHYDRATE 500 MG: 250 TABLET ORAL at 06:00

## 2025-04-11 RX ADMIN — AMPICILLIN SODIUM, SULBACTAM SODIUM 3 G: 2; 1 INJECTION, POWDER, FOR SOLUTION INTRAMUSCULAR; INTRAVENOUS at 00:00

## 2025-04-11 RX ADMIN — LOSARTAN POTASSIUM 50 MG: 50 TABLET, FILM COATED ORAL at 21:44

## 2025-04-11 RX ADMIN — CARVEDILOL 25 MG: 25 TABLET, FILM COATED ORAL at 17:29

## 2025-04-11 RX ADMIN — CARVEDILOL 25 MG: 25 TABLET, FILM COATED ORAL at 07:48

## 2025-04-11 RX ADMIN — ATORVASTATIN CALCIUM 20 MG: 20 TABLET, FILM COATED ORAL at 21:44

## 2025-04-11 RX ADMIN — LOSARTAN POTASSIUM 50 MG: 50 TABLET, FILM COATED ORAL at 09:25

## 2025-04-11 RX ADMIN — OXYCODONE HYDROCHLORIDE 5 MG: 5 TABLET ORAL at 21:44

## 2025-04-11 RX ADMIN — POTASSIUM CHLORIDE 40 MEQ: 1500 TABLET, EXTENDED RELEASE ORAL at 09:24

## 2025-04-11 RX ADMIN — AMPICILLIN SODIUM, SULBACTAM SODIUM 3 G: 2; 1 INJECTION, POWDER, FOR SOLUTION INTRAMUSCULAR; INTRAVENOUS at 05:59

## 2025-04-11 RX ADMIN — FUROSEMIDE 20 MG: 20 TABLET ORAL at 06:00

## 2025-04-11 ASSESSMENT — COGNITIVE AND FUNCTIONAL STATUS - GENERAL
DRESSING REGULAR UPPER BODY CLOTHING: A LITTLE
HELP NEEDED FOR BATHING: A LOT
SUGGESTED CMS G CODE MODIFIER DAILY ACTIVITY: CK
TOILETING: A LOT
DAILY ACTIVITIY SCORE: 16
DRESSING REGULAR LOWER BODY CLOTHING: A LOT
PERSONAL GROOMING: A LITTLE

## 2025-04-11 ASSESSMENT — ACTIVITIES OF DAILY LIVING (ADL): TOILETING: INDEPENDENT

## 2025-04-11 ASSESSMENT — PAIN DESCRIPTION - PAIN TYPE
TYPE: ACUTE PAIN;CHRONIC PAIN
TYPE: ACUTE PAIN
TYPE: ACUTE PAIN

## 2025-04-11 ASSESSMENT — FIBROSIS 4 INDEX: FIB4 SCORE: 3.84

## 2025-04-11 NOTE — DISCHARGE PLANNING
[facility] Transitional Care Coordination    Referral from:  Dr. Healy   Insurance Provider on Facesheet: Medicare/[place]  Potential Rehab diagnosis:  Debility    Chart review indicates patient has ongoing medical management and may have therapy needs to possibly meet inpatient rehab facility criteria with the goal of returning to community.      D/C Support:  Adult Children    Physiatry consult pended, waiting for additional information.  Would welcome PT as clinically appropriate.  TCC will follow.  Please reach out sooner if PMR consult requested for medical management.     Last Covid test:    Thank you for the referral.

## 2025-04-11 NOTE — CARE PLAN
The patient is Stable - Low risk of patient condition declining or worsening    Shift Goals  Clinical Goals: VSS, comfort, labs  Patient Goals: rest, go home  Family Goals: updates      Problem: Knowledge Deficit - Standard  Goal: Patient and family/care givers will demonstrate understanding of plan of care, disease process/condition, diagnostic tests and medications  Outcome: Progressing     Problem: Safety  Goal: Will remain free from injury  Outcome: Progressing  Goal: Will remain free from falls  Outcome: Progressing     Problem: Skin Integrity  Goal: Skin integrity is maintained or improved  Outcome: Progressing     Problem: Fall Risk  Goal: Patient will remain free from falls  Outcome: Progressing     Problem: Communication  Goal: The ability to communicate needs accurately and effectively will improve  Outcome: Progressing     Problem: Pain Management  Goal: Pain level will decrease to patient's comfort goal  Outcome: Progressing     Problem: Infection  Goal: Will remain free from infection  Outcome: Progressing     Problem: Respiratory:  Goal: Respiratory status will improve  Outcome: Progressing     Problem: Bowel/Gastric:  Goal: Normal bowel function is maintained or improved  Outcome: Progressing  Goal: Will not experience complications related to bowel motility  Outcome: Progressing     Problem: Psychosocial Needs:  Goal: Level of anxiety will decrease  Outcome: Progressing

## 2025-04-11 NOTE — PROGRESS NOTES
Bedside report received and patient care assumed. Pt is resting in bed, A&O4, with no complaints of pain, and is on 1 L NC. Tele box on. All fall precautions are in place, belongings at bedside table.  Pt was updated on POC, no questions or concerns. Pt educated on use of call light for assistance.

## 2025-04-11 NOTE — CARE PLAN
The patient is Stable - Low risk of patient condition declining or worsening    Shift Goals  Clinical Goals: VSS, monitor labs, pain management  Patient Goals: pain control, sleep  Family Goals: updates    Progress made toward(s) clinical / shift goals:    Problem: Knowledge Deficit - Standard  Goal: Patient and family/care givers will demonstrate understanding of plan of care, disease process/condition, diagnostic tests and medications  Outcome: Progressing     Problem: Skin Integrity  Goal: Skin integrity is maintained or improved  Outcome: Progressing     Problem: Fall Risk  Goal: Patient will remain free from falls  Outcome: Progressing     Problem: Safety  Goal: Will remain free from injury  Outcome: Progressing  Goal: Will remain free from falls  Outcome: Progressing       Patient is not progressing towards the following goals:

## 2025-04-11 NOTE — DISCHARGE PLANNING
Case Management Discharge Planning    Admission Date: 4/8/2025  GMLOS: 2.9  ALOS: 3    6-Clicks ADL Score: 16  6-Clicks Mobility Score: 15  PT and/or OT Eval ordered: Yes  Post-acute Referrals Ordered: Yes  Post-acute Choice Obtained: Yes  Has referral(s) been sent to post-acute provider:  Yes      Anticipated Discharge Dispo: Discharge Disposition: D/T to SNF with Medicare cert in anticipation of skilled care (03)    DME Needed: No    Action(s) Taken: ONEIDA RN met with pt and discussed SNF choice. Pt ok with reaching out to [facility] as her  has been there in the past. Pt does not want [facility] as her spouse had a bad experience with that SNF. Pt is concerned about co payment as well. Choice form scanned to [facility].    Discussed [facility] as well. Pt would be open to [facility] if accepted.     1545: ONEIDA RN rounded back with pt after clarification form [facility] about copayments for stay. Pt will be covered the first 20 days. Per pt she is agreeable and is not planning on staying longer then 20 days.

## 2025-04-11 NOTE — CARE PLAN
The patient is Stable - Low risk of patient condition declining or worsening    Shift Goals  Clinical Goals: EGD, moniotr for signs of bleed  Patient Goals: updates on discharge plan  Family Goals: PT/OT, updates    Progress made toward(s) clinical / shift goals:    Problem: Knowledge Deficit - Standard  Goal: Patient and family/care givers will demonstrate understanding of plan of care, disease process/condition, diagnostic tests and medications  Outcome: Progressing     Problem: Skin Integrity  Goal: Skin integrity is maintained or improved  Outcome: Progressing     Problem: Fall Risk  Goal: Patient will remain free from falls  Outcome: Progressing     Problem: Communication  Goal: The ability to communicate needs accurately and effectively will improve  Outcome: Progressing     Problem: Risk for Fluid Volume Deficit Related to Bleeding  Goal: Fluid volume balance will be maintained  Outcome: Progressing  Goal: Patient will show no signs and symptoms of excessive bleeding  Outcome: Progressing     Problem: Risk for Bleeding  Goal: Patient will take measures to prevent bleeding and recognizes signs of bleeding that need to be reported immediately to a health care professional  Outcome: Progressing  Goal: Patient will not experience bleeding as evidenced by normal blood pressure, stable hematocrit and hemoglobin levels and desired ranges for coagulation profiles  Outcome: Progressing       Patient is not progressing towards the following goals:       4 = No assist / stand by assistance

## 2025-04-11 NOTE — DISCHARGE PLANNING
1135  JAYNE sent [facility] referrals @1135, requested by ONEIDA Tan.     [id number]  Agency/Facility Name: [facility]  Spoke To: Berkley  Outcome: JAYNE called to ask for a bed for today. Per Berkley there is no beds available at this very moment, but once she verifies a pt will discharge from facility she will notify [facility] if the bed is available for today. JAYNE also asked if pt would have a copay during the stay per Berkley she will verify but from Berkley understands pt will not have a copay for the first 20 days but will have a copay of $41.90 after 21 days of SNF stay after the she meets the deductible of $299. ONEIDA RN notified.     [id number]  Agency/Facility Name: [facility]   Spoke To: Berkley   Outcome: Berkley called back to notify that facility will not have an available bed today, but will call CM RN/JAYNE back tomorrow for updates. Berkley also notified [facility] that pt will have 100% SNF coverage for the first 20 days.

## 2025-04-11 NOTE — PROGRESS NOTES
Monitor summary:        Rhythm: AV paced  Rate: 68-78  Ectopy: (F)PVC  Measurements: .20/.14/.42        12hr chart check

## 2025-04-11 NOTE — PROGRESS NOTES
Hospital Medicine Daily Progress Note    Date of Service  4/10/2025    Chief Complaint  Priscilla Hdz is a 85 y.o. female admitted 4/8/2025 with GI bleed    Hospital Course  Patient is an 85-year-old female with past medical history of HLD, HTN, complete heart block status post PPM 2019, CKD 3B that presents with 1 day history of bright red blood per rectum.     Patient states that earlier today around 1300 they noticed blood on their down after standing up.  Subsequently went to the restroom with passage of bright red blood per rectum and possible clots.     In the ED, BP 100s to 150s/50s to 90s, HR 60s to 90s, RR 18-39, saturating well on room air.  Received NS 1 L bolus x 1.  WBC 6.4, hemoglobin 7.7>>6.4, MCV 91.5, , sodium 141, potassium 4.4, bicarb 19, anion gap 8, BUN 24, creatinine 0.83, corrected calcium 9.3, albumin 2.5, iron 14, percent saturation 9, ferritin 342, troponin 51, INR 1.32.  EKG V paced , QTc 485, without ST changes, relatively unchanged from previous.  CTA abdomen pelvis with and without without evidence of active GI bleeding, colonic diverticulosis, right lower lobe consolidation.     Interval Problem Update  4/9  Afebrile pulse 70s to 105 normal respiratory rate systolic blood pressure 101-192 pulse ox 93 to 99% wean down to 1 L nasal cannula.  Leukocytosis, anemia up to 9 after transfusions CMP albumin 2.5 total protein 5.5 otherwise unremarkable.  Procalcitonin 0.42.  Restarted home Coreg, Lasix, losartan.  EGD performed showed nonobstructing Schatzki ring in the esophagus, hiatal hernia, gastric erosions, recommending PPI daily and checking H. pylori and stool.  Colonoscopy had poor prep, has n.p.o. orders placed following the procedure for what I suspect is a repeat colonoscopy tomorrow.    4/10  Afebrile, pulse 4882 respiratory rate 1422 systolic blood pressure  pulse ox 92 to 98% on 1 L nasal cannula.  Hemoglobin 8.7, potassium 3.3 glucose 121 calcium  7.5.  Analgesia adjusted, potassium replaced.  Went for colonoscopy today showed multiple diverticula no bleeding, mild hemorrhoids.  GI signed off, recommend no further GI procedures at this time.      We discussed behavioral health referral on discharge, she is having rather severe ongoing grief/depression since her  passed 6 months ago.  We discussed the role of medications and behavioral therapy and she believes she would be interested in both.  Will place referral on discharge.  No new complaints although she is feeling rather weak since being admitted and having had EGD and 2 colonoscopies in two days, PT OT orders are in to evaluate.    I have discussed this patient's plan of care and discharge plan at IDT rounds today with Case Management, Nursing, Nursing leadership, and other members of the IDT team.    Consultants/Specialty  GI    Code Status  Full Code    Disposition  The patient is not medically cleared for discharge to home or a post-acute facility.      I have placed the appropriate orders for post-discharge needs.    Review of Systems  ROS   Review of Systems   Constitutional:  Negative for chills and fever.   HENT:  Negative for ear pain.    Eyes:  Negative for blurred vision, double vision and pain.   Respiratory:  Positive for shortness of breath. Negative for cough and wheezing.    Cardiovascular:  Negative for chest pain, palpitations and leg swelling.   Gastrointestinal:  Positive for blood in stool. Negative for abdominal pain, constipation, diarrhea, melena, nausea and vomiting.   Genitourinary:  Negative for dysuria, frequency and hematuria.   Musculoskeletal:  Negative for back pain, joint pain and neck pain.   Neurological:  Negative for dizziness and headaches.     Physical Exam  Temp:  [35.9 °C (96.6 °F)-37.3 °C (99.2 °F)] 36.6 °C (97.8 °F)  Pulse:  [48-82] 62  Resp:  [14-22] 18  BP: ()/(44-80) 130/54  SpO2:  [92 %-98 %] 97 %    Physical Exam  Vitals and nursing note  reviewed. Exam conducted with a chaperone present.   Constitutional:       General: She is not in acute distress.     Appearance: She is not ill-appearing or toxic-appearing.   HENT:      Head: Normocephalic and atraumatic.      Nose: Nose normal. No rhinorrhea.      Mouth/Throat:      Mouth: Mucous membranes are dry.      Pharynx: Oropharynx is clear.   Eyes:      General: No scleral icterus.     Extraocular Movements: Extraocular movements intact.      Conjunctiva/sclera: Conjunctivae normal.   Cardiovascular:      Rate and Rhythm: Normal rate and regular rhythm.      Pulses: Normal pulses.      Heart sounds: No murmur heard.  Pulmonary:      Effort: Pulmonary effort is normal. No respiratory distress.      Breath sounds: Normal breath sounds. No wheezing, rhonchi or rales.   Abdominal:      General: There is no distension.      Palpations: Abdomen is soft.      Tenderness: There is no abdominal tenderness. There is no guarding or rebound.   Musculoskeletal:         General: Normal range of motion.      Cervical back: Normal range of motion and neck supple. No rigidity.      Right lower leg: Edema present.      Left lower leg: Edema present.   Skin:     General: Skin is warm and dry.      Capillary Refill: Capillary refill takes less than 2 seconds.      Coloration: Skin is not jaundiced.   Neurological:      General: No focal deficit present.      Mental Status: She is alert and oriented to person, place, and time. Mental status is at baseline.      Cranial Nerves: No cranial nerve deficit.      Sensory: No sensory deficit.      Motor: No weakness.   Psychiatric:         Mood and Affect: Mood normal.         Behavior: Behavior normal.         Thought Content: Thought content normal.         Judgment: Judgment normal.         Fluids    Intake/Output Summary (Last 24 hours) at 4/11/2025 0554  Last data filed at 4/10/2025 2200  Gross per 24 hour   Intake 330 ml   Output 50 ml   Net 280 ml        Laboratory  Recent  Labs     04/08/25  1950 04/09/25  0746 04/10/25  1132   WBC 6.2 7.3 6.2   RBC 2.25* 3.14* 2.98*   HEMOGLOBIN 6.4* 9.0* 8.7*   HEMATOCRIT 20.3* 28.2* 26.5*   MCV 90.2 89.8 88.9   MCH 28.4 28.7 29.2   MCHC 31.5* 31.9* 32.8   RDW 51.0* 49.5 50.8*   PLATELETCT 210 211 206   MPV 10.2 10.0 10.1     Recent Labs     04/08/25  1640 04/09/25  0746 04/10/25  1132   SODIUM 141 140 141   POTASSIUM 4.4 4.2 3.3*   CHLORIDE 114* 110 109   CO2 19* 21 21   GLUCOSE 116* 90 121*   BUN 24* 20 13   CREATININE 0.83 0.76 0.74   CALCIUM 8.1* 7.5* 7.5*     Recent Labs     04/08/25  1640   APTT 29.7   INR 1.32*               Imaging  CTA ABDOMEN PELVIS W & W/O POST PROCESS   Final Result      1.  No CT evidence of active gastrointestinal bleeding.   2.  Colonic diverticulosis.   3.  Right lower lobe consolidation, concerning for pneumonia.              Assessment/Plan  * Lower GI bleed- (present on admission)  Assessment & Plan  Hemoglobin 7.7>>6.4, decreased from 12.9 on 11/14/2024.  Anemia likely in the setting of acute blood loss, possible component of iron deficiency with iron level of 14 and percent sat of 9, ferritin 342.  Patient states that earlier today around 1300 they noticed blood on their down after standing up.  Subsequently went to the restroom with passage of bright red blood per rectum and possible clots. CTA abdomen pelvis with and without without evidence of active GI bleeding, colonic diverticulosis, right lower lobe consolidation.    Lower GI: Diverticulosis, AVM, malignancy, hemorrhoids     -1u pRBC ordered in ED  -GI consulted in the ED, Dr. Arnold  -NPO  -Avoid NSAIDs  -2 large bore IVs  -Transfuse PRN Hgb<7 and PLT<50  -Consider IV iron infusion and/or starting on oral ferrous sulfate prior to discharge    Elevated troponin- (present on admission)  Assessment & Plan  Troponin 51, likely nonischemic myocardial injury in the setting of GI bleed.  EKG without ST changes, denies chest pain.    -Trend troponin    Normal  anion gap metabolic acidosis- (present on admission)  Assessment & Plan  Bicarb 19 and anion gap 8.    -Daily BMP    Stage 3b chronic kidney disease- (present on admission)  Assessment & Plan  Creatinine 0.83, around baseline.    -Avoid nephrotoxic medications    Cardiac pacemaker in situ- (present on admission)  Assessment & Plan  Complete heart block status post PPM 2019    Complete heart block by electrocardiogram (HCC)- (present on admission)  Assessment & Plan  Status post PPM 2019    Venous insufficiency of both lower extremities- (present on admission)  Assessment & Plan  -Hold home Lasix and spironolactone in the setting of GI bleed    Essential hypertension- (present on admission)  Assessment & Plan  -Hold home losartan, carvedilol, hydralazine, spironolactone, Lasix in the setting of GI bleed    Dyslipidemia- (present on admission)  Assessment & Plan  -Continue home atorvastatin         VTE prophylaxis:   SCDs/TEDs      I have performed a physical exam and reviewed and updated ROS and Plan today (4/10/2025). In review of yesterday's note (4/09/2025), there are no changes except as documented above.  Total time spent 55 minutes. I spent greater than 50% of the time for patient care, counseling, and coordination on this date, including unit/floor time, and face-to-face time with the patient as per interval events, my own review of patient's imaging and lab analysis and developing my assessment and plan above.

## 2025-04-11 NOTE — THERAPY
Occupational Therapy   Initial Evaluation     Patient Name: Priscilla Hdz  Age:  85 y.o., Sex:  female  Medical Record #: [id number]  Today's Date: 4/11/2025     Precautions  Precautions: Fall Risk    Assessment    Patient is pleasant 85 y.o. female with a PMHx significant for HLD, HTN, complete heart block s/p PPM (2019), CKD, arthritis, asthma, osteopenia. Presented to the hospital with c/o SOB and bloody stools. EGD revealed nonobstructing Schatzki ring in the esophagus, hiatal hernia and  gastric erosions. Now s/p colonoscopy with no evidence of GI bleeding; found to have diverticulosis and mild hemorrhoid. Pt greeted and seen for OT eval and treatment. Required CGA - min A for mobility, SBA for seated g/h, min A for UB dressing, min A for toileting on BSC and max A for LB dressing. Educated on role of OT in acute care, reason for assessment/evaluation, DC recommendations, energy conservation techniques, benefits of shower chair for bathing ADLs, home safety/fall reduction strategies, hand placement during transfers, compensatory strategies to maximize occupational independence with ADLs, AE and importance of frequent EOB/OOB activity. Educated on benefits of walking to the bathroom and getting up to the chair for all meals; encouraged pt to mobilize with nursing staff. Currently limited by impaired cognition/safety awareness, strength, balance, activity tolerance, functional mobility, and pain which are currently affecting patients ability to complete ADL/IADLs at baseline. Will continue to follow.    Plan    Occupational Therapy Initial Treatment Plan   Treatment Interventions: (P) Self Care / Activities of Daily Living, Adaptive Equipment, Neuro Re-Education / Balance, Therapeutic Exercises, Therapeutic Activity, Family / Caregiver Training  Treatment Frequency: (P) 4 Times per Week  Duration: (P) Until Therapy Goals Met    DC Equipment Recommendations: (P) Hand Held Shower  Discharge Recommendations: (P)  "Recommend post-acute placement for additional occupational therapy services prior to discharge home     Subjective    \"I'm not at my baseline... I'll tell you that right now.\"     Objective     04/11/25 0952   Prior Living Situation   Prior Services   (Assist with IADLs per family)   Housing / Facility 1 Story House   Steps Into Home 3   Rail   (reports rail to enter but unsure which side 2/2 does not use at baseline)   Bathroom Set up Walk In Shower;Grab Bars;Shower Chair   Equipment Owned 4-Wheel Walker;Front-Wheel Walker;Tub / Shower Seat;Grab Bar(s) In Tub / Shower;Grab Bar(s) By Toilet  (tray for FWW)   Lives with - Patient's Self Care Capacity Adult Children   Comments Pt lives with a son who is home most of the time to assist PRN. Reports 3 other sons live locally and can assist intermittently.   Prior Level of ADL Function   Self Feeding Independent   Grooming / Hygiene Independent   Bathing Independent   Dressing Independent   Toileting Independent   Prior Level of IADL Function   Medication Management Independent   Laundry Independent   Kitchen Mobility Requires Assist   Finances Independent   Home Management Dependent   Shopping Dependent   Prior Level Of Mobility Independent With Device in Community;Independent With Device in Home  (FWW with tray in home; 4WW in community)   Driving / Transportation Relatives / Others Provide Transportation   Comments Son reports he has been providing SBA -CGA for mobility when home more recently   History of Falls   History of Falls Yes   Date of Last Fall   (3 falls over the last few weeks; dates not specified)   Precautions   Precautions Fall Risk   Vitals   Patient BP Position Abdul's Position   Blood Pressure  123/63   Pulse Oximetry 96 %   O2 (LPM) 1   O2 Delivery Device Silicone Nasal Cannula   Vitals Comments VSS throughout; does not use O2 at baseline   Pain 0 - 10 Group   Therapist Pain Assessment During Activity;Post Activity Pain Same as Prior to " "Activity;Nurse Notified  (Increased pain with mobility; not quantified)   Cognition    Cognition / Consciousness X   Level of Consciousness Alert   Safety Awareness Impaired   New Learning Impaired   Attention Impaired   Comments Pleasant and participatory. Impaired safety awareness. Prefers to self direct care; self-limiting.   Active ROM Upper Body   Active ROM Upper Body  WDL   Dominant Hand Right   Strength Upper Body   Upper Body Strength  X   Gross Strength Generalized Weakness, Equal Bilaterally.    Sensation Upper Body   Comments Denies N/T   Coordination Upper Body   Coordination WDL   Balance Assessment   Sitting Balance (Static) Fair   Sitting Balance (Dynamic) Fair -   Standing Balance (Static) Poor +   Standing Balance (Dynamic) Poor +   Weight Shift Sitting Fair   Weight Shift Standing Poor   Comments FWW in standing   Bed Mobility    Supine to Sit Contact Guard Assist   Sit to Supine Contact Guard Assist   Scooting Minimal Assist   Comments HOB elevated with heavy reliance on rail   ADL Assessment   Eating Supervision   Grooming Standby Assist;Seated   Upper Body Dressing Minimal Assist   Lower Body Dressing Maximal Assist   Toileting Minimal Assist  (balance during pericare on BSC)   Functional Mobility   Sit to Stand Minimal Assist   Bed, Chair, Wheelchair Transfer Minimal Assist   Toilet Transfers Minimal Assist   Transfer Method Stand Step   Mobility FWW; bed > BSC > bed   Visual Perception   Comments Denies changes   Activity Tolerance   Comments Limited by weakness, fatigue, volition and pain   Patient / Family Goals   Patient / Family Goal #1 \"to get stronger\"   Short Term Goals   Short Term Goal # 1 Pt will complete toilet/ADL transfers with SPV and no LOB   Short Term Goal # 2 Pt will complete toileting ADLs with SPV   Short Term Goal # 3 Pt will complete LB dressing with SPV and AE PRN   Short Term Goal # 4 Pt will complete standing g/h routine with SPV and seated rest breaks PRN   Education " Group   Education Provided Energy Conservation;Home Safety;Transfers;Role of Occupational Therapist;Activities of Daily Living;Adaptive Equipment;Pathology of bedrest   Role of Occupational Therapist Patient Response Patient;Acceptance;Explanation;Verbal Demonstration;Reinforcement Needed   Energy Conservation Patient Response Patient;Acceptance;Explanation;Verbal Demonstration;Reinforcement Needed   Home Safety Patient Response Patient;Acceptance;Explanation;Verbal Demonstration;Reinforcement Needed   Transfers Patient Response Patient;Acceptance;Explanation;Verbal Demonstration;Reinforcement Needed   ADL Patient Response Patient;Acceptance;Explanation;Verbal Demonstration;Reinforcement Needed   Adaptive Equipment Patient Response Patient;Acceptance;Explanation;Verbal Demonstration;Reinforcement Needed   Pathology of Bedrest Patient Response Patient;Acceptance;Explanation;Verbal Demonstration;Reinforcement Needed   Additional Comments Educated on role of OT in acute care, reason for assessment/evaluation, DC recommendations, energy conservation techniques, benefits of shower chair for bathing ADLs, home safety/fall reduction strategies, hand placement during transfers, compensatory strategies to maximize occupational independence with ADLs, AE and importance of frequent EOB/OOB activity. Educated on benefits of walking to the bathroom and getting up to the chair for all meals; pt with preference [place] [facility]   Occupational Therapy Initial Treatment Plan    Treatment Interventions Self Care / Activities of Daily Living;Adaptive Equipment;Neuro Re-Education / Balance;Therapeutic Exercises;Therapeutic Activity;Family / Caregiver Training   Treatment Frequency 4 Times per Week   Duration Until Therapy Goals Met   Problem List   Problem List Decreased Active Daily Living Skills;Decreased Homemaking Skills;Decreased Upper Extremity Strength Right;Decreased Upper Extremity Strength Left;Decreased Functional  Mobility;Decreased Activity Tolerance;Safety Awareness Deficits / Cognition;Impaired Postural Control / Balance   Anticipated Discharge Equipment and Recommendations   DC Equipment Recommendations Hand Held Shower   Discharge Recommendations Recommend post-acute placement for additional occupational therapy services prior to discharge home   Interdisciplinary Plan of Care Collaboration   IDT Collaboration with  Nursing;Family / Caregiver   Patient Position at End of Therapy In Bed;Bed Alarm On;Call Light within Reach;Tray Table within Reach;Phone within Reach;Family / Friend in Room  (son arrived toward end of session)   Collaboration Comments OT report and recs; RN updated. Son expressing concern regarding pts mobility and requesting education on proper way to mobilize with FWW; PT notified.   Session Information   Date / Session Number  4/11, 1 (1/4, 4/17)

## 2025-04-11 NOTE — DISCHARGE PLANNING
Case Management Discharge Planning    Admission Date: 4/8/2025  GMLOS: 2.9  ALOS: 3    6-Clicks ADL Score: 16  6-Clicks Mobility Score: 15  PT and/or OT Eval ordered: Yes  Post-acute Referrals Ordered: Yes  Post-acute Choice Obtained: No  Has referral(s) been sent to post-acute provider:  Yes      Anticipated Discharge Dispo: Discharge Disposition: D/T to SNF with Medicare cert in anticipation of skilled care (03)     DME Needed: No    Action(s) Taken: Updated Provider/Nurse on Discharge Plan and Referral(s) sent    1054, JAYNE Brasher informed to send SNF referrals locally.    [facility] # [id number]    Escalations Completed: None    Medically Clear: No    Next Steps: CM will continue to assist Pt with discharge needs.      Barriers to Discharge: Medical clearance and Pending Placement    Is the patient up for discharge tomorrow: No

## 2025-04-12 LAB — EKG IMPRESSION: NORMAL

## 2025-04-12 PROCEDURE — 770020 HCHG ROOM/CARE - TELE (206)

## 2025-04-12 PROCEDURE — 99233 SBSQ HOSP IP/OBS HIGH 50: CPT | Performed by: STUDENT IN AN ORGANIZED HEALTH CARE EDUCATION/TRAINING PROGRAM

## 2025-04-12 PROCEDURE — A9270 NON-COVERED ITEM OR SERVICE: HCPCS | Performed by: STUDENT IN AN ORGANIZED HEALTH CARE EDUCATION/TRAINING PROGRAM

## 2025-04-12 PROCEDURE — 97530 THERAPEUTIC ACTIVITIES: CPT

## 2025-04-12 PROCEDURE — 93005 ELECTROCARDIOGRAM TRACING: CPT | Mod: TC

## 2025-04-12 PROCEDURE — 97163 PT EVAL HIGH COMPLEX 45 MIN: CPT

## 2025-04-12 PROCEDURE — 700102 HCHG RX REV CODE 250 W/ 637 OVERRIDE(OP): Performed by: STUDENT IN AN ORGANIZED HEALTH CARE EDUCATION/TRAINING PROGRAM

## 2025-04-12 PROCEDURE — 700111 HCHG RX REV CODE 636 W/ 250 OVERRIDE (IP)

## 2025-04-12 PROCEDURE — 700111 HCHG RX REV CODE 636 W/ 250 OVERRIDE (IP): Mod: JZ | Performed by: STUDENT IN AN ORGANIZED HEALTH CARE EDUCATION/TRAINING PROGRAM

## 2025-04-12 PROCEDURE — 700105 HCHG RX REV CODE 258: Performed by: STUDENT IN AN ORGANIZED HEALTH CARE EDUCATION/TRAINING PROGRAM

## 2025-04-12 PROCEDURE — 93010 ELECTROCARDIOGRAM REPORT: CPT | Performed by: INTERNAL MEDICINE

## 2025-04-12 RX ORDER — SPIRONOLACTONE 25 MG/1
12.5 TABLET ORAL DAILY
Status: DISCONTINUED | OUTPATIENT
Start: 2025-04-12 | End: 2025-04-14

## 2025-04-12 RX ORDER — HYDRALAZINE HYDROCHLORIDE 50 MG/1
100 TABLET, FILM COATED ORAL 2 TIMES DAILY
Status: DISCONTINUED | OUTPATIENT
Start: 2025-04-12 | End: 2025-04-14 | Stop reason: HOSPADM

## 2025-04-12 RX ORDER — MORPHINE SULFATE 4 MG/ML
3 INJECTION INTRAVENOUS ONCE
Status: COMPLETED | OUTPATIENT
Start: 2025-04-12 | End: 2025-04-12

## 2025-04-12 RX ORDER — OMEPRAZOLE 20 MG/1
20 CAPSULE, DELAYED RELEASE ORAL 2 TIMES DAILY
Status: DISCONTINUED | OUTPATIENT
Start: 2025-04-12 | End: 2025-04-14 | Stop reason: HOSPADM

## 2025-04-12 RX ADMIN — AMOXICILLIN AND CLAVULANATE POTASSIUM 1 TABLET: 875; 125 TABLET, FILM COATED ORAL at 17:11

## 2025-04-12 RX ADMIN — ATORVASTATIN CALCIUM 20 MG: 20 TABLET, FILM COATED ORAL at 20:40

## 2025-04-12 RX ADMIN — LOSARTAN POTASSIUM 50 MG: 50 TABLET, FILM COATED ORAL at 20:40

## 2025-04-12 RX ADMIN — MORPHINE SULFATE 3 MG: 4 INJECTION INTRAVENOUS at 02:04

## 2025-04-12 RX ADMIN — AMPICILLIN SODIUM, SULBACTAM SODIUM 3 G: 2; 1 INJECTION, POWDER, FOR SOLUTION INTRAMUSCULAR; INTRAVENOUS at 05:30

## 2025-04-12 RX ADMIN — CARVEDILOL 25 MG: 25 TABLET, FILM COATED ORAL at 17:13

## 2025-04-12 RX ADMIN — FUROSEMIDE 20 MG: 20 TABLET ORAL at 05:32

## 2025-04-12 RX ADMIN — SPIRONOLACTONE 12.5 MG: 25 TABLET ORAL at 11:26

## 2025-04-12 RX ADMIN — LOSARTAN POTASSIUM 50 MG: 50 TABLET, FILM COATED ORAL at 09:11

## 2025-04-12 RX ADMIN — OMEPRAZOLE 20 MG: 20 CAPSULE, DELAYED RELEASE ORAL at 17:12

## 2025-04-12 RX ADMIN — HYDRALAZINE HYDROCHLORIDE 100 MG: 50 TABLET ORAL at 11:26

## 2025-04-12 RX ADMIN — HYDRALAZINE HYDROCHLORIDE 100 MG: 50 TABLET ORAL at 20:40

## 2025-04-12 RX ADMIN — PANTOPRAZOLE SODIUM 40 MG: 40 INJECTION, POWDER, FOR SOLUTION INTRAVENOUS at 05:30

## 2025-04-12 RX ADMIN — CARVEDILOL 25 MG: 25 TABLET, FILM COATED ORAL at 08:21

## 2025-04-12 RX ADMIN — AMPICILLIN SODIUM, SULBACTAM SODIUM 3 G: 2; 1 INJECTION, POWDER, FOR SOLUTION INTRAMUSCULAR; INTRAVENOUS at 11:31

## 2025-04-12 ASSESSMENT — COGNITIVE AND FUNCTIONAL STATUS - GENERAL
MOVING TO AND FROM BED TO CHAIR: A LITTLE
MOBILITY SCORE: 17
WALKING IN HOSPITAL ROOM: A LITTLE
STANDING UP FROM CHAIR USING ARMS: A LITTLE
TURNING FROM BACK TO SIDE WHILE IN FLAT BAD: A LITTLE
SUGGESTED CMS G CODE MODIFIER MOBILITY: CK
MOVING FROM LYING ON BACK TO SITTING ON SIDE OF FLAT BED: A LITTLE
CLIMB 3 TO 5 STEPS WITH RAILING: A LOT

## 2025-04-12 ASSESSMENT — GAIT ASSESSMENTS
DISTANCE (FEET): 15
GAIT LEVEL OF ASSIST: MINIMAL ASSIST
ASSISTIVE DEVICE: FRONT WHEEL WALKER

## 2025-04-12 ASSESSMENT — PAIN DESCRIPTION - PAIN TYPE
TYPE: CHRONIC PAIN;ACUTE PAIN
TYPE: ACUTE PAIN;CHRONIC PAIN
TYPE: ACUTE PAIN
TYPE: ACUTE PAIN

## 2025-04-12 ASSESSMENT — FIBROSIS 4 INDEX: FIB4 SCORE: 4.1

## 2025-04-12 NOTE — PROGRESS NOTES
Monitor Summary  Rhythm: Paced  Rate: 54-98  Ectopy: (F) trip, coup, (F) PVC  Measurements: -/.09/-  ---12 hr Chart Review---

## 2025-04-12 NOTE — CARE PLAN
The patient is Stable - Low risk of patient condition declining or worsening    Shift Goals  Clinical Goals: safety, VSS, pain management  Patient Goals: rest, comfort  Family Goals: keith        Problem: Knowledge Deficit - Standard  Goal: Patient and family/care givers will demonstrate understanding of plan of care, disease process/condition, diagnostic tests and medications  Outcome: Progressing     Problem: Skin Integrity  Goal: Skin integrity is maintained or improved  Outcome: Progressing     Problem: Fall Risk  Goal: Patient will remain free from falls  Outcome: Progressing     Problem: Communication  Goal: The ability to communicate needs accurately and effectively will improve  Outcome: Progressing     Problem: Pain Management  Goal: Pain level will decrease to patient's comfort goal  Outcome: Progressing     Problem: Infection  Goal: Will remain free from infection  Outcome: Progressing     Problem: Psychosocial Needs:  Goal: Level of anxiety will decrease  Outcome: Progressing     Problem: Mobility  Goal: Risk for activity intolerance will decrease  Outcome: Progressing

## 2025-04-12 NOTE — PROGRESS NOTES
Hospital Medicine Daily Progress Note    Date of Service  4/11/2025    Chief Complaint  Priscilla Hdz is a 85 y.o. female admitted 4/8/2025 with GI bleed    Hospital Course  Patient is an 85-year-old female with past medical history of HLD, HTN, complete heart block status post PPM 2019, CKD 3B that presents with 1 day history of bright red blood per rectum.     Patient states that earlier today around 1300 they noticed blood on their down after standing up.  Subsequently went to the restroom with passage of bright red blood per rectum and possible clots.     In the ED, BP 100s to 150s/50s to 90s, HR 60s to 90s, RR 18-39, saturating well on room air.  Received NS 1 L bolus x 1.  WBC 6.4, hemoglobin 7.7>>6.4, MCV 91.5, , sodium 141, potassium 4.4, bicarb 19, anion gap 8, BUN 24, creatinine 0.83, corrected calcium 9.3, albumin 2.5, iron 14, percent saturation 9, ferritin 342, troponin 51, INR 1.32.  EKG V paced , QTc 485, without ST changes, relatively unchanged from previous.  CTA abdomen pelvis with and without without evidence of active GI bleeding, colonic diverticulosis, right lower lobe consolidation.     Interval Problem Update  4/9  Afebrile pulse 70s to 105 normal respiratory rate systolic blood pressure 101-192 pulse ox 93 to 99% wean down to 1 L nasal cannula.  Leukocytosis, anemia up to 9 after transfusions CMP albumin 2.5 total protein 5.5 otherwise unremarkable.  Procalcitonin 0.42.  Restarted home Coreg, Lasix, losartan.  EGD performed showed nonobstructing Schatzki ring in the esophagus, hiatal hernia, gastric erosions, recommending PPI daily and checking H. pylori and stool.  Colonoscopy had poor prep, has n.p.o. orders placed following the procedure for what I suspect is a repeat colonoscopy tomorrow.    4/10  Afebrile, pulse 4882 respiratory rate 1422 systolic blood pressure  pulse ox 92 to 98% on 1 L nasal cannula.  Hemoglobin 8.7, potassium 3.3 glucose 121 calcium  7.5.  Analgesia adjusted, potassium replaced.  Went for colonoscopy today showed multiple diverticula no bleeding, mild hemorrhoids.  GI signed off, recommend no further GI procedures at this time.      We discussed behavioral health referral on discharge, she is having rather severe ongoing grief/depression since her  passed 6 months ago.  We discussed the role of medications and behavioral therapy and she believes she would be interested in both.  Will place referral on discharge.  No new complaints although she is feeling rather weak since being admitted and having had EGD and 2 colonoscopies in two days, PT OT orders are in to evaluate.    4/11  Afebrile pulse 50s to 60s normal respiratory rate systolic blood pressure 120s to 140s pulse ox 94 to 97% on 1 to 2 L nasal cannula.  No leukocytosis, stable anemia, BMP calcium 7.6.  OT recommending postacute placement, patient agrees.  Discharge planner is working to arrange, medically cleared for discharge to SNF.    I have discussed this patient's plan of care and discharge plan at IDT rounds today with Case Management, Nursing, Nursing leadership, and other members of the IDT team.    Consultants/Specialty  GI    Code Status  Full Code    Disposition  The patient is not medically cleared for discharge to home or a post-acute facility.      I have placed the appropriate orders for post-discharge needs.    Review of Systems  ROS   Review of Systems   Constitutional:  Negative for chills and fever.   HENT:  Negative for ear pain.    Eyes:  Negative for blurred vision, double vision and pain.   Respiratory:  Positive for shortness of breath. Negative for cough and wheezing.    Cardiovascular:  Negative for chest pain, palpitations and leg swelling.   Gastrointestinal:  Positive for blood in stool. Negative for abdominal pain, constipation, diarrhea, melena, nausea and vomiting.   Genitourinary:  Negative for dysuria, frequency and hematuria.   Musculoskeletal:   Negative for back pain, joint pain and neck pain.   Neurological:  Negative for dizziness and headaches.     Physical Exam  Temp:  [36.3 °C (97.3 °F)-36.7 °C (98.1 °F)] 36.7 °C (98.1 °F)  Pulse:  [48-64] 62  Resp:  [17-18] 18  BP: (116-149)/(48-67) 128/57  SpO2:  [94 %-97 %] 95 %    Physical Exam  Vitals and nursing note reviewed. Exam conducted with a chaperone present.   Constitutional:       General: She is not in acute distress.     Appearance: She is not ill-appearing or toxic-appearing.   HENT:      Head: Normocephalic and atraumatic.      Nose: Nose normal. No rhinorrhea.      Mouth/Throat:      Mouth: Mucous membranes are dry.      Pharynx: Oropharynx is clear.   Eyes:      General: No scleral icterus.     Extraocular Movements: Extraocular movements intact.      Conjunctiva/sclera: Conjunctivae normal.   Cardiovascular:      Rate and Rhythm: Normal rate and regular rhythm.      Pulses: Normal pulses.      Heart sounds: No murmur heard.  Pulmonary:      Effort: Pulmonary effort is normal. No respiratory distress.      Breath sounds: Normal breath sounds. No wheezing, rhonchi or rales.   Abdominal:      General: There is no distension.      Palpations: Abdomen is soft.      Tenderness: There is no abdominal tenderness. There is no guarding or rebound.   Musculoskeletal:         General: Normal range of motion.      Cervical back: Normal range of motion and neck supple. No rigidity.      Right lower leg: Edema present.      Left lower leg: Edema present.   Skin:     General: Skin is warm and dry.      Capillary Refill: Capillary refill takes less than 2 seconds.      Coloration: Skin is not jaundiced.      Findings: No rash.   Neurological:      General: No focal deficit present.      Mental Status: She is alert and oriented to person, place, and time. Mental status is at baseline.      Cranial Nerves: No cranial nerve deficit.      Sensory: No sensory deficit.      Motor: No weakness.   Psychiatric:          Mood and Affect: Mood normal.         Behavior: Behavior normal.         Thought Content: Thought content normal.         Judgment: Judgment normal.         Fluids    Intake/Output Summary (Last 24 hours) at 4/11/2025 2054  Last data filed at 4/11/2025 0953  Gross per 24 hour   Intake 380 ml   Output --   Net 380 ml        Laboratory  Recent Labs     04/09/25  0746 04/10/25  1132 04/11/25  1001   WBC 7.3 6.2 5.5   RBC 3.14* 2.98* 2.96*   HEMOGLOBIN 9.0* 8.7* 8.4*   HEMATOCRIT 28.2* 26.5* 27.4*   MCV 89.8 88.9 92.6   MCH 28.7 29.2 28.4   MCHC 31.9* 32.8 30.7*   RDW 49.5 50.8* 53.7*   PLATELETCT 211 206 193   MPV 10.0 10.1 10.1     Recent Labs     04/09/25  0746 04/10/25  1132 04/11/25  1001   SODIUM 140 141 140   POTASSIUM 4.2 3.3* 4.7   CHLORIDE 110 109 111   CO2 21 21 22   GLUCOSE 90 121* 95   BUN 20 13 17   CREATININE 0.76 0.74 0.85   CALCIUM 7.5* 7.5* 7.6*                     Imaging  CTA ABDOMEN PELVIS W & W/O POST PROCESS   Final Result      1.  No CT evidence of active gastrointestinal bleeding.   2.  Colonic diverticulosis.   3.  Right lower lobe consolidation, concerning for pneumonia.              Assessment/Plan  * Lower GI bleed- (present on admission)  Assessment & Plan  Hemoglobin 7.7>>6.4, decreased from 12.9 on 11/14/2024.  Anemia likely in the setting of acute blood loss, possible component of iron deficiency with iron level of 14 and percent sat of 9, ferritin 342.  Patient states that earlier today around 1300 they noticed blood on their down after standing up.  Subsequently went to the restroom with passage of bright red blood per rectum and possible clots. CTA abdomen pelvis with and without without evidence of active GI bleeding, colonic diverticulosis, right lower lobe consolidation.    Lower GI: Diverticulosis, AVM, malignancy, hemorrhoids     -1u pRBC ordered in ED  -GI consulted in the ED, Dr. Arnold  -NPO  -Avoid NSAIDs  -2 large bore IVs  -Transfuse PRN Hgb<7 and PLT<50  -Consider IV iron  infusion and/or starting on oral ferrous sulfate prior to discharge    Elevated troponin- (present on admission)  Assessment & Plan  Troponin 51, likely nonischemic myocardial injury in the setting of GI bleed.  EKG without ST changes, denies chest pain.    -Trend troponin    Normal anion gap metabolic acidosis- (present on admission)  Assessment & Plan  Bicarb 19 and anion gap 8.    -Daily BMP    Stage 3b chronic kidney disease- (present on admission)  Assessment & Plan  Creatinine 0.83, around baseline.    -Avoid nephrotoxic medications    Cardiac pacemaker in situ- (present on admission)  Assessment & Plan  Complete heart block status post PPM 2019    Complete heart block by electrocardiogram (HCC)- (present on admission)  Assessment & Plan  Status post PPM 2019    Venous insufficiency of both lower extremities- (present on admission)  Assessment & Plan  -Hold home Lasix and spironolactone in the setting of GI bleed    Essential hypertension- (present on admission)  Assessment & Plan  -Hold home losartan, carvedilol, hydralazine, spironolactone, Lasix in the setting of GI bleed    Dyslipidemia- (present on admission)  Assessment & Plan  -Continue home atorvastatin         VTE prophylaxis:   SCDs/TEDs      I have performed a physical exam and reviewed and updated ROS and Plan today (4/11/2025). In review of yesterday's note (4/10/2025), there are no changes except as documented above.  Total time spent 53 minutes. I spent greater than 50% of the time for patient care, counseling, and coordination on this date, including unit/floor time, and face-to-face time with the patient as per interval events, my own review of patient's imaging and lab analysis and developing my assessment and plan above.

## 2025-04-12 NOTE — DISCHARGE PLANNING
Case Management Discharge Planning    Admission Date: 4/8/2025  GMLOS: 2.9  ALOS: 4    6-Clicks ADL Score: 16  6-Clicks Mobility Score: 15  PT and/or OT Eval ordered: Yes  Post-acute Referrals Ordered: Yes  Post-acute Choice Obtained: Yes  Has referral(s) been sent to post-acute provider:  Yes      Anticipated Discharge Dispo: Discharge Disposition: D/T to SNF with Medicare cert in anticipation of skilled care (03)    DME Needed: No    Action(s) Taken: Updated Provider/Nurse on Discharge Plan    Escalations Completed: None    Medically Clear: No    Next Steps: Discussed in rounds  Pt is a potentially good rehab candidate. Needs PT eval. Message sent to PT. Discussed Rehab with pt, she is agreeable rehab placement.  Received call from [facility] at [facility]. She might have bed today. Rehab considering pt but needs PT eval. Await PT eval  for potential for IRF.    Barriers to Discharge: Pending Placement and Pending PT Evaluation    Is the patient up for discharge tomorrow: No

## 2025-04-12 NOTE — PROGRESS NOTES
Report received. Assumed care. Pt in bed awake. A/O x4. VSS. Responds appropriately. Denies pain, SOB. Assessment complete. Telemonitor on. Discussed POC, , pt verbalizes understanding. Explained importance of calling before getting OOB. Call light and belongings within reach. Bed alarm on. Bed in the lowest position. Treaded socks in place. Hourly rounding in progress. Will continue to monitor .

## 2025-04-12 NOTE — CARE PLAN
The patient is Stable - Low risk of patient condition declining or worsening    Shift Goals  Clinical Goals: stable VS/labs, pain control  Patient Goals: rest, pain control, comfort  Family Goals: mot present at this time    Progress made toward(s) clinical / shift goals:    Problem: Knowledge Deficit - Standard  Goal: Patient and family/care givers will demonstrate understanding of plan of care, disease process/condition, diagnostic tests and medications  Outcome: Progressing  Note: Educated pt on POC, monitor VS/labs, pain control, mobility, PT/OT, safety, DC planning, all questions answered, hourly rounding in progress     Problem: Skin Integrity  Goal: Skin integrity is maintained or improved  Outcome: Progressing  Note: On low airloss matress, q2 turns, mepilex to sacral wound in place, heels floated with pillows     Problem: Pain - Standard  Goal: Alleviation of pain or a reduction in pain to the patient’s comfort goal  Outcome: Progressing  Note: VSS, denies any pain at this time, resting in bed comfortably at this time, hourly rounding in progress       Patient is not progressing towards the following goals:

## 2025-04-12 NOTE — THERAPY
Physical Therapy   Initial Evaluation     Patient Name: Priscilla Hdz  Age:  85 y.o., Sex:  female  Medical Record #: [id number]  Today's Date: 4/12/2025     Precautions  Precautions: Fall Risk    Assessment  Patient is 85 y.o. female that presented to acute 4/8/25 with bright red blood per rectum and concern for GIB. EGD revealed nonobstructing Schatzki ring in the esophagus, hiatal hernia, and gastric erosions. Colonoscopy with no evidence of bleeding; found to have diverticulosis and mild hemorrhoid. PMHx significant for HLD, HTN, complete heart block s/p PPM 2019, CKD3B.    She presented to PT with impaired balance and coordination, weakness, and decreased activity tolerance which are limiting her ability to safely perform mobility at [facility]. She mobilized as detailed below; was able to ambulate short distance in room with FWW but required min A and is well below her functional baseline. Will follow.      Plan    Physical Therapy Initial Treatment Plan   Treatment Plan : Bed Mobility, Equipment, Family / Caregiver Training, Manual Therapy, Gait Training, Neuro Re-Education / Balance, Self Care / Home Evaluation, Stair Training, Therapeutic Activities, Therapeutic Exercise  Treatment Frequency: 4 Times per Week  Duration: Until Therapy Goals Met    DC Equipment Recommendations: Unable to determine at this time  Discharge Recommendations: Recommend post-acute placement for additional physical therapy services prior to discharge home       Subjective    Patient received in bed and agreeable to evaluation with encouragement     Objective       04/12/25 1216   Vitals   O2 (LPM) 2   O2 Delivery Device Silicone Nasal Cannula   Vitals Comments SpO2 dropped on room air   Pain 0 - 10 Group   Location Back;Buttock   Therapist Pain Assessment During Activity;Nurse Notified   Prior Living Situation   Prior Services Intermittent Physical Support for ADL Per Family   Housing / Facility 1 Story House   Steps Into Home 3   Steps  In Home 0   Equipment Owned Front-Wheel Walker;4-Wheel Walker   Lives with - Patient's Self Care Capacity Adult Children   Comments Son lives with patient and assists with IADLs; patient does not feel comfortable having son assist with toileting, showering, etc   Prior Level of Functional Mobility   Bed Mobility Independent   Transfer Status Independent   Ambulation Independent   Ambulation Distance community   Assistive Devices Used Front-Wheel Walker  (in house; 4WW in community)   Stairs Independent   History of Falls   History of Falls Yes   Cognition    Cognition / Consciousness X   Level of Consciousness Alert   Safety Awareness Impaired   New Learning Impaired   Attention Impaired   Comments pleasant, cooperative. decreased insight   Active ROM Upper Body   Comments patient moved BUE functionally during session   Active ROM Lower Body    Comments functional for mobility   Strength Lower Body   Comments gross BLE weakness 3+/5 except 2/5 B DF   Other Treatments   Other Treatments Provided additional time required for therapeutic activities   Balance Assessment   Sitting Balance (Static) Fair   Sitting Balance (Dynamic) Fair   Standing Balance (Static) Fair -   Standing Balance (Dynamic) Fair -   Weight Shift Sitting Fair   Weight Shift Standing Fair   Comments used FWW in standing. no overt LOB but appeared unsteady   Bed Mobility    Supine to Sit Contact Guard Assist   Sit to Supine   (NT, left in chair)   Scooting Standby Assist  (seated)   Gait Analysis   Gait Level Of Assist Minimal Assist   Assistive Device Front Wheel Walker   Distance (Feet) 15   # of Times Distance was Traveled 1   Deviation   (steppage gait, no DF BLE)   # of Stairs Climbed 0   Weight Bearing Status no restrictions   Vision Deficits Impacting Mobility NT   Functional Mobility   Sit to Stand Minimal Assist   Bed, Chair, Wheelchair Transfer Minimal Assist   Transfer Method Stand Step   6 Clicks Assessment - How much HELP from from  another person do you currently need... (If the patient hasn't done an activity recently, how much help from another person do you think he/she would need if he/she tried?)   Turning from your back to your side while in a flat bed without using bedrails? 3   Moving from lying on your back to sitting on the side of a flat bed without using bedrails? 3   Moving to and from a bed to a chair (including a wheelchair)? 3   Standing up from a chair using your arms (e.g., wheelchair, or bedside chair)? 3   Walking in hospital room? 3   Climbing 3-5 steps with a railing? 2   6 clicks Mobility Score 17   Short Term Goals    Short Term Goal # 1 Patient will move supine <> sitting EOB from flat bed without bed rail with supervision within 6tx in order to get in/out of bed   Short Term Goal # 2 Patient will move sitting <> standing with LRAD and supervision within 6tx in order to initiate transfers and gait   Short Term Goal # 3 Patient will ambulate 150ft with LRAD and supervision within 6tx in order to access environment   Short Term Goal # 4 Patient will ascend/descend 3 steps with unilateral UE support and supervision within 6tx in order to enter/exit home   Education Group   Education Provided Role of Physical Therapist   Role of Physical Therapist Patient Response Patient;Acceptance;Explanation;Verbal Demonstration   Problem List    Problems Impaired Bed Mobility;Impaired Transfers;Impaired Ambulation;Functional ROM Deficit;Functional Strength Deficit;Impaired Balance;Impaired Coordination;Decreased Activity Tolerance;Safety Awareness Deficits / Cognition

## 2025-04-13 LAB
ANION GAP SERPL CALC-SCNC: 9 MMOL/L (ref 7–16)
BUN SERPL-MCNC: 16 MG/DL (ref 8–22)
CALCIUM SERPL-MCNC: 7.8 MG/DL (ref 8.5–10.5)
CHLORIDE SERPL-SCNC: 105 MMOL/L (ref 96–112)
CO2 SERPL-SCNC: 22 MMOL/L (ref 20–33)
CREAT SERPL-MCNC: 0.79 MG/DL (ref 0.5–1.4)
ERYTHROCYTE [DISTWIDTH] IN BLOOD BY AUTOMATED COUNT: 51.7 FL (ref 35.9–50)
GFR SERPLBLD CREATININE-BSD FMLA CKD-EPI: 72 ML/MIN/1.73 M 2
GLUCOSE SERPL-MCNC: 94 MG/DL (ref 65–99)
HCT VFR BLD AUTO: 27.1 % (ref 37–47)
HGB BLD-MCNC: 8.4 G/DL (ref 12–16)
MCH RBC QN AUTO: 28.4 PG (ref 27–33)
MCHC RBC AUTO-ENTMCNC: 31 G/DL (ref 32.2–35.5)
MCV RBC AUTO: 91.6 FL (ref 81.4–97.8)
PLATELET # BLD AUTO: 220 K/UL (ref 164–446)
PMV BLD AUTO: 10.6 FL (ref 9–12.9)
POTASSIUM SERPL-SCNC: 4.8 MMOL/L (ref 3.6–5.5)
RBC # BLD AUTO: 2.96 M/UL (ref 4.2–5.4)
SODIUM SERPL-SCNC: 136 MMOL/L (ref 135–145)
WBC # BLD AUTO: 5.6 K/UL (ref 4.8–10.8)

## 2025-04-13 PROCEDURE — 99223 1ST HOSP IP/OBS HIGH 75: CPT | Performed by: PHYSICAL MEDICINE & REHABILITATION

## 2025-04-13 PROCEDURE — 700102 HCHG RX REV CODE 250 W/ 637 OVERRIDE(OP): Performed by: STUDENT IN AN ORGANIZED HEALTH CARE EDUCATION/TRAINING PROGRAM

## 2025-04-13 PROCEDURE — 770020 HCHG ROOM/CARE - TELE (206)

## 2025-04-13 PROCEDURE — 36415 COLL VENOUS BLD VENIPUNCTURE: CPT

## 2025-04-13 PROCEDURE — 80048 BASIC METABOLIC PNL TOTAL CA: CPT

## 2025-04-13 PROCEDURE — A9270 NON-COVERED ITEM OR SERVICE: HCPCS | Performed by: STUDENT IN AN ORGANIZED HEALTH CARE EDUCATION/TRAINING PROGRAM

## 2025-04-13 PROCEDURE — 85027 COMPLETE CBC AUTOMATED: CPT

## 2025-04-13 RX ADMIN — CARVEDILOL 25 MG: 25 TABLET, FILM COATED ORAL at 17:15

## 2025-04-13 RX ADMIN — HYDRALAZINE HYDROCHLORIDE 100 MG: 50 TABLET ORAL at 20:07

## 2025-04-13 RX ADMIN — AMOXICILLIN AND CLAVULANATE POTASSIUM 1 TABLET: 875; 125 TABLET, FILM COATED ORAL at 05:08

## 2025-04-13 RX ADMIN — SPIRONOLACTONE 12.5 MG: 25 TABLET ORAL at 05:08

## 2025-04-13 RX ADMIN — OMEPRAZOLE 20 MG: 20 CAPSULE, DELAYED RELEASE ORAL at 05:08

## 2025-04-13 RX ADMIN — LOSARTAN POTASSIUM 50 MG: 50 TABLET, FILM COATED ORAL at 10:13

## 2025-04-13 RX ADMIN — FUROSEMIDE 20 MG: 20 TABLET ORAL at 05:08

## 2025-04-13 RX ADMIN — ATORVASTATIN CALCIUM 20 MG: 20 TABLET, FILM COATED ORAL at 20:07

## 2025-04-13 RX ADMIN — OMEPRAZOLE 20 MG: 20 CAPSULE, DELAYED RELEASE ORAL at 17:15

## 2025-04-13 RX ADMIN — AMOXICILLIN AND CLAVULANATE POTASSIUM 1 TABLET: 875; 125 TABLET, FILM COATED ORAL at 17:15

## 2025-04-13 RX ADMIN — CARVEDILOL 25 MG: 25 TABLET, FILM COATED ORAL at 08:00

## 2025-04-13 RX ADMIN — HYDRALAZINE HYDROCHLORIDE 100 MG: 50 TABLET ORAL at 08:00

## 2025-04-13 RX ADMIN — LOSARTAN POTASSIUM 50 MG: 50 TABLET, FILM COATED ORAL at 20:07

## 2025-04-13 ASSESSMENT — PAIN DESCRIPTION - PAIN TYPE
TYPE: ACUTE PAIN
TYPE: ACUTE PAIN

## 2025-04-13 ASSESSMENT — FIBROSIS 4 INDEX: FIB4 SCORE: 4.1

## 2025-04-13 NOTE — CARE PLAN
Problem: Knowledge Deficit - Standard  Goal: Patient and family/care givers will demonstrate understanding of plan of care, disease process/condition, diagnostic tests and medications  Outcome: Progressing     Problem: Skin Integrity  Goal: Skin integrity is maintained or improved  Outcome: Progressing     Problem: Fall Risk  Goal: Patient will remain free from falls  Outcome: Progressing   The patient is Stable - Low risk of patient condition declining or worsening    Shift Goals  Clinical Goals: no bloody stool, vss, monitor labs  Patient Goals: rest comfort  Family Goals: keith    Progress made toward(s) clinical / shift goals:       Patient is not progressing towards the following goals:

## 2025-04-13 NOTE — CONSULTS
Physical Medicine and Rehabilitation Consultation              Date of initial consultation: 4/13/2025  Requesting provider: Raul Healy MD   Consulting provider: Karon Patton D.O.  Reason for consultation: assess for acute inpatient rehab appropriateness  LOS: 5 Day(s)    Chief complaint: Bloody stools    HPI: The patient is a 85 y.o.  female with a past medical history of hyperlipidemia, hypertension, history of complete heart block status post pacemaker in 2019, and CKD;  who presented on 4/8/2025  3:01 PM with bloody stools.  Per documentation, patient had noted dark red blood clots in his stools.  Upon evaluation in the emergency department hemoglobin was noted to be 7.7 which dropped to 6.4.  CTA abdomen and pelvis was negative for an active GI bleed.  Patient was transfused 1 unit packed red blood cells in the emergency department.  GI was consulted and patient was taken for an EGD on 4/9 which showed a nonobstructing Schatzki ring, hiatal hernia and gastric erosion.  4/10 patient was taken for colonoscopy which was negative for an active GI bleed.  Patient remains on a PPI, hemoglobin has been stable around 8.4.  Hospital course has been notable for hypertension and nonsustained elevated troponin.    Patient seen and examined at bedside, patient reports she feels ok. Reports she has chronic swelling in her legs and usually wears compression stockings at home.  Maxwell abdo pain. Denies HA, lightheadedness, SOB, CP, abdominal pain, or changes in numbness/tingling/weakness.   Reports she is agreeable to rehab, but ideally would like to be home for [facility]. Also mentions she would not feel comfortably with her son helping with showers. Dicussed that showers could be done with  OT.         Social Hx:  Patient lives with adult children in a one-story house with 3 stairs to enter, patient does not feel comfortable send assisting with toileting and showering,  recently passed away 6 months ago.  3  NIECY  At prior level of function mod I with an FWW was dependent for shopping and home management    Tobacco: Denied  Alcohol: Occasional  Drugs: Denied    THERAPY:  Restrictions: Fall risk  PT: Functional mobility   4/12 min assist with an FWW x 15 feet, min assist sit to stand, min assist transfers    OT: ADLs  4/11 min assist sit to stand, max assist lower body dressing, min assist upper body dressing    SLP:   None    IMAGING:  CTA ABDOMEN PELVIS W & W/O POST PROCESS  Narrative: 4/8/2025 6:19 PM    HISTORY/REASON FOR EXAM:  Lower gastrointestinal bleeding    TECHNIQUE/EXAM DESCRIPTION:  CT angiogram of the abdomen and pelvis without and with contrast, with reconstructions.    Initial precontrast helical sections were obtained from the diaphragmatic domes to the lesser trochanters. Following this, 90 mL of Omnipaque 350 nonionic contrast was administered at 5.0 mL/sec and helical scanning was obtained from the diaphragmatic   domes through the lesser trochanters. Thin section overlapping reconstruction interval was utilized and multiplanar volume reformatted were generated in the sagittal and coronal planes.    3D angiographic images were reviewed on PACS. Maximum intensity projection (MIP) images were generated and reviewed.    Low dose optimization technique was utilized for this CT exam including automated exposure control and adjustment of the mA and/or kV according to patient size.    COMPARISON:  None.    FINDINGS:  AORTA AND BRANCH VESSELS:  No evidence of aortic aneurysm or dissection. No intramural hematoma. Atherosclerosis.  Patent origins of the celiac, superior mesenteric and renal arteries. Patent common iliac and visualized external iliac arteries.    CT ABDOMEN/PELVIS:  Lower chest: Right lower lobe consolidation. Trace bilateral pleural effusions. ICD/pacemaker leads.  Liver: No mass. No intrahepatic biliary dilatation.  Gallbladder: No mural thickening. No radiopaque gallstones.  Common bile duct:  Nondilated.  Pancreas: Unremarkable.  Spleen: No mass.  Adrenals: No mass.  Kidneys: No suspicious mass lesions. No hydronephrosis.  Stomach, small bowel, colon: No bowel wall thickening or obstruction. Colonic diverticulosis.  Peritoneal cavity: No ascites.  Lymph nodes: No enlarged nodes by size criteria.  Pelvic organs: Unremarkable.    MUSCULOSKELETAL STRUCTURES: No acute fracture or destructive lesion. Right hip arthroplasty.    3D angiographic/MIP images of the vasculature confirm the vascular findings as described above.  Impression: 1.  No CT evidence of active gastrointestinal bleeding.  2.  Colonic diverticulosis.  3.  Right lower lobe consolidation, concerning for pneumonia.        PROCEDURES:  4/9 EGD by Dr. Arnold  4/10 colonoscopy performed by Dr. James    PMH:  Past Medical History:   Diagnosis Date    Arthritis     Hip, knee    ASTHMA     AV block, complete (HCC) 06/2019    Status post pacemaker implantation    Cataract     Bilateral IOL    CKD (chronic kidney disease)     Hyperlipidemia     Hypertension 10/2023    Echocardiogram with normal LV size, LVEF 69%. Normal LA and RV, enlarged RA. Trace MR. RVSP 35mmHg.    Menopause     Osteopenia     Pedal edema        PSH:  Past Surgical History:   Procedure Laterality Date    COLONOSCOPY N/A 4/10/2025    Procedure: COLONOSCOPY;  Surgeon: Juan Manuel James M.D.;  Location: SURGERY SAME DAY [facility];  Service: Gastroenterology    [place] UPPER GI ENDOSCOPY,BIOPSY N/A 4/9/2025    Procedure: GASTROSCOPY, WITH BIOPSY;  Surgeon: Ly Arnold M.D.;  Location: SURGERY SAME DAY [facility];  Service: Gastroenterology    COLONOSCOPY N/A 4/9/2025    Procedure: COLONOSCOPY;  Surgeon: Ly Arnold M.D.;  Location: SURGERY SAME DAY [facility];  Service: Gastroenterology    PACEMAKER INSERTION Left 06/20/2019    [employer] [place] S  MRI [id number] implanted by Dr. Mcginnis.    HIP REVISION TOTAL Right 8/29/2018    Procedure: HIP REVISION TOTAL;  Surgeon: Oniel Goodson M.D.;   "Location: SURGERY [facility];  Service: Orthopedics    HIP ARTHROPLASTY TOTAL Right 8/9/2016    Procedure: HIP ARTHROPLASTY TOTAL;  Surgeon: Oniel Goodson M.D.;  Location: SURGERY [facility];  Service:     CATARACT EXTRACTION WITH IOL Bilateral 2012       FHX:  Family History   Problem Relation Age of Onset    Heart Disease Father     Hyperlipidemia Father     Hypertension Father     Heart Disease Brother     Hypertension Mother     Hyperlipidemia Mother        Medications:  Current Facility-Administered Medications   Medication Dose    hydrALAZINE (Apresoline) tablet 100 mg  100 mg    spironolactone (Aldactone) tablet 12.5 mg  12.5 mg    amoxicillin-clavulanate (Augmentin) 875-125 MG per tablet 1 Tablet  1 Tablet    omeprazole (PriLOSEC) capsule 20 mg  20 mg    oxyCODONE immediate-release (Roxicodone) tablet 5 mg  5 mg    carvedilol (Coreg) tablet 25 mg  25 mg    losartan (Cozaar) tablet 50 mg  50 mg    furosemide (Lasix) tablet 20 mg  20 mg    acetaminophen (Tylenol) tablet 650 mg  650 mg    ondansetron (Zofran) syringe/vial injection 4 mg  4 mg    Or    ondansetron (Zofran ODT) dispertab 4 mg  4 mg    atorvastatin (Lipitor) tablet 20 mg  20 mg       Allergies:  Allergies   Allergen Reactions    Other Environmental Cough     Pine, Dust         Physical Exam:  Vitals: BP (!) 146/51   Pulse 78   Temp 36.8 °C (98.2 °F) (Temporal)   Resp 16   Ht 1.651 m (5' 5\")   Wt 69.1 kg (152 lb 5.4 oz)   SpO2 91%   Gen: NAD  Head:  NC/AT  Eyes/ Nose/ Mouth: PERRLA, moist mucous membranes  Cardio: RRR, good distal perfusion, warm extremities  Pulm: normal respiratory effort, no cyanosis   Abd: Soft NTND, negative borborygmi   Ext: b/l LE swelling 2+ pitting edema, patient reports this is chronic. .  No calf tenderness. No clubbing.    Mental status:  A&Ox4 (person, place, date, situation) answers questions appropriately follows commands  Speech: fluent, no aphasia or dysarthria    CRANIAL NERVES:  2,3: " visual acuity grossly intact, PERRL  3,4,6: EOMI bilaterally, no nystagmus or diplopia  5: sensation intact to light touch bilaterally and symmetric  7: no facial asymmetry  8: hearing grossly intact      Motor:      Upper Extremity  Myotome R L   Shoulder flexion C5 5/5 5/5   Elbow flexion C5 5/5 5/5   Wrist extension C6 5/5 5/5   Elbow extension C7 5/5 5/5   Finger flexion C8 5/5 5/5   Finger abduction T1 5/5 5/5     Lower Extremity Myotome R L   Hip flexion L2 5/5 5/5   Knee extension L3 5/5 5/5   Ankle dorsiflexion L4 1, limtied by edema/5 1, limited by edema/5   Toe extension L5 4/5 4/5   Ankle plantarflexion S1 3/5, limtied by ankle swelling  3/5, limited by ankle swelling        Sensory:   intact to light touch through out    DTRs: 2+ in bilateral  biceps,  No clonus at bilateral ankles  Negative Rg b/l     Tone: no spasticity noted    Coordination:   intact fine motor with fingers bilaterally      Labs: Reviewed and significant for   Recent Labs     04/10/25  1132 25  1001 25  0326   RBC 2.98* 2.96* 2.96*   HEMOGLOBIN 8.7* 8.4* 8.4*   HEMATOCRIT 26.5* 27.4* 27.1*   PLATELETCT 206 193 220     Recent Labs     04/10/25  1132 25  1001 25  0326   SODIUM 141 140 136   POTASSIUM 3.3* 4.7 4.8   CHLORIDE 109 111 105   CO2 21 22 22   GLUCOSE 121* 95 94   BUN 13 17 16   CREATININE 0.74 0.85 0.79   CALCIUM 7.5* 7.6* 7.8*     Recent Results (from the past 24 hours)   EKG    Collection Time: 25  5:38 PM   Result Value Ref Range    Report       [facility]    Test Date:  2025  Pt Name:    MOISE MENCHACA                Department: 171  MRN:        [id number]                      Room:       T729  Gender:     Female                       Technician: AMY  :        [id number]                   Requested By:DORETHA IVAN  Order #:    [id number]                    Reading MD: Wali Galvin MD    Measurements  Intervals                                Axis  Rate:       69                            P:          0  OR:         186                          QRS:        47  QRSD:       129                          T:          269  QT:         474  QTc:        508    Interpretive Statements  A-V dual-paced complexes w/ some inhibition  Electronically Signed On 04- 17:38:11 PDT by Wali Galvin MD     CBC WITHOUT DIFFERENTIAL    Collection Time: 04/13/25  3:26 AM   Result Value Ref Range    WBC 5.6 4.8 - 10.8 K/uL    RBC 2.96 (L) 4.20 - 5.40 M/uL    Hemoglobin 8.4 (L) 12.0 - 16.0 g/dL    Hematocrit 27.1 (L) 37.0 - 47.0 %    MCV 91.6 81.4 - 97.8 fL    MCH 28.4 27.0 - 33.0 pg    MCHC 31.0 (L) 32.2 - 35.5 g/dL    RDW 51.7 (H) 35.9 - 50.0 fL    Platelet Count 220 164 - 446 K/uL    MPV 10.6 9.0 - 12.9 fL   Basic Metabolic Panel    Collection Time: 04/13/25  3:26 AM   Result Value Ref Range    Sodium 136 135 - 145 mmol/L    Potassium 4.8 3.6 - 5.5 mmol/L    Chloride 105 96 - 112 mmol/L    Co2 22 20 - 33 mmol/L    Glucose 94 65 - 99 mg/dL    Bun 16 8 - 22 mg/dL    Creatinine 0.79 0.50 - 1.40 mg/dL    Calcium 7.8 (L) 8.5 - 10.5 mg/dL    Anion Gap 9.0 7.0 - 16.0   ESTIMATED GFR    Collection Time: 04/13/25  3:26 AM   Result Value Ref Range    GFR (CKD-EPI) 72 >60 mL/min/1.73 m 2         ASSESSMENT:  Patient is a 85 y.o. female admitted with GI bleed    [facility] Code / Diagnosis to Support: 0016 - Debility (Non-Cardiac, Non-Pulmonary)  See DISPO details below for recommendations on appropriate level of rehab for this diagnosis.    Barriers to transfer include: Insurance authorization, TCCs to verify disposition, medical clearance and bed availability     Assessment and Plan:  GI bleed  -Admitted with dark red clots in stool  - Hemoglobin noted to be 7.7 with drop to 6.4  - CT abdomen negative for active GI bleeding  colonic diverticulitis  - GI consulted  - 4/9 EGD noted to have hiatal hernia obstructive Schatzki ring but without evidence of active bleed  - 4/10 colonoscopy completed active GI bleed  - Patient  remains on omeprazole twice daily  - Will complete Augmentin on 4/13 for suspected H. pylori  - Continues to require PT/OT    Acute blood loss anemia  - Upon admission patient had a drop in hemoglobin from 7.7 > 6.4 secondary to GI bleed  - Now status post 1 unit packed red blood cells  - 4/13 hemoglobin stable at 8.4  - Primary team monitoring CBC    Hypertension  - On home dose Coreg, Lasix, hydralazine, losartan and spironolactone    LE venous insufficiency   - chronic swelling, now back on lasix   - ordered compression stockings   - recommend b/l LE dopplers, as patient has not been able to be on chemical dvt ppx     History of CKD stage III  - Creatinine has remained within normal limits    History of pacemaker  - Known history of prior complete heart block, status post pacemaker in 2019  Hyperlipidemia  - On home dose Lipitor    Bowel  Last BM: 04/12/25  - Not currently on scheduled bowel meds    DISPO:  - patient is currently functioning below their level of baseline, recommend post acute rehab  - recommend IRF level therapy with 3hr of therapy 5 days per week   - prior to acceptance to IRF, will need LE dopplers completed and confirmation of DC support from son   - TCC to assist with insurance auth and DC support from son at least at a CGA level of support for mobility       Medical Complexity:  GI bleed  Acute blood loss anemia  Hypertension  History of CKD stage III  History of pacemaker  Hyperlipidemia  Impaired mobility and ADLs    DVT PPX: SCDs      Thank you for allowing us to participate in the care of this patient.     Patient was seen for >80 minutes on unit/floor of which > 50% of time was spent on counseling and coordination of care regarding the above, including prognosis, risk reduction, benefits of treatment, and options for next stage of care.    Karon Dangelo, DO   Physical Medicine and Rehabilitation     Please note that this dictation was created using voice recognition software. I  have made every reasonable attempt to correct obvious errors, but there may be errors of grammar and possibly content that I did not discover before finalizing the note.

## 2025-04-13 NOTE — DISCHARGE PLANNING
Case Management Discharge Planning    Admission Date: 4/8/2025  GMLOS: 2.9  ALOS: 5    6-Clicks ADL Score: 16  6-Clicks Mobility Score: 17  PT and/or OT Eval ordered: Yes  Post-acute Referrals Ordered: Yes  Post-acute Choice Obtained: Yes-[facility]  Has referral(s) been sent to post-acute provider:  Yes      Anticipated Discharge Dispo: Discharge Disposition: D/T to SNF with Medicare cert in anticipation of skilled care versus inpatient rehab     Action(s) Taken: PT notes are now in, no TCCs available today. Discussion with Pt and son-They request [facility]. Pt's son, Akil, lives with her and will provide support as needed.   Per MD pt is medically cleared to go to post acute.   Escalations Completed: None    Medically Clear: No    Next Steps: Reach out to [facility] TCC tomorrow 4/14    Barriers to Discharge: Pending Placement    Is the patient up for discharge tomorrow: Possible-pending acceptance

## 2025-04-13 NOTE — CARE PLAN
The patient is Stable - Low risk of patient condition declining or worsening    Shift Goals  Clinical Goals: monitor labs, monitor stools, VSS, safety  Patient Goals: rest, comfort  Family Goals: keith    Progress made toward(s) clinical / shift goals:    Problem: Knowledge Deficit - Standard  Goal: Patient and family/care givers will demonstrate understanding of plan of care, disease process/condition, diagnostic tests and medications  Description: Target End Date:  1-3 days or as soon as patient condition allowsDocument in Patient Education1.  Patient and family/caregiver oriented to unit, equipment, visitation policy and means for communicating concern2.  Complete/review Learning Assessment3.  Assess knowledge level of disease process/condition, treatment plan, diagnostic tests and medications4.  Explain disease process/condition, treatment plan, diagnostic tests and medications  Outcome: Progressing     Problem: Skin Integrity  Goal: Skin integrity is maintained or improved  Description: Target End Date:  Prior to discharge or change in level of careDocument interventions on Skin Risk/Ezequiel flowsheet groups and corresponding LDA1.  Assess and monitor skin integrity, appearance and/or temperature2.  Assess risk factors for impaired skin integrity and/or pressures ulcers3.  Implement precautions to protect skin integrity in collaboration with interdisciplinary team4.  Implement pressure ulcer prevention protocol if at risk for skin breakdown5.  Confirm wound care consult if at risk for skin breakdown6.  Ensure patient use of pressure relieving devices  (Low air loss bed, waffle overlay, heel protectors, ROHO cushion, etc)  Outcome: Progressing     Problem: Fall Risk  Goal: Patient will remain free from falls  Description: Target End Date:  Prior to discharge or change in level of careDocument interventions on the [place] Fall Risk Assessment1.  Assess for fall risk factors2.  Implement fall  precautions  Outcome: Progressing     Problem: Communication  Goal: The ability to communicate needs accurately and effectively will improve  Outcome: Progressing     Problem: Pain Management  Goal: Pain level will decrease to patient's comfort goal  Outcome: Progressing     Problem: Urinary Elimination:  Goal: Ability to reestablish a normal urinary elimination pattern will improve  Outcome: Progressing     Problem: Skin Integrity  Goal: Risk for impaired skin integrity will decrease  Outcome: Progressing     Problem: Mobility  Goal: Risk for activity intolerance will decrease  Outcome: Progressing     Problem: Knowledge Deficit  Goal: Knowledge of disease process/condition, treatment plan, diagnostic tests, and medications will improve  Outcome: Progressing       Patient is not progressing towards the following goals:      Problem: Respiratory:  Goal: Respiratory status will improve  Outcome: Not Progressing

## 2025-04-13 NOTE — PROGRESS NOTES
Monitor Summary  Rhythm: Paced  Rate: 77-86  Ectopy: Frequent PVC couplet/trigeminy  Measurements:.16 /.13/.40

## 2025-04-13 NOTE — PROGRESS NOTES
Bedside report completed, pt sleeping, respirations are regular and non labored. Bed alarm on and call light in reach.

## 2025-04-13 NOTE — PROGRESS NOTES
Hospital Medicine Daily Progress Note    Date of Service  4/12/2025    Chief Complaint  Priscilla Hdz is a 85 y.o. female admitted 4/8/2025 with GI bleed    Hospital Course  Patient is an 85-year-old female with past medical history of HLD, HTN, complete heart block status post PPM 2019, CKD 3B that presents with 1 day history of bright red blood per rectum.     Patient states that earlier today around 1300 they noticed blood on their down after standing up.  Subsequently went to the restroom with passage of bright red blood per rectum and possible clots.     In the ED, BP 100s to 150s/50s to 90s, HR 60s to 90s, RR 18-39, saturating well on room air.  Received NS 1 L bolus x 1.  WBC 6.4, hemoglobin 7.7>>6.4, MCV 91.5, , sodium 141, potassium 4.4, bicarb 19, anion gap 8, BUN 24, creatinine 0.83, corrected calcium 9.3, albumin 2.5, iron 14, percent saturation 9, ferritin 342, troponin 51, INR 1.32.  EKG V paced , QTc 485, without ST changes, relatively unchanged from previous.  CTA abdomen pelvis with and without without evidence of active GI bleeding, colonic diverticulosis, right lower lobe consolidation.     Interval Problem Update  4/9  Afebrile pulse 70s to 105 normal respiratory rate systolic blood pressure 101-192 pulse ox 93 to 99% wean down to 1 L nasal cannula.  Leukocytosis, anemia up to 9 after transfusions CMP albumin 2.5 total protein 5.5 otherwise unremarkable.  Procalcitonin 0.42.  Restarted home Coreg, Lasix, losartan.  EGD performed showed nonobstructing Schatzki ring in the esophagus, hiatal hernia, gastric erosions, recommending PPI daily and checking H. pylori and stool.  Colonoscopy had poor prep, has n.p.o. orders placed following the procedure for what I suspect is a repeat colonoscopy tomorrow.    4/10  Afebrile, pulse 4882 respiratory rate 1422 systolic blood pressure  pulse ox 92 to 98% on 1 L nasal cannula.  Hemoglobin 8.7, potassium 3.3 glucose 121 calcium  7.5.  Analgesia adjusted, potassium replaced.  Went for colonoscopy today showed multiple diverticula no bleeding, mild hemorrhoids.  GI signed off, recommend no further GI procedures at this time.      We discussed behavioral health referral on discharge, she is having rather severe ongoing grief/depression since her  passed 6 months ago.  We discussed the role of medications and behavioral therapy and she believes she would be interested in both.  Will place referral on discharge.  No new complaints although she is feeling rather weak since being admitted and having had EGD and 2 colonoscopies in two days, PT OT orders are in to evaluate.    4/11  Afebrile pulse 50s to 60s normal respiratory rate systolic blood pressure 120s to 140s pulse ox 94 to 97% on 1 to 2 L nasal cannula.  No leukocytosis, stable anemia, BMP calcium 7.6.  OT recommending postacute placement, patient agrees.  Discharge planner is working to arrange, medically cleared for discharge to SNF.    4/12  Afebrile normal pulse and respiratory rate systolic blood pressure 110s 160 pulse ox 92 to 95% on 2 L nasal cannula.  Tolerating p.o. intake, no further bleeding noted, pending inpatient rehab versus SNF, discharge planners working.  No new complaints.    I have discussed this patient's plan of care and discharge plan at IDT rounds today with Case Management, Nursing, Nursing leadership, and other members of the IDT team.    Consultants/Specialty  GI    Code Status  Full Code    Disposition  The patient is medically cleared for discharge to home or a post-acute facility.  Anticipate discharge to: skilled nursing facility    I have placed the appropriate orders for post-discharge needs.    Review of Systems  ROS   Review of Systems   Constitutional:  Negative for chills and fever.   HENT:  Negative for ear pain.    Eyes:  Negative for blurred vision, double vision and pain.   Respiratory:  Positive for shortness of breath. Negative for cough  and wheezing.    Cardiovascular:  Negative for chest pain, palpitations and leg swelling.   Gastrointestinal:  Positive for blood in stool. Negative for abdominal pain, constipation, diarrhea, melena, nausea and vomiting.   Genitourinary:  Negative for dysuria, frequency and hematuria.   Musculoskeletal:  Negative for back pain, joint pain and neck pain.   Neurological:  Negative for dizziness and headaches.     Physical Exam  Temp:  [36 °C (96.8 °F)-36.3 °C (97.3 °F)] 36.3 °C (97.3 °F)  Pulse:  [64-88] 75  Resp:  [16-18] 18  BP: (112-168)/(45-71) 142/55  SpO2:  [92 %-95 %] 93 %    Physical Exam  Vitals and nursing note reviewed. Exam conducted with a chaperone present.   Constitutional:       General: She is not in acute distress.     Appearance: She is not ill-appearing or toxic-appearing.   HENT:      Head: Normocephalic and atraumatic.      Nose: Nose normal. No rhinorrhea.      Mouth/Throat:      Mouth: Mucous membranes are moist.      Pharynx: Oropharynx is clear.   Eyes:      General: No scleral icterus.     Extraocular Movements: Extraocular movements intact.      Conjunctiva/sclera: Conjunctivae normal.   Cardiovascular:      Rate and Rhythm: Normal rate and regular rhythm.      Pulses: Normal pulses.      Heart sounds: No murmur heard.  Pulmonary:      Effort: Pulmonary effort is normal. No respiratory distress.      Breath sounds: Normal breath sounds. No wheezing, rhonchi or rales.   Abdominal:      General: There is no distension.      Palpations: Abdomen is soft.      Tenderness: There is no abdominal tenderness. There is no guarding or rebound.   Musculoskeletal:         General: Swelling present. Normal range of motion.      Cervical back: Normal range of motion and neck supple. No rigidity.      Right lower leg: Edema present.      Left lower leg: Edema present.   Skin:     General: Skin is warm and dry.      Capillary Refill: Capillary refill takes less than 2 seconds.      Coloration: Skin is not  jaundiced.      Findings: No rash.   Neurological:      General: No focal deficit present.      Mental Status: She is alert and oriented to person, place, and time. Mental status is at baseline.      Cranial Nerves: No cranial nerve deficit.      Sensory: No sensory deficit.      Motor: No weakness.   Psychiatric:         Mood and Affect: Mood normal.         Behavior: Behavior normal.         Thought Content: Thought content normal.         Judgment: Judgment normal.         Fluids    Intake/Output Summary (Last 24 hours) at 4/13/2025 0713  Last data filed at 4/13/2025 0322  Gross per 24 hour   Intake 240 ml   Output 825 ml   Net -585 ml        Laboratory  Recent Labs     04/10/25  1132 04/11/25  1001 04/13/25  0326   WBC 6.2 5.5 5.6   RBC 2.98* 2.96* 2.96*   HEMOGLOBIN 8.7* 8.4* 8.4*   HEMATOCRIT 26.5* 27.4* 27.1*   MCV 88.9 92.6 91.6   MCH 29.2 28.4 28.4   MCHC 32.8 30.7* 31.0*   RDW 50.8* 53.7* 51.7*   PLATELETCT 206 193 220   MPV 10.1 10.1 10.6     Recent Labs     04/10/25  1132 04/11/25  1001 04/13/25  0326   SODIUM 141 140 136   POTASSIUM 3.3* 4.7 4.8   CHLORIDE 109 111 105   CO2 21 22 22   GLUCOSE 121* 95 94   BUN 13 17 16   CREATININE 0.74 0.85 0.79   CALCIUM 7.5* 7.6* 7.8*                     Imaging  CTA ABDOMEN PELVIS W & W/O POST PROCESS   Final Result      1.  No CT evidence of active gastrointestinal bleeding.   2.  Colonic diverticulosis.   3.  Right lower lobe consolidation, concerning for pneumonia.              Assessment/Plan  * Lower GI bleed- (present on admission)  Assessment & Plan  Hemoglobin 7.7>>6.4, decreased from 12.9 on 11/14/2024.  Anemia likely in the setting of acute blood loss, possible component of iron deficiency with iron level of 14 and percent sat of 9, ferritin 342.  Patient states that earlier today around 1300 they noticed blood on their down after standing up.  Subsequently went to the restroom with passage of bright red blood per rectum and possible clots. CTA abdomen  pelvis with and without without evidence of active GI bleeding, colonic diverticulosis, right lower lobe consolidation.    Lower GI: Diverticulosis, AVM, malignancy, hemorrhoids     -1u pRBC ordered in ED  -GI consulted in the ED, Dr. Arnold  -NPO  -Avoid NSAIDs  -2 large bore IVs  -Transfuse PRN Hgb<7 and PLT<50  -Consider IV iron infusion and/or starting on oral ferrous sulfate prior to discharge    Elevated troponin- (present on admission)  Assessment & Plan  Troponin 51, likely nonischemic myocardial injury in the setting of GI bleed.  EKG without ST changes, denies chest pain.    -Trend troponin    Normal anion gap metabolic acidosis- (present on admission)  Assessment & Plan  Bicarb 19 and anion gap 8.    -Daily BMP    Stage 3b chronic kidney disease- (present on admission)  Assessment & Plan  Creatinine 0.83, around baseline.    -Avoid nephrotoxic medications    Cardiac pacemaker in situ- (present on admission)  Assessment & Plan  Complete heart block status post PPM 2019    Complete heart block by electrocardiogram (HCC)- (present on admission)  Assessment & Plan  Status post PPM 2019    Venous insufficiency of both lower extremities- (present on admission)  Assessment & Plan  -Hold home Lasix and spironolactone in the setting of GI bleed    Essential hypertension- (present on admission)  Assessment & Plan  -Hold home losartan, carvedilol, hydralazine, spironolactone, Lasix in the setting of GI bleed    Dyslipidemia- (present on admission)  Assessment & Plan  -Continue home atorvastatin         VTE prophylaxis:   SCDs/TEDs      I have performed a physical exam and reviewed and updated ROS and Plan today (4/12/2025). In review of yesterday's note (4/11/2025), there are no changes except as documented above.  Total time spent 54 minutes. I spent greater than 50% of the time for patient care, counseling, and coordination on this date, including unit/floor time, and face-to-face time with the patient as per  interval events, my own review of patient's imaging and lab analysis and developing my assessment and plan above.

## 2025-04-13 NOTE — PROGRESS NOTES
Bedside report received and patient care assumed. Pt is resting in bed, A&O4, with no complaints of pain, and is on 2L NC. Tele box on. All fall precautions are in place, belongings at bedside table.  Pt was updated on POC, no questions or concerns. Pt educated on use of call light for assistance.

## 2025-04-14 ENCOUNTER — APPOINTMENT (OUTPATIENT)
Dept: RADIOLOGY | Facility: MEDICAL CENTER | Age: 85
DRG: 378 | End: 2025-04-14
Attending: STUDENT IN AN ORGANIZED HEALTH CARE EDUCATION/TRAINING PROGRAM
Payer: MEDICARE

## 2025-04-14 ENCOUNTER — HOSPITAL ENCOUNTER (INPATIENT)
Facility: REHABILITATION | Age: 85
LOS: 10 days | DRG: 073 | End: 2025-04-24
Attending: STUDENT IN AN ORGANIZED HEALTH CARE EDUCATION/TRAINING PROGRAM | Admitting: STUDENT IN AN ORGANIZED HEALTH CARE EDUCATION/TRAINING PROGRAM
Payer: MEDICARE

## 2025-04-14 ENCOUNTER — APPOINTMENT (OUTPATIENT)
Dept: RADIOLOGY | Facility: REHABILITATION | Age: 85
DRG: 073 | End: 2025-04-14
Attending: STUDENT IN AN ORGANIZED HEALTH CARE EDUCATION/TRAINING PROGRAM
Payer: MEDICARE

## 2025-04-14 VITALS
TEMPERATURE: 97.5 F | OXYGEN SATURATION: 94 % | WEIGHT: 152.34 LBS | DIASTOLIC BLOOD PRESSURE: 60 MMHG | BODY MASS INDEX: 25.38 KG/M2 | HEIGHT: 65 IN | RESPIRATION RATE: 16 BRPM | HEART RATE: 70 BPM | SYSTOLIC BLOOD PRESSURE: 136 MMHG

## 2025-04-14 DIAGNOSIS — G62.9 POLYNEUROPATHY: ICD-10-CM

## 2025-04-14 DIAGNOSIS — I10 ESSENTIAL HYPERTENSION: ICD-10-CM

## 2025-04-14 DIAGNOSIS — E55.9 VITAMIN D DEFICIENCY: ICD-10-CM

## 2025-04-14 DIAGNOSIS — K29.01 GASTROINTESTINAL HEMORRHAGE ASSOCIATED WITH ACUTE GASTRITIS: ICD-10-CM

## 2025-04-14 PROBLEM — K92.2 GI BLEED: Status: ACTIVE | Noted: 2025-04-14

## 2025-04-14 LAB
ANION GAP SERPL CALC-SCNC: 8 MMOL/L (ref 7–16)
BUN SERPL-MCNC: 18 MG/DL (ref 8–22)
CALCIUM SERPL-MCNC: 8.1 MG/DL (ref 8.5–10.5)
CHLORIDE SERPL-SCNC: 106 MMOL/L (ref 96–112)
CO2 SERPL-SCNC: 25 MMOL/L (ref 20–33)
CREAT SERPL-MCNC: 0.84 MG/DL (ref 0.5–1.4)
ERYTHROCYTE [DISTWIDTH] IN BLOOD BY AUTOMATED COUNT: 50 FL (ref 35.9–50)
GFR SERPLBLD CREATININE-BSD FMLA CKD-EPI: 67 ML/MIN/1.73 M 2
GLUCOSE SERPL-MCNC: 98 MG/DL (ref 65–99)
HCT VFR BLD AUTO: 29.7 % (ref 37–47)
HGB BLD-MCNC: 9.4 G/DL (ref 12–16)
MCH RBC QN AUTO: 28.6 PG (ref 27–33)
MCHC RBC AUTO-ENTMCNC: 31.6 G/DL (ref 32.2–35.5)
MCV RBC AUTO: 90.3 FL (ref 81.4–97.8)
PLATELET # BLD AUTO: 220 K/UL (ref 164–446)
PMV BLD AUTO: 10.4 FL (ref 9–12.9)
POTASSIUM SERPL-SCNC: 4.3 MMOL/L (ref 3.6–5.5)
RBC # BLD AUTO: 3.29 M/UL (ref 4.2–5.4)
SODIUM SERPL-SCNC: 139 MMOL/L (ref 135–145)
WBC # BLD AUTO: 5.5 K/UL (ref 4.8–10.8)

## 2025-04-14 PROCEDURE — 93970 EXTREMITY STUDY: CPT

## 2025-04-14 PROCEDURE — 80048 BASIC METABOLIC PNL TOTAL CA: CPT

## 2025-04-14 PROCEDURE — A9270 NON-COVERED ITEM OR SERVICE: HCPCS | Performed by: STUDENT IN AN ORGANIZED HEALTH CARE EDUCATION/TRAINING PROGRAM

## 2025-04-14 PROCEDURE — 700102 HCHG RX REV CODE 250 W/ 637 OVERRIDE(OP): Performed by: STUDENT IN AN ORGANIZED HEALTH CARE EDUCATION/TRAINING PROGRAM

## 2025-04-14 PROCEDURE — 85027 COMPLETE CBC AUTOMATED: CPT

## 2025-04-14 PROCEDURE — 71045 X-RAY EXAM CHEST 1 VIEW: CPT

## 2025-04-14 PROCEDURE — 94760 N-INVAS EAR/PLS OXIMETRY 1: CPT

## 2025-04-14 PROCEDURE — 770010 HCHG ROOM/CARE - REHAB SEMI PRIVAT*

## 2025-04-14 PROCEDURE — 36415 COLL VENOUS BLD VENIPUNCTURE: CPT

## 2025-04-14 PROCEDURE — 93970 EXTREMITY STUDY: CPT | Mod: 26 | Performed by: INTERNAL MEDICINE

## 2025-04-14 PROCEDURE — 99223 1ST HOSP IP/OBS HIGH 75: CPT | Performed by: STUDENT IN AN ORGANIZED HEALTH CARE EDUCATION/TRAINING PROGRAM

## 2025-04-14 PROCEDURE — 99233 SBSQ HOSP IP/OBS HIGH 50: CPT | Performed by: STUDENT IN AN ORGANIZED HEALTH CARE EDUCATION/TRAINING PROGRAM

## 2025-04-14 RX ORDER — ATORVASTATIN CALCIUM 20 MG/1
20 TABLET, FILM COATED ORAL
Status: CANCELLED | OUTPATIENT
Start: 2025-04-14

## 2025-04-14 RX ORDER — POLYETHYLENE GLYCOL 3350 17 G/17G
1 POWDER, FOR SOLUTION ORAL
Status: DISCONTINUED | OUTPATIENT
Start: 2025-04-14 | End: 2025-04-24 | Stop reason: HOSPADM

## 2025-04-14 RX ORDER — HYDRALAZINE HYDROCHLORIDE 50 MG/1
100 TABLET, FILM COATED ORAL 2 TIMES DAILY
Status: CANCELLED | OUTPATIENT
Start: 2025-04-14

## 2025-04-14 RX ORDER — CARVEDILOL 25 MG/1
25 TABLET ORAL 2 TIMES DAILY WITH MEALS
Status: CANCELLED | OUTPATIENT
Start: 2025-04-14

## 2025-04-14 RX ORDER — LOSARTAN POTASSIUM 50 MG/1
50 TABLET ORAL 2 TIMES DAILY
Status: CANCELLED | OUTPATIENT
Start: 2025-04-14

## 2025-04-14 RX ORDER — OXYCODONE HYDROCHLORIDE 5 MG/1
2.5 TABLET ORAL
Status: DISCONTINUED | OUTPATIENT
Start: 2025-04-14 | End: 2025-04-24 | Stop reason: HOSPADM

## 2025-04-14 RX ORDER — OMEPRAZOLE 20 MG/1
20 CAPSULE, DELAYED RELEASE ORAL 2 TIMES DAILY
Status: CANCELLED | OUTPATIENT
Start: 2025-04-14

## 2025-04-14 RX ORDER — SPIRONOLACTONE 25 MG/1
12.5 TABLET ORAL ONCE
Status: DISCONTINUED | OUTPATIENT
Start: 2025-04-14 | End: 2025-04-14

## 2025-04-14 RX ORDER — ONDANSETRON 4 MG/1
4 TABLET, ORALLY DISINTEGRATING ORAL 4 TIMES DAILY PRN
Status: DISCONTINUED | OUTPATIENT
Start: 2025-04-14 | End: 2025-04-24 | Stop reason: HOSPADM

## 2025-04-14 RX ORDER — HYDRALAZINE HYDROCHLORIDE 25 MG/1
25 TABLET, FILM COATED ORAL EVERY 8 HOURS PRN
Status: DISCONTINUED | OUTPATIENT
Start: 2025-04-14 | End: 2025-04-24 | Stop reason: HOSPADM

## 2025-04-14 RX ORDER — CARBOXYMETHYLCELLULOSE SODIUM 5 MG/ML
1 SOLUTION/ DROPS OPHTHALMIC PRN
Status: DISCONTINUED | OUTPATIENT
Start: 2025-04-14 | End: 2025-04-24 | Stop reason: HOSPADM

## 2025-04-14 RX ORDER — ACETAMINOPHEN 325 MG/1
650 TABLET ORAL EVERY 4 HOURS PRN
Status: DISCONTINUED | OUTPATIENT
Start: 2025-04-14 | End: 2025-04-24 | Stop reason: HOSPADM

## 2025-04-14 RX ORDER — OMEPRAZOLE 20 MG/1
20 CAPSULE, DELAYED RELEASE ORAL 2 TIMES DAILY
Status: DISCONTINUED | OUTPATIENT
Start: 2025-04-14 | End: 2025-04-24 | Stop reason: HOSPADM

## 2025-04-14 RX ORDER — ONDANSETRON 2 MG/ML
4 INJECTION INTRAMUSCULAR; INTRAVENOUS 4 TIMES DAILY PRN
Status: DISCONTINUED | OUTPATIENT
Start: 2025-04-14 | End: 2025-04-24 | Stop reason: HOSPADM

## 2025-04-14 RX ORDER — AMOXICILLIN 250 MG
2 CAPSULE ORAL EVERY EVENING
Status: DISCONTINUED | OUTPATIENT
Start: 2025-04-14 | End: 2025-04-24 | Stop reason: HOSPADM

## 2025-04-14 RX ORDER — CARVEDILOL 12.5 MG/1
25 TABLET ORAL 2 TIMES DAILY WITH MEALS
Status: DISCONTINUED | OUTPATIENT
Start: 2025-04-14 | End: 2025-04-16

## 2025-04-14 RX ORDER — SPIRONOLACTONE 25 MG/1
25 TABLET ORAL DAILY
Status: DISCONTINUED | OUTPATIENT
Start: 2025-04-15 | End: 2025-04-14

## 2025-04-14 RX ORDER — HYDRALAZINE HYDROCHLORIDE 50 MG/1
100 TABLET, FILM COATED ORAL 2 TIMES DAILY
Status: DISCONTINUED | OUTPATIENT
Start: 2025-04-14 | End: 2025-04-24 | Stop reason: HOSPADM

## 2025-04-14 RX ORDER — ALUMINA, MAGNESIA, AND SIMETHICONE 2400; 2400; 240 MG/30ML; MG/30ML; MG/30ML
20 SUSPENSION ORAL
Status: DISCONTINUED | OUTPATIENT
Start: 2025-04-14 | End: 2025-04-24 | Stop reason: HOSPADM

## 2025-04-14 RX ORDER — SPIRONOLACTONE 25 MG/1
12.5 TABLET ORAL DAILY
Status: DISCONTINUED | OUTPATIENT
Start: 2025-04-15 | End: 2025-04-14 | Stop reason: HOSPADM

## 2025-04-14 RX ORDER — OXYCODONE HYDROCHLORIDE 5 MG/1
5 TABLET ORAL
Status: DISCONTINUED | OUTPATIENT
Start: 2025-04-14 | End: 2025-04-24 | Stop reason: HOSPADM

## 2025-04-14 RX ORDER — FUROSEMIDE 20 MG/1
20 TABLET ORAL DAILY
Status: DISCONTINUED | OUTPATIENT
Start: 2025-04-15 | End: 2025-04-23

## 2025-04-14 RX ORDER — ATORVASTATIN CALCIUM 10 MG/1
20 TABLET, FILM COATED ORAL
Status: DISCONTINUED | OUTPATIENT
Start: 2025-04-14 | End: 2025-04-24 | Stop reason: HOSPADM

## 2025-04-14 RX ORDER — LOSARTAN POTASSIUM 50 MG/1
50 TABLET ORAL 2 TIMES DAILY
Status: DISCONTINUED | OUTPATIENT
Start: 2025-04-14 | End: 2025-04-24 | Stop reason: HOSPADM

## 2025-04-14 RX ORDER — SPIRONOLACTONE 25 MG/1
12.5 TABLET ORAL DAILY
Status: CANCELLED | OUTPATIENT
Start: 2025-04-15

## 2025-04-14 RX ORDER — ECHINACEA PURPUREA EXTRACT 125 MG
2 TABLET ORAL PRN
Status: DISCONTINUED | OUTPATIENT
Start: 2025-04-14 | End: 2025-04-24 | Stop reason: HOSPADM

## 2025-04-14 RX ORDER — SPIRONOLACTONE 25 MG/1
12.5 TABLET ORAL DAILY
Status: DISCONTINUED | OUTPATIENT
Start: 2025-04-15 | End: 2025-04-19

## 2025-04-14 RX ORDER — FUROSEMIDE 20 MG/1
20 TABLET ORAL DAILY
Status: CANCELLED | OUTPATIENT
Start: 2025-04-15

## 2025-04-14 RX ADMIN — SENNOSIDES AND DOCUSATE SODIUM 2 TABLET: 50; 8.6 TABLET ORAL at 20:26

## 2025-04-14 RX ADMIN — CARVEDILOL 25 MG: 25 TABLET, FILM COATED ORAL at 07:53

## 2025-04-14 RX ADMIN — FUROSEMIDE 20 MG: 20 TABLET ORAL at 05:06

## 2025-04-14 RX ADMIN — LOSARTAN POTASSIUM 50 MG: 50 TABLET, FILM COATED ORAL at 17:31

## 2025-04-14 RX ADMIN — ATORVASTATIN CALCIUM 20 MG: 10 TABLET, FILM COATED ORAL at 20:26

## 2025-04-14 RX ADMIN — CARVEDILOL 25 MG: 12.5 TABLET, FILM COATED ORAL at 17:31

## 2025-04-14 RX ADMIN — HYDRALAZINE HYDROCHLORIDE 100 MG: 50 TABLET ORAL at 20:26

## 2025-04-14 RX ADMIN — SPIRONOLACTONE 12.5 MG: 25 TABLET ORAL at 05:06

## 2025-04-14 RX ADMIN — HYDRALAZINE HYDROCHLORIDE 100 MG: 50 TABLET ORAL at 07:53

## 2025-04-14 RX ADMIN — OMEPRAZOLE 20 MG: 20 CAPSULE, DELAYED RELEASE ORAL at 20:26

## 2025-04-14 RX ADMIN — LOSARTAN POTASSIUM 50 MG: 50 TABLET, FILM COATED ORAL at 07:53

## 2025-04-14 RX ADMIN — OMEPRAZOLE 20 MG: 20 CAPSULE, DELAYED RELEASE ORAL at 05:06

## 2025-04-14 ASSESSMENT — LIFESTYLE VARIABLES
HAVE YOU EVER FELT YOU SHOULD CUT DOWN ON YOUR DRINKING: NO
TOTAL SCORE: 0
AVERAGE NUMBER OF DAYS PER WEEK YOU HAVE A DRINK CONTAINING ALCOHOL: 0
DOES PATIENT WANT TO STOP DRINKING: NO
HOW MANY TIMES IN THE PAST YEAR HAVE YOU HAD 5 OR MORE DRINKS IN A DAY: 0
EVER HAD A DRINK FIRST THING IN THE MORNING TO STEADY YOUR NERVES TO GET RID OF A HANGOVER: NO
ON A TYPICAL DAY WHEN YOU DRINK ALCOHOL HOW MANY DRINKS DO YOU HAVE: 0
ALCOHOL_USE: NO
EVER FELT BAD OR GUILTY ABOUT YOUR DRINKING: NO
CONSUMPTION TOTAL: NEGATIVE
TOTAL SCORE: 0
TOTAL SCORE: 0
HAVE PEOPLE ANNOYED YOU BY CRITICIZING YOUR DRINKING: NO

## 2025-04-14 ASSESSMENT — PATIENT HEALTH QUESTIONNAIRE - PHQ9
SUM OF ALL RESPONSES TO PHQ9 QUESTIONS 1 AND 2: 0
2. FEELING DOWN, DEPRESSED, IRRITABLE, OR HOPELESS: NOT AT ALL
1. LITTLE INTEREST OR PLEASURE IN DOING THINGS: NOT AT ALL
1. LITTLE INTEREST OR PLEASURE IN DOING THINGS: NOT AT ALL
2. FEELING DOWN, DEPRESSED, IRRITABLE, OR HOPELESS: NOT AT ALL
SUM OF ALL RESPONSES TO PHQ9 QUESTIONS 1 AND 2: 0

## 2025-04-14 ASSESSMENT — FIBROSIS 4 INDEX: FIB4 SCORE: 3.59

## 2025-04-14 NOTE — PROGRESS NOTES
NEW ADMIT FALL PREVENTION EDUCATION                                    AND INTERVENTION       Fall Prevention Education Video watched: Yes  Strip Alarm in place: Yes  Alarming seatbelt No   Does patient need a posey bed?: No   Does patient have the right size non-skid sock?: Yes      Admitting RN:  Rosas Camarena

## 2025-04-14 NOTE — PREADMISSION SCREENING NOTE
Pre-Admission Screening Form    Patient Information:   Name: Priscilla Hdz     MRN: [id number]       : 1940      Age: 85 y.o.   Gender: female      Race: White [7]       Marital Status:  [5]  Family Contact: Akil Hdz Scott        Relationship: Son [15]  Son [15]  Home Phone: [phone]           Cell Phone: [phone]    Advanced Directives: None  Code Status:  FULL  Current Attending Provider: Raul Healy M.D.  Referring Physician: Dr. Raul Healy      Physiatrist Consult: Dr. Karon Patton       Referral Date: 2025  Primary Payor Source:  MEDICARE  Secondary Payor Source:  LESTER    Medical Information:   Date of Admission to Acute Care [place]2025  Room Number: T729/01  Rehabilitation Diagnosis: 0016 - Debility (Non-Cardiac, Non-Pulmonary)  Immunization History   Administered Date(s) Administered    Influenza Vaccine Adult HD 2013, 10/18/2014, 10/24/2017, 2018, 2022    Influenza Vaccine Pediatric Split - Historical Data 10/25/2004, 2006, 10/29/2007    Influenza split virus trivalent (PF) 2009    Influenza, unspecified formulation 10/30/2019, 10/07/2020, 2022    Kenan SARS-CoV-2 Vaccine 2021    Pneumococcal Conjugate Vaccine (Prevnar/PCV-13) 10/24/2017    Pneumococcal Conjugate, unspecified formulation 2009    Pneumococcal polysaccharide vaccine (PPSV-23) 2006    Tdap Vaccine 2012    ZZZInfluenza Vaccine Pediatric Preserv Free 2009    Zoster Vaccine Live (ZVL) (Zostavax) - HISTORICAL DATA 2012    Zoster Vaccine Recombinant (RZV) (SHINGRIX) 2021     Allergies   Allergen Reactions    Other Environmental Cough     [place], Dust     Past Medical History:   Diagnosis Date    Arthritis     Hip, knee    ASTHMA     AV block, complete (HCC) 2019    Status post pacemaker implantation    Cataract     Bilateral IOL    CKD (chronic kidney disease)     Hyperlipidemia     Hypertension  10/2023    Echocardiogram with normal LV size, LVEF 69%. Normal LA and RV, enlarged RA. Trace MR. RVSP 35mmHg.    Menopause     Osteopenia     Pedal edema      Past Surgical History:   Procedure Laterality Date    COLONOSCOPY N/A 4/10/2025    Procedure: COLONOSCOPY;  Surgeon: Juan Manuel James M.D.;  Location: SURGERY SAME DAY [facility];  Service: Gastroenterology    [place] UPPER GI ENDOSCOPY,BIOPSY N/A 4/9/2025    Procedure: GASTROSCOPY, WITH BIOPSY;  Surgeon: Ly Arnold M.D.;  Location: SURGERY SAME DAY [facility];  Service: Gastroenterology    COLONOSCOPY N/A 4/9/2025    Procedure: COLONOSCOPY;  Surgeon: Ly Arnold M.D.;  Location: SURGERY SAME DAY [facility];  Service: Gastroenterology    PACEMAKER INSERTION Left 06/20/2019    Medtronic [place] S DR MRI [id number] implanted by Dr. Mcginnis.    HIP REVISION TOTAL Right 8/29/2018    Procedure: HIP REVISION TOTAL;  Surgeon: Oniel Goodson M.D.;  Location: SURGERY [facility];  Service: Orthopedics    HIP ARTHROPLASTY TOTAL Right 8/9/2016    Procedure: HIP ARTHROPLASTY TOTAL;  Surgeon: Oniel Goodson M.D.;  Location: SURGERY [facility];  Service:     CATARACT EXTRACTION WITH IOL Bilateral 2012       History Leading to Admission, Conditions that Caused the Need for Rehab (CMS):     Basilio Mitchell M.D.  Physician  [facility]  H&P     Signed  Date of Service: 4/8/2025  7:40 PM  Hospital Medicine History & Physical Note  Date of Service  4/8/2025     Primary Care Physician  Beth Friedman M.D.     Consultants  GI     Specialist Names: Dr. Arnold     Code Status  Full Code     Chief Complaint    Chief Complaint  Patient presents with   Bloody Stools      Pt BIB EMS from home. Per report pt started bleeding rectally today, dark red clots. Pt denies hx of blood thinners/hemorrhoids. Pt reports not taking lasix for the past 4 days, somewhat SOB w/leg swelling as well.    Shortness of Breath     History of Presenting Illness  Patient is an  85-year-old female with past medical history of HLD, HTN, complete heart block status post PPM 2019, CKD 3B that presents with 1 day history of bright red blood per rectum.     Patient states that earlier today around 1300 they noticed blood on their down after standing up.  Subsequently went to the restroom with passage of bright red blood per rectum and possible clots.     In the ED, BP 100s to 150s/50s to 90s, HR 60s to 90s, RR 18-39, saturating well on room air.  Received NS 1 L bolus x 1.  WBC 6.4, hemoglobin 7.7>>6.4, MCV 91.5, , sodium 141, potassium 4.4, bicarb 19, anion gap 8, BUN 24, creatinine 0.83, corrected calcium 9.3, albumin 2.5, iron 14, percent saturation 9, ferritin 342, troponin 51, INR 1.32.  EKG V paced , QTc 485, without ST changes, relatively unchanged from previous.  CTA abdomen pelvis with and without without evidence of active GI bleeding, colonic diverticulosis, right lower lobe consolidation.     I discussed the plan of care with patient.     Review of Systems  Review of Systems   Constitutional:  Negative for chills and fever.   HENT:  Negative for ear pain.    Eyes:  Negative for blurred vision, double vision and pain.   Respiratory:  Positive for shortness of breath. Negative for cough and wheezing.    Cardiovascular:  Negative for chest pain, palpitations and leg swelling.   Gastrointestinal:  Positive for blood in stool. Negative for abdominal pain, constipation, diarrhea, melena, nausea and vomiting.   Genitourinary:  Negative for dysuria, frequency and hematuria.   Musculoskeletal:  Negative for back pain, joint pain and neck pain.   Neurological:  Negative for dizziness and headaches.      Past Medical History   has a past medical history of Arthritis, ASTHMA, AV block, complete (HCC) (06/2019), Cataract, CKD (chronic kidney disease), Hyperlipidemia, Hypertension (10/2023), Menopause, Osteopenia, and Pedal edema.     Surgical History   has a past surgical history  that includes cataract extraction with iol (Bilateral, 2012); hip arthroplasty total (Right, 8/9/2016); hip revision total (Right, 8/29/2018); and pacemaker insertion (Left, 06/20/2019).      Family History  family history includes Heart Disease in her brother and father; Hyperlipidemia in her father and mother; Hypertension in her father and mother.   Family history reviewed with patient. There is no family history that is pertinent to the chief complaint.      Social History   reports that she has never smoked. She has never used smokeless tobacco. She reports current alcohol use of about 0.6 oz of alcohol per week. She reports that she does not use drugs.     Allergies    Allergies  Allergies  Allergen Reactions   Other Environmental Cough      [place], Dust    Assessment & Plan  Hemoglobin 7.7>>6.4, decreased from 12.9 on 11/14/2024.  Anemia likely in the setting of acute blood loss, possible component of iron deficiency with iron level of 14 and percent sat of 9, ferritin 342.  Patient states that earlier today around 1300 they noticed blood on their down after standing up.  Subsequently went to the restroom with passage of bright red blood per rectum and possible clots. CTA abdomen pelvis with and without without evidence of active GI bleeding, colonic diverticulosis, right lower lobe consolidation.     Lower GI: Diverticulosis, AVM, malignancy, hemorrhoids     -1u pRBC ordered in ED  -GI consulted in the ED, Dr. Arnold  -NPO  -Avoid NSAIDs  -2 large bore IVs  -Transfuse PRN Hgb<7 and PLT<50  -Consider IV iron infusion and/or starting on oral ferrous sulfate prior to discharge     Elevated troponin- (present on admission)  Assessment & Plan  Troponin 51, likely nonischemic myocardial injury in the setting of GI bleed.  EKG without ST changes, denies chest pain.     -Trend troponin     Normal anion gap metabolic acidosis- (present on admission)  Assessment & Plan  Bicarb 19 and anion gap 8.     -Daily BMP     Stage  "3b chronic kidney disease- (present on admission)  Assessment & Plan  Creatinine 0.83, around baseline.     -Avoid nephrotoxic medications     Cardiac pacemaker in situ- (present on admission)  Assessment & Plan  Complete heart block status post PPM 2019     Complete heart block by electrocardiogram (HCC)- (present on admission)  Assessment & Plan  Status post PPM 2019     Venous insufficiency of both lower extremities- (present on admission)  Assessment & Plan  -Hold home Lasix and spironolactone in the setting of GI bleed     Essential hypertension- (present on admission)  Assessment & Plan  -Hold home losartan, carvedilol, hydralazine, spironolactone, Lasix in the setting of GI bleed     Dyslipidemia- (present on admission)  Assessment & Plan  -Continue home atorvastatin     VTE prophylaxis: SCDs/TEDs            Ly Arnold M.D.  Physician  Gastroenterology  Consults     Signed  Date of Service: 2025  9:34 PM  GASTROENTEROLOGY CONSULTATION     PATIENT NAME: Priscilla Hdz  :   1940  CSN:   [id number]  MRN:  [id number]      CONSULTATION DATE:  2025     PRIMARY CARE PROVIDER:  Beth Friedman M.D.                REASON FOR CONSULT:  hematochezia  Consult requested by Dr. Annalee Kline     HISTORY OF PRESENT ILLNESS:  Priscilla Hdz is a 85 y.o. female who presents to the Emergency Department with bloody stools onset today. Patient reports she had one episode of bloody stools earlier today, she describes as \"dark red blood\" with a small amount of stool, however mostly blood.  She states she had minor bloody stool on Friday but has since worsened. She reports this has never happened before. She adds that she has had previously colonoscopy with no issues. She is not sure about the timing of colonoscopy. Patient states she is not on any blood thinners or aspirin however does take ibuprofen. Patient also reports she has been experiencing shortness of breath today. She also states she has swelling " on her lower extremities and uses compression socks for at least 1 hour a day but adds that she has not used them in over a week. Patient states that her right leg is worse than her left. Patient also reports pain in the left upper quadrant of her abdominal area. She also reports falling three times causing her bruising on her knees.  No history of head trauma. I ordered iron studies and she is iron deficient    IMPRESSION/PLAN:  1. Iron def anemia  2. Hematochezia  3. History of complete heart block with pacemaker in place     EGD/ Colonoscopy in am  NPO after midnight  Golytely  Transfuse  Trend h/h      Ly Arnold M.D.  Gastroenterology              Ly Arnold M.D.  Physician  Gastroenterology  Procedures     Signed  Date of Service: 4/9/2025  9:33 AM  OPERATIVE REPORT  PATIENT:   Priscilla Hdz   1940      PREOPERATIVE DIAGNOSES/INDICATIONS: Hematochezia, iron def anemia     POSTOPERATIVE DIAGNOSIS: gastric erosions, hiatal hernia, non-obstructing Schatzki ring     PROCEDURE:  ESOPHAGOGASTRODUODENOSCOPY with biopsy     PHYSICIAN:  Ly Arnold MD     ANESTHESIA:  Per anesthesiologist.     ESTIMATED BLOOD LOSS:nil     LOCATION: [facility]     CONSENT:  OBTAINED. The risks, benefits and alternatives of the procedure were discussed in details. The risks include and are not limited to bleeding, infection, perforation, missed lesions, and sedations risks (cardiopulmonary compromise and allergic reaction to medications).     DESCRIPTION: The patient presented to the procedure room.  After routine checkup was performed, patient was brought into the endoscopy suite.  Patient was placed on his left lateral decubitus position. A bite block was placed in patient's mouth. Patient was sedated by anesthesia.  Vital signs were monitored throughout the procedure.  Oxygenation support was provided throughout the procedure. Upper endoscope was inserted into patient's mouth and advanced to the second portion of  the duodenum under direct visualization.       Once the site was reached and examined, the upper endoscope was withdrawn.  Retroflexion was made within the stomach.  The stomach was decompressed, scope was withdrawn and the procedure was terminated.      The patient tolerated the procedure well.  There were no immediate complications.     OPERATIVE FINDINGS:     1. Esophagus: normal, except for non-obstructing Schatzki ring  2. Stomach:  3 cm hiatal hernia, gastric erosions in the antrum  3. Duodenum: normal to second portion. Biopsy for celiac sprue due to iron def anemia     IMPRESSION/RECOMMENDATIONS:  1. Non-obstructing Schatzki ring  2. Hiatal hernia  3. Gastric erosion  PPI daily  H. Pylori in stool - please check  Await biopsy               Juan Manuel James M.D.  Physician  Gastroenterology  Procedures     Signed  Date of Service: 4/10/2025  8:33 AM  OPERATIVE REPORT  PATIENT: Priscilla Hdz  1940     PREOPERATIVE DIAGNOSIS/INDICATION: GI bleeding.      POSTOPERATIVE DIAGNOSES:   No bleeding was noted. Yellow stool in terminal ileum.      PROCEDURE: COLONOSCOPY     PHYSICIAN: Curtis James MD MPH     CONSENT:  OBTAINED. The risks, benefits and alternatives of the procedure were discussed in details. The risks include and are not limited to bleeding, infection, perforation, missed lesions, and sedations risks (cardiopulmonary compromise and allergic reaction to medications).     ANESTHESIA:  Per anesthesiologist/ICU/ER providers.     LOCATION: [facility]     DESCRIPTION:  The patient presented to the procedure room.  After routine checkup was performed, patient was brought into endoscopy suite.  Patient was placed on his left lateral decubitus position.  Patient was sedated by anesthesia. Vital signs were monitored throughout procedure.  Oxygenation support was provided throughout procedure. Digital rectal examination was performed.  Then, a colonoscope was inserted into patient's anus, advanced to the  terminal ileum under direct visualization.  Once the site was reached and examined, the colonoscope was withdrawn and procedure was terminated. Withdrawal time was at least 6 minutes to ensure adequate examination. (8:23 cecum, 8:30 end).      The patient tolerated the procedure well.  There were no immediate complications.     The quality of the bowel preparation was fairly adequate.     Estimated blood loss from procedure: < 5cc.      FINDINGS:     [place]  Right:2  Mid:3  Left:2     Not able to investigate small polyp due to prep.   Cecal intubation yes. Two times of cecal intubation done.  Retroflexion done.     Multiple diverticula, but no bleeding.   Mild hemorrhoid.      RECOMMENDATIONS:  No evidence of GI bleeding. No further GI procedure at this time.   Resume eating.      GI signs off.           Karon Patton D.O.  Physician  Physical Medicine & Rehab  Consults     Signed  Date of Service: 4/13/2025  9:33 AM                                                    Physical Medicine and Rehabilitation Consultation                                                                            Date of initial consultation: 4/13/2025  Requesting provider: Raul Healy MD   Consulting provider: Karon Patton D.O.  Reason for consultation: assess for acute inpatient rehab appropriateness  LOS: 5 Day(s)     Chief complaint: Bloody stools     HPI: The patient is a 85 y.o.  female with a past medical history of hyperlipidemia, hypertension, history of complete heart block status post pacemaker in 2019, and CKD;  who presented on 4/8/2025  3:01 PM with bloody stools.  Per documentation, patient had noted dark red blood clots in his stools.  Upon evaluation in the emergency department hemoglobin was noted to be 7.7 which dropped to 6.4.  CTA abdomen and pelvis was negative for an active GI bleed.  Patient was transfused 1 unit packed red blood cells in the emergency department.  GI was consulted and patient was taken for  an EGD on 4/9 which showed a nonobstructing Schatzki ring, hiatal hernia and gastric erosion.  4/10 patient was taken for colonoscopy which was negative for an active GI bleed.  Patient remains on a PPI, hemoglobin has been stable around 8.4.  Hospital course has been notable for hypertension and nonsustained elevated troponin.     Patient seen and examined at bedside, patient reports she feels ok. Reports she has chronic swelling in her legs and usually wears compression stockings at home.  Maxwell abdo pain. Denies HA, lightheadedness, SOB, CP, abdominal pain, or changes in numbness/tingling/weakness.   Reports she is agreeable to rehab, but ideally would like to be home for brett. Also mentions she would not feel comfortably with her son helping with showers. Dicussed that showers could be done with  OT.      Social Hx:  Patient lives with adult children in a one-story house with 3 stairs to enter, patient does not feel comfortable send assisting with toileting and showering,  recently passed away 6 months ago.  3 NIECY  At prior level of function mod I with an FWW was dependent for shopping and home management     Tobacco: Denied  Alcohol: Occasional  Drugs: Denied     THERAPY:  Restrictions: Fall risk  PT: Functional mobility   4/12 min assist with an FWW x 15 feet, min assist sit to stand, min assist transfers     OT: ADLs  4/11 min assist sit to stand, max assist lower body dressing, min assist upper body dressing     SLP:   None     IMAGING:  CTA ABDOMEN PELVIS W & W/O POST PROCESS  Narrative: 4/8/2025 6:19 PM     HISTORY/REASON FOR EXAM:  Lower gastrointestinal bleeding     TECHNIQUE/EXAM DESCRIPTION:  CT angiogram of the abdomen and pelvis without and with contrast, with reconstructions.     Initial precontrast helical sections were obtained from the diaphragmatic domes to the lesser trochanters. Following this, 90 mL of Omnipaque 350 nonionic contrast was administered at 5.0 mL/sec and helical  scanning was obtained from the diaphragmatic   domes through the lesser trochanters. Thin section overlapping reconstruction interval was utilized and multiplanar volume reformatted were generated in the sagittal and coronal planes.     3D angiographic images were reviewed on PACS. Maximum intensity projection (MIP) images were generated and reviewed.     Low dose optimization technique was utilized for this CT exam including automated exposure control and adjustment of the mA and/or kV according to patient size.     COMPARISON:  None.     FINDINGS:  AORTA AND BRANCH VESSELS:  No evidence of aortic aneurysm or dissection. No intramural hematoma. Atherosclerosis.  Patent origins of the celiac, superior mesenteric and renal arteries. Patent common iliac and visualized external iliac arteries.     CT ABDOMEN/PELVIS:  Lower chest: Right lower lobe consolidation. Trace bilateral pleural effusions. ICD/pacemaker leads.  Liver: No mass. No intrahepatic biliary dilatation.  Gallbladder: No mural thickening. No radiopaque gallstones.  Common bile duct: Nondilated.  Pancreas: Unremarkable.  Spleen: No mass.  Adrenals: No mass.  Kidneys: No suspicious mass lesions. No hydronephrosis.  Stomach, small bowel, colon: No bowel wall thickening or obstruction. Colonic diverticulosis.  Peritoneal cavity: No ascites.  Lymph nodes: No enlarged nodes by size criteria.  Pelvic organs: Unremarkable.     MUSCULOSKELETAL STRUCTURES: No acute fracture or destructive lesion. Right hip arthroplasty.     3D angiographic/MIP images of the vasculature confirm the vascular findings as described above.  Impression: 1.  No CT evidence of active gastrointestinal bleeding.  2.  Colonic diverticulosis.  3.  Right lower lobe consolidation, concerning for pneumonia.       PROCEDURES:  4/9 EGD by Dr. Arnold  4/10 colonoscopy performed by Dr. James     PMH:    Past Medical History  Past Medical History:  Diagnosis Date   Arthritis      Hip, knee   ASTHMA      AV block, complete (HCC) 06/2019    Status post pacemaker implantation   Cataract      Bilateral IOL   CKD (chronic kidney disease)     Hyperlipidemia     Hypertension 10/2023    Echocardiogram with normal LV size, LVEF 69%. Normal LA and RV, enlarged RA. Trace MR. RVSP 35mmHg.   Menopause     Osteopenia     Pedal edema      PSH:    Past Surgical History  Past Surgical History:  Procedure Laterality Date   COLONOSCOPY N/A 4/10/2025    Procedure: COLONOSCOPY;  Surgeon: Juan Manuel James M.D.;  Location: SURGERY SAME DAY [facility];  Service: Gastroenterology   [place] UPPER GI ENDOSCOPY,BIOPSY N/A 4/9/2025    Procedure: GASTROSCOPY, WITH BIOPSY;  Surgeon: Ly Arnold M.D.;  Location: SURGERY SAME DAY [facility];  Service: Gastroenterology   COLONOSCOPY N/A 4/9/2025    Procedure: COLONOSCOPY;  Surgeon: Ly Arnold M.D.;  Location: SURGERY SAME DAY [facility];  Service: Gastroenterology   PACEMAKER INSERTION Left 06/20/2019    Medtronic [place] S DR MRI [id number] implanted by Dr. Mcginnis.   HIP REVISION TOTAL Right 8/29/2018    Procedure: HIP REVISION TOTAL;  Surgeon: Oniel Goodson M.D.;  Location: SURGERY [facility];  Service: Orthopedics   HIP ARTHROPLASTY TOTAL Right 8/9/2016    Procedure: HIP ARTHROPLASTY TOTAL;  Surgeon: Oniel Goodson M.D.;  Location: SURGERY [facility];  Service:    CATARACT EXTRACTION WITH IOL Bilateral 2012    FHX:    Family History  Family History  Problem Relation Age of Onset   Heart Disease Father     Hyperlipidemia Father     Hypertension Father     Heart Disease Brother     Hypertension Mother     Hyperlipidemia Mother      Medications:    Current Facility-Administered Medications  Medication Dose   hydrALAZINE (Apresoline) tablet 100 mg  100 mg   spironolactone (Aldactone) tablet 12.5 mg  12.5 mg   amoxicillin-clavulanate (Augmentin) 875-125 MG per tablet 1 Tablet  1 Tablet   omeprazole (PriLOSEC) capsule 20 mg  20 mg   oxyCODONE immediate-release (Roxicodone) tablet  "5 mg  5 mg   carvedilol (Coreg) tablet 25 mg  25 mg   losartan (Cozaar) tablet 50 mg  50 mg   furosemide (Lasix) tablet 20 mg  20 mg   acetaminophen (Tylenol) tablet 650 mg  650 mg   ondansetron (Zofran) syringe/vial injection 4 mg  4 mg    Or   ondansetron (Zofran ODT) dispertab 4 mg  4 mg   atorvastatin (Lipitor) tablet 20 mg  20 mg     Allergies:    Allergies  Allergies  Allergen Reactions   Other Environmental Cough      [place], Dust    Physical Exam:  Vitals: BP (!) 146/51   Pulse 78   Temp 36.8 °C (98.2 °F) (Temporal)   Resp 16   Ht 1.651 m (5' 5\")   Wt 69.1 kg (152 lb 5.4 oz)   SpO2 91%   Gen: NAD  Head:  NC/AT  Eyes/ Nose/ Mouth: PERRLA, moist mucous membranes  Cardio: RRR, good distal perfusion, warm extremities  Pulm: normal respiratory effort, no cyanosis   Abd: Soft NTND, negative borborygmi   Ext: b/l LE swelling 2+ pitting edema, patient reports this is chronic. .  No calf tenderness. No clubbing.     Mental status:  A&Ox4 (person, place, date, situation) answers questions appropriately follows commands  Speech: fluent, no aphasia or dysarthria     CRANIAL NERVES:  2,3: visual acuity grossly intact, PERRL  3,4,6: EOMI bilaterally, no nystagmus or diplopia  5: sensation intact to light touch bilaterally and symmetric  7: no facial asymmetry  8: hearing grossly intact     Motor:                           Upper Extremity  Myotome R L  Shoulder flexion C5 5/5 5/5  Elbow flexion C5 5/5 5/5  Wrist extension C6 5/5 5/5  Elbow extension C7 5/5 5/5  Finger flexion C8 5/5 5/5  Finger abduction T1 5/5 5/5       Lower Extremity Myotome R L  Hip flexion L2 5/5 5/5  Knee extension L3 5/5 5/5  Ankle dorsiflexion L4 1, limtied by edema/5 1, limited by edema/5  Toe extension L5 4/5 4/5  Ankle plantarflexion S1 3/5, limtied by ankle swelling  3/5, limited by ankle swelling     Sensory:   intact to light touch through out     DTRs: 2+ in bilateral  biceps,  No clonus at bilateral ankles  Negative Rg b/l    "   Tone: no spasticity noted     Coordination:   intact fine motor with fingers bilaterally     Labs: Reviewed and significant for     Recent Labs    04/10/25  1132 25  1001 25  0326  RBC 2.98* 2.96* 2.96*  HEMOGLOBIN 8.7* 8.4* 8.4*  HEMATOCRIT 26.5* 27.4* 27.1*  PLATELETCT 206 193 220     Recent Labs    04/10/25  1132 25  1001 25  0326  SODIUM 141 140 136  POTASSIUM 3.3* 4.7 4.8  CHLORIDE 109 111 105  CO2 21 22 22  GLUCOSE 121* 95 94  BUN 13 17 16  CREATININE 0.74 0.85 0.79  CALCIUM 7.5* 7.6* 7.8*     Recent Results  Recent Results (from the past 24 hours)  EKG    Collection Time: 25  5:38 PM  Result Value Ref Range    Report          [facility]     Test Date:  2025  Pt Name:    MOISE MENCHACA                Department: [facility]  MRN:        [id number]                      Room:       T729  Gender:     Female                       Technician: AMY  :        [id number]                   Requested By:DORETHA IVAN  Order #:    [id number]                    Reading MD: Wali Galvin MD     Measurements  Intervals                                Axis  Rate:       69                           P:          0  AZ:         186                          QRS:        47  QRSD:       129                          T:          269  QT:         474  QTc:        508     Interpretive Statements  A-V dual-paced complexes w/ some inhibition  Electronically Signed On 2025 17:38:11 PDT by Wali Galvin MD     CBC WITHOUT DIFFERENTIAL    Collection Time: 25  3:26 AM  Result Value Ref Range    WBC 5.6 4.8 - 10.8 K/uL    RBC 2.96 (L) 4.20 - 5.40 M/uL    Hemoglobin 8.4 (L) 12.0 - 16.0 g/dL    Hematocrit 27.1 (L) 37.0 - 47.0 %    MCV 91.6 81.4 - 97.8 fL    MCH 28.4 27.0 - 33.0 pg    MCHC 31.0 (L) 32.2 - 35.5 g/dL    RDW 51.7 (H) 35.9 - 50.0 fL    Platelet Count 220 164 - 446 K/uL    MPV 10.6 9.0 - 12.9 fL  Basic Metabolic Panel    Collection Time: 25  3:26 AM  Result Value Ref Range     Sodium 136 135 - 145 mmol/L    Potassium 4.8 3.6 - 5.5 mmol/L    Chloride 105 96 - 112 mmol/L    Co2 22 20 - 33 mmol/L    Glucose 94 65 - 99 mg/dL    Bun 16 8 - 22 mg/dL    Creatinine 0.79 0.50 - 1.40 mg/dL    Calcium 7.8 (L) 8.5 - 10.5 mg/dL    Anion Gap 9.0 7.0 - 16.0  ESTIMATED GFR    Collection Time: 04/13/25  3:26 AM  Result Value Ref Range    GFR (CKD-EPI) 72 >60 mL/min/1.73 m 2    ASSESSMENT:  Patient is a 85 y.o. female admitted with GI bleed     [facility] Code / Diagnosis to Support: 0016 - Debility (Non-Cardiac, Non-Pulmonary)  See DISPO details below for recommendations on appropriate level of rehab for this diagnosis.     Barriers to transfer include: Insurance authorization, TCCs to verify disposition, medical clearance and bed availability      Assessment and Plan:  GI bleed  -Admitted with dark red clots in stool  - Hemoglobin noted to be 7.7 with drop to 6.4  - CT abdomen negative for active GI bleeding  colonic diverticulitis  - GI consulted  - 4/9 EGD noted to have hiatal hernia obstructive Schatzki ring but without evidence of active bleed  - 4/10 colonoscopy completed active GI bleed  - Patient remains on omeprazole twice daily  - Will complete Augmentin on 4/13 for suspected H. pylori  - Continues to require PT/OT     Acute blood loss anemia  - Upon admission patient had a drop in hemoglobin from 7.7 > 6.4 secondary to GI bleed  - Now status post 1 unit packed red blood cells  - 4/13 hemoglobin stable at 8.4  - Primary team monitoring CBC     Hypertension  - On home dose Coreg, Lasix, hydralazine, losartan and spironolactone     LE venous insufficiency   - chronic swelling, now back on lasix   - ordered compression stockings   - recommend b/l LE dopplers, as patient has not been able to be on chemical dvt ppx      History of CKD stage III  - Creatinine has remained within normal limits     History of pacemaker  - Known history of prior complete heart block, status post pacemaker in  2019  Hyperlipidemia  - On home dose Lipitor     Bowel  Last BM: 04/12/25  - Not currently on scheduled bowel meds     DISPO:  - patient is currently functioning below their level of baseline, recommend post acute rehab  - recommend IRF level therapy with 3hr of therapy 5 days per week   - prior to acceptance to IRF, will need LE dopplers completed and confirmation of DC support from son   - TCC to assist with insurance auth and DC support from son at least at a CGA level of support for mobility      Medical Complexity:  GI bleed  Acute blood loss anemia  Hypertension  History of CKD stage III  History of pacemaker  Hyperlipidemia  Impaired mobility and ADLs     DVT PPX: SCDs    Thank you for allowing us to participate in the care of this patient.      Patient was seen for >80 minutes on unit/floor of which > 50% of time was spent on counseling and coordination of care regarding the above, including prognosis, risk reduction, benefits of treatment, and options for next stage of care.     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  Registered Nurse  Progress Notes     Signed  Date of Service: 4/14/2025  6:45 AM    Monitor Summary  Rhythm: Paced  Rate: 66-73  Ectopy: frequent PVC  Measurements:.17 /.12/.43       Current Vital Signs:   Temperature: 36.4 °C (97.5 °F) Pulse: 68 Respiration: 16 Blood Pressure : (!) 174/69  Weight: 69.1 kg (152 lb 5.4 oz) Height: 165.1 cm (5' 5\")  Pulse Oximetry: 93 % O2 (LPM): 2      Completed Laboratory Reports:  Recent Labs     04/13/25  0326 04/14/25  0351   WBC 5.6 5.5   HEMOGLOBIN 8.4* 9.4*   HEMATOCRIT 27.1* 29.7*   PLATELETCT 220 220   SODIUM 136 139   POTASSIUM 4.8 4.3   BUN 16 18   CREATININE 0.79 0.84   GLUCOSE 94 98     Additional Labs: Not Applicable    Prior Living Situation:   Housing / Facility: 1 Story House  Steps Into Home: 3  Steps In Home: 0  Lives with - Patient's Self Care Capacity: Adult Children  Equipment Owned: Front-Wheel Walker, 4-Wheel Walker    Prior Level of Function / Living Situation:   Physical Therapy: Prior Services: Intermittent Physical Support for ADL Per Family  Housing / Facility: 1 Story House  Steps Into Home: 3  Steps In Home: 0  Rail:  (reports rail to enter but unsure which side 2/2 does not use at baseline)  Bathroom Set up: Walk In Shower, Grab Bars, Shower Chair  Equipment Owned: Front-Wheel Walker, 4-Wheel Walker  Lives with - Patient's Self Care Capacity: Adult Children  Bed Mobility: Independent  Transfer Status: Independent  Ambulation: Independent  Assistive Devices Used: Front-Wheel Walker (in house; 4WW in community)  Stairs: Independent  Current Level of Function:   Gait Level Of Assist: Minimal Assist  Assistive Device: Front Wheel Walker  Distance (Feet): 15  Deviation:  (steppage gait, no DF BLE)  # of Stairs Climbed: 0  Weight Bearing Status: no restrictions  Supine to Sit: Contact Guard Assist  Sit to Supine:  (NT, left in " chair)  Scooting: Standby Assist (seated)  Comments: HOB elevated with heavy reliance on rail  Sit to Stand: Minimal Assist  Bed, Chair, Wheelchair Transfer: Minimal Assist  Toilet Transfers: Minimal Assist  Transfer Method: Stand Step     Occupational Therapy:   Self Feeding: Independent  Grooming / Hygiene: Independent  Bathing: Independent  Dressing: Independent  Toileting: Independent  Medication Management: Independent  Laundry: Independent  Kitchen Mobility: Requires Assist  Finances: Independent  Home Management: Dependent  Shopping: Dependent  Prior Level Of Mobility: Independent With Device in Community, Independent With Device in Home (FWW with tray in home; 4WW in community)  Driving / Transportation: Relatives / Others Provide Transportation  Prior Services: Intermittent Physical Support for ADL Per Family  Housing / Facility: [address] [place] [address]  Current Level of Function:   Eating: Supervision  Upper Body Dressing: Minimal Assist  Lower Body Dressing: Maximal Assist  Toileting: Minimal Assist (balance during pericare on BSC)  Speech Language Pathology:      Rehabilitation Prognosis/Potential: Good  Estimated Length of Stay: 10-12 days    Nursing:      Continent    Scope/Intensity of Services Recommended:  Physical Therapy: 1.5 hr / day  5 days / week. Therapeutic Interventions Required: Maximize Endurance, Mobility, Strength, and Safety  Occupational Therapy: 1.5 hr / day 5 days / week. Therapeutic Interventions Required: Maximize Self Care, ADLs, IADLs, and Energy Conservation  Rehabilitation Nursin/7. Therapeutic Interventions Required: Monitor Pain, Skin, Wound(s), Vital Signs, Intake and Output, Labs, Safety, Family Training, and DVT Prophylaxis; Bowel and bladder regimen; Infection control; ADL's.  Rehabilitation Physician: 3 - 5 days / week. Therapeutic Interventions Required: Medical Management  Respiratory Care: Consult. Therapeutic Interventions Required: Pulmonary Toileting, O2 Weaning, and  Respiratory care per protocol.   Dietician: Consult. Therapeutic Interventions Required: Nutritional evaluation with recommendations to promote optimal health and healing.     She requires 24-hour rehabilitation nursing to manage bowel and bladder function, skin care, wound, nutrition and fluid intake, pulmonary hygiene, pain control, safety, medication management, and patient/family goals. In addition, rehabilitation nursing will reiterate and reinforce therapy skills and equipment use, including ADLs, as well as provide education to the patient and family. Priscilla Hdz is willing to participate in and is able to tolerate the proposed plan of care.    Rehabilitation Goals and Plan (Expected frequency & duration of treatment in the IRF):   Return to the Community, Modified Independent Level of Care, Outpatient Support, and Family Able to Provide 24/7 Assistance  Anticipated Date of Rehabilitation Admission: 4/14/2025  Patient/Family oriented IRF level of care/facility/plan: Yes  Patient/Family willing to participate in IRF care/facility/plan: Yes  Patient able to tolerate IRF level of care proposed: Yes  Patient has potential to benefit IRF level of care proposed: Yes  Comments: Not Applicable    Special Needs or Precautions - Medical Necessity:  Safety Concerns/Precautions:  Fall Risk / High Risk for Falls, Balance, and Bed / Chair Alarm  Complex Wound Care: Sacrum/Bilateral Heels  Pain Management  Requires Oxygen    Diet:   DIET ORDERS (From admission to next 24h)       Start     Ordered    04/10/25 1641  Diet Order Diet: Low Fiber(GI Soft) (Advance Diet as Tolerated)  ALL MEALS        Question:  Diet:  Answer:  Low Fiber(GI Soft)  Comment:  Advance Diet as Tolerated    04/10/25 1641                    Anticipated Discharge Destination / Patient/Family Goal:  Destination: Home with Assistance Support System: Family   Anticipated home health services: OT, PT, Nursing, and Aide  Previously used  service/  provider: Not Applicable  Anticipated DME Needs:  To be determined  Outpatient Services:  To be determined  Alternative resources to address additional identified needs:   Future Appointments   Date Time Provider Department Center   5/13/2025  2:15 PM JONN Ordoñez None   5/13/2025  3:00 PM CHELSI Sweeney None       Pre-Screen Completed: 4/14/2025 10:35 AM Steffanie Brower R.N.

## 2025-04-14 NOTE — PROGRESS NOTES
1330 Pt arrived at [facility] from [facility] via transport. Dr. Moeller to follow. Initial assessment initiated. Pt oriented to room and facility routine and safety measures; pt education binder provided and discussed. Pt A/O x 4, continent of bowel and bladder. Min assist for transfers. All wounds photographed and documented; photos uploaded to . Pt denies pain or discomfort at this time. Pt positioned for comfort in bed. Call light within reach, safety measures in place.

## 2025-04-14 NOTE — PROGRESS NOTES
[facility]    Called patient's son, Akil, that the patient will be transferring to [facility] between [id number].

## 2025-04-14 NOTE — PROGRESS NOTES
4 Eyes Skin Assessment Completed by JAYJAY Pillai and Roberta CLEVELAND RN.    Head WDL  Ears WDL  Nose WDL  Mouth WDL  Neck WDL  Breast/Chest WDL  Shoulder Blades WDL  Spine WDL  (R) Arm/Elbow/Hand Redness and Blanching  (L) Arm/Elbow/Hand Redness, Blanching, and Abrasion  Abdomen WDL  Groin WDL  Scrotum/Coccyx/Buttocks Redness, wound   (R) Leg wound, edema  (L) Leg wound, edema  (R) Heel/Foot/Toe Redness, Blanching, and Edema  (L) Heel/Foot/Toe Redness, Blanching, and Edema          Devices In Places Nasal Cannula      Interventions In Place Low Air Loss Mattress    Possible Skin Injury Yes    Pictures Uploaded Into Epic Yes  Wound Consult Placed Yes  RN Wound Prevention Protocol Ordered Yes

## 2025-04-14 NOTE — DISCHARGE PLANNING
Case Management Discharge Planning    Admission Date: 4/8/2025  GMLOS: 2.9  ALOS: 6    6-Clicks ADL Score: 16  6-Clicks Mobility Score: 17  PT and/or OT Eval ordered: Yes  Post-acute Referrals Ordered: Yes  Post-acute Choice Obtained: Yes  Has referral(s) been sent to post-acute provider:  Yes      Anticipated Discharge Dispo: Discharge Disposition: Disch to  rehab facility or distinct part unit (62)    DME Needed: No    Action(s) Taken: Notified pt has transportation to [facility] today at 1330. Spoke with pt at bedside and cobra completed and given to RN.    Escalations Completed: None    Medically Clear: Yes    Next Steps: No CM needs    Barriers to Discharge: None    Is the patient up for discharge tomorrow: No-anticipated to DC to [facility] today at 1330

## 2025-04-14 NOTE — DISCHARGE PLANNING
[facility] Transitional Care Coordination    Physiatry consult complete.  Dr. Patton recommending -     DISPO:  - patient is currently functioning below their level of baseline, recommend post acute rehab  - recommend IRF level therapy with 3hr of therapy 5 days per week   - prior to acceptance to IRF, will need LE dopplers completed and confirmation of DC support from son   - [facility] to assist with insurance auth and DC support from son at least at a CGA level of support for mobility     Call out to son Akil to discuss IPR referral.  No answer.  Left message requesting return call to [facility].      0825a - Return call from Akil.  Explained specifics of inpatient rehab, answered questions/concerns.  Discussed medical management [facility], nursing care/nurse to patient ratio and plan for 3hrs therapy/5 days per week with goal of safe transition home.  Discussed [facility] CM team to assist through discharge process.  Reviewed Medicare/[employer] insurance for program.  Akil endorses IPR admission, tells me he has been staying with Priscilla since last May, able to provide 24/7 support - SSH, 3 NIECY, +grab bars in bathroom.    Akil reports pta, he would assist pt to bathroom, position walker to facilitate safe transfer to toilet, stand outside door for safety.  Akil believes patient will agree to continue this level assist for personal care/toileting.  Discussed in-home aide for showering.  Discussed [facility] visitor policy, clothing requirements, family training.  Akil stated agreement to attend family training [facility] prior to discharge.  Akil aware potential transition to [facility] today pending medical clearance.  Volate message to ALEX Casanova seeking status of medical clearance for IPR.

## 2025-04-14 NOTE — PROGRESS NOTES
Bedside report received and patient care assumed. Pt is resting in bed, A&O4, with no complaints of pain, and is on 1L NC. Tele box on. All fall precautions are in place, belongings at bedside table.  Pt was updated on POC, no questions or concerns. Pt educated on use of call light for assistance.

## 2025-04-14 NOTE — DISCHARGE PLANNING
[facility] Transitional Care Coordination    Rehab PAS to Dr. Moeller for review.  Will follow for attestation.     1118a -  Dr. Ludwig Moeller will accept Priscilla to inpatient rehab.  GMT wheelchair transport 1/1:30p today.  Dr. Healy aware.  Nursing to call report to [phone].  Volate message to bedside RN Ly.  Volate update to ALEX Casanova.  Telephone call to son Akil with update on pending transfer.  Provided [facility] Nurses station number.      TCC will follow to assist as needed with transition to [facility].

## 2025-04-14 NOTE — CARE PLAN
The patient is Stable - Low risk of patient condition declining or worsening    Shift Goals  Clinical Goals: monitor stool, monitor labs, VSS, safety  Patient Goals: sleep  Family Goals: keith    Progress made toward(s) clinical / shift goals:    Problem: Knowledge Deficit - Standard  Goal: Patient and family/care givers will demonstrate understanding of plan of care, disease process/condition, diagnostic tests and medications  Description: Target End Date:  1-3 days or as soon as patient condition allowsDocument in Patient Education1.  Patient and family/caregiver oriented to unit, equipment, visitation policy and means for communicating concern2.  Complete/review Learning Assessment3.  Assess knowledge level of disease process/condition, treatment plan, diagnostic tests and medications4.  Explain disease process/condition, treatment plan, diagnostic tests and medications  Outcome: Progressing     Problem: Skin Integrity  Goal: Skin integrity is maintained or improved  Description: Target End Date:  Prior to discharge or change in level of careDocument interventions on Skin Risk/Ezequiel flowsheet groups and corresponding LDA1.  Assess and monitor skin integrity, appearance and/or temperature2.  Assess risk factors for impaired skin integrity and/or pressures ulcers3.  Implement precautions to protect skin integrity in collaboration with interdisciplinary team4.  Implement pressure ulcer prevention protocol if at risk for skin breakdown5.  Confirm wound care consult if at risk for skin breakdown6.  Ensure patient use of pressure relieving devices  (Low air loss bed, waffle overlay, heel protectors, ROHO cushion, etc)  Outcome: Progressing     Problem: Fall Risk  Goal: Patient will remain free from falls  Description: Target End Date:  Prior to discharge or change in level of careDocument interventions on the [place] Fall Risk Assessment1.  Assess for fall risk factors2.  Implement fall precautions  Outcome:  Progressing     Problem: Bowel/Gastric:  Goal: Normal bowel function is maintained or improved  Outcome: Progressing     Problem: Urinary Elimination:  Goal: Ability to reestablish a normal urinary elimination pattern will improve  Outcome: Progressing     Problem: Medication  Goal: Compliance with prescribed medication will improve  Outcome: Progressing     Problem: Knowledge Deficit  Goal: Knowledge of disease process/condition, treatment plan, diagnostic tests, and medications will improve  Outcome: Progressing       Patient is not progressing towards the following goals:

## 2025-04-14 NOTE — H&P
Physical Medicine & Rehabilitation  History and Physical (H&P)  &     Post Admission Physician Evaluation (ANDREA)       Date of Admission: 4/14/2025  Date of Service: 4/14/2025   Priscilla Hdz    [facility] Code to Support Admission: 0016 - Debility (Non-Cardiac, Non-Pulmonary)  Etiologic Diagnosis: GI bleed    _______________________________________________      History of Present Illness:  Adapted from the PM&R Consult by Dr. Pooja Dangelo:   The patient is a 85 y.o.  female with a past medical history of hyperlipidemia, hypertension, history of complete heart block status post pacemaker in 2019, and CKD;  who presented on 4/8/2025  3:01 PM with bloody stools.  Per documentation, patient had noted dark red blood clots in his stools.  Upon evaluation in the emergency department hemoglobin was noted to be 7.7 which dropped to 6.4.  CTA abdomen and pelvis was negative for an active GI bleed.  Patient was transfused 1 unit packed red blood cells in the emergency department.  GI was consulted and patient was taken for an EGD on 4/9 which showed a nonobstructing Schatzki ring, hiatal hernia and gastric erosion.  4/10 patient was taken for colonoscopy which was negative for an active GI bleed.  Patient remains on a PPI, hemoglobin has been stable around 8.4.  Hospital course has been notable for hypertension and nonsustained elevated troponin. She was transferred to [facility] on 4/14/2025.     On my evaluation, the patient is awake and alert and answer questions appropriately.  Admits to having a dry cough that is bothersome, was told she had walking pneumonia. Patient denies fevers, chills, headache, abdominal pain, nausea, vomiting, dysuria.      Past Medical History:  Past Medical History:   Diagnosis Date    Arthritis     Hip, knee    ASTHMA     AV block, complete (HCC) 06/2019    Status post pacemaker implantation    Cataract     Bilateral IOL    CKD (chronic kidney disease)     Hyperlipidemia     Hypertension  10/2023    Echocardiogram with normal LV size, LVEF 69%. Normal LA and RV, enlarged RA. Trace MR. RVSP 35mmHg.    Menopause     Osteopenia     Pedal edema        Past Surgical History:  Past Surgical History:   Procedure Laterality Date    COLONOSCOPY N/A 4/10/2025    Procedure: COLONOSCOPY;  Surgeon: Juan Manuel James M.D.;  Location: SURGERY SAME DAY [facility];  Service: Gastroenterology    LA UPPER GI ENDOSCOPY,BIOPSY N/A 4/9/2025    Procedure: GASTROSCOPY, WITH BIOPSY;  Surgeon: Ly Arnold M.D.;  Location: SURGERY SAME DAY [facility];  Service: Gastroenterology    COLONOSCOPY N/A 4/9/2025    Procedure: COLONOSCOPY;  Surgeon: Ly Arnold M.D.;  Location: SURGERY SAME DAY [facility];  Service: Gastroenterology    PACEMAKER INSERTION Left 06/20/2019    [employer] [place] S DR MRI [id number] implanted by Dr. Mcginnis.    HIP REVISION TOTAL Right 8/29/2018    Procedure: HIP REVISION TOTAL;  Surgeon: Oniel Goodson M.D.;  Location: SURGERY [facility];  Service: Orthopedics    HIP ARTHROPLASTY TOTAL Right 8/9/2016    Procedure: HIP ARTHROPLASTY TOTAL;  Surgeon: Oniel Goodson M.D.;  Location: SURGERY [facility];  Service:     CATARACT EXTRACTION WITH IOL Bilateral 2012       Family History:  Family History   Problem Relation Age of Onset    Heart Disease Father     Hyperlipidemia Father     Hypertension Father     Heart Disease Brother     Hypertension Mother     Hyperlipidemia Mother        Medications:  Current Facility-Administered Medications   Medication Dose    Respiratory Therapy Consult      Pharmacy Consult Request ...Pain Management Review 1 Each  1 Each    hydrALAZINE (Apresoline) tablet 25 mg  25 mg    acetaminophen (Tylenol) tablet 650 mg  650 mg    senna-docusate (Pericolace Or Senokot S) 8.6-50 MG per tablet 2 Tablet  2 Tablet    And    polyethylene glycol/lytes (Miralax) Packet 1 Packet  1 Packet    docusate sodium (Enemeez) enema 283 mg  283 mg    magnesium hydroxide (Milk Of  "Magnesia) suspension 30 mL  30 mL    carboxymethylcellulose (Refresh Tears) 0.5 % ophthalmic drops 1 Drop  1 Drop    benzocaine-menthol (Cepacol) lozenge 1 Lozenge  1 Lozenge    mag hydrox-al hydrox-simeth (Maalox Plus Es Or Mylanta Ds) suspension 20 mL  20 mL    ondansetron (Zofran ODT) dispertab 4 mg  4 mg    Or    ondansetron (Zofran) syringe/vial injection 4 mg  4 mg    sodium chloride (Ocean) 0.65 % nasal spray 2 Spray  2 Spray    oxyCODONE immediate-release (Roxicodone) tablet 2.5 mg  2.5 mg    Or    oxyCODONE immediate-release (Roxicodone) tablet 5 mg  5 mg    melatonin tablet 3 mg  3 mg    atorvastatin (Lipitor) tablet 20 mg  20 mg    carvedilol (Coreg) tablet 25 mg  25 mg    [START ON 4/15/2025] furosemide (Lasix) tablet 20 mg  20 mg    hydrALAZINE (Apresoline) tablet 100 mg  100 mg    losartan (Cozaar) tablet 50 mg  50 mg    omeprazole (PriLOSEC) capsule 20 mg  20 mg    [START ON 4/15/2025] spironolactone (Aldactone) tablet 12.5 mg  12.5 mg       Allergies:  Other environmental    Psychosocial History:  Patient lives with adult son in a one-story house with 3 stairs to enter,   recently passed away 6 months ago.     Tobacco: Denied  Alcohol: Occasional  Drugs: Denied    Level of Function Prior to Disability:  At prior level of function mod I with an FWW was dependent for shopping and home management     Level of Function Prior to Admission to [facility]:  PT: Functional mobility   4/12 min assist with an FWW x 15 feet, min assist sit to stand, min assist transfers     OT: ADLs  4/11 min assist sit to stand, max assist lower body dressing, min assist upper body dressing    CURRENT LEVEL OF FUNCTION:   Same as level of function prior to admission to [facility]    Physical Examination:     Vitals: /49   Pulse 67   Temp 36.5 °C (97.7 °F) (Oral)   Resp 16   Ht 1.651 m (5' 5\")   Wt 65.5 kg (144 lb 6.4 oz)   SpO2 94%   Gen: NAD  Head: Normocephalic, " atraumatic  Eyes/ Nose/ Mouth: PERRL, moist mucous membranes  Cardio: good distal perfusion, warm extremities  Pulm: normal respiratory effort, no cyanosis   Abd: Soft, Nontender, Nondistended   Ext: No peripheral edema. No calf tenderness. No clubbing.    Mental status: Alert. Oriented to person, place, month and year.   Speech: fluent, no aphasia or dysarthria    Motor:      Upper Extremity  Myotome R L   Shoulder flexion C5 4/5 4/5   Elbow flexion C5 4/5 4/5   Wrist extension C6 4/5 4/5   Elbow extension C7 4/5 4/5   Finger flexion C8 4/5 4/5   Finger abduction T1 4/5 4/5     Lower Extremity Myotome R L   Hip flexion L2 4/5 4/5   Knee extension L3 4/5 4/5   Ankle dorsiflexion L4 1/5 1/5   Toe extension L5 3/5 3/5   Ankle plantarflexion S1 2/5 2/5     Sensory:   intact to light touch through out arms and legs    DTRs: 2+ in bilateral  biceps, 1+ in bilateral patellar tendons, trace in bilateral ankles.   No clonus at bilateral ankles  Negative babinski b/l  Negative Rg b/l     Tone: no spasticity noted, no cogwheeling noted      Radiology:  4/8/25: CTA abdomen pelvis with contrast  IMPRESSION:  1.  No CT evidence of active gastrointestinal bleeding.  2.  Colonic diverticulosis.  3.  Right lower lobe consolidation, concerning for pneumonia.    Laboratory Values:  Recent Labs     04/13/25  0326 04/14/25  0351   SODIUM 136 139   POTASSIUM 4.8 4.3   CHLORIDE 105 106   CO2 22 25   GLUCOSE 94 98   BUN 16 18   CREATININE 0.79 0.84   CALCIUM 7.8* 8.1*     Recent Labs     04/13/25  0326 04/14/25  0351   WBC 5.6 5.5   RBC 2.96* 3.29*   HEMOGLOBIN 8.4* 9.4*   HEMATOCRIT 27.1* 29.7*   MCV 91.6 90.3   MCH 28.4 28.6   MCHC 31.0* 31.6*   RDW 51.7* 50.0   PLATELETCT 220 220   MPV 10.6 10.4           Primary Rehabilitation Diagnosis:    This patient is a 85 y.o. female admitted for acute inpatient rehabilitation with GI bleed.    Impairments:   ADLs/IADLs  Mobility    Secondary Diagnosis/Medical Co-morbidities Affecting  Function  GI bleed  Acute blood loss anemia  Hypertension  History of CKD stage III  History of pacemaker  Hyperlipidemia  Impaired mobility and ADLs    Relevant Changes Since Preadmission Evaluation:    Status unchanged    The patient's rehabilitation potential is Very Good  The patient's medical prognosis is good    Rehabilitation Plan:   Discussion and Recommendations:   1. The patient requires an acute inpatient rehabilitation program with a coordinated program of care at an intensity and frequency not available at a lower level of care. This recommendation is substantiated by the patient's medical physicians who recommend that the patient's intervention and assessment of medical issues needs to be done at an acute level of care for patient's safety and maximum outcome.   2. A coordinated program of care will be supplied by an interdisciplinary team of physical therapy, occupational therapy, rehab physician, rehab nursing, and, if needed, speech therapy and rehab psychology. Rehab team presents a patient-specific rehabilitation and education program concentrating on prevention of future problems related to accessibility, mobility, skin, bowel, bladder, sexuality, and psychosocial and medical/surgical problems.   3. Need for Rehabilitation Physician: The rehab physician will be evaluating the patient on a multi-weekly basis to help coordinate the program of care. The rehab physician communicates between medical physicians, therapists, and nurses to maximize the patient's potential outcome. Specific areas in which the rehab physician will be providing daily assessment include the following:   A. Assessing the patient's heart rate and blood pressure response (vitals monitoring) to activity and making adjustments in medications or conservative measures as needed.   B. The rehab physician will be assessing the frequency at which the program can be increased to allow the patient to reach optimal functional outcome.    C. The rehab physician will also provide assessments in daily skin care, especially in light of patient's impairments in mobility.   D. The rehab physician will provide special expertise in understanding how to work with functional impairment and recommend appropriate interventions, compensatory techniques, and education that will facilitate the patient's outcome.   4. Rehab R.N.   The rehab RN will be working with patient to carry over in room mobility and activities of daily living when the patient is not in 3 hours of skilled therapy. Rehab nursing will be working in conjunction with rehab physician to address all the medical issues above and continue to assess laboratory work and discuss abnormalities with the treating physicians, assess vitals, and response to activity, and discuss and report abnormalities with the rehab physician. Rehab RN will also continue daily skin care, supervise bladder/bowel program, instruct in medication administration, and ensure patient safety.   5. Rehab Therapy: Therapies to treat at intensity and frequency of (may change after completion of evaluation by all therapeutic disciplines):       PT:  Physical therapy to address mobility, transfer, gait training and evaluation for adaptive equipment needs 1.5 hour/day at least 5 days/week for the duration of the ELOS (see below)       OT:  Occupational therapy to address ADLs, self-care, home management training, functional mobility/transfers and assistive device evaluation, and community re-integration 1.5 hour/day at least 5 days/week for the duration of the ELOS (see below).     Medical management / Rehabilitation Issues/ Adverse Potential as part of rehabilitation plan     Rehabilitation Issues/Adverse Potential  1.  GI Bleed: Patient demonstrates functional deficits in strength, balance, coordination, and ADL's. Patient is admitted to [facility] for comprehensive rehabilitation therapy as described below.    Rehabilitation nursing monitors bowel and bladder control, educates on medication administration, co-morbidities and monitors patient safety.    2.  Neurostimulants: None at this time but continue to assess daily for need to initiate should status change.    3.  DVT prophylaxis:  Patient is not cleared for anticoagulation upon transfer. Encourage OOB. Monitor daily for signs and symptoms of DVT including but not limited to swelling and pain to prevent the development of DVT that may interfere with therapies.    4.  GI prophylaxis:  On prilosec to prevent gastritis/dyspepsia which may interfere with therapies.    5.  Pain: Controlled with tylenol, oxycodone    6.  Nutrition/Dysphagia: Dietician monitors nutrient intake, recommend supplements prn and provide nutrition education to pt/family to promote optimal nutrition for wound healing/recovery.     7.  Bladder/bowel:  Start bowel and bladder program as described below, to prevent constipation, urinary retention (which may lead to UTI), and urinary incontinence (which will impact upon pt's functional independence).   - TV Q3h while awake with post void bladder scans, I&O cath for PVRs >400  - up to commode after meal     8.  Skin/dermal ulcer prophylaxis: Monitor for new skin conditions with q.2 h. turns as required to prevent the development of skin breakdown.     9.  Cognition/Behavior: As needed psychologist provides adjustment counseling to illness and psychosocial barriers that may be potential barriers to rehabilitation.     10. Respiratory therapy: RT performs O2 management prn, breathing retraining, pulmonary hygiene and bronchospasm management prn to optimize participation in therapies.     Medical Co-Morbidities/Adverse Potential Affecting Function:    GI bleed  -Admitted with dark red clots in stool.  Hemoglobin found to be 7.7, dropped to 6.4. CT abd/pelv negative for acute GI bleeding. EGD showed hiatal hernia, nonobstructing Schatzki ring, gastric  erosion.  -PPI BID  -PT and OT for mobility and ADLs. Per guidelines, 15 hours per week between PT, OT and/or SLP.  -Follow up with GI    Acute blood loss anemia  -Received transfusion at acute hospital  -On admission to rehab: Most recent Hgb 9.4.  Check morning labs.    Hypertension  -on admission: coreg 25mg BID, losartan 50mg BID, spironolactone 12.5mg daily.   -Monitor for need to adjust medications     LE venous insufficiency   -chronic swelling.  Recent ultrasound Doppler negative for DVT.  -compression stockings  -lasix 20mg daily     Distal LE weakness  -on admission: has weakness in bilateral ankle dorsiflexion and plantarflexion.  Maybe due to edema.  Monitor for now.  Question possible neuropathy? although sensation intact.     Cough  -on admission: has nonproductive cough. Check CXR. Check AM labs.     Acute respiratory failure  -On admission: On 1 L supplemental O2.  Wean as tolerated.    History of CKD stage III  -Creatinine has remained within normal limits recently  -monitor     History of pacemaker  -Known history of prior complete heart block, status post pacemaker in 2019  Hyperlipidemia  -On home dose Lipitor     Pain - prn Tylenol, oxycodone    Bladder management  -monitor for urinary retention. Bladder scan and ICP per protocol.     Bowel management  -senna/docusate for prophylaxis    Skin - Patient at risk for skin breakdown due to debility in areas including sacrum, achilles, elbows and head in addition to other sites. Nursing to assess skin daily.     DVT Ppx - Patient not cleared for DVT chemoprophylaxis.  Recent ultrasound negative for DVT.    I personally performed a complete drug regimen review and no potential clinically significant medication issues were identified.     Goals/Expected Level of Function Based on Current Medical and Functional Status:  (may change based on patient's medical status and rate of impairment recovery)  Transfers:   Modified Independent  Mobility/Gait:    Modified Independent  ADL's:   Modified Independent    DISPOSITION: Discharge to pre-morbid independent living setting with the supportive care of patient's family.    ELOS: 14 days  ____________________________________    Ludwig Moeller D.O.  Physical Medicine & Rehabilitation   ____________________________________    Pt was seen today for 80 min. Time spent included pre-admission screening, pre-admission review of vitals and laboratory values/tests, unit/floor time, face-to-face time with the patient including physical examination, care coordination, counseling of patient and/or family, ordering medications/procedures/tests, discussion with other healthcare providers, and/or documentation in the electronic medical record.

## 2025-04-14 NOTE — PROGRESS NOTES
Monitor Summary  Rhythm: Paced  Rate: 66-73  Ectopy: frequent PVC  Measurements:.17 /.12/.43

## 2025-04-14 NOTE — PROGRESS NOTES
NURSING DAILY NOTE    Name: Priscilla Hdz   Date of Admission: 4/14/2025   Admitting Diagnosis: GI bleed  Attending Physician: KWAKU MARTINEZ D.O.  Allergies: Other environmental    Safety  Patient Assist     Patient Precautions  Precautions: Fall Risk  Fall Risk: Poor balance, Poor safety  Bed Transfer Status     Toilet Transfer Status      Assistive Devices     Oxygen  Silicone Nasal Cannula  Diet/Therapeutic Dining  Current Diet Order   Procedures    Diet Order Diet: Low Fiber(GI Soft) (Advance Diet as Tolerated)     Pill Administration  whole  Agitated Behavioral Scale     ABS Level of Severity       Fall Risk  Has the patient had a fall this admission?      [place] Fall Risk Scoring  18, HIGH RISK  Fall Risk Safety Measures  bed alarm and chair alarm    Vitals  Temperature: 36.5 °C (97.7 °F)  Temp src: Oral  Pulse: 67  Respiration: 16  Blood Pressure : 131/49  Blood Pressure MAP (Calculated): 76 MM HG  BP Location: Right, Upper Arm  Patient BP Position: Abdul's Position     Oxygen  Pulse Oximetry: 94 %  O2 (LPM): 1  O2 Delivery Device: Silicone Nasal Cannula    Bowel and Bladder  Last Bowel Movement  04/14/25  Stool Type     Bowel Device     Continent  Bladder: Did not void   Bowel: No movement  Bladder Function  Urine Color: Yellow  Genitourinary Assessment   Bladder Assessment (WDL):  Within Defined Limits  Urine Color: Yellow  Bladder Device: Bathroom  $ Bladder Scan Results (mL): 77    Skin  Ezequiel Score   12  Sensory Interventions   Bed Types: Low Airloss  Moisture Interventions  Moisturizers/Barriers: Moisturizer       Pain  Pain Rating Scale  0 - No Pain  Pain Location     Pain Location Orientation     Pain Interventions   Declines    ADLs    Bathing      Linen Change      Personal Hygiene     Chlorhexidine Bath      Oral Care     Teeth/Dentures     Shave     Nutrition Percentage Eaten     Environmental Precautions     Patient Turns/Positioning     Patient Turns Assistance/Tolerance     Bed  Positions     Head of Bed Elevated         Psychosocial/Neurologic Assessment  Psychosocial Assessment  Psychosocial (WDL):  WDL Except  Patient Behaviors: Forgetful  Neurologic Assessment  Neuro (WDL): Exceptions to WDL  Level of Consciousness: Alert  Orientation Level: Disoriented to time  Cognition: Memory Loss, Poor safety awareness  Speech: Clear  Muscle Strength Right Arm: Good Strength Against Gravity and Moderate Resistance  Muscle Strength Left Arm: Good Strength Against Gravity and Moderate Resistance  Muscle Strength Right Leg: Fair Strength against Gravity but No Resistance  Muscle Strength Left Leg: Fair Strength against Gravity but No Resistance  EENT (WDL):  Within Defined Limits    Cardio/Pulmonary Assessment  Edema   RLE Edema: 2+  LLE Edema: 2+  Respiratory Breath Sounds  RUL Breath Sounds: Clear  RML Breath Sounds: Clear  RLL Breath Sounds: Clear  KEVIN Breath Sounds: Clear  LLL Breath Sounds: Clear  Cardiac Assessment   Cardiac (WDL):  WDL Except (pacemaker)

## 2025-04-14 NOTE — PROGRESS NOTES
Hospital Medicine Daily Progress Note    Date of Service  4/13/2025    Chief Complaint  Priscilla Hdz is a 85 y.o. female admitted 4/8/2025 with GI bleed    Hospital Course  Patient is an 85-year-old female with past medical history of HLD, HTN, complete heart block status post PPM 2019, CKD 3B that presents with 1 day history of bright red blood per rectum.     Patient states that earlier today around 1300 they noticed blood on their down after standing up.  Subsequently went to the restroom with passage of bright red blood per rectum and possible clots.     In the ED, BP 100s to 150s/50s to 90s, HR 60s to 90s, RR 18-39, saturating well on room air.  Received NS 1 L bolus x 1.  WBC 6.4, hemoglobin 7.7>>6.4, MCV 91.5, , sodium 141, potassium 4.4, bicarb 19, anion gap 8, BUN 24, creatinine 0.83, corrected calcium 9.3, albumin 2.5, iron 14, percent saturation 9, ferritin 342, troponin 51, INR 1.32.  EKG V paced , QTc 485, without ST changes, relatively unchanged from previous.  CTA abdomen pelvis with and without without evidence of active GI bleeding, colonic diverticulosis, right lower lobe consolidation.     Interval Problem Update  4/9  Afebrile pulse 70s to 105 normal respiratory rate systolic blood pressure 101-192 pulse ox 93 to 99% wean down to 1 L nasal cannula.  Leukocytosis, anemia up to 9 after transfusions CMP albumin 2.5 total protein 5.5 otherwise unremarkable.  Procalcitonin 0.42.  Restarted home Coreg, Lasix, losartan.  EGD performed showed nonobstructing Schatzki ring in the esophagus, hiatal hernia, gastric erosions, recommending PPI daily and checking H. pylori and stool.  Colonoscopy had poor prep, has n.p.o. orders placed following the procedure for what I suspect is a repeat colonoscopy tomorrow.    4/10  Afebrile, pulse 4882 respiratory rate 1422 systolic blood pressure  pulse ox 92 to 98% on 1 L nasal cannula.  Hemoglobin 8.7, potassium 3.3 glucose 121 calcium  7.5.  Analgesia adjusted, potassium replaced.  Went for colonoscopy today showed multiple diverticula no bleeding, mild hemorrhoids.  GI signed off, recommend no further GI procedures at this time.      We discussed behavioral health referral on discharge, she is having rather severe ongoing grief/depression since her  passed 6 months ago.  We discussed the role of medications and behavioral therapy and she believes she would be interested in both.  Will place referral on discharge.  No new complaints although she is feeling rather weak since being admitted and having had EGD and 2 colonoscopies in two days, PT OT orders are in to evaluate.    4/11  Afebrile pulse 50s to 60s normal respiratory rate systolic blood pressure 120s to 140s pulse ox 94 to 97% on 1 to 2 L nasal cannula.  No leukocytosis, stable anemia, BMP calcium 7.6.  OT recommending postacute placement, patient agrees.  Discharge planner is working to arrange, medically cleared for discharge to SNF.    4/12  Afebrile normal pulse and respiratory rate systolic blood pressure 110s 160 pulse ox 92 to 95% on 2 L nasal cannula.  Tolerating p.o. intake, no further bleeding noted, pending inpatient rehab versus SNF, discharge planners working.  No new complaints.    4/13  Patient is afebrile with normal pulse and respiratory rate systolic blood pressure ranged from 110's 170s pulse ox 91 to 98% on 2 L nasal cannula.  Hemoglobin 8.4, calcium 7.8.  Discussed with PM&R Dr. Patton, thinks to be a good candidate for inpatient rehab,  They are requesting bilateral lower extremity venous ultrasound to rule out DVT, I have ordered this.  Tolerating p.o. intake, no new complaints, feeling well.    I have discussed this patient's plan of care and discharge plan at IDT rounds today with Case Management, Nursing, Nursing leadership, and other members of the IDT team.    Consultants/Specialty  GI    Code Status  Full Code    Disposition  The patient is not medically  cleared for discharge to home or a post-acute facility.      I have placed the appropriate orders for post-discharge needs.    Review of Systems  ROS   Review of Systems   Constitutional:  Negative for chills and fever.   HENT:  Negative for ear pain.    Eyes:  Negative for blurred vision, double vision and pain.   Respiratory:  Positive for shortness of breath. Negative for cough and wheezing.    Cardiovascular:  Negative for chest pain, palpitations and leg swelling.   Gastrointestinal:  Positive for blood in stool. Negative for abdominal pain, constipation, diarrhea, melena, nausea and vomiting.   Genitourinary:  Negative for dysuria, frequency and hematuria.   Musculoskeletal:  Negative for back pain, joint pain and neck pain.   Neurological:  Negative for dizziness and headaches.     Physical Exam  Temp:  [35.9 °C (96.6 °F)-36.8 °C (98.2 °F)] 36.2 °C (97.2 °F)  Pulse:  [67-78] 74  Resp:  [16-20] 16  BP: (118-176)/(51-78) 151/78  SpO2:  [91 %-98 %] 93 %    Physical Exam  Vitals and nursing note reviewed. Exam conducted with a chaperone present.   Constitutional:       General: She is not in acute distress.     Appearance: Normal appearance. She is not ill-appearing or toxic-appearing.   HENT:      Head: Normocephalic and atraumatic.      Nose: Nose normal. No rhinorrhea.      Mouth/Throat:      Mouth: Mucous membranes are moist.      Pharynx: Oropharynx is clear.   Eyes:      General: No scleral icterus.     Extraocular Movements: Extraocular movements intact.      Conjunctiva/sclera: Conjunctivae normal.   Cardiovascular:      Rate and Rhythm: Normal rate and regular rhythm.      Pulses: Normal pulses.      Heart sounds: No murmur heard.  Pulmonary:      Effort: Pulmonary effort is normal. No respiratory distress.      Breath sounds: Normal breath sounds. No wheezing, rhonchi or rales.   Abdominal:      General: There is no distension.      Palpations: Abdomen is soft.      Tenderness: There is no abdominal  tenderness. There is no guarding or rebound.   Musculoskeletal:         General: Swelling present. Normal range of motion.      Cervical back: Normal range of motion and neck supple. No rigidity.      Right lower leg: Edema present.      Left lower leg: Edema present.   Skin:     General: Skin is warm and dry.      Capillary Refill: Capillary refill takes less than 2 seconds.      Coloration: Skin is not jaundiced.      Findings: No erythema or rash.   Neurological:      General: No focal deficit present.      Mental Status: She is alert and oriented to person, place, and time. Mental status is at baseline.      Cranial Nerves: No cranial nerve deficit.      Sensory: No sensory deficit.      Motor: No weakness.   Psychiatric:         Mood and Affect: Mood normal.         Behavior: Behavior normal.         Thought Content: Thought content normal.         Judgment: Judgment normal.         Fluids    Intake/Output Summary (Last 24 hours) at 4/14/2025 0455  Last data filed at 4/13/2025 1947  Gross per 24 hour   Intake 720 ml   Output 1350 ml   Net -630 ml        Laboratory  Recent Labs     04/11/25  1001 04/13/25  0326 04/14/25  0351   WBC 5.5 5.6 5.5   RBC 2.96* 2.96* 3.29*   HEMOGLOBIN 8.4* 8.4* 9.4*   HEMATOCRIT 27.4* 27.1* 29.7*   MCV 92.6 91.6 90.3   MCH 28.4 28.4 28.6   MCHC 30.7* 31.0* 31.6*   RDW 53.7* 51.7* 50.0   PLATELETCT 193 220 220   MPV 10.1 10.6 10.4     Recent Labs     04/11/25  1001 04/13/25  0326 04/14/25  0351   SODIUM 140 136 139   POTASSIUM 4.7 4.8 4.3   CHLORIDE 111 105 106   CO2 22 22 25   GLUCOSE 95 94 98   BUN 17 16 18   CREATININE 0.85 0.79 0.84   CALCIUM 7.6* 7.8* 8.1*                     Imaging  CTA ABDOMEN PELVIS W & W/O POST PROCESS   Final Result      1.  No CT evidence of active gastrointestinal bleeding.   2.  Colonic diverticulosis.   3.  Right lower lobe consolidation, concerning for pneumonia.              Assessment/Plan  * Lower GI bleed- (present on admission)  Assessment &  Plan  Hemoglobin 7.7>>6.4, decreased from 12.9 on 11/14/2024.  Anemia likely in the setting of acute blood loss, possible component of iron deficiency with iron level of 14 and percent sat of 9, ferritin 342.  Patient states that earlier today around 1300 they noticed blood on their down after standing up.  Subsequently went to the restroom with passage of bright red blood per rectum and possible clots. CTA abdomen pelvis with and without without evidence of active GI bleeding, colonic diverticulosis, right lower lobe consolidation.    Lower GI: Diverticulosis, AVM, malignancy, hemorrhoids     -1u pRBC ordered in ED  -GI consulted in the ED, Dr. Arnold  -NPO  -Avoid NSAIDs  -2 large bore IVs  -Transfuse PRN Hgb<7 and PLT<50  -Consider IV iron infusion and/or starting on oral ferrous sulfate prior to discharge    Elevated troponin- (present on admission)  Assessment & Plan  Troponin 51, likely nonischemic myocardial injury in the setting of GI bleed.  EKG without ST changes, denies chest pain.    -Trend troponin    Normal anion gap metabolic acidosis- (present on admission)  Assessment & Plan  Bicarb 19 and anion gap 8.    -Daily BMP    Stage 3b chronic kidney disease- (present on admission)  Assessment & Plan  Creatinine 0.83, around baseline.    -Avoid nephrotoxic medications    Cardiac pacemaker in situ- (present on admission)  Assessment & Plan  Complete heart block status post PPM 2019    Complete heart block by electrocardiogram (HCC)- (present on admission)  Assessment & Plan  Status post PPM 2019    Venous insufficiency of both lower extremities- (present on admission)  Assessment & Plan  -Hold home Lasix and spironolactone in the setting of GI bleed    Essential hypertension- (present on admission)  Assessment & Plan  -Hold home losartan, carvedilol, hydralazine, spironolactone, Lasix in the setting of GI bleed    Dyslipidemia- (present on admission)  Assessment & Plan  -Continue home atorvastatin         VTE  prophylaxis:   SCDs/TEDs      I have performed a physical exam and reviewed and updated ROS and Plan today (4/13/2025). In review of yesterday's note (4/12/2025), there are no changes except as documented above.  Total time spent 56 minutes. I spent greater than 50% of the time for patient care, counseling, and coordination on this date, including unit/floor time, and face-to-face time with the patient as per interval events, my own review of patient's imaging and lab analysis and developing my assessment and plan above.

## 2025-04-15 ENCOUNTER — APPOINTMENT (OUTPATIENT)
Dept: PHYSICAL THERAPY | Facility: REHABILITATION | Age: 85
DRG: 073 | End: 2025-04-15
Attending: STUDENT IN AN ORGANIZED HEALTH CARE EDUCATION/TRAINING PROGRAM
Payer: MEDICARE

## 2025-04-15 ENCOUNTER — APPOINTMENT (OUTPATIENT)
Dept: OCCUPATIONAL THERAPY | Facility: REHABILITATION | Age: 85
DRG: 073 | End: 2025-04-15
Attending: STUDENT IN AN ORGANIZED HEALTH CARE EDUCATION/TRAINING PROGRAM
Payer: MEDICARE

## 2025-04-15 PROBLEM — R60.9 EDEMA: Status: ACTIVE | Noted: 2025-04-15

## 2025-04-15 PROBLEM — D64.9 ANEMIA: Status: ACTIVE | Noted: 2025-04-15

## 2025-04-15 LAB
25(OH)D3 SERPL-MCNC: 11 NG/ML (ref 30–100)
ALBUMIN SERPL BCP-MCNC: 2.8 G/DL (ref 3.2–4.9)
ALBUMIN/GLOB SERPL: 0.8 G/DL
ALP SERPL-CCNC: 53 U/L (ref 30–99)
ALT SERPL-CCNC: 11 U/L (ref 2–50)
ANION GAP SERPL CALC-SCNC: 9 MMOL/L (ref 7–16)
AST SERPL-CCNC: 32 U/L (ref 12–45)
BASOPHILS # BLD AUTO: 0.2 % (ref 0–1.8)
BASOPHILS # BLD: 0.01 K/UL (ref 0–0.12)
BILIRUB SERPL-MCNC: 0.3 MG/DL (ref 0.1–1.5)
BUN SERPL-MCNC: 17 MG/DL (ref 8–22)
CALCIUM ALBUM COR SERPL-MCNC: 9.3 MG/DL (ref 8.5–10.5)
CALCIUM SERPL-MCNC: 8.3 MG/DL (ref 8.5–10.5)
CHLORIDE SERPL-SCNC: 103 MMOL/L (ref 96–112)
CO2 SERPL-SCNC: 28 MMOL/L (ref 20–33)
CREAT SERPL-MCNC: 0.89 MG/DL (ref 0.5–1.4)
EOSINOPHIL # BLD AUTO: 0.07 K/UL (ref 0–0.51)
EOSINOPHIL NFR BLD: 1.3 % (ref 0–6.9)
ERYTHROCYTE [DISTWIDTH] IN BLOOD BY AUTOMATED COUNT: 50.3 FL (ref 35.9–50)
EST. AVERAGE GLUCOSE BLD GHB EST-MCNC: 128 MG/DL
FLUAV RNA SPEC QL NAA+PROBE: NEGATIVE
FLUBV RNA SPEC QL NAA+PROBE: NEGATIVE
GFR SERPLBLD CREATININE-BSD FMLA CKD-EPI: 63 ML/MIN/1.73 M 2
GLOBULIN SER CALC-MCNC: 3.3 G/DL (ref 1.9–3.5)
GLUCOSE SERPL-MCNC: 88 MG/DL (ref 65–99)
HBA1C MFR BLD: 6.1 % (ref 4–5.6)
HCT VFR BLD AUTO: 29.2 % (ref 37–47)
HGB BLD-MCNC: 9.1 G/DL (ref 12–16)
IMM GRANULOCYTES # BLD AUTO: 0.08 K/UL (ref 0–0.11)
IMM GRANULOCYTES NFR BLD AUTO: 1.5 % (ref 0–0.9)
LYMPHOCYTES # BLD AUTO: 0.76 K/UL (ref 1–4.8)
LYMPHOCYTES NFR BLD: 14.5 % (ref 22–41)
MCH RBC QN AUTO: 28.5 PG (ref 27–33)
MCHC RBC AUTO-ENTMCNC: 31.2 G/DL (ref 32.2–35.5)
MCV RBC AUTO: 91.5 FL (ref 81.4–97.8)
MONOCYTES # BLD AUTO: 0.24 K/UL (ref 0–0.85)
MONOCYTES NFR BLD AUTO: 4.6 % (ref 0–13.4)
NEUTROPHILS # BLD AUTO: 4.09 K/UL (ref 1.82–7.42)
NEUTROPHILS NFR BLD: 77.9 % (ref 44–72)
NRBC # BLD AUTO: 0 K/UL
NRBC BLD-RTO: 0 /100 WBC (ref 0–0.2)
PLATELET # BLD AUTO: 220 K/UL (ref 164–446)
PMV BLD AUTO: 10.6 FL (ref 9–12.9)
POTASSIUM SERPL-SCNC: 4.1 MMOL/L (ref 3.6–5.5)
PROT SERPL-MCNC: 6.1 G/DL (ref 6–8.2)
RBC # BLD AUTO: 3.19 M/UL (ref 4.2–5.4)
RSV RNA SPEC QL NAA+PROBE: NEGATIVE
SARS-COV-2 RNA RESP QL NAA+PROBE: NOTDETECTED
SODIUM SERPL-SCNC: 140 MMOL/L (ref 135–145)
WBC # BLD AUTO: 5.3 K/UL (ref 4.8–10.8)

## 2025-04-15 PROCEDURE — 770010 HCHG ROOM/CARE - REHAB SEMI PRIVAT*

## 2025-04-15 PROCEDURE — 700102 HCHG RX REV CODE 250 W/ 637 OVERRIDE(OP): Performed by: STUDENT IN AN ORGANIZED HEALTH CARE EDUCATION/TRAINING PROGRAM

## 2025-04-15 PROCEDURE — 97110 THERAPEUTIC EXERCISES: CPT

## 2025-04-15 PROCEDURE — 99222 1ST HOSP IP/OBS MODERATE 55: CPT | Performed by: HOSPITALIST

## 2025-04-15 PROCEDURE — 80053 COMPREHEN METABOLIC PANEL: CPT

## 2025-04-15 PROCEDURE — 83036 HEMOGLOBIN GLYCOSYLATED A1C: CPT

## 2025-04-15 PROCEDURE — 85025 COMPLETE CBC W/AUTO DIFF WBC: CPT

## 2025-04-15 PROCEDURE — 99232 SBSQ HOSP IP/OBS MODERATE 35: CPT | Performed by: STUDENT IN AN ORGANIZED HEALTH CARE EDUCATION/TRAINING PROGRAM

## 2025-04-15 PROCEDURE — 0241U HCHG SARS-COV-2 COVID-19 NFCT DS RESP RNA 4 TRGT ED POC: CPT

## 2025-04-15 PROCEDURE — 82306 VITAMIN D 25 HYDROXY: CPT

## 2025-04-15 PROCEDURE — A9270 NON-COVERED ITEM OR SERVICE: HCPCS | Performed by: HOSPITALIST

## 2025-04-15 PROCEDURE — 700102 HCHG RX REV CODE 250 W/ 637 OVERRIDE(OP): Performed by: HOSPITALIST

## 2025-04-15 PROCEDURE — A9270 NON-COVERED ITEM OR SERVICE: HCPCS | Performed by: STUDENT IN AN ORGANIZED HEALTH CARE EDUCATION/TRAINING PROGRAM

## 2025-04-15 PROCEDURE — 36415 COLL VENOUS BLD VENIPUNCTURE: CPT

## 2025-04-15 PROCEDURE — 97162 PT EVAL MOD COMPLEX 30 MIN: CPT

## 2025-04-15 PROCEDURE — 97530 THERAPEUTIC ACTIVITIES: CPT

## 2025-04-15 PROCEDURE — 97535 SELF CARE MNGMENT TRAINING: CPT

## 2025-04-15 RX ORDER — FERROUS GLUCONATE 324(38)MG
324 TABLET ORAL
Status: DISCONTINUED | OUTPATIENT
Start: 2025-04-16 | End: 2025-04-24 | Stop reason: HOSPADM

## 2025-04-15 RX ORDER — GUAIFENESIN/DEXTROMETHORPHAN 100-10MG/5
5 SYRUP ORAL
Status: DISCONTINUED | OUTPATIENT
Start: 2025-04-15 | End: 2025-04-22

## 2025-04-15 RX ORDER — ERGOCALCIFEROL 1.25 MG/1
50000 CAPSULE, LIQUID FILLED ORAL
Status: DISCONTINUED | OUTPATIENT
Start: 2025-04-15 | End: 2025-04-24 | Stop reason: HOSPADM

## 2025-04-15 RX ADMIN — ERGOCALCIFEROL 50000 UNITS: 1.25 CAPSULE ORAL at 09:51

## 2025-04-15 RX ADMIN — HYDRALAZINE HYDROCHLORIDE 100 MG: 50 TABLET ORAL at 08:27

## 2025-04-15 RX ADMIN — OMEPRAZOLE 20 MG: 20 CAPSULE, DELAYED RELEASE ORAL at 20:34

## 2025-04-15 RX ADMIN — HYDRALAZINE HYDROCHLORIDE 100 MG: 50 TABLET ORAL at 20:34

## 2025-04-15 RX ADMIN — CARVEDILOL 25 MG: 12.5 TABLET, FILM COATED ORAL at 17:06

## 2025-04-15 RX ADMIN — LOSARTAN POTASSIUM 50 MG: 50 TABLET, FILM COATED ORAL at 05:11

## 2025-04-15 RX ADMIN — AMOXICILLIN AND CLAVULANATE POTASSIUM 1 TABLET: 875; 125 TABLET, FILM COATED ORAL at 20:34

## 2025-04-15 RX ADMIN — GUAIFENESIN SYRUP AND DEXTROMETHORPHAN 5 ML: 100; 10 SYRUP ORAL at 17:30

## 2025-04-15 RX ADMIN — OMEPRAZOLE 20 MG: 20 CAPSULE, DELAYED RELEASE ORAL at 08:28

## 2025-04-15 RX ADMIN — LOSARTAN POTASSIUM 50 MG: 50 TABLET, FILM COATED ORAL at 17:06

## 2025-04-15 RX ADMIN — GUAIFENESIN SYRUP AND DEXTROMETHORPHAN 5 ML: 100; 10 SYRUP ORAL at 20:33

## 2025-04-15 RX ADMIN — CARVEDILOL 25 MG: 12.5 TABLET, FILM COATED ORAL at 08:28

## 2025-04-15 RX ADMIN — FUROSEMIDE 20 MG: 20 TABLET ORAL at 05:11

## 2025-04-15 RX ADMIN — ATORVASTATIN CALCIUM 20 MG: 10 TABLET, FILM COATED ORAL at 20:34

## 2025-04-15 RX ADMIN — SPIRONOLACTONE 12.5 MG: 25 TABLET ORAL at 05:11

## 2025-04-15 RX ADMIN — SENNOSIDES AND DOCUSATE SODIUM 2 TABLET: 50; 8.6 TABLET ORAL at 20:34

## 2025-04-15 SDOH — ECONOMIC STABILITY: TRANSPORTATION INSECURITY
IN THE PAST 12 MONTHS, HAS LACK OF RELIABLE TRANSPORTATION KEPT YOU FROM MEDICAL APPOINTMENTS, MEETINGS, WORK OR FROM GETTING THINGS NEEDED FOR DAILY LIVING?: NO

## 2025-04-15 SDOH — ECONOMIC STABILITY: TRANSPORTATION INSECURITY
IN THE PAST 12 MONTHS, HAS THE LACK OF TRANSPORTATION KEPT YOU FROM MEDICAL APPOINTMENTS OR FROM GETTING MEDICATIONS?: NO

## 2025-04-15 ASSESSMENT — ACTIVITIES OF DAILY LIVING (ADL): TOILETING: INDEPENDENT

## 2025-04-15 ASSESSMENT — PATIENT HEALTH QUESTIONNAIRE - PHQ9
1. LITTLE INTEREST OR PLEASURE IN DOING THINGS: NOT AT ALL
SUM OF ALL RESPONSES TO PHQ9 QUESTIONS 1 AND 2: 0
2. FEELING DOWN, DEPRESSED, IRRITABLE, OR HOPELESS: NOT AT ALL

## 2025-04-15 ASSESSMENT — BRIEF INTERVIEW FOR MENTAL STATUS (BIMS)
BIMS SUMMARY SCORE: 15
WHAT DAY OF THE WEEK IS IT: CORRECT
ASKED TO RECALL SOCK: YES, NO CUE REQUIRED
WHAT YEAR IS IT: CORRECT
ASKED TO RECALL BLUE: YES, NO CUE REQUIRED
ASKED TO RECALL BED: YES, NO CUE REQUIRED
INITIAL REPETITION OF BED BLUE SOCK - FIRST ATTEMPT: 3
WHAT MONTH IS IT: ACCURATE WITHIN 5 DAYS

## 2025-04-15 NOTE — THERAPY
Occupational Therapy   Initial Evaluation     Patient Name:  Priscilla Hdz  Age:  85 y.o., Sex:  female  Medical Record #:  [id number]  Today's Date: 4/15/2025     Subjective: Pt seen 2x this morning (0700 and 0900). During 0700 eval, pt frustrated to be woken up for therapy as she did not sleep last night, agreeable with encouragement stating she has to use the restroom. Pt pleasant during 0900 session and agreeable to OT session.     Objective:    04/15/25 0701 04/15/25 0901   OT Charge Group   Charges Yes  --    OT Self Care / ADL (Units) 1 2   OT Total Time Spent   OT Individual Total Time Spent (Mins) 60 30   Precautions   Precautions Fall Risk Fall Risk;Cardiopulmonary   Fall Risk History of falls;Poor balance;Poor safety History of falls;Poor balance   Cardiopulmonary  --  Pacemaker   Comments  --  1L wean   Vitals   Pulse Oximetry 92 %  --    Room Air Oximetry 88  --    O2 (LPM) 1 1   O2 Delivery Device Silicone Nasal Cannula Silicone Nasal Cannula   Vitals Comments Trialed RA, 87-89%, returned to 1L, pt does not wear O2 at home  --    Pain 0 - 10 Group   Pain Rating Scale (NPRS) 0 0   Bowel   Last BM  --  04/15/25   Number of Times Stooled  --  1   Stool Description  --  Medium;Brown   Type of Stool (Consistency)  --  Type 4: Like a sausage or snake, smooth and soft   Bladder   Urine Void (mL) 400 ml 350 ml   Urine Color Straw Straw   Number of Times Voided 1 1   Bladder Device Bathroom Bathroom   Cognition    Level of Consciousness Alert  --    Oral Hygiene   Level of Assist Set Up;Stand By Assist  --    Patient Position Seated  --    Grooming   Level of Assist Independent Independent   Patient Position Seated Seated   Upper Body Dressing   Level of Assist Set Up;Stand by Assist  --    Patient Position Seated  --    Clothing Item(s)   (HG)  --    Lower Body Dressing   Level of Assist Moderate Assist  --    Patient Position Seated;Standing  --    Clothing Item(s) Pants;Disposable Brief  --    Additional  Description Assist with threading pant leg;Assist with draw up;Cueing needed;Extra time needed  --    Putting On/Taking Off Footwear   Level of Assist Maximal Assist Minimal Assist   Patient Position Seated Seated   Footwear Type Treaded Socks Treaded Socks   Adaptive Equipment  --  Reacher;Sock Aid   Additional Description  --  Extra time needed;Cueing needed   Shower/Bathe Self   Level of Assist Minimal Assist  --    Patient Position Seated;Standing  --    Adaptive Equipment Fold Down Shower Bench;Shower Chair;Handheld Shower Head;Grab Bars  --    Additional Description Extra time needed;Cueing needed  (Extra time, cueing needed for HH shower head, seated shower at pt normally showers standing)  --    Tub/Shower Transfer   Level of Assist Contact Guard Assist  --    Transfer Type Stand pivot transfer  --    Adaptive Equipment Grab bars  --    Toilet Transfer   Level of Assist Contact Guard Assist Contact Guard Assist   Transfer Type Stand Pivot Transfer Stand Pivot Transfer   Adaptive Equipment Grab bars Grab bars   Assistive Device  --  Wheelchair   Additional Description Extra time needed;Cueing needed  --    Toileting Hygiene   Level of Assist Contact Guard Assist Contact Guard Assist   Additional Description Grab bars;Cueing needed;Extra time needed Grab bars;Extra time needed   Lying to Sitting on Side of Bed   Level of Assist Stand By Assist  --    Chair/Bed-to-Chair Transfer   Level of Assist Minimal Assist  --    Transfer Type Stand Pivot Transfer  --    Additional Description Cueing needed  (Cueing needed for sequencing, hand placement)  --    Patient Scheduling Information   OT Time 30/60 minutes  --    Primary or Coverage OT Reid MATOS  --    Early Morning Treatment No 0700 Treatment  --    Other Scheduling Comments Requests female OT for showering/dressing  --    Strengths & Barriers   Strengths Alert and oriented;Supportive family  --    Barriers Agitation;Decreased endurance;Difficulty following  instructions;Fatigue;Generalized weakness;Impaired activity tolerance;Impaired balance;Impaired carryover of learning;Impaired functional cognition;Limited mobility  --    OT DME Recommendations   Additional Equipment (NOT TYPICALLY COVERED BY INSURANCE) Sock aid;Reacher;Long handled shoe horn  --    Interdisciplinary Plan of Care Collaboration   IDT Collaboration with  Nursing Physical Therapist   Patient Position at End of Therapy Seated;Chair Alarm On;Call Light within Reach;Tray Table within Reach Seated;Chair Alarm On;Call Light within Reach;Tray Table within Reach   Collaboration Comments CLORAMU BELLO     Patient has been educated on the following items: occupational therapy role, occupational therapy plan of care, rehab expectations, goal setting, ADL needs, patient passport, and fall risk and use of call light    0900 Session  AE Education/Demo  - Pt issued sock aid, reacher, dressing stick and LH sponge for LB dressing and bathing.  - Pt provided verbal education and demo of dressing stick, reacher and sock aid for doffing/donning socks, pants and underwear.   - Pt provided verbal education for LH sponge to assist with back and BLE    Pt reports she owns a different type of sock aid and reacher. Pt normally wears compression stockings, not socks.    Assessment:   Patient is a 85 y.o. female with a diagnosis of GI bleed [K92.2].    Currently, the patient has the following precautions: Fall Risk: (P) History of falls, Poor balance, Cardiopulmonary: (P) Pacemaker, Comments: (P) 1L wean    Patient's PMH includes:   Past Medical History:   Diagnosis Date    Arthritis     Hip, knee    ASTHMA     AV block, complete (HCC) 06/2019    Status post pacemaker implantation    Cataract     Bilateral IOL    CKD (chronic kidney disease)     Hyperlipidemia     Hypertension 10/2023    Echocardiogram with normal LV size, LVEF 69%. Normal LA and RV, enlarged RA. Trace MR. RVSP 35mmHg.    Menopause     Osteopenia     Pedal edema       Patient's Past Surgical History:   Past Surgical History:   Procedure Laterality Date    COLONOSCOPY N/A 4/10/2025    Procedure: COLONOSCOPY;  Surgeon: Juan Manuel James M.D.;  Location: SURGERY SAME DAY [facility];  Service: Gastroenterology    WA UPPER GI ENDOSCOPY,BIOPSY N/A 4/9/2025    Procedure: GASTROSCOPY, WITH BIOPSY;  Surgeon: Ly Arnold M.D.;  Location: SURGERY SAME DAY [facility];  Service: Gastroenterology    COLONOSCOPY N/A 4/9/2025    Procedure: COLONOSCOPY;  Surgeon: Ly Arnold M.D.;  Location: SURGERY SAME DAY [facility];  Service: Gastroenterology    PACEMAKER INSERTION Left 06/20/2019    [employer] [facility] S DR MRI [id number] implanted by Dr. Mcginnis.    HIP REVISION TOTAL Right 8/29/2018    Procedure: HIP REVISION TOTAL;  Surgeon: Oniel Goodson M.D.;  Location: SURGERY [facility];  Service: Orthopedics    HIP ARTHROPLASTY TOTAL Right 8/9/2016    Procedure: HIP ARTHROPLASTY TOTAL;  Surgeon: Oniel Goodson M.D.;  Location: SURGERY [facility];  Service:     CATARACT EXTRACTION WITH IOL Bilateral 2012       OT evaluation performed today; functional performance at today's assessment is as above. At baseline, the patient required assistance with ADLs/IADLs (able to complete ADLs IND, assist with some IADLs. The patient is limited by the following barriers: Agitation, Decreased endurance, Difficulty following instructions, Fatigue, Generalized weakness, Impaired activity tolerance, Impaired balance, Impaired carryover of learning, Impaired functional cognition, Limited mobility    Additional factors influencing patient status and prognosis include: prior level of function, PMH, and the above comorbidities.    The patient is performing well below their baseline level of function and will benefit from an interdisciplinary high intensity rehabilitation program to maximize functional independence with ADL's, IADL's and functional mobility, decrease burden of care, and support a safe  return home with family support.    Plan:   Recommend Occupational Therapy  minutes per day 5-6 days per week for 10-14 days for the following treatments:  OT Group Therapy, OT Self Care/ADL, OT Cognitive Skill Dev, OT Community Reintegration, OT Manual Ther Technique, OT Neuro Re-Ed/Balance, OT Therapeutic Activity, OT Aquatic Therapy, OT Evaluation, and OT Therapeutic Exercise.    Passport items to be completed:  Perform bathroom transfers, complete dressing, complete feeding, get ready for the day, prepare a simple meal, participate in household tasks, adapt home for safety needs, demonstrate home exercise program, complete caregiver training     Goals: Long term and short term goals have been discussed with patient and they are in agreement.    Occupational Therapy Goals (Active)       Problem: Bathing       Dates: Start:  04/15/25         Goal: STG-Within one week, patient will bathe CGA w/ AE PRN       Dates: Start:  04/15/25               Problem: Dressing       Dates: Start:  04/15/25         Goal: STG-Within one week, patient will dress EOB Parth with AE PRN       Dates: Start:  04/15/25               Problem: Functional Transfers       Dates: Start:  04/15/25         Goal: STG-Within one week, patient will transfer to toilet SBA w/ LRD       Dates: Start:  04/15/25               Problem: Grooming       Dates: Start:  04/15/25         Goal: STG-Within one week, patient will complete grooming standing at sink SBA       Dates: Start:  04/15/25               Problem: OT Long Term Goals       Dates: Start:  04/15/25         Goal: LTG-By discharge, patient will complete basic self care tasks SUP-Jovani       Dates: Start:  04/15/25            Goal: LTG-By discharge, patient will perform bathroom transfers SUP-Jovani       Dates: Start:  04/15/25            Goal: LTG-By discharge, patient will dress EOB SUP-Jovani w/ AE PRN       Dates: Start:  04/15/25            Goal: LTG-By discharge, patient will complete  funct mob household distance SBA-SUP w/ LRD       Dates: Start:  04/15/25

## 2025-04-15 NOTE — DIETARY
"Nutrition Services: Initial Assessment     Day 1 of admit. Priscilla Hdz is 85 y.o., female with admitting DX of GI bleed [K92.2].    Consult Received for: MST - Malnutrition Screening Tool score of 3 for unsure of unplanned weight loss and poor appetite.    Current Hospital Problems List:    Principal Problem:    GI bleed (POA: Yes)  Resolved Problems:    * No resolved hospital problems. *    Nutrition Assessment:      Height: 165.1 cm (5' 5\")  Weight: 65.5 kg (144 lb 6.4 oz)  Weight taken via: Bed Scale  BMI Calculated: 24.03  BMI Classification: WNL       Weight Readings from Last 5 encounters:   Wt Readings from Last 5 Encounters:   04/14/25 65.5 kg (144 lb 6.4 oz)   04/13/25 69.1 kg (152 lb 5.4 oz)   12/03/24 64.9 kg (143 lb)   10/29/24 66.7 kg (147 lb)   10/29/24 66.7 kg (147 lb)         Objective:   Pertinent Medical Hx: CKD, Hyperlipidemia, Hypertension, Pacemaker  Pertinent Labs: 4/15: Alb 2.8 (L)  Pertinent Meds: Lipitor, Lasix, Senna, Aldactone  Skin/Wounds:  Wound team consult pending. Per wound team note at [facility] on 4/9, patient with unstageable wound to coccyx.  Food Allergies: None known  Last BM:  Type 4: Like a sausage or snake, smooth and soft  04/15/25       Current Diet Order/Intake:   Low fiber diet, advance diet as tolerated.      Subjective:   Spoke with patient and son at bedside. Observed patient ate most of eggs and one pancake at breakfast. Patient states she eats ok. Per chart review, recorded PO intake at [facility] was usually %.    Per son, patient does sometimes only eat a few bites at meals. Discussed option of oral nutrition supplement and patient agreed this would be helpful. Per discussion with patient, will send one Ensure at breakfast daily. Noted patient also requesting no chicken or carrots.       Nutrition Focused Physical Exam (NFPE)  Weight Loss: No loss per chart review.  Muscle Mass: Well Nourished  Subcutaneous Fat: Well Nourished  Fluid " Accumulation: 2+ edema BLE per flow sheets  Reduced  Strength: N/A in acute care setting.    Nutrition Diagnosis:      Increased nutrient Needs related to healing needs as evidenced by unstageable wound to coccyx per wound team assessment 4/9.      Based on RD assessment at this time, Patient does not meet criteria in congruence with ASPEN/[employer] guidelines for malnutrition    Nutrition Interventions:      Recommend Ensure Plus High Protein (provides 350 calories) 20 g protein per 8 fl oz) QD.  Patient aware of active plan of care as appropriate.       Nutrition Monitoring and Evaluation:      Monitor nutrition POC, goal for >50% intake from meals and supplements.  Monitor vital signs pertinent to nutrition.    RD following and will provide updated recommendations as indicated.      Julianna Farah R.D.                                         ASPEN/AND CRITERIA FOR MALNUTRITION

## 2025-04-15 NOTE — CONSULTS
HOSPITAL MEDICINE CONSULTATION    Requesting Physician:  Dr. Moeller    Reason for Consult:  Cough    History of Present Illness:  The patient is an 85-year-old  female with past medical history significant for complete heart block status post permanent pacemaker, hypertension, and dyslipidemia.  She was admitted to [facility] on 4/8/25 for bloody stools.  CT abdomen and pelvis showed no active gastrointestinal bleeding, diverticulosis, and right lower lobe pneumonia.  Esophagogastroduodenoscopy done on 4/9/25 revealed non-obstructing Schatzki ring, hiatal hernia, and gastric erosions.  Colonoscopy performed on 4/10/25 was negative for active bleeding.  She was transfused and placed on a proton pump inhibitor.  Due to her ongoing functional debility, the patient was transferred to [facility] on 4/14/25.  Hospital Medicine consultation is requested to assist in the management of this patient's cough.  It is noted that she has not been treated for her pneumonia.  She also has anemia and vitamin D deficiency.    Review of Systems:  Review of Systems   Constitutional:  Positive for malaise/fatigue. Negative for chills and fever.   HENT: Negative.     Eyes: Negative.    Respiratory:  Positive for cough and shortness of breath.    Cardiovascular:  Negative for chest pain and palpitations.   Gastrointestinal:  Negative for abdominal pain, nausea and vomiting.   Genitourinary: Negative.    Musculoskeletal: Negative.    Skin:  Negative for itching and rash.   Endo/Heme/Allergies:  Does not bruise/bleed easily.   All other systems reviewed and are negative.      Allergies:  Allergies   Allergen Reactions    Other Environmental Cough     Pine, Dust       Medications:    Current Facility-Administered Medications:     vitamin D2 (Ergocalciferol) (Drisdol) capsule 50,000 Units, 50,000 Units, Oral, Q7 DAYS, Ludwig Moeller D.O., 50,000 Units at 04/15/25 9795     amoxicillin-clavulanate (Augmentin) 875-125 MG per tablet 1 Tablet, 1 Tablet, Oral, Q12HRS, Fiorella Woodward M.D.    guaiFENesin dextromethorphan (Robitussin DM) 100-10 MG/5ML syrup 5 mL, 5 mL, Oral, 4X/DAY WITH MEALS + NIGHTLY, Fiorella Woodward M.D.    [START ON 4/16/2025] ferrous gluconate (Fergon) tablet 324 mg, 324 mg, Oral, QDAY with Breakfast, Fiorella Woodward M.D.    Respiratory Therapy Consult, , Nebulization, Continuous RT, Ludwig Moeller D.O.    Pharmacy Consult Request ...Pain Management Review 1 Each, 1 Each, Other, PHARMACY TO DOSE, Ludwig Moeller D.O.    hydrALAZINE (Apresoline) tablet 25 mg, 25 mg, Oral, Q8HRS PRN, Ludwig Moeller D.O.    acetaminophen (Tylenol) tablet 650 mg, 650 mg, Oral, Q4HRS PRN, Ludwig Moeller D.O.    senna-docusate (Pericolace Or Senokot S) 8.6-50 MG per tablet 2 Tablet, 2 Tablet, Oral, Q EVENING, 2 Tablet at 04/14/25 2026 **AND** polyethylene glycol/lytes (Miralax) Packet 1 Packet, 1 Packet, Oral, QDAY PRN, Ludwig Moeller D.O.    docusate sodium (Enemeez) enema 283 mg, 283 mg, Rectal, QDAY PRN, Luwdig Moeller D.O.    magnesium hydroxide (Milk Of Magnesia) suspension 30 mL, 30 mL, Oral, QDAY PRN, Ludwig Moeller D.O.    carboxymethylcellulose (Refresh Tears) 0.5 % ophthalmic drops 1 Drop, 1 Drop, Both Eyes, PRN, Ludwig Moeller D.O.    benzocaine-menthol (Cepacol) lozenge 1 Lozenge, 1 Lozenge, Mouth/Throat, Q2HRS PRN, Ludwig Moeller D.O.    mag hydrox-al hydrox-simeth (Maalox Plus Es Or Mylanta Ds) suspension 20 mL, 20 mL, Oral, Q2HRS PRN, KAREN MurilloO.    ondansetron (Zofran ODT) dispertab 4 mg, 4 mg, Oral, 4X/DAY PRN **OR** ondansetron (Zofran) syringe/vial injection 4 mg, 4 mg, Intramuscular, 4X/DAY PRN, NASH Murillo.O.    sodium chloride (Ocean) 0.65 % nasal spray 2 Spray, 2 Spray, Nasal, PRN, NASH Murillo.O.    oxyCODONE immediate-release (Roxicodone) tablet 2.5 mg, 2.5 mg, Oral, Q3HRS PRN **OR** oxyCODONE immediate-release (Roxicodone) tablet 5 mg, 5 mg, Oral, Q3HRS PRN, Ludwig  Moeller, D.O.    melatonin tablet 3 mg, 3 mg, Oral, QHS PRN, Ludwig Moeller, D.O.    atorvastatin (Lipitor) tablet 20 mg, 20 mg, Oral, QDAY, Ludwig Moeller, D.O., 20 mg at 04/14/25 2026    carvedilol (Coreg) tablet 25 mg, 25 mg, Oral, BID WITH MEALS, Ludwig Moeller, D.O., 25 mg at 04/15/25 0828    furosemide (Lasix) tablet 20 mg, 20 mg, Oral, DAILY, Ludwig Moeller, D.O., 20 mg at 04/15/25 0511    hydrALAZINE (Apresoline) tablet 100 mg, 100 mg, Oral, BID, Ludwig Moeller, D.O., 100 mg at 04/15/25 0827    losartan (Cozaar) tablet 50 mg, 50 mg, Oral, BID, Ludwig Moeller, D.O., 50 mg at 04/15/25 0511    omeprazole (PriLOSEC) capsule 20 mg, 20 mg, Oral, BID, Ludwig Moeller, D.O., 20 mg at 04/15/25 0828    spironolactone (Aldactone) tablet 12.5 mg, 12.5 mg, Oral, DAILY, Ludwig Moeller, D.O., 12.5 mg at 04/15/25 0511    Past Medical/Surgical History:  Past Medical History:   Diagnosis Date    Arthritis     Hip, knee    ASTHMA     AV block, complete (HCC) 06/2019    Status post pacemaker implantation    Cataract     Bilateral IOL    CKD (chronic kidney disease)     Hyperlipidemia     Hypertension 10/2023    Echocardiogram with normal LV size, LVEF 69%. Normal LA and RV, enlarged RA. Trace MR. RVSP 35mmHg.    Menopause     Osteopenia     Pedal edema      Past Surgical History:   Procedure Laterality Date    COLONOSCOPY N/A 4/10/2025    Procedure: COLONOSCOPY;  Surgeon: Juan Manuel James M.D.;  Location: SURGERY SAME DAY [facility];  Service: Gastroenterology    VA UPPER GI ENDOSCOPY,BIOPSY N/A 4/9/2025    Procedure: GASTROSCOPY, WITH BIOPSY;  Surgeon: Ly Arnold M.D.;  Location: SURGERY SAME DAY [facility];  Service: Gastroenterology    COLONOSCOPY N/A 4/9/2025    Procedure: COLONOSCOPY;  Surgeon: Ly Aronld M.D.;  Location: SURGERY SAME DAY [facility];  Service: Gastroenterology    PACEMAKER INSERTION Left 06/20/2019    [facility] [facility] [employer] [facility] S DR MRI [id number] implanted by Dr. Mcginnis.    HIP REVISION TOTAL Right 8/29/2018    Procedure: HIP  REVISION TOTAL;  Surgeon: Oniel Goodson M.D.;  Location: SURGERY [facility];  Service: Orthopedics    HIP ARTHROPLASTY TOTAL Right 8/9/2016    Procedure: HIP ARTHROPLASTY TOTAL;  Surgeon: Oniel Goodson M.D.;  Location: SURGERY [facility];  Service:     CATARACT EXTRACTION WITH IOL Bilateral 2012       Social History:  Social History     Socioeconomic History    Marital status:      Spouse name: Not on file    Number of children: Not on file    Years of education: Not on file    Highest education level: Not on file   Occupational History    Not on file   Tobacco Use    Smoking status: Never    Smokeless tobacco: Never   Vaping Use    Vaping status: Never Used   Substance and Sexual Activity    Alcohol use: Yes     Alcohol/week: 0.6 oz     Types: 1 Glasses of wine per week     Comment: once every 2-3 months.     Drug use: No    Sexual activity: Not Currently     Partners: Male   Other Topics Concern    Not on file   Social History Narrative    Retired , [employer] [employer].      Social Drivers of Health     Financial Resource Strain: Not on file   Food Insecurity: No Food Insecurity (4/14/2025)    Hunger Vital Sign     Worried About Running Out of Food in the Last Year: Never true     Ran Out of Food in the Last Year: Never true   Transportation Needs: No Transportation Needs (4/15/2025)    PRAPARE - Transportation     Lack of Transportation (Medical): No     Lack of Transportation (Non-Medical): No   Physical Activity: Not on file   Stress: Not on file   Social Connections: Not on file   Intimate Partner Violence: Not At Risk (4/14/2025)    Humiliation, Afraid, Rape, and Kick questionnaire     Fear of Current or Ex-Partner: No     Emotionally Abused: No     Physically Abused: No     Sexually Abused: No   Housing Stability: Low Risk  (4/14/2025)    Housing Stability Vital Sign     Unable to Pay for Housing in the Last Year: No     Number of Times Moved in the Last Year: 0      Homeless in the Last Year: No       Family History:  Family History   Problem Relation Age of Onset    Heart Disease Father     Hyperlipidemia Father     Hypertension Father     Heart Disease Brother     Hypertension Mother     Hyperlipidemia Mother        Physical Examination:   Vitals:    04/15/25 0755 04/15/25 1032 04/15/25 1301 04/15/25 1427   BP: 135/58 102/42 95/73 (!) 148/57   Pulse: 70 65 73 76   Resp:  18  18   Temp:  37.2 °C (99 °F)  37.6 °C (99.6 °F)   TempSrc:  Temporal  Temporal   SpO2:  96% 95% 95%   Weight:       Height:           Physical Exam  Vitals reviewed.   Constitutional:       General: She is not in acute distress.     Appearance: She is ill-appearing and toxic-appearing. She is not diaphoretic.   HENT:      Head: Normocephalic and atraumatic.      Right Ear: External ear normal.      Left Ear: External ear normal.      Nose: Nose normal.      Mouth/Throat:      Pharynx: Oropharynx is clear.   Eyes:      General:         Right eye: No discharge.         Left eye: No discharge.      Extraocular Movements: Extraocular movements intact.      Conjunctiva/sclera: Conjunctivae normal.   Cardiovascular:      Rate and Rhythm: Normal rate and regular rhythm.   Pulmonary:      Effort: No respiratory distress.      Breath sounds: No wheezing.      Comments: Decreased BS  Abdominal:      General: Bowel sounds are normal. There is no distension.      Palpations: Abdomen is soft.      Tenderness: There is no abdominal tenderness. There is no guarding or rebound.   Musculoskeletal:      Cervical back: Normal range of motion and neck supple.      Right lower leg: Edema present.      Left lower leg: Edema present.   Skin:     General: Skin is warm and dry.   Neurological:      Comments: awake         Laboratory Data:  Recent Labs     04/13/25  0326 04/14/25  0351 04/15/25  0528   WBC 5.6 5.5 5.3   RBC 2.96* 3.29* 3.19*   HEMOGLOBIN 8.4* 9.4* 9.1*   HEMATOCRIT 27.1* 29.7* 29.2*   MCV 91.6 90.3 91.5   MCH  28.4 28.6 28.5   MCHC 31.0* 31.6* 31.2*   RDW 51.7* 50.0 50.3*   PLATELETCT 220 220 220   MPV 10.6 10.4 10.6     Recent Labs     04/13/25  0326 04/14/25  0351 04/15/25  0528   SODIUM 136 139 140   POTASSIUM 4.8 4.3 4.1   CHLORIDE 105 106 103   CO2 22 25 28   GLUCOSE 94 98 88   BUN 16 18 17   CREATININE 0.79 0.84 0.89   CALCIUM 7.8* 8.1* 8.3*       Imaging:      Impressions/Recommendations:  GI bleed  Pt presented w/ bloody stools  4/8/25 CT Abd/Pelvis no active GIB, diverticulosis, RLL PNA  EGD 4/9/25 non-obstructing Schatzki ring, hiatal hernia, and gastric erosion  Colonoscopy 4/10/25 no active bleeding  S/P [facility] at [facility]  On PPI bid    Complete heart block by electrocardiogram (HCC)  S/P PPM    Dyslipidemia  On Lipitor    Vitamin D deficiency disease  Vit D level 11  Start supplementation    Cough  4/8/25 CT Abd/Pelvis no active GIB, diverticulosis, RLL PNA  Will check Viral Panel given normal WBC  Start empiric Augmentin and Guaifenesin  RT protocol    Edema  Check BNP and Echo  On Lasix and Aldactone    Essential hypertension  On Coreg, Losartan, Hydralazine, Lasix, and Aldactone  Monitor blood pressure trends    Anemia  Has normocytic indices  Fe 14  Start supplements  Follow H/H    Full Code    Thank you for the opportunity to assist in this patient's care.  We will continue to follow along with you.

## 2025-04-15 NOTE — ASSESSMENT & PLAN NOTE
4/8/25 CT Abd/Pelvis no active GIB, diverticulosis, RLL PNA  COVID/FLU/RSV negative  Now off supplemental O2 S/P empiric abx  Was on Augmentin x 5 days, completed 4/20/25

## 2025-04-15 NOTE — ASSESSMENT & PLAN NOTE
U/S BLE negative for DVT  Echo EF 70%, RVSP 33 mmHg  BNP improved w/ diuresis  Leg swelling resolved  Discontinued Lasix and Aldactone for azotemia

## 2025-04-15 NOTE — DISCHARGE PLANNING
CASE MANAGEMENT INITIAL ASSESSMENT    Admit Date:  4/14/2025     I met with pt to discuss role of case management / discharge planning / team conference.  She provided me w/ information for assessment.    Patient is a  85 y.o. female transferred from [facility] where she was hospitalized from 4/8 to 4/14.     Dc Disposition:  Return home; SSH w 3 NIECY; +walk in shower w/ grab bars; pt has a fww/4ww; rosi - Akil has been staying w/ her since 5/2024; he is able to provide 24/7 care.     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    Ptosis of left eyelid 10/18/2022    Age-related macular degeneration 10/18/2022    Glaucoma suspect of both eyes 10/18/2022    Stage 3b chronic kidney disease 08/02/2021    Cardiac pacemaker in situ 07/01/2019    Pedal edema 06/19/2019    Complete heart block by electrocardiogram (HCC) 06/18/2019    Venous insufficiency of both lower extremities 02/12/2019    Anxiety 07/26/2017    Essential hypertension     Osteopenia     Dyslipidemia      Prior Living Situation:  Housing / Facility: 1 Story House in [place]  Lives with - Patient's Self Care Capacity: Adult Children - Akil; pt has 3 other sons who live locally; 1 in [place].     Prior Level of Function:  Medication Management: Independent  Finances: Independent  Home Management: Dependent  Shopping: Dependent  Prior Level Of Mobility: Supervision With Device in Community, Supervision With Device in Home (Per notes, son has been providing CGA-SBA for mobility)  Driving / Transportation: Relatives / Others Provide Transportation    Support Systems:  Primary : Akil Hdz Son    Other support systems: Lg Hdz Son   Advance  Directives: No  Power of  (Name & Phone): none    Previous Services Utilized:   Equipment Owned: Front-Wheel Walker, 4-Wheel Walker, Grab Bar(s) In Tub / Shower  Prior Services: Intermittent Physical Support for ADL Per Family    Other Information:  Occupation (Pre-Hospital Vocational): Retired Due To Age  Primary Payor Source: Medicare A  Secondary Payor Source: Other (Comments) (Sara)  Primary Care Practitioner : Beth Friedman MD  Other MDs: MOE    Patient / Family Goal:  Return home where her son has been staying w/ her since 5/24    Plan:  1. Continue to follow patient through hospitalization and provide discharge planning in collaboration with patient, family, physicians and ancillary services.     2. Utilize community resources to ensure a safe discharge.     3. Anticipate home health; follow up with PCP/GI; PATIENT INDICATES SHE ALSO HAS A CARDIOLOGY.

## 2025-04-15 NOTE — PROGRESS NOTES
NURSING DAILY NOTE    Name: Priscilla Hdz   Date of Admission: 4/14/2025   Admitting Diagnosis: GI bleed  Attending Physician: KWAKU MARTINEZ D.O.  Allergies: Other environmental    Safety  Patient Assist  CGAx1  Patient Precautions  Precautions: Fall Risk  Fall Risk: Poor balance  Bed Transfer Status     Toilet Transfer Status      Assistive Devices  Rails, Wheelchair push  Oxygen  Silicone Nasal Cannula  Diet/Therapeutic Dining  Current Diet Order   Procedures    Diet Order Diet: Low Fiber(GI Soft) (Advance Diet as Tolerated)     Pill Administration  whole and one at a time  Agitated Behavioral Scale     ABS Level of Severity       Fall Risk  Has the patient had a fall this admission?      Greer Chino Fall Risk Scoring  18, HIGH RISK  Fall Risk Safety Measures  bed alarm and chair alarm    Vitals  Temperature: 36.5 °C (97.7 °F)  Temp src: Oral  Pulse: 66  Respiration: 16  Blood Pressure : 124/41  Blood Pressure MAP (Calculated): 69 MM HG  BP Location: Right, Upper Arm  Patient BP Position: Abdul's Position     Oxygen  Pulse Oximetry: 94 %  O2 (LPM): 1  O2 Delivery Device: Silicone Nasal Cannula    Bowel and Bladder  Last Bowel Movement  04/14/25  Stool Type     Bowel Device     Continent  Bladder: Did not void   Bowel: No movement  Bladder Function  Urine Void (mL): 600 ml  Number of Times Voided: 1  Urine Color: Yellow  Genitourinary Assessment   Bladder Assessment (WDL):  Within Defined Limits  Urine Color: Yellow  Bladder Device: Bathroom  $ Bladder Scan Results (mL): 77    Skin  Ezequiel Score   12  Sensory Interventions   Bed Types: Standard/Trauma Mattress, Low Airloss  Moisture Interventions  Moisturizers/Barriers: Moisturizer       Pain  Pain Rating Scale  0 - No Pain  Pain Location     Pain Location Orientation     Pain Interventions   Declines    ADLs    Bathing   Patient Refused Bathing  Linen Change   Partial  Personal Hygiene     Chlorhexidine Bath      Oral Care     Teeth/Dentures     Shave      Nutrition Percentage Eaten     Environmental Precautions     Patient Turns/Positioning     Patient Turns Assistance/Tolerance     Bed Positions     Head of Bed Elevated         Psychosocial/Neurologic Assessment  Psychosocial Assessment  Psychosocial (WDL):  WDL Except  Patient Behaviors: Anxious  Neurologic Assessment  Neuro (WDL): Exceptions to WDL  Level of Consciousness: Alert  Orientation Level: Disoriented to time  Cognition: Memory Loss, Poor safety awareness  Speech: Clear  Muscle Strength Right Arm: Good Strength Against Gravity and Moderate Resistance  Muscle Strength Left Arm: Good Strength Against Gravity and Moderate Resistance  Muscle Strength Right Leg: Fair Strength against Gravity but No Resistance  Muscle Strength Left Leg: Fair Strength against Gravity but No Resistance  EENT (WDL):  Within Defined Limits    Cardio/Pulmonary Assessment  Edema   RLE Edema: 2+  LLE Edema: 2+  Respiratory Breath Sounds  RUL Breath Sounds: Clear  RML Breath Sounds: Clear  RLL Breath Sounds: Clear  KEVIN Breath Sounds: Clear  LLL Breath Sounds: Clear  Cardiac Assessment   Cardiac (WDL):  WDL Except (pacemaker)

## 2025-04-15 NOTE — CARE PLAN
"The patient is Watcher - Medium risk of patient condition declining or worsening    Shift Goals  Clinical Goals: Fall prevention  Patient Goals: Get stronger, sleep and rest well tonight    Progress made toward(s) clinical / shift goals:    Problem: Knowledge Deficit - Standard  Goal: Patient and family/care givers will demonstrate understanding of plan of care, disease process/condition, diagnostic tests and medications  Outcome: Progressing     Problem: Skin Integrity  Goal: Skin integrity is maintained or improved  Outcome: Progressing  Note: Assisted with T&P. Pt on low airloss. Wound consult in placed.      Problem: Fall Risk - Rehab  Goal: Patient will remain free from falls  Outcome: Progressing  Note: Greer Chino Fall risk Assessment Score: 18    High fall risk Interventions   - Bed and strip alarm   - Yellow sign by the door   - Yellow wrist band \"Fall risk\"  - Room near to the nurse station  - Do not leave patient unattended in the bathroom  - Fall risk education provided       Problem: Psychosocial  Goal: Patient's level of anxiety will decrease  Outcome: Progressing  Flowsheets (Taken 4/14/2025 2145)  Decrease Anxiety Level: Implemented stimuli reduction, calming techniques  Patient Behaviors: Anxious  Note: Anxious R/T hospitalization/new to Rehab. Provided emotional support and reassurance. Explained reasons and benefits of being here in Rehab. Distraction like watching TV to divert attention from anxious thoughts and encourage rest and sleep.       Patient is not progressing towards the following goals:      "

## 2025-04-15 NOTE — THERAPY
Physical Therapy   Initial Evaluation     Patient Name:  Priscilla Hdz  Age:  85 y.o., Sex:  female  Medical Record #:  [id number]  Today's Date: 4/15/2025     Subjective: Pt reports fatigue during both sessions detailed below, but motivated to participate.     Objective:    04/15/25 1030   PT Charge Group   Charges Yes   PT Therapeutic Exercise (Units) 1   PT Evaluation PT Evaluation Mod   PT Total Time Spent   PT Individual Total Time Spent (Mins) 60   Precautions   Precautions Fall Risk;Cardiopulmonary   Fall Risk History of falls;Poor balance  (BLE weakness)   Cardiopulmonary Pacemaker   Comments 1L wean   Vitals   O2 (LPM) 1   O2 Delivery Device Silicone Nasal Cannula   Pain 0 - 10 Group   Location Hip   Location Orientation Right   Prior Living Situation   Prior Services Intermittent Physical Support for ADL Per Family   Housing / Facility 1 Story House   Steps Into Home 3   Steps In Home 0   Rail Right Rail  (Steps in Home)   Equipment Owned Front-Wheel Walker;4-Wheel Walker   Lives with - Patient's Self Care Capacity Adult Children   Comments pt's son lives with her and provides some support c/ IADLs including cooking   Prior Level of Functional Mobility   Bed Mobility Independent   Transfer Status Independent   Ambulation Independent   Distance Ambulation Household Distances   Assistive Devices Used Front-Wheel Walker   Stairs Independent   Comments pt states she uses FWW at home and 4WW in community; pt states she rarely leaves her home   Passive ROM Lower Body   Passive ROM Lower Body WDL   Active ROM Lower Body    Active ROM Lower Body  X   Lt Ankle Dorsiflexion Degrees   (no AROM present)   Rt Ankle Dorsiflexion Degrees   (no AROM present)   Strength Lower Body   Lower Body Strength  X   Rt Hip Flexion Strength 3 (F)   Rt Hip Abduction Strength 3 (F)   Rt Hip Adduction Strength 3+ (F+)   Rt Knee Flexion Strength 3+ (F+)   Rt Knee Extension Strength 4- (G-)   Rt Ankle Dorsiflexion Strength 0 (Zero)   Rt  Ankle Plantar Flexion Strength 0 (Zero)   Lt Hip Flexion Strength 3+ (F+)   Lt Hip Abduction Strength 3+ (F+)   Lt Hip Adduction Strength 3+ (F+)   Lt Knee Flexion Strength 3+ (F+)   Lt Knee Extension Strength 4- (G-)   Lt Ankle Dorsiflexion Strength 0 (Zero)   Lt Ankle Plantar Flexion Strength 0 (Zero)   Sensation Lower Body   Lower Extremity Sensation   X  (c/o RLE numbness and tingling)   Observed Balance Assessment   Sitting Balance (Static) Supervised   Sitting Balance (Dynamic) Stand By Assist   Standing Balance (Static) Contact Guard Assist   Standing Balance (Dynamic) Minimal Assist   Roll Left and Right   Assistance Needed Incidental touching   Physical Assistance Level 25% or less   CARE Score - Roll Left and Right 3   Roll Left and Right   Level of Assist Contact Guard Assist   Additional Description Completed on regular bed;Extra time needed   Sit to Lying   Assistance Needed Incidental touching   Physical Assistance Level 25% or less   CARE Score - Sit to Lying 3   Sit to Lying   Level of Assist Contact Guard Assist   Additional Description Completed on regular bed;Extra time needed   Lying to Sitting on Side of Bed   Assistance Needed Incidental touching   Physical Assistance Level 25% or less   CARE Score - Lying to Sitting on Side of Bed 3   Lying to Sitting on Side of Bed   Level of Assist Contact Guard Assist   Additional Description Completed on regular bed;Extra time needed   Sit to Stand   Assistance Needed Adaptive equipment;Physical assistance;Set-up / clean-up   Physical Assistance Level 26%-50%   CARE Score - Sit to Stand 3   Sit to Stand   Level of Assist Minimal Assist   Assistive Devices FWW   Additional Description Cueing needed;Extra time needed   Chair/Bed-to-Chair Transfer   Assistance Needed Physical assistance;Adaptive equipment;Set-up / clean-up   Physical Assistance Level 26%-50%   CARE Score - Chair/Bed-to-Chair Transfer 3   Chair/Bed-to-Chair Transfer   Level of Assist Minimal  Assist   Transfer Type Stand Pivot Transfer   Assistive Devices FWW   Additional Description Cueing needed;Extra time needed   Car Transfer   Assistance Needed Physical assistance;Verbal cues;Set-up / clean-up;Adaptive equipment   Physical Assistance Level 51%-75%   CARE Score - Car Transfer 2   Car Transfer   Level of Assist Moderate Assist   Transfer Type Stand Step Transfer   Assistive Device FWW   Additional Description Cueing needed;Extra time needed   Walk 10 Feet   Assistance Needed Physical assistance;Adaptive equipment   Physical Assistance Level 26%-50%   CARE Score - Walk 10 Feet 3   Walk 50 Feet with Two Turns   Reason if not Attempted Medical concerns   CARE Score - Walk 50 Feet with Two Turns 88   Walk 150 Feet   Reason if not Attempted Medical concerns   CARE Score - Walk 150 Feet 88   Walking 10 Feet on Uneven Surfaces   Reason if not Attempted Medical concerns   CARE Score - Walking 10 Feet on Uneven Surfaces 88   Ambulation   Level of Assist Minimal Assist   Assistive Device FWW   Additional Description Cueing needed;Extra time needed   Distance 25ft   1 Step (Curb)   Reason if not Attempted Safety concerns   CARE Score - 1 Step (Curb) 88   Curbs   Level of Assist Unable to Participate   4 Steps   Reason if not Attempted Safety concerns   CARE Score - 4 Steps 88   12 Steps   Reason if not Attempted Safety concerns   CARE Score - 12 Steps 88   Stairs   Level of Assist Unable to Participate   Picking Up Object   Reason if not Attempted Safety concerns   CARE Score - Picking Up Object 88   Wheel 50 Feet with Two Turns   Assistance Needed Physical assistance   Physical Assistance Level Total assistance   CARE Score - Wheel 50 Feet with Two Turns 1   Type of Wheelchair/Scooter Manual   Wheel 150 Feet   Assistance Needed Physical assistance   Physical Assistance Level Total assistance   CARE Score - Wheel 150 Feet 1   Type of Wheelchair/Scooter Manual   Patient Scheduling Information   PT Time 30/60  minutes   Primary or Coverage PT José Miguelbria   OT Time 30/60 minutes   Primary or Coverage OT Female OT - pt requested   [facility] 0700 Treatment   Other Scheduling Comments Requests female OT for showering/dressing   Problem List    Problems Pain;Impaired Transfers;Impaired Ambulation;Functional Strength Deficit;Decreased Activity Tolerance   Strengths & Barriers   Strengths Able to follow instructions;Alert and oriented;Effective communication skills;Good insight into deficits/needs;Motivated for self care and independence;Pleasant and cooperative;Willingly participates in therapeutic activities   Barriers Decreased endurance;Fatigue;Generalized weakness;Home accessibility;Impaired activity tolerance;Pain;Limited mobility   Benefit   Therapy Benefit Patient Would Benefit from Inpatient Rehabilitation Physical Therapy to Maximize Functional [place] with ADLs, IADLs and Mobility.   Current Discharge Plan   Current Discharge Plan Return to Prior Living Situation   PT DME Recommendations   Wheelchair   (TBD)   Assistive Device   (pt owns a FWW and 4WW)   Interdisciplinary Plan of Care Collaboration   IDT Collaboration with  Occupational Therapist   Patient Position at End of Therapy Seated;Chair Alarm On;Call Light within Reach;Tray Table within Reach;Phone within Reach   Collaboration Comments CLOF   Impairment Assessments   Impairment Assessments Passive ROM;Active ROM;Strength;Sensation;Balance     Patient has been educated on the following items: physical therapy role, physical therapy plan of care, rehab expectations, goal setting, mobility needs, patient passport, and fall risk and use of call light    Therapeutic Exercise  Purpose: to improve strength and to improve functional endurance  Interventions:   Lower body exercises:   Seated program -   Ankle pumps, 1 set of 10, Right, and Left; B PROM  Hip abduction, 1 set of 10, Bilateral, and Light Resistance Theraband  Long arc quad, 1 set of 10,  Right, and Left  Marching, Reciprocal and 1 set of 10  Hamstring curl, 1 set of 10, Right, Left, and Light Resistance Theraband  Seated HEP handout provided and reviewed         04/15/25 1301   PT Charge Group   PT Therapeutic Exercise (Units) 1   PT Therapeutic Activities (Units) 1   PT Total Time Spent   PT Individual Total Time Spent (Mins) 30   Vitals   Pulse 73   Patient BP Position Sitting   Blood Pressure  95/73   Pulse Oximetry 95 %   O2 (LPM) 1   O2 Delivery Device Silicone Nasal Cannula   Vitals Comments following first interval on Motomed   Bowel   Number Of Times Incontinent Of Bowel  1   Bowel Device Bathroom;Diaper   Last BM 04/15/25   Number of Times Stooled 1   Stool Description--OPTIONAL Small;Brown   Bladder   Urine Color Yellow   Number of Times Voided 1   Bladder Device Bathroom   Sit to Lying   Level of Assist Contact Guard Assist   Sit to Stand   Level of Assist Minimal Assist   Assistive Devices Grab Bar  (bed rail)   Additional Description Cueing needed;Extra time needed   Chair/Bed-to-Chair Transfer   Level of Assist Minimal Assist   Transfer Type Stand Step Transfer   Assistive Devices   (bed rail)   Additional Description Cueing needed;Extra time needed   Toilet Transfer   Level of Assist Contact Guard Assist   Transfer Type Stand Pivot Transfer   Adaptive Equipment Grab bars   Assistive Device Wheelchair   Additional Description Cueing needed;Extra time needed   Toileting Hygiene   Level of Assist Contact Guard Assist   Additional Description Grab bars;Extra time needed   Interdisciplinary Plan of Care Collaboration   Patient Position at End of Therapy In Bed;Bed Alarm On;Call Light within Reach;Tray Table within Reach;Phone within Reach     Therapeutic Activities  Purpose: to improve performance and function of daily activities and to increase safety with activities of daily life and mobility related to activities of daily life  Interventions:   Toilet transfer  Self-care  MaxA for LB  dressing  W/c cushion replaced c/ roho cushion due to pt c/o buttocks pain following prolonged sitting in chair today    Therapeutic Exercise  Purpose: to improve strength and to improve functional endurance  Interventions:   [place] Med Cycle: Gear Level 3, x2 intervals for 3 minutes; 3-4 minute rest break  STS x3 reps c/ UE support      Assessment:    Patient is a 85 y.o. female with a diagnosis of GI bleed [K92.2].     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 Normal LA and RV, enlarged RA. Trace MR. RVSP 35mmHg.    Menopause     Osteopenia     Pedal edema      Patient's Past Surgical History:   Past Surgical History:   Procedure Laterality Date    COLONOSCOPY N/A 4/10/2025    Procedure: COLONOSCOPY;  Surgeon: Juan Manuel James M.D.;  Location: SURGERY SAME DAY [facility];  Service: Gastroenterology    SC UPPER GI ENDOSCOPY,BIOPSY N/A 4/9/2025    Procedure: GASTROSCOPY, WITH BIOPSY;  Surgeon: Ly Arnold M.D.;  Location: SURGERY SAME DAY [facility];  Service: Gastroenterology    COLONOSCOPY N/A 4/9/2025    Procedure: COLONOSCOPY;  Surgeon: Ly Arnold M.D.;  Location: SURGERY SAME DAY [facility];  Service: Gastroenterology    PACEMAKER INSERTION Left 06/20/2019    Netadminhelio HOFF DR MRI [id number] implanted by Dr. Mcginnis.    HIP REVISION TOTAL Right 8/29/2018    Procedure: HIP REVISION TOTAL;  Surgeon: Oniel Goodson M.D.;  Location: SURGERY [facility];  Service: Orthopedics    HIP ARTHROPLASTY TOTAL Right 8/9/2016    Procedure: HIP ARTHROPLASTY TOTAL;  Surgeon:  Oniel Goodson M.D.;  Location: SURGERY [facility];  Service:     CATARACT EXTRACTION WITH IOL Bilateral 2012       PT evaluation performed today; functional performance at today's assessment is as above. At baseline, the patient was independent with mobility.  The patient is limited by the following barriers: Decreased endurance, Fatigue, Generalized weakness, Home accessibility, Impaired activity tolerance, Pain, Limited mobility    Additional factors influencing patient status and prognosis include: prior level of function, PMH, and the above comorbidities.     The patient is performing well below their baseline level of function and will benefit from an interdisciplinary high intensity rehabilitation program to maximize functional independence, decrease burden of care, and support a safe return home with family support.    Plan:    Recommend Physical Therapy  minutes per day 5-7 days per week for 10-14 days for the following treatments: PT Group Therapy, PT Gait Training, PT Self Care/Home Eval, PT Therapeutic Exercises, PT Neuro Re-Ed/Balance, PT Aquatic Therapy, PT Therapeutic Activity, PT Evaluation, PT Wheelchair Management , and PT Community Integration.    Passport items to be completed:  Get in/out of bed safely, in/out of a vehicle, safely use mobility device, walk or wheel around home/community, navigate up and down stairs, show how to get up/down from the ground, ensure home is accessible, demonstrate HEP, complete caregiver training    Goals:  Long-term and short-term goals have been discussed with patient and they are in agreement.    Physical Therapy Problems (Active)       Problem: Mobility       Dates: Start:  04/15/25         Goal: STG-Within one week, patient will ambulate 40ft c/ FWW and CGA       Dates: Start:  04/15/25            Goal: STG-Within one week, patient will ascend and descend 3 stairs c/ BHR and ModA       Dates: Start:  04/15/25               Problem: Mobility  Transfers       Dates: Start:  04/15/25         Goal: STG-Within one week, patient will perform bed mobility c/ SPV       Dates: Start:  04/15/25            Goal: STG-Within one week, patient will sit to stand c/ FWW and CGA       Dates: Start:  04/15/25            Goal: STG-Within one week, patient will transfer bed to chair c/ FWW and CGA       Dates: Start:  04/15/25               Problem: PT-Long Term Goals       Dates: Start:  04/15/25         Goal: LTG-By discharge, patient will ambulate 50ft c/ FWW and SBA       Dates: Start:  04/15/25            Goal: LTG-By discharge, patient will transfer one surface to another c/ FWW and SBA       Dates: Start:  04/15/25            Goal: LTG-By discharge, patient will perform home exercise program c/ set-up       Dates: Start:  04/15/25            Goal: LTG-By discharge, patient will ambulate up/down 3 stairs c/ RHR and Kelly       Dates: Start:  04/15/25            Goal: LTG-By discharge, patient will transfer in/out of a car c/ FWW and Kelly       Dates: Start:  04/15/25

## 2025-04-15 NOTE — ASSESSMENT & PLAN NOTE
Pt presented w/ bloody stools  4/8/25 CT Abd/Pelvis no active GIB, diverticulosis, RLL PNA  EGD 4/9/25 non-obstructing Schatzki ring, hiatal hernia, and gastric erosion  Colonoscopy 4/10/25 no active bleeding  S/P [facility] at [facility]  Continue PPI bid  GI F/U

## 2025-04-15 NOTE — PROGRESS NOTES
"  Physical Medicine & Rehabilitation Progress Note    Encounter Date: 4/15/2025    Interval Events (Subjective):  VS: 1L O2.   Last BM 4/15. Voiding urine.   Pertinent labs today: hgb 9.1, Ca 8.3, Vit D 11  Missed doses of scheduled meds: none  PRNs used: none    Patient seen at bedside today.  Patient states breathing is okay right now, does not use oxygen at home.  Does admit to poor appetite and poor sleep last night due to intermittent sleep interruptions.  Admits to fatigue.  Discussed with internal medicine about chest x-ray results and edema.      Objective:  Vital signs: /58   Pulse 70   Temp 36.9 °C (98.5 °F) (Temporal)   Resp 18   Ht 1.651 m (5' 5\")   Wt 65.5 kg (144 lb 6.4 oz)   SpO2 92%   BMI 24.03 kg/m²   General: not in distress.  HEENT: PERRL. EOMI.  Pulmonary: Breathing comfortably on 1L O2.   Cardiovascular: Rate as above, warm extremities  Gastrointestinal: Abdomen non-distended  Extremities/MSK: Bilateral lower extremity edema  Integumentary: Visible skin intact.  Psychiatric: Normal affect.  Neurologic: Awake and alert. Answering questions appropriately. Normal speech.       Laboratory Values:  Recent Results (from the past 72 hours)   EKG    Collection Time: 25  5:38 PM   Result Value Ref Range    Report       [facility]    Test Date:  2025  Pt Name:    MOISE MENCHACA                Department: 171  MRN:        [id number]                      Room:       [facility]  Gender:     Female                       Technician: AMY  :        [id number]                   Requested By:DORETHA IVAN  Order #:    [id number]                    Reading MD: Wali Galvin MD    Measurements  Intervals                                Axis  Rate:       69                           P:          0  VA:         186                          QRS:        47  QRSD:       129                          T:          269  QT:         474  QTc:        508    Interpretive Statements  A-V dual-paced " complexes w/ some inhibition  Electronically Signed On 04- 17:38:11 PDT by Wali Galvin MD     CBC WITHOUT DIFFERENTIAL    Collection Time: 04/13/25  3:26 AM   Result Value Ref Range    WBC 5.6 4.8 - 10.8 K/uL    RBC 2.96 (L) 4.20 - 5.40 M/uL    Hemoglobin 8.4 (L) 12.0 - 16.0 g/dL    Hematocrit 27.1 (L) 37.0 - 47.0 %    MCV 91.6 81.4 - 97.8 fL    MCH 28.4 27.0 - 33.0 pg    MCHC 31.0 (L) 32.2 - 35.5 g/dL    RDW 51.7 (H) 35.9 - 50.0 fL    Platelet Count 220 164 - 446 K/uL    MPV 10.6 9.0 - 12.9 fL   Basic Metabolic Panel    Collection Time: 04/13/25  3:26 AM   Result Value Ref Range    Sodium 136 135 - 145 mmol/L    Potassium 4.8 3.6 - 5.5 mmol/L    Chloride 105 96 - 112 mmol/L    Co2 22 20 - 33 mmol/L    Glucose 94 65 - 99 mg/dL    Bun 16 8 - 22 mg/dL    Creatinine 0.79 0.50 - 1.40 mg/dL    Calcium 7.8 (L) 8.5 - 10.5 mg/dL    Anion Gap 9.0 7.0 - 16.0   ESTIMATED GFR    Collection Time: 04/13/25  3:26 AM   Result Value Ref Range    GFR (CKD-EPI) 72 >60 mL/min/1.73 m 2   Basic Metabolic Panel    Collection Time: 04/14/25  3:51 AM   Result Value Ref Range    Sodium 139 135 - 145 mmol/L    Potassium 4.3 3.6 - 5.5 mmol/L    Chloride 106 96 - 112 mmol/L    Co2 25 20 - 33 mmol/L    Glucose 98 65 - 99 mg/dL    Bun 18 8 - 22 mg/dL    Creatinine 0.84 0.50 - 1.40 mg/dL    Calcium 8.1 (L) 8.5 - 10.5 mg/dL    Anion Gap 8.0 7.0 - 16.0   CBC WITHOUT DIFFERENTIAL    Collection Time: 04/14/25  3:51 AM   Result Value Ref Range    WBC 5.5 4.8 - 10.8 K/uL    RBC 3.29 (L) 4.20 - 5.40 M/uL    Hemoglobin 9.4 (L) 12.0 - 16.0 g/dL    Hematocrit 29.7 (L) 37.0 - 47.0 %    MCV 90.3 81.4 - 97.8 fL    MCH 28.6 27.0 - 33.0 pg    MCHC 31.6 (L) 32.2 - 35.5 g/dL    RDW 50.0 35.9 - 50.0 fL    Platelet Count 220 164 - 446 K/uL    MPV 10.4 9.0 - 12.9 fL   ESTIMATED GFR    Collection Time: 04/14/25  3:51 AM   Result Value Ref Range    GFR (CKD-EPI) 67 >60 mL/min/1.73 m 2   CBC with Differential    Collection Time: 04/15/25  5:28 AM   Result  Value Ref Range    WBC 5.3 4.8 - 10.8 K/uL    RBC 3.19 (L) 4.20 - 5.40 M/uL    Hemoglobin 9.1 (L) 12.0 - 16.0 g/dL    Hematocrit 29.2 (L) 37.0 - 47.0 %    MCV 91.5 81.4 - 97.8 fL    MCH 28.5 27.0 - 33.0 pg    MCHC 31.2 (L) 32.2 - 35.5 g/dL    RDW 50.3 (H) 35.9 - 50.0 fL    Platelet Count 220 164 - 446 K/uL    MPV 10.6 9.0 - 12.9 fL    Neutrophils-Polys 77.90 (H) 44.00 - 72.00 %    Lymphocytes 14.50 (L) 22.00 - 41.00 %    Monocytes 4.60 0.00 - 13.40 %    Eosinophils 1.30 0.00 - 6.90 %    Basophils 0.20 0.00 - 1.80 %    Immature Granulocytes 1.50 (H) 0.00 - 0.90 %    Nucleated RBC 0.00 0.00 - 0.20 /100 WBC    Neutrophils (Absolute) 4.09 1.82 - 7.42 K/uL    Lymphs (Absolute) 0.76 (L) 1.00 - 4.80 K/uL    Monos (Absolute) 0.24 0.00 - 0.85 K/uL    Eos (Absolute) 0.07 0.00 - 0.51 K/uL    Baso (Absolute) 0.01 0.00 - 0.12 K/uL    Immature Granulocytes (abs) 0.08 0.00 - 0.11 K/uL    NRBC (Absolute) 0.00 K/uL   Comp Metabolic Panel (CMP)    Collection Time: 04/15/25  5:28 AM   Result Value Ref Range    Sodium 140 135 - 145 mmol/L    Potassium 4.1 3.6 - 5.5 mmol/L    Chloride 103 96 - 112 mmol/L    Co2 28 20 - 33 mmol/L    Anion Gap 9.0 7.0 - 16.0    Glucose 88 65 - 99 mg/dL    Bun 17 8 - 22 mg/dL    Creatinine 0.89 0.50 - 1.40 mg/dL    Calcium 8.3 (L) 8.5 - 10.5 mg/dL    Correct Calcium 9.3 8.5 - 10.5 mg/dL    AST(SGOT) 32 12 - 45 U/L    ALT(SGPT) 11 2 - 50 U/L    Alkaline Phosphatase 53 30 - 99 U/L    Total Bilirubin 0.3 0.1 - 1.5 mg/dL    Albumin 2.8 (L) 3.2 - 4.9 g/dL    Total Protein 6.1 6.0 - 8.2 g/dL    Globulin 3.3 1.9 - 3.5 g/dL    A-G Ratio 0.8 g/dL   Vitamin D, 25-hydroxy (blood)    Collection Time: 04/15/25  5:28 AM   Result Value Ref Range    25-Hydroxy   Vitamin D 25 11 (L) 30 - 100 ng/mL   ESTIMATED GFR    Collection Time: 04/15/25  5:28 AM   Result Value Ref Range    GFR (CKD-EPI) 63 >60 mL/min/1.73 m 2       Medications:  Scheduled Medications   Medication Dose Frequency    Pharmacy Consult Request  1 Each  PHARMACY TO DOSE    senna-docusate  2 Tablet Q EVENING    atorvastatin  20 mg QDAY    carvedilol  25 mg BID WITH MEALS    furosemide  20 mg DAILY    hydrALAZINE  100 mg BID    losartan  50 mg BID    omeprazole  20 mg BID    spironolactone  12.5 mg DAILY     PRN medications: Respiratory Therapy Consult, hydrALAZINE, acetaminophen, senna-docusate **AND** polyethylene glycol/lytes, docusate sodium, magnesium hydroxide, carboxymethylcellulose, benzocaine-menthol, mag hydrox-al hydrox-simeth, ondansetron **OR** ondansetron, sodium chloride, oxyCODONE immediate-release **OR** oxyCODONE immediate-release, melatonin    Diet:  Current Diet Order   Procedures    Diet Order Diet: Low Fiber(GI Soft) (Advance Diet as Tolerated)       Medical Decision Making and Plan:  GI bleed  -Admitted with dark red clots in stool.  Hemoglobin found to be 7.7, dropped to 6.4. CT abd/pelv negative for acute GI bleeding. EGD showed hiatal hernia, nonobstructing Schatzki ring, gastric erosion.  -PPI BID  -PT and OT for mobility and ADLs. Per guidelines, 15 hours per week between PT, OT and/or SLP.  -Follow up with GI     Acute blood loss anemia  -Received transfusion at acute hospital  -On admission to rehab: Most recent Hgb 9.4.  Check morning labs.     Distal LE weakness  -on admission: has weakness in bilateral ankle dorsiflexion and plantarflexion.  Maybe due to edema.  Monitor for now.  Question possible neuropathy? although sensation intact.     Hypertension  -on admission: coreg 25mg BID, losartan 50mg BID, spironolactone 12.5mg daily. Monitor for need to adjust medications  -IM following  -Continue Coreg, losartan, spironolactone     LE venous insufficiency   LE edema  -on admission: chronic swelling.  Recent ultrasound Doppler negative for DVT. compression stockings. lasix 20mg daily.   -4/15: IM consult.   -Continue Lasix      Cough  -on admission: has nonproductive cough. Check CXR. Check AM labs.   -4/15: CXR appears to have fluid  overload in lungs. Will have IM consult.      Acute respiratory failure  -On admission: On 1 L supplemental O2.  Wean as tolerated.     History of CKD stage III  -Creatinine has remained within normal limits recently  -monitor     History of pacemaker  -Known history of prior complete heart block, status post pacemaker in 2019    Hyperlipidemia  -On home dose Lipitor     Pain - prn Tylenol, oxycodone    Vit D deficiency - 4/15: vit D 11. Plan for ergocalciferol 50,000u weekly while in rehab. Will need follow up with PCP.      Bladder management  -monitor for urinary retention. Bladder scan and ICP per protocol.      Bowel management  -senna/docusate for prophylaxis     Skin - Patient at risk for skin breakdown due to debility in areas including sacrum, achilles, elbows and head in addition to other sites. Nursing to assess skin daily.      DVT Ppx - Patient not cleared for DVT chemoprophylaxis.  Recent ultrasound negative for DVT.      UPCOMING LABS/IMAGING: per IM      HOSPITALIST FOLLOWING: Yes     DISPO: Home. Lives in [facility] with [facility]. Her son lives with her and can provide support upon discharge.      STIVEN: TBD     DISCHARGE SPECIALIST FOLLOW UP: Gastroenterology       Patient to scheduled follow up with their PCP within 2 weeks from discharge from the [facility].      ____________________________________    Ludwig Moeller D.O.  Physical Medicine & Rehabilitation   ____________________________________    Total time:  41 minutes. Time spent included pre-rounding review of vitals and tests, unit/floor time, face-to-face time with the patient including physical examination, care coordination, counseling of patient and/or family, ordering medications/procedures/tests, discussion with CM, PT, OT, SLP and/or other healthcare providers, and documentation in the electronic medical record.

## 2025-04-16 ENCOUNTER — APPOINTMENT (OUTPATIENT)
Dept: OCCUPATIONAL THERAPY | Facility: REHABILITATION | Age: 85
DRG: 073 | End: 2025-04-16
Attending: STUDENT IN AN ORGANIZED HEALTH CARE EDUCATION/TRAINING PROGRAM
Payer: MEDICARE

## 2025-04-16 ENCOUNTER — APPOINTMENT (OUTPATIENT)
Dept: PHYSICAL THERAPY | Facility: REHABILITATION | Age: 85
DRG: 073 | End: 2025-04-16
Attending: STUDENT IN AN ORGANIZED HEALTH CARE EDUCATION/TRAINING PROGRAM
Payer: MEDICARE

## 2025-04-16 LAB
ANION GAP SERPL CALC-SCNC: 8 MMOL/L (ref 7–16)
BUN SERPL-MCNC: 19 MG/DL (ref 8–22)
CALCIUM SERPL-MCNC: 8.2 MG/DL (ref 8.5–10.5)
CHLORIDE SERPL-SCNC: 103 MMOL/L (ref 96–112)
CO2 SERPL-SCNC: 27 MMOL/L (ref 20–33)
CREAT SERPL-MCNC: 0.88 MG/DL (ref 0.5–1.4)
ERYTHROCYTE [DISTWIDTH] IN BLOOD BY AUTOMATED COUNT: 50.7 FL (ref 35.9–50)
GFR SERPLBLD CREATININE-BSD FMLA CKD-EPI: 63 ML/MIN/1.73 M 2
GLUCOSE SERPL-MCNC: 97 MG/DL (ref 65–99)
HCT VFR BLD AUTO: 28.2 % (ref 37–47)
HGB BLD-MCNC: 8.8 G/DL (ref 12–16)
MCH RBC QN AUTO: 28.6 PG (ref 27–33)
MCHC RBC AUTO-ENTMCNC: 31.2 G/DL (ref 32.2–35.5)
MCV RBC AUTO: 91.6 FL (ref 81.4–97.8)
NT-PROBNP SERPL IA-MCNC: 3040 PG/ML (ref 0–125)
PLATELET # BLD AUTO: 203 K/UL (ref 164–446)
PMV BLD AUTO: 10.7 FL (ref 9–12.9)
POTASSIUM SERPL-SCNC: 4 MMOL/L (ref 3.6–5.5)
PROCALCITONIN SERPL-MCNC: 0.11 NG/ML
RBC # BLD AUTO: 3.08 M/UL (ref 4.2–5.4)
SODIUM SERPL-SCNC: 138 MMOL/L (ref 135–145)
WBC # BLD AUTO: 9.1 K/UL (ref 4.8–10.8)

## 2025-04-16 PROCEDURE — 97530 THERAPEUTIC ACTIVITIES: CPT

## 2025-04-16 PROCEDURE — 85027 COMPLETE CBC AUTOMATED: CPT

## 2025-04-16 PROCEDURE — 94760 N-INVAS EAR/PLS OXIMETRY 1: CPT

## 2025-04-16 PROCEDURE — 80048 BASIC METABOLIC PNL TOTAL CA: CPT

## 2025-04-16 PROCEDURE — 97535 SELF CARE MNGMENT TRAINING: CPT

## 2025-04-16 PROCEDURE — 97530 THERAPEUTIC ACTIVITIES: CPT | Mod: CQ

## 2025-04-16 PROCEDURE — 700102 HCHG RX REV CODE 250 W/ 637 OVERRIDE(OP): Performed by: STUDENT IN AN ORGANIZED HEALTH CARE EDUCATION/TRAINING PROGRAM

## 2025-04-16 PROCEDURE — 99232 SBSQ HOSP IP/OBS MODERATE 35: CPT | Performed by: HOSPITALIST

## 2025-04-16 PROCEDURE — A9270 NON-COVERED ITEM OR SERVICE: HCPCS | Performed by: STUDENT IN AN ORGANIZED HEALTH CARE EDUCATION/TRAINING PROGRAM

## 2025-04-16 PROCEDURE — 700102 HCHG RX REV CODE 250 W/ 637 OVERRIDE(OP): Performed by: HOSPITALIST

## 2025-04-16 PROCEDURE — 84145 PROCALCITONIN (PCT): CPT

## 2025-04-16 PROCEDURE — A9270 NON-COVERED ITEM OR SERVICE: HCPCS | Performed by: HOSPITALIST

## 2025-04-16 PROCEDURE — 83880 ASSAY OF NATRIURETIC PEPTIDE: CPT

## 2025-04-16 PROCEDURE — 97116 GAIT TRAINING THERAPY: CPT | Mod: CQ

## 2025-04-16 PROCEDURE — 97110 THERAPEUTIC EXERCISES: CPT

## 2025-04-16 PROCEDURE — 36415 COLL VENOUS BLD VENIPUNCTURE: CPT

## 2025-04-16 PROCEDURE — 97110 THERAPEUTIC EXERCISES: CPT | Mod: CQ

## 2025-04-16 PROCEDURE — 99233 SBSQ HOSP IP/OBS HIGH 50: CPT | Performed by: STUDENT IN AN ORGANIZED HEALTH CARE EDUCATION/TRAINING PROGRAM

## 2025-04-16 PROCEDURE — 770010 HCHG ROOM/CARE - REHAB SEMI PRIVAT*

## 2025-04-16 RX ORDER — CARVEDILOL 12.5 MG/1
12.5 TABLET ORAL 2 TIMES DAILY WITH MEALS
Status: DISCONTINUED | OUTPATIENT
Start: 2025-04-16 | End: 2025-04-17

## 2025-04-16 RX ADMIN — OMEPRAZOLE 20 MG: 20 CAPSULE, DELAYED RELEASE ORAL at 20:13

## 2025-04-16 RX ADMIN — FERROUS GLUCONATE 324 MG: 324 TABLET ORAL at 08:29

## 2025-04-16 RX ADMIN — GUAIFENESIN SYRUP AND DEXTROMETHORPHAN 5 ML: 100; 10 SYRUP ORAL at 11:39

## 2025-04-16 RX ADMIN — AMOXICILLIN AND CLAVULANATE POTASSIUM 1 TABLET: 875; 125 TABLET, FILM COATED ORAL at 20:13

## 2025-04-16 RX ADMIN — LOSARTAN POTASSIUM 50 MG: 50 TABLET, FILM COATED ORAL at 05:11

## 2025-04-16 RX ADMIN — GUAIFENESIN SYRUP AND DEXTROMETHORPHAN 5 ML: 100; 10 SYRUP ORAL at 17:12

## 2025-04-16 RX ADMIN — SPIRONOLACTONE 12.5 MG: 25 TABLET ORAL at 05:11

## 2025-04-16 RX ADMIN — HYDRALAZINE HYDROCHLORIDE 100 MG: 50 TABLET ORAL at 08:28

## 2025-04-16 RX ADMIN — OMEPRAZOLE 20 MG: 20 CAPSULE, DELAYED RELEASE ORAL at 08:29

## 2025-04-16 RX ADMIN — HYDRALAZINE HYDROCHLORIDE 100 MG: 50 TABLET ORAL at 20:13

## 2025-04-16 RX ADMIN — AMOXICILLIN AND CLAVULANATE POTASSIUM 1 TABLET: 875; 125 TABLET, FILM COATED ORAL at 08:29

## 2025-04-16 RX ADMIN — CARVEDILOL 25 MG: 12.5 TABLET, FILM COATED ORAL at 08:29

## 2025-04-16 RX ADMIN — GUAIFENESIN SYRUP AND DEXTROMETHORPHAN 5 ML: 100; 10 SYRUP ORAL at 20:13

## 2025-04-16 RX ADMIN — CARVEDILOL 12.5 MG: 12.5 TABLET, FILM COATED ORAL at 17:12

## 2025-04-16 RX ADMIN — GUAIFENESIN SYRUP AND DEXTROMETHORPHAN 5 ML: 100; 10 SYRUP ORAL at 08:28

## 2025-04-16 RX ADMIN — FUROSEMIDE 20 MG: 20 TABLET ORAL at 05:11

## 2025-04-16 RX ADMIN — LOSARTAN POTASSIUM 50 MG: 50 TABLET, FILM COATED ORAL at 17:12

## 2025-04-16 RX ADMIN — ATORVASTATIN CALCIUM 20 MG: 10 TABLET, FILM COATED ORAL at 20:13

## 2025-04-16 ASSESSMENT — ENCOUNTER SYMPTOMS
VOMITING: 0
FEVER: 0
SHORTNESS OF BREATH: 0
BRUISES/BLEEDS EASILY: 0
EYES NEGATIVE: 1
PALPITATIONS: 0
MUSCULOSKELETAL NEGATIVE: 1
ABDOMINAL PAIN: 0
COUGH: 1
POLYDIPSIA: 0
SHORTNESS OF BREATH: 1
NAUSEA: 0
CHILLS: 0

## 2025-04-16 ASSESSMENT — PAIN DESCRIPTION - PAIN TYPE: TYPE: ACUTE PAIN;CHRONIC PAIN

## 2025-04-16 NOTE — FLOWSHEET NOTE
04/16/25 1000   Events/Summary/Plan   Events/Summary/Plan Oximetry check   Vital Signs   Pulse 82   Respiration 16   Pulse Oximetry 93 %   $ Pulse Oximetry (Spot Check) Yes   Respiratory Assessment   Level of Consciousness Alert   Chest Exam   Work Of Breathing / Effort Within Normal Limits   Breath Sounds   RUL Breath Sounds Clear   RML Breath Sounds Clear   RLL Breath Sounds Clear   KEVIN Breath Sounds Clear   LLL Breath Sounds Clear   Secretions   Cough Non Productive   Oxygen   O2 (LPM) 0.5   O2 Delivery Device Silicone Nasal Cannula     Titrated too  half a liter of oxygen.

## 2025-04-16 NOTE — CARE PLAN
Problem: Mobility  Goal: STG-Within one week, patient will ambulate 40ft c/ FWW and CGA  Outcome: Not Met  Goal: STG-Within one week, patient will ascend and descend 3 stairs c/ BHR and ModA  Outcome: Not Met     Problem: Mobility Transfers  Goal: STG-Within one week, patient will perform bed mobility c/ SPV  Outcome: Not Met  Goal: STG-Within one week, patient will sit to stand c/ FWW and CGA  Outcome: Not Met  Goal: STG-Within one week, patient will transfer bed to chair c/ FWW and CGA  Outcome: Not Met   No STG met, pt evaluated on 4/15

## 2025-04-16 NOTE — THERAPY
Occupational Therapy  Daily Treatment     Patient Name:  Priscilla Hdz  Age:  85 y.o., Sex:  female  Medical Record #:  [id number]  Today's Date:  4/16/2025    Precautions  Precautions: (P) Fall Risk, Cardiopulmonary  Fall Risk: (P) Poor balance, History of falls  Cardiopulmonary: (P) Pacemaker  Comments: (P) 1L wean    Subjective: Pt seated in w/c in middle gym, hand off from PT. Son, Akil, present for portion of session. Pt agreeable, frustrated that she didn't get any sleep last night. This OT provided active listening and emotional support including night mask and ear buds, pt in better mood at end of session.     Objective:    04/16/25 0931   OT Charge Group   OT Self Care / ADL (Units) 2   OT Therapy Activity (Units) 1   OT Therapeutic Exercise (Units) 1   OT Total Time Spent   OT Individual Total Time Spent (Mins) 60   Precautions   Precautions Fall Risk;Cardiopulmonary   Fall Risk Poor balance;History of falls   Cardiopulmonary Pacemaker   Comments 1L wean   Vitals   O2 (LPM) 0.5   O2 Delivery Device Silicone Nasal Cannula   Pain 0 - 10 Group   Pain Rating Scale (NPRS) 0   Bowel   Bowel Device Bathroom   Last BM 04/16/25   Number of Times Stooled 1   Stool Description--OPTIONAL Small;Brown   Type of Stool (Consistency)--REQUIRED Type 4: Like a sausage or snake, smooth and soft   Bladder   Urine Color Unable To Evaluate   Number of Times Voided 1   Bladder Device Bathroom   Grooming   Level of Assist Independent   Patient Position Seated   Additional Description Hand hygiene   Putting On/Taking Off Footwear   Level of Assist Maximal Assist   Patient Position Seated   Footwear Type Compression Stockings   Additional Description Extra time needed   Toilet Transfer   Level of Assist Contact Guard Assist   Transfer Type Stand Pivot Transfer   Adaptive Equipment Grab bars   Assistive Device Wheelchair   Additional Description Cueing needed;Extra time needed   Toileting Hygiene   Level of Assist Minimal Assist    Additional Description Assist to pull pants down;Grab bars;Cueing needed   Interdisciplinary Plan of Care Collaboration   IDT Collaboration with  Physical Therapist Assistant (PTA);Respiratory Therapist;Family / Caregiver   Patient Position at End of Therapy Seated;Chair Alarm On;Call Light within Reach;Tray Table within Reach;Self Releasing Lap Belt Applied   Collaboration Comments CLOF/son present/hand off from PT         Therapeutic Activities  Purpose: to improve performance and function of daily activities, to provide patient and family education, and to increase safety with activities of daily life and mobility related to activities of daily life  Interventions:  Patient or family education - Son provided education on pt CLOF, pt goals, role of OT, transition home, doffing/donning SIMON hose and rehab schedule information. Pt also able to provide more details about pt's CLOF including CGA to bathroom, helping more with IADLs.      Therapeutic Exercise  Purpose: to improve strength, to improve ROM/flexibility, and to improve functional endurance  Interventions:   Upper body exercises:   Seated program -   Chest press, 1 set of 10, Bilateral, and Weight 2# dowel  Front arm raise, 1 set of 10, Bilateral, and Other Resistance AAROM  Bicep curls, 2 sets of 10, Bilateral, and Weight 3# dowel    Activities of Daily Living (ADL's)  Purpose: to improve function, safety, and independence with activities of daily living and to provide patient and family education  Interventions:   Lower Body Dressing - pt son provided education on AE for bathing/LB dressing to improve pt IND  Large knee high SIMON hose donned - pt edu on importance of removing during PM hours, also noted on board    Assessment:    Pt tolerated session fair, upset about lack of sleep, however mood improved by end of session. Pt did present with impaired AROM for shoulder flexion reporting pain during movement, improved with AAROM.     Pt and son receptive  to all edu. Son, Akil asked to have staff educated on drinking more water, using incentive spirometer and reported his mom had been more depressed lately. Pt educated on water intake and use of incentive spirometer. When asked if pt would like to talk to Dr. Cowan, pt declined. Will address again at next session.       Strengths: Alert and oriented, Supportive family  Barriers: Agitation, Decreased endurance, Difficulty following instructions, Fatigue, Generalized weakness, Impaired activity tolerance, Impaired balance, Impaired carryover of learning, Impaired functional cognition, Limited mobility    Plan:    Pt would benefit from skilled OT services to address:  - ADLs/IADLs (laundry)  - Strength/endurance/activity tolerance  - Sitting/standing balance  - Funct Mob  - Transfers  - Sit to Stand Training  - UE strength/ROM (shoulder flex/ext)      DME  OT DME Recommendations  Additional Equipment (NOT TYPICALLY COVERED BY INSURANCE): Sock aid, Reacher, Long handled shoe horn    Passport items to be completed:  Perform bathroom transfers, complete dressing, complete feeding, get ready for the day, prepare a simple meal, participate in household tasks, adapt home for safety needs, demonstrate home exercise program, complete caregiver training     Occupational Therapy Goals (Active)       Problem: Bathing       Dates: Start:  04/15/25         Goal: STG-Within one week, patient will bathe CGA w/ AE PRN       Dates: Start:  04/15/25               Problem: Dressing       Dates: Start:  04/15/25         Goal: STG-Within one week, patient will dress EOB Parth with AE PRN       Dates: Start:  04/15/25               Problem: Functional Transfers       Dates: Start:  04/15/25         Goal: STG-Within one week, patient will transfer to toilet SBA w/ LRD       Dates: Start:  04/15/25               Problem: Grooming       Dates: Start:  04/15/25         Goal: STG-Within one week, patient will complete grooming standing at sink SBA        Dates: Start:  04/15/25               Problem: OT Long Term Goals       Dates: Start:  04/15/25         Goal: LTG-By discharge, patient will complete basic self care tasks SUP-Jovani       Dates: Start:  04/15/25            Goal: LTG-By discharge, patient will perform bathroom transfers SUP-Jovani       Dates: Start:  04/15/25            Goal: LTG-By discharge, patient will dress EOB SUP-Jovani w/ AE PRN       Dates: Start:  04/15/25            Goal: LTG-By discharge, patient will complete funct mob household distance SBA-SUP w/ LRD       Dates: Start:  04/15/25

## 2025-04-16 NOTE — CARE PLAN
Problem: Knowledge Deficit - Standard  Goal: Patient and family/care givers will demonstrate understanding of plan of care, disease process/condition, diagnostic tests and medications  Outcome: Progressing     Problem: Skin Integrity  Goal: Skin integrity is maintained or improved  Outcome: Progressing     Problem: Fall Risk - Rehab  Goal: Patient will remain free from falls  Outcome: Progressing     Problem: Psychosocial  Goal: Patient's level of anxiety will decrease  Outcome: Progressing  Goal: Patient's ability to verbalize feelings about condition will improve  Outcome: Progressing     Problem: Respiratory  Goal: Patient will understand use and administration of respiratory medications to improve respiratory function  Outcome: Progressing     Problem: Skin Integrity  Goal: Patient's skin integrity will be maintained or improve  Outcome: Progressing     Problem: Self Care  Goal: Patient will have the ability to perform ADLs independently or with assistance  Outcome: Progressing     Problem: Mobility  Goal: Patient's capacity to carry out activities will improve  Outcome: Progressing       The patient is Watcher - Medium risk of patient condition declining or worsening    Shift Goals  Clinical Goals: safety  Patient Goals: safety, comfort

## 2025-04-16 NOTE — PROGRESS NOTES
NURSING DAILY NOTE    Name: Priscilla Hdz   Date of Admission: 4/14/2025   Admitting Diagnosis: GI bleed  Attending Physician: KWAKU MARTINEZ D.O.  Allergies: Other environmental    Safety  Patient Assist  CGAx1  Patient Precautions  Precautions: Fall Risk, Cardiopulmonary  Fall Risk: Poor balance, History of falls  Cardiopulmonary: Pacemaker  Comments: 1L wean  Bed Transfer Status  Minimal Assist  Toilet Transfer Status   Contact Guard Assist  Assistive Devices  Hand held assist, Wheelchair push  Oxygen  Silicone Nasal Cannula  Diet/Therapeutic Dining  Current Diet Order   Procedures    Diet Order Diet: Low Fiber(GI Soft) (Advance Diet as Tolerated)     Pill Administration  whole  Agitated Behavioral Scale     ABS Level of Severity       Fall Risk  Has the patient had a fall this admission?      Greer [place] Fall Risk Scoring  14, MODERATE RISK  Fall Risk Safety Measures  bed alarm, chair alarm, and poor balance    Vitals  Temperature: 37.6 °C (99.6 °F)  Temp src: Temporal  Pulse: 80  Respiration: 18  Blood Pressure : 125/46  Blood Pressure MAP (Calculated): 72 MM HG  BP Location: Right, Upper Arm  Patient BP Position: Abdul's Position     Oxygen  Pulse Oximetry: 96 %  O2 (LPM): 1  O2 Delivery Device: Silicone Nasal Cannula  Incentive Spirometer Volume: 750 mL    Bowel and Bladder  Last Bowel Movement  04/15/25  Stool Type  Type 4: Like a sausage or snake, smooth and soft  Bowel Device  Bathroom, Diaper  Continent  Bladder: Did not void   Bowel: No movement  Bladder Function  Urine Void (mL): 350 ml  Number of Times Voided: 1  Urine Color: Yellow  Genitourinary Assessment   Bladder Assessment (WDL):  Within Defined Limits  Urine Color: Yellow  Bladder Device: Bathroom  $ Bladder Scan Results (mL): 105    Skin  Ezequiel Score   16  Sensory Interventions   Bed Types: Standard/Trauma Mattress  Skin Preventative Measures: Pillows in Use for Support / Positioning  Moisture Interventions  Moisturizers/Barriers:  Moisturizer       Pain  Pain Rating Scale  0 - No Pain  Pain Location  Hip  Pain Location Orientation  Right  Pain Interventions   Declines    ADLs    Bathing   Shower, Staff (OT)  Linen Change   Partial  Personal Hygiene     Chlorhexidine Bath      Oral Care     Teeth/Dentures     Shave     Nutrition Percentage Eaten     Environmental Precautions     Patient Turns/Positioning  Patient turns self independently side to side without assistance, to offload sacral area  Patient Turns Assistance/Tolerance     Bed Positions     Head of Bed Elevated         Psychosocial/Neurologic Assessment  Psychosocial Assessment  Psychosocial (WDL):  WDL Except  Patient Behaviors: Anxious  Neurologic Assessment  Neuro (WDL): Exceptions to WDL  Level of Consciousness: Alert  Orientation Level: Oriented X4  Cognition: Appropriate judgement, Appropriate for developmental age, Follows commands  Speech: Clear  Muscle Strength Right Arm: Good Strength Against Gravity and Moderate Resistance  Muscle Strength Left Arm: Good Strength Against Gravity and Moderate Resistance  Muscle Strength Right Leg: Fair Strength against Gravity but No Resistance  Muscle Strength Left Leg: Fair Strength against Gravity but No Resistance  EENT (WDL):  WDL Except    Cardio/Pulmonary Assessment  Edema   RLE Edema: 2+  LLE Edema: 2+  Respiratory Breath Sounds  RUL Breath Sounds: Clear  RML Breath Sounds: Clear  RLL Breath Sounds: Clear  KEVIN Breath Sounds: Clear  LLL Breath Sounds: Clear  Cardiac Assessment   Cardiac (WDL):  WDL Except (pacemaker)

## 2025-04-16 NOTE — WOUND TEAM
[facility]  Inpatient Services  Initial Wound and Skin Care Evaluation    Admission Date: 4/14/2025     Last order of IP CONSULT TO WOUND CARE was found on 4/14/2025 from Hospital Encounter on 4/14/2025     HPI, PMH, SH: Reviewed    Past Surgical History:   Procedure Laterality Date    COLONOSCOPY N/A 4/10/2025    Procedure: COLONOSCOPY;  Surgeon: Juan Manuel James M.D.;  Location: SURGERY SAME DAY [facility];  Service: Gastroenterology    NC UPPER GI ENDOSCOPY,BIOPSY N/A 4/9/2025    Procedure: GASTROSCOPY, WITH BIOPSY;  Surgeon: Ly Arnold M.D.;  Location: SURGERY SAME DAY [facility];  Service: Gastroenterology    COLONOSCOPY N/A 4/9/2025    Procedure: COLONOSCOPY;  Surgeon: Ly Arnold M.D.;  Location: SURGERY SAME DAY [facility];  Service: Gastroenterology    PACEMAKER INSERTION Left 06/20/2019    [employer] Choccolocco S DR MRI [id number] implanted by Dr. Mcginnis.    HIP REVISION TOTAL Right 8/29/2018    Procedure: HIP REVISION TOTAL;  Surgeon: Oniel Goodson M.D.;  Location: SURGERY [facility];  Service: Orthopedics    HIP ARTHROPLASTY TOTAL Right 8/9/2016    Procedure: HIP ARTHROPLASTY TOTAL;  Surgeon: Oniel Goodson M.D.;  Location: SURGERY [facility];  Service:     CATARACT EXTRACTION WITH IOL Bilateral 2012     Social History     Tobacco Use    Smoking status: Never    Smokeless tobacco: Never   Substance Use Topics    Alcohol use: Yes     Alcohol/week: 0.6 oz     Types: 1 Glasses of wine per week     Comment: once every 2-3 months.      No chief complaint on file.    Diagnosis: GI bleed [K92.2]    Unit where seen by Wound Team: [facility]03/01     WOUND CONSULT RELATED TO:  Bi-lateral heels and coccyx    WOUND TEAM PLAN OF CARE - Frequency of Follow-up:   Nursing to follow dressing orders written for wound care. Contact wound team if area fails to progress, deteriorates or with any questions/concerns if something comes up before next scheduled follow up (See below as to whether wound  is following and frequency of wound follow up)   Weekly - Wednesdays    WOUND HISTORY:   Pt with HX of Gi bleed reports she has and sores on her buttocks before. She spends the majority of her time sitting up and sleeps in her recliner.        WOUND ASSESSMENT/LDA  Wound 04/08/25 Pressure Injury Coccyx Medial (Active)   Date First Assessed/Time First Assessed: 04/08/25 2355   Present on Original Admission: Yes  Hand Hygiene Completed: Yes  Primary Wound Type: Pressure Injury  Location: Coccyx  Wound Orientation: Medial      Assessments 4/16/2025 11:00 AM   Site Assessment Yellow;Slough   Periwound Assessment Red   Margins Attached edges   Treatments Cleansed;Nonselective debridement;Offloading   Wound Cleansing Approved Wound Cleanser   Dressing Status Open to Air   Dressing Changed New   Dressing Options Mepitel One   Dressing Change/Treatment Frequency Every 72 hrs, and As Needed   NEXT Dressing Change/Treatment Date 04/19/25   NEXT Weekly Photo (Inpatient Only) 04/23/25   Wound Team Following Weekly   Wound Length (cm) 0.2 cm   Wound Width (cm) 0.2 cm   Wound Depth (cm) 0.2 cm   Wound Surface Area (cm^2) 0.04 cm^2   Wound Volume (cm^3) 0.008 cm^3   Wound Bed Slough (%) 100 %   WOUND NURSE ONLY - Time Spent with Patient (mins) 30       Wound 04/16/25 Pressure Injury Coccyx Right unstagable (Active)   Date First Assessed/Time First Assessed: 04/16/25 1150   Present on Original Admission: Yes  Hand Hygiene Completed: Yes  Primary Wound Type: Pressure Injury  Location: Coccyx  Laterality: Right  Wound Description (Comments): unstagable      Assessments 4/16/2025 11:00 AM   Wound Image     Site Assessment Yellow;White;Slough   Periwound Assessment Red   Margins Attached edges   ** WOUND NURSE ONLY BELOW THIS LINE** WOUND   Wound Team Following Weekly   Pressure Injury Stage Unstageable   Wound Length (cm) 0.7 cm   Wound Width (cm) 0.6 cm   Wound Depth (cm) 0.5 cm   Wound Surface Area (cm^2) 0.42 cm^2   Wound Volume  (cm^3) 0.21 cm^3   Wound Bed Slough (%) 100 %   WOUND NURSE ONLY - Time Spent with Patient (mins) 20       Wound 04/16/25 Pressure Injury Ankle Posterior Right none blanching callous stage 1 PI (Active)   Date First Assessed/Time First Assessed: 04/16/25 1154   Present on Original Admission: Yes  Hand Hygiene Completed: Yes  Primary Wound Type: Pressure Injury  Location: Ankle  Wound Orientation: Posterior  Laterality: Right  Wound Description (Comment...      Assessments 4/16/2025 11:00 AM   Wound Image     Site Assessment Red;Callus;Intact   Periwound Assessment Red   Margins Attached edges   Drainage Amount None   Treatments Site care;Nonselective debridement;Cleansed;Offloading   Wound Cleansing Approved Wound Cleanser   Dressing Status Open to Air   NEXT Weekly Photo (Inpatient Only) 04/23/25   Wound Team Following Weekly   Pressure Injury Stage Stage 1   Post-Procedure Length (cm) 1 cm   Post-Procedure Width (cm) 2 cm   Post-Procedure Surface Area (cm^2) 2 cm^2   WOUND NURSE ONLY - Time Spent with Patient (mins) 30        Vascular:    AMANDA:   No results found.    Lab Values:    Lab Results   Component Value Date/Time    WBC 9.1 04/16/2025 05:16 AM    RBC 3.08 (L) 04/16/2025 05:16 AM    HEMOGLOBIN 8.8 (L) 04/16/2025 05:16 AM    HEMATOCRIT 28.2 (L) 04/16/2025 05:16 AM    HBA1C 6.1 (H) 04/15/2025 05:28 AM    PLATELETCT 203 04/16/2025 05:16 AM         Culture Results show:  No results found for this or any previous visit (from the past 720 hours).    Pain Level/Medicated:  Patient denies pain       INTERVENTIONS BY WOUND TEAM:  Chart and images reviewed. Discussed with bedside RN. All areas of concern (based on picture review, LDA review and discussion with bedside RN) have been thoroughly assessed. Documentation of areas based on significant findings. This RN in to assess patient. Performed standard wound care which includes appropriate positioning, dressing removal and non-selective debridement. Pictures and  measurements obtained weekly if/when required.    Wound:  Sacrum and coccyx  Cleansed/Non-selectively Debrided with:  Wound cleanser and Gauze  Tiff wound: Cleansed with Wound cleanser and Gauze, Prepped with No Sting  Primary Dressing:  off loading sacral dressing     Wound:  Bi-lateral heels  Cleansed/Non-selectively Debrided with:  Wound cleanser and Gauze  None blanching right heel, and red blanching right nhan. Offloading orders placed.    Advanced Wound Care Discharge Planning  Number of Clinicians necessary to complete wound care: 1  Is patient requiring IV pain medications for dressing changes:  No   Length of time for dressing change 30 min. (This does not include chart review, pre-medication time, set up, clean up or time spent charting.)    Interdisciplinary consultation: Patient, Bedside RN (Amara)Madalyn .  Pressure injury and staging reviewed with  Abi Vega    EVALUATION / RATIONALE FOR TREATMENT:     Date:  04/16/25  Wound Status:  Initial evaluation    Wound to coccyx, open area midline coccyx 100% slough covering, right sacrum larger opening with 100% slough covering, left sacrum two small lacerations possible excoriation. Area is red, she was having incontinence at main she has not had any here and that is baseline. Areas cleansed with wound cleanser and off loading sacral dressing placed to protect and offload area. Right heel red and none blanching left heel is very red and slow to torey, offloading measures placed.          Goals: Steady decrease in wound area and depth weekly.    NURSING PLAN OF CARE ORDERS:  Dressing changes: See Dressing Care orders  Skin care: See Skin Care orders  RN Prevention Protocol    NUTRITION RECOMMENDATIONS   Wound Team Recommendations:  N/A    DIET ORDERS (From admission to next 24h)       Start     Ordered    04/15/25 0842  Supplements  ONCE DAILY        Question Answer Comment   Which Supplement Ensure    Ensure: Ensure Plus Carton        04/15/25 0841     04/14/25 1341  Diet Order Diet: Low Fiber(GI Soft) (Advance Diet as Tolerated)  ALL MEALS        Question:  Diet:  Answer:  Low Fiber(GI Soft)  Comment:  Advance Diet as Tolerated    04/14/25 1340                    PREVENTATIVE INTERVENTIONS:    Q shift Ezequiel - performed per nursing policy  Q shift pressure point assessments - performed per nursing policy    Surface/Positioning  Low Airloss - Currently in Place    Offloading/Redistribution  Float Heels off Bed with Pillows - Applied this Visit           Mobilization      Up to chair     Anticipated discharge plans:  Self/Family Care        Vac Discharge Needs:  Vac Discharge plan is purely a recommendation from wound team and not a requirement for discharge unless otherwise stated by physician.  Not Applicable Pt not on a wound vac

## 2025-04-16 NOTE — DISCHARGE PLANNING
Case Management/IDT follow up.   Projected dc date: 04/24/2025  IDT continues to recommend IRF level of care as patient continue to make progress with all therapies.     DC needs  Home health:  PT/OT/SLP/RN  DME: NO needs at this time. W/C tbd     Follow up:   PCP: Beth Friedman MD   Other: GI    I met with patient and family providing update from IDT and discussed plan of care.  They are in agreement w/ plan.     Plan:  Continue to follow

## 2025-04-16 NOTE — THERAPY
"Occupational Therapy  Daily Treatment     Patient Name:  Priscilla Hdz  Age:  85 y.o., Sex:  female  Medical Record #:  [id number]  Today's Date:  4/16/2025    Precautions  Precautions: Fall Risk, Cardiopulmonary  Fall Risk: Poor balance, History of falls  Cardiopulmonary: Pacemaker  Orthopedic: Other (see comments) (B foot drop of unknown origin)  Comments: 1L wean    Subjective: \"That's the first time I have done that\" referring to clothing management.      Objective:    04/16/25 1531   OT Charge Group   OT Self Care / ADL (Units) 1   OT Therapy Activity (Units) 1   OT Total Time Spent   OT Individual Total Time Spent (Mins) 30   Bladder   Urine Color Eleonora   Number of Times Voided 1   Bladder Device Bathroom   Grooming   Level of Assist Modified Independent   Patient Position Seated   Additional Description Hand hygiene   Toilet Transfer   Level of Assist Contact Guard Assist   Transfer Type Stand Pivot Transfer   Adaptive Equipment Grab bars   Assistive Device Wheelchair   Additional Description Extra time needed   Toileting Hygiene   Level of Assist Contact Guard Assist   Additional Description Cueing needed  (1 cue to step forward when her pants were caught on the toilet)         Therapeutic Activities  Purpose: to increase safety with activities of daily life and mobility related to activities of daily life  Interventions:  Sit to stand training 2x5 sit to stands from WC with FWW with SBA. Pt remembered to push off the WC with one hand without cueing.  Educated pt to breathe with oxygen pulse    Activities of Daily Living (ADL's)  Purpose: to improve function, safety, and independence with activities of daily living  Interventions:   Toilet Transfers  Toilet Hygiene    Assessment:    Pt improved with independence after toileting (able to manage clothing and do hygiene with CGA). Pt was able to stand with FWW for transfers to and from bed. Used grab bar for transfer to/from toilet.   Strengths: Alert and " oriented, Supportive family  Barriers: Agitation, Decreased endurance, Difficulty following instructions, Fatigue, Generalized weakness, Impaired activity tolerance, Impaired balance, Impaired carryover of learning, Impaired functional cognition, Limited mobility    Plan:  Pt would benefit from skilled OT services to address:  - ADLs/IADLs (laundry)  - Strength/endurance/activity tolerance  - Sitting/standing balance  - Funct Mob  - Transfers  - Sit to Stand Training  - UE strength/ROM (shoulder flex/ext)    DME  OT DME Recommendations  Additional Equipment (NOT TYPICALLY COVERED BY INSURANCE): Sock aid, Reacher, Long handled shoe horn    Passport items to be completed:  Perform bathroom transfers, complete dressing, complete feeding, get ready for the day, prepare a simple meal, participate in household tasks, adapt home for safety needs, demonstrate home exercise program, complete caregiver training     Occupational Therapy Goals (Active)       Problem: Bathing       Dates: Start:  04/15/25         Goal: STG-Within one week, patient will bathe CGA w/ AE PRN       Dates: Start:  04/15/25               Problem: Dressing       Dates: Start:  04/15/25         Goal: STG-Within one week, patient will dress EOB Parth with AE PRN       Dates: Start:  04/15/25               Problem: Functional Transfers       Dates: Start:  04/15/25         Goal: STG-Within one week, patient will transfer to toilet SBA w/ LRD       Dates: Start:  04/15/25               Problem: Grooming       Dates: Start:  04/15/25         Goal: STG-Within one week, patient will complete grooming standing at sink SBA       Dates: Start:  04/15/25               Problem: OT Long Term Goals       Dates: Start:  04/15/25         Goal: LTG-By discharge, patient will complete basic self care tasks SUP-Jovani       Dates: Start:  04/15/25            Goal: LTG-By discharge, patient will perform bathroom transfers SUP-Jovani       Dates: Start:  04/15/25             Goal: LTG-By discharge, patient will dress EOB SUP-Jovani w/ AE PRN       Dates: Start:  04/15/25            Goal: LTG-By discharge, patient will complete funct mob household distance SBA-SUP w/ LRD       Dates: Start:  04/15/25

## 2025-04-16 NOTE — CARE PLAN
Problem: Grooming  Goal: STG-Within one week, patient will complete grooming standing at sink SBA  Outcome: Progressing     Problem: Bathing  Goal: STG-Within one week, patient will bathe CGA w/ AE PRN  Outcome: Progressing     Problem: Dressing  Goal: STG-Within one week, patient will dress EOB Parth with AE PRN  Outcome: Progressing     Problem: Functional Transfers  Goal: STG-Within one week, patient will transfer to toilet SBA w/ LRD  Outcome: Progressing

## 2025-04-16 NOTE — CARE PLAN
The patient is Watcher - Medium risk of patient condition declining or worsening    Shift Goals  Clinical Goals: Safety  Patient Goals: Safety, Comfort    Progress made toward(s) clinical / shift goals:    Problem: Knowledge Deficit - Standard  Goal: Patient and family/care givers will demonstrate understanding of plan of care, disease process/condition, diagnostic tests and medications  Outcome: Progressing     Problem: Psychosocial  Goal: Patient's level of anxiety will decrease  Outcome: Progressing     Problem: Respiratory  Goal: Patient will understand use and administration of respiratory medications to improve respiratory function  Outcome: Progressing  Note: Continue to use O2 at 2 li per NC. IS at 750 ml. Encouraged to keep using it when awake.

## 2025-04-16 NOTE — CARE PLAN
Problem: Knowledge Deficit - Standard  Goal: Patient and family/care givers will demonstrate understanding of plan of care, disease process/condition, diagnostic tests and medications  Outcome: Progressing     Problem: Skin Integrity  Goal: Skin integrity is maintained or improved  Outcome: Progressing     Problem: Fall Risk - Rehab  Goal: Patient will remain free from falls  Outcome: Progressing     Problem: Psychosocial  Goal: Patient's level of anxiety will decrease  Outcome: Progressing     Problem: Skin Integrity  Goal: Patient's skin integrity will be maintained or improve  Outcome: Progressing     Problem: Self Care  Goal: Patient will have the ability to perform ADLs independently or with assistance  Outcome: Progressing     Problem: Mobility  Goal: Patient's capacity to carry out activities will improve  Outcome: Progressing       The patient is Watcher - Medium risk of patient condition declining or worsening    Shift Goals  Clinical Goals: safety  Patient Goals: safety, comfort

## 2025-04-16 NOTE — DISCHARGE PLANNING
CM//Discharge :    The following has been ordered:   Home health:     [facility]                 Disciplines ordered: RN, PT, Ot, Wound care  Status: Sent

## 2025-04-16 NOTE — PROGRESS NOTES
NURSING DAILY NOTE    Name: Priscilla Hdz   Date of Admission: 4/14/2025   Admitting Diagnosis: GI bleed  Attending Physician: KWAKU MATRINEZ D.O.  Allergies: Other environmental    Safety  Patient Assist  CGAx1  Patient Precautions  Precautions: Fall Risk, Cardiopulmonary  Fall Risk: History of falls, Poor balance (BLE weakness)  Cardiopulmonary: Pacemaker  Comments: 1L wean  Bed Transfer Status  Minimal Assist  Toilet Transfer Status   Contact Guard Assist  Assistive Devices  Rails, Wheelchair  Oxygen  Silicone Nasal Cannula  Diet/Therapeutic Dining  Current Diet Order   Procedures    Diet Order Diet: Low Fiber(GI Soft) (Advance Diet as Tolerated)     Pill Administration  whole  Agitated Behavioral Scale     ABS Level of Severity       Fall Risk  Has the patient had a fall this admission?      Greer [place] Fall Risk Scoring  14, MODERATE RISK  Fall Risk Safety Measures  bed alarm, chair alarm, and poor balance    Vitals  Temperature: 37.6 °C (99.6 °F)  Temp src: Temporal  Pulse: 87  Respiration: 18  Blood Pressure : (!) 144/59  Blood Pressure MAP (Calculated): 87 MM HG  BP Location: Right, Upper Arm  Patient BP Position: Abdul's Position     Oxygen  Pulse Oximetry: 95 %  O2 (LPM): 1  O2 Delivery Device: Silicone Nasal Cannula    Bowel and Bladder  Last Bowel Movement  04/15/25  Stool Type  Type 4: Like a sausage or snake, smooth and soft  Bowel Device  Bathroom, Diaper  Continent  Bladder: Did not void   Bowel: No movement  Bladder Function  Urine Void (mL): 350 ml  Number of Times Voided: 1  Urine Color: Yellow  Genitourinary Assessment   Bladder Assessment (WDL):  Within Defined Limits  Urine Color: Yellow  Bladder Device: Bathroom  $ Bladder Scan Results (mL): 12    Skin  Ezequiel Score   16  Sensory Interventions   Bed Types: Standard/Trauma Mattress  Moisture Interventions  Moisturizers/Barriers: Moisturizer       Pain  Pain Rating Scale  0 - No Pain  Pain Location  Hip  Pain Location  Orientation  Right  Pain Interventions   Declines    ADLs    Bathing   Shower, Staff (OT)  Linen Change   Partial  Personal Hygiene     Chlorhexidine Bath      Oral Care     Teeth/Dentures     Shave     Nutrition Percentage Eaten     Environmental Precautions     Patient Turns/Positioning  Patient turns self independently side to side without assistance, to offload sacral area  Patient Turns Assistance/Tolerance     Bed Positions     Head of Bed Elevated         Psychosocial/Neurologic Assessment  Psychosocial Assessment  Psychosocial (WDL):  WDL Except  Patient Behaviors: Anxious  Neurologic Assessment  Neuro (WDL): Exceptions to WDL  Level of Consciousness: Alert  Orientation Level: Oriented X4  Cognition: Appropriate judgement, Appropriate for developmental age, Follows commands  Speech: Clear  Muscle Strength Right Arm: Good Strength Against Gravity and Moderate Resistance  Muscle Strength Left Arm: Good Strength Against Gravity and Moderate Resistance  Muscle Strength Right Leg: Fair Strength against Gravity but No Resistance  Muscle Strength Left Leg: Fair Strength against Gravity but No Resistance  EENT (WDL):  WDL Except    Cardio/Pulmonary Assessment  Edema   RLE Edema: 2+  LLE Edema: 2+  Respiratory Breath Sounds  RUL Breath Sounds: Clear  RML Breath Sounds: Clear  RLL Breath Sounds: Clear  KEVIN Breath Sounds: Clear  LLL Breath Sounds: Clear  Cardiac Assessment   Cardiac (WDL):  WDL Except (pacemaker)

## 2025-04-16 NOTE — PROGRESS NOTES
Hospital Medicine Daily Progress Note        Chief Complaint  Pneumonia  Edema  Hypertension    Interval Problem Update  Denies new complaints.  Seems to be coughing less today.  Laboratory results reviewed.    Disposition  Per Physiatry    Review of Systems  Review of Systems   Constitutional:  Negative for chills and fever.   HENT: Negative.     Eyes: Negative.    Respiratory:  Positive for cough. Negative for shortness of breath.    Cardiovascular:  Negative for chest pain and palpitations.   Gastrointestinal:  Negative for abdominal pain, nausea and vomiting.   Musculoskeletal: Negative.    Skin:  Negative for itching and rash.   Endo/Heme/Allergies:  Negative for polydipsia. Does not bruise/bleed easily.        Physical Exam  Temp:  [36.6 °C (97.9 °F)-37.6 °C (99.6 °F)] 36.6 °C (97.9 °F)  Pulse:  [66-87] 70  Resp:  [16-18] 17  BP: ()/(39-73) 108/39  SpO2:  [93 %-96 %] 96 %    Physical Exam  Vitals reviewed.   Constitutional:       General: She is not in acute distress.     Appearance: Normal appearance. She is not ill-appearing.   HENT:      Head: Normocephalic and atraumatic.      Right Ear: External ear normal.      Left Ear: External ear normal.      Nose: Nose normal.      Mouth/Throat:      Pharynx: Oropharynx is clear.   Eyes:      General:         Right eye: No discharge.         Left eye: No discharge.      Extraocular Movements: Extraocular movements intact.      Conjunctiva/sclera: Conjunctivae normal.   Cardiovascular:      Rate and Rhythm: Normal rate and regular rhythm.   Pulmonary:      Effort: No respiratory distress.      Breath sounds: No wheezing.      Comments: Decreased BS  Abdominal:      General: Bowel sounds are normal. There is no distension.      Palpations: Abdomen is soft.      Tenderness: There is no abdominal tenderness.   Musculoskeletal:      Cervical back: Normal range of motion and neck supple.      Right lower leg: Edema present.      Left lower leg: Edema present.    Skin:     General: Skin is warm and dry.   Neurological:      Mental Status: She is alert.      Comments: Awake and alert         Fluids    Intake/Output Summary (Last 24 hours) at 4/16/2025 1112  Last data filed at 4/15/2025 2035  Gross per 24 hour   Intake 120 ml   Output --   Net 120 ml        Laboratory  Recent Labs     04/14/25  0351 04/15/25  0528 04/16/25  0516   WBC 5.5 5.3 9.1   RBC 3.29* 3.19* 3.08*   HEMOGLOBIN 9.4* 9.1* 8.8*   HEMATOCRIT 29.7* 29.2* 28.2*   MCV 90.3 91.5 91.6   MCH 28.6 28.5 28.6   MCHC 31.6* 31.2* 31.2*   RDW 50.0 50.3* 50.7*   PLATELETCT 220 220 203   MPV 10.4 10.6 10.7     Recent Labs     04/14/25  0351 04/15/25  0528 04/16/25  0516   SODIUM 139 140 138   POTASSIUM 4.3 4.1 4.0   CHLORIDE 106 103 103   CO2 25 28 27   GLUCOSE 98 88 97   BUN 18 17 19   CREATININE 0.84 0.89 0.88   CALCIUM 8.1* 8.3* 8.2*                   Assessment/Plan  * GI bleed- (present on admission)  Assessment & Plan  Pt presented w/ bloody stools  4/8/25 CT Abd/Pelvis no active GIB, diverticulosis, RLL PNA  EGD 4/9/25 non-obstructing Schatzki ring, hiatal hernia, and gastric erosion  Colonoscopy 4/10/25 no active bleeding  S/[facility]  Continue PPI bid  GI F/U    Anemia  Assessment & Plan  Has normocytic indices  Fe 14, continue supplements  Follow H/H    Edema  Assessment & Plan  BNP 3040  Echo pending  Continue Lasix and Aldactone for now    Complete heart block by electrocardiogram (HCC)- (present on admission)  Assessment & Plan  S/P PPM    Cough- (present on admission)  Assessment & Plan  4/8/25 CT Abd/Pelvis no active GIB, diverticulosis, RLL PNA  COVID/FLU/RSV negative  Continue empiric Augmentin  Also on Guaifenesin, IS, and RT protocol    Vitamin D deficiency disease- (present on admission)  Assessment & Plan  Vit D level 11  Continue supplementation    Essential hypertension- (present on admission)  Assessment & Plan  On Coreg, Losartan, Hydralazine, Lasix, and Aldactone  Decrease Coreg for low  trending blood pressures    Dyslipidemia- (present on admission)  Assessment & Plan  On Lipitor       Full Code

## 2025-04-16 NOTE — THERAPY
"Occupational Therapy  Daily Treatment     Patient Name:  Priscilla Hdz  Age:  85 y.o., Sex:  female  Medical Record #:  [id number]  Today's Date:  4/16/2025    Precautions  Precautions: Fall Risk, Cardiopulmonary  Fall Risk: Poor balance, History of falls  Cardiopulmonary: Pacemaker  Orthopedic: Other (see comments) (B foot drop of unknown origin)  Comments: 1L wean    Subjective: \"That helps a lot.\" Pt reported about heat on R shoulder      Objective:    04/16/25 1431   OT Charge Group   OT Therapy Activity (Units) 1   OT Therapeutic Exercise (Units) 1   OT Total Time Spent   OT Individual Total Time Spent (Mins) 30   Pain 0 - 10 Group   Location Shoulder   Location Orientation Right   Pain Rating Scale (NPRS) 3   Description Aching   Interdisciplinary Plan of Care Collaboration   Patient Position at End of Therapy In Bed;Bed Alarm On;Call Light within Reach;Tray Table within Reach;Phone within Reach         Therapeutic Activities  Purpose: to improve performance and function of daily activities and to provide patient and family education  Interventions:  Patient or family education  Pt reported pain in R shoulder- rotator cuff- issued pt heat for shoulder and educated pt on AAROM   Pt reported heat helped a lot with pain mgmt  Pt reported wound on bottom and resting/sleeping on side- discussed if wedges would assist to keep pt off bottom- pt reported that it's just easier for her to lay on side and is afraid she won't be able to move herself if wedges are in place     Therapeutic Exercise  Purpose: to improve strength and to improve functional endurance  Interventions:   Upper body exercises:   Educated pt on UB exercises while in fowlers position in bed   Bicep curls, supination/pronation, wrist flex/ext   2 sets x 10 reps with 2 lbs     Assessment:    Pt tolerated session fair. PT very fatigued during my session and c/o pain on her bottom and R shoulder. Pt laying on R side partially to offload weight from " bottom 2/2 pain. Pt unable to completed UB exercises with R shoulder 2/2  potential rotator cuff injury- education provided on AAROM and pain mgmt techniques.   Strengths: Alert and oriented, Supportive family  Barriers: Agitation, Decreased endurance, Difficulty following instructions, Fatigue, Generalized weakness, Impaired activity tolerance, Impaired balance, Impaired carryover of learning, Impaired functional cognition, Limited mobility    Plan:    Pt would benefit from skilled OT services to address:  - ADLs/IADLs (laundry)  - Strength/endurance/activity tolerance  - Sitting/standing balance  - Funct Mob  - Transfers  - Sit to Stand Training  - UE strength/ROM (shoulder flex/ext)    Occupational Therapy Goals (Active)       Problem: Bathing       Dates: Start:  04/15/25         Goal: STG-Within one week, patient will bathe CGA w/ AE PRN       Dates: Start:  04/15/25               Problem: Dressing       Dates: Start:  04/15/25         Goal: STG-Within one week, patient will dress EOB Parth with AE PRN       Dates: Start:  04/15/25               Problem: Functional Transfers       Dates: Start:  04/15/25         Goal: STG-Within one week, patient will transfer to toilet SBA w/ LRD       Dates: Start:  04/15/25               Problem: Grooming       Dates: Start:  04/15/25         Goal: STG-Within one week, patient will complete grooming standing at sink SBA       Dates: Start:  04/15/25               Problem: OT Long Term Goals       Dates: Start:  04/15/25         Goal: LTG-By discharge, patient will complete basic self care tasks SUP-Jovani       Dates: Start:  04/15/25            Goal: LTG-By discharge, patient will perform bathroom transfers SUP-Jovani       Dates: Start:  04/15/25            Goal: LTG-By discharge, patient will dress EOB SUP-Jovani w/ AE PRN       Dates: Start:  04/15/25            Goal: LTG-By discharge, patient will complete funct mob household distance SBA-SUP w/ LRD       Dates: Start:   04/15/25

## 2025-04-16 NOTE — PROGRESS NOTES
Physical Medicine & Rehabilitation Progress Note    Encounter Date: 2025    Interval Events (Subjective):  VS: 1L O2.   Last BM 4/15. Voiding urine.   Pertinent labs today: WBC 9.1, Hgb 8.8, proBNP 3040, procalcitonin 0.11, flu/RSV/COVID-negative.  Missed doses of scheduled meds: none  PRNs used: none    Patient seen at bedside today.  Has poor mood, feeling down. Her   about 6 months ago. Depressed about medical problems. She doesn't want to talk to a therapist about this right now, but we can discuss in the future. Did not sleep well.     Patient was discussed in IDT today; please see details below.   ____________________  Interdisciplinary Team Conference   Most recent IDT on 2025    ILudwig D.O., was present and led the interdisciplinary team conference on 2025.  I led the IDT conference and agree with the IDT conference documentation and plan of care as noted below.     Nursing:  Diet Current Diet Order   Procedures    Diet Order Diet: Low Fiber(GI Soft) (Advance Diet as Tolerated)       Eating ADL       % of Last Meal  Oral Nutrition: *  * Meal *  *, Breakfast, Less than 25% Consumed   Sleep Poor   Bowel Last BM: 04/15/25   Bladder Continent   Barriers to Discharge Home:   Wound care      Physical Therapy:  Bed Mobility Roll Left and Right: Contact Guard Assist  Sit to Lying: Contact Guard Assist  Lying to Sitting: Contact Guard Assist   Transfers Sit to Stand: Minimal Assist  Chair/Bed to Chair: Minimal Assist  Toilet: Contact Guard Assist  Car: Moderate Assist   Mobility Ambulation: Minimal Assist  Device: FWW  Ambulation Distance: 25ft  Wheelchair:    Type:    Wheelchair Distance:     Stairs/Curbs Stairs: Unable to Participate  Stairs Amount:    Curbs: Unable to Participate   Barriers to Discharge Home:   Distal weakness, fatigue      Occupational Therapy:  Oral Hygiene Set Up, Stand By Assist   Grooming Independent   Shower/Bathing Minimal Assist   UB Dressing Set Up,  "Stand by Assist   LB Dressing Moderate Assist  Minimal Assist   Toileting Transfer: Contact Guard Assist  Hygiene: Contact Guard Assist   Shower & Transfer Contact Guard Assist     Barriers to Discharge Home:   Deconditioning, weakness, poor balance    Respiratory Therapy:  O2 (LPM): 1  O2 Delivery Device: Silicone Nasal Cannula    Case Management:  Continues to work on disposition and DME needs.        Discharge Date/Disposition:  4/24/2025  _____________________________________       Objective:  Vital signs: /46   Pulse 71   Temp 37.1 °C (98.7 °F) (Temporal)   Resp 17   Ht 1.651 m (5' 5\")   Wt 65.5 kg (144 lb 6.4 oz)   SpO2 93%   BMI 24.03 kg/m²   General: not in distress.  HEENT: PERRL. EOMI.  Pulmonary: Breathing comfortably on 1L O2.   Cardiovascular: Rate as above, warm extremities  Gastrointestinal: Abdomen non-distended  Extremities/MSK: Bilateral lower extremity edema  Integumentary:   4/16: sacrum      Psychiatric: Normal affect.  Neurologic: Awake and alert. Answering questions appropriately. Normal speech.       Laboratory Values:  Recent Results (from the past 72 hours)   Basic Metabolic Panel    Collection Time: 04/14/25  3:51 AM   Result Value Ref Range    Sodium 139 135 - 145 mmol/L    Potassium 4.3 3.6 - 5.5 mmol/L    Chloride 106 96 - 112 mmol/L    Co2 25 20 - 33 mmol/L    Glucose 98 65 - 99 mg/dL    Bun 18 8 - 22 mg/dL    Creatinine 0.84 0.50 - 1.40 mg/dL    Calcium 8.1 (L) 8.5 - 10.5 mg/dL    Anion Gap 8.0 7.0 - 16.0   CBC WITHOUT DIFFERENTIAL    Collection Time: 04/14/25  3:51 AM   Result Value Ref Range    WBC 5.5 4.8 - 10.8 K/uL    RBC 3.29 (L) 4.20 - 5.40 M/uL    Hemoglobin 9.4 (L) 12.0 - 16.0 g/dL    Hematocrit 29.7 (L) 37.0 - 47.0 %    MCV 90.3 81.4 - 97.8 fL    MCH 28.6 27.0 - 33.0 pg    MCHC 31.6 (L) 32.2 - 35.5 g/dL    RDW 50.0 35.9 - 50.0 fL    Platelet Count 220 164 - 446 K/uL    MPV 10.4 9.0 - 12.9 fL   ESTIMATED GFR    Collection Time: 04/14/25  3:51 AM   Result Value " Ref Range    GFR (CKD-EPI) 67 >60 mL/min/1.73 m 2   CBC with Differential    Collection Time: 04/15/25  5:28 AM   Result Value Ref Range    WBC 5.3 4.8 - 10.8 K/uL    RBC 3.19 (L) 4.20 - 5.40 M/uL    Hemoglobin 9.1 (L) 12.0 - 16.0 g/dL    Hematocrit 29.2 (L) 37.0 - 47.0 %    MCV 91.5 81.4 - 97.8 fL    MCH 28.5 27.0 - 33.0 pg    MCHC 31.2 (L) 32.2 - 35.5 g/dL    RDW 50.3 (H) 35.9 - 50.0 fL    Platelet Count 220 164 - 446 K/uL    MPV 10.6 9.0 - 12.9 fL    Neutrophils-Polys 77.90 (H) 44.00 - 72.00 %    Lymphocytes 14.50 (L) 22.00 - 41.00 %    Monocytes 4.60 0.00 - 13.40 %    Eosinophils 1.30 0.00 - 6.90 %    Basophils 0.20 0.00 - 1.80 %    Immature Granulocytes 1.50 (H) 0.00 - 0.90 %    Nucleated RBC 0.00 0.00 - 0.20 /100 WBC    Neutrophils (Absolute) 4.09 1.82 - 7.42 K/uL    Lymphs (Absolute) 0.76 (L) 1.00 - 4.80 K/uL    Monos (Absolute) 0.24 0.00 - 0.85 K/uL    Eos (Absolute) 0.07 0.00 - 0.51 K/uL    Baso (Absolute) 0.01 0.00 - 0.12 K/uL    Immature Granulocytes (abs) 0.08 0.00 - 0.11 K/uL    NRBC (Absolute) 0.00 K/uL   Comp Metabolic Panel (CMP)    Collection Time: 04/15/25  5:28 AM   Result Value Ref Range    Sodium 140 135 - 145 mmol/L    Potassium 4.1 3.6 - 5.5 mmol/L    Chloride 103 96 - 112 mmol/L    Co2 28 20 - 33 mmol/L    Anion Gap 9.0 7.0 - 16.0    Glucose 88 65 - 99 mg/dL    Bun 17 8 - 22 mg/dL    Creatinine 0.89 0.50 - 1.40 mg/dL    Calcium 8.3 (L) 8.5 - 10.5 mg/dL    Correct Calcium 9.3 8.5 - 10.5 mg/dL    AST(SGOT) 32 12 - 45 U/L    ALT(SGPT) 11 2 - 50 U/L    Alkaline Phosphatase 53 30 - 99 U/L    Total Bilirubin 0.3 0.1 - 1.5 mg/dL    Albumin 2.8 (L) 3.2 - 4.9 g/dL    Total Protein 6.1 6.0 - 8.2 g/dL    Globulin 3.3 1.9 - 3.5 g/dL    A-G Ratio 0.8 g/dL   HEMOGLOBIN A1C    Collection Time: 04/15/25  5:28 AM   Result Value Ref Range    Glycohemoglobin 6.1 (H) 4.0 - 5.6 %    Est Avg Glucose 128 mg/dL   Vitamin D, 25-hydroxy (blood)    Collection Time: 04/15/25  5:28 AM   Result Value Ref Range     25-Hydroxy   Vitamin D 25 11 (L) 30 - 100 ng/mL   ESTIMATED GFR    Collection Time: 04/15/25  5:28 AM   Result Value Ref Range    GFR (CKD-EPI) 63 >60 mL/min/1.73 m 2   POC CoV-2, FLU A/B, RSV by PCR    Collection Time: 04/15/25  6:40 PM   Result Value Ref Range    POC Influenza A RNA, PCR Negative Negative    POC Influenza B RNA, PCR Negative Negative    POC RSV, by PCR Negative Negative    POC SARS-CoV-2, PCR NotDetected NotDetected   CBC WITHOUT DIFFERENTIAL    Collection Time: 04/16/25  5:16 AM   Result Value Ref Range    WBC 9.1 4.8 - 10.8 K/uL    RBC 3.08 (L) 4.20 - 5.40 M/uL    Hemoglobin 8.8 (L) 12.0 - 16.0 g/dL    Hematocrit 28.2 (L) 37.0 - 47.0 %    MCV 91.6 81.4 - 97.8 fL    MCH 28.6 27.0 - 33.0 pg    MCHC 31.2 (L) 32.2 - 35.5 g/dL    RDW 50.7 (H) 35.9 - 50.0 fL    Platelet Count 203 164 - 446 K/uL    MPV 10.7 9.0 - 12.9 fL   Basic Metabolic Panel    Collection Time: 04/16/25  5:16 AM   Result Value Ref Range    Sodium 138 135 - 145 mmol/L    Potassium 4.0 3.6 - 5.5 mmol/L    Chloride 103 96 - 112 mmol/L    Co2 27 20 - 33 mmol/L    Glucose 97 65 - 99 mg/dL    Bun 19 8 - 22 mg/dL    Creatinine 0.88 0.50 - 1.40 mg/dL    Calcium 8.2 (L) 8.5 - 10.5 mg/dL    Anion Gap 8.0 7.0 - 16.0   proBrain Natriuretic Peptide, NT    Collection Time: 04/16/25  5:16 AM   Result Value Ref Range    NT-proBNP 3040 (H) 0 - 125 pg/mL   PROCALCITONIN    Collection Time: 04/16/25  5:16 AM   Result Value Ref Range    Procalcitonin 0.11 <0.25 ng/mL   ESTIMATED GFR    Collection Time: 04/16/25  5:16 AM   Result Value Ref Range    GFR (CKD-EPI) 63 >60 mL/min/1.73 m 2       Medications:  Scheduled Medications   Medication Dose Frequency    vitamin D2 (Ergocalciferol)  50,000 Units Q7 DAYS    amoxicillin-clavulanate  1 Tablet Q12HRS    guaiFENesin dextromethorphan  5 mL 4X/DAY WITH MEALS + NIGHTLY    ferrous gluconate  324 mg QDAY with Breakfast    Pharmacy Consult Request  1 Each PHARMACY TO DOSE    senna-docusate  2 Tablet Q EVENING     atorvastatin  20 mg QDAY    carvedilol  25 mg BID WITH MEALS    furosemide  20 mg DAILY    hydrALAZINE  100 mg BID    losartan  50 mg BID    omeprazole  20 mg BID    spironolactone  12.5 mg DAILY     PRN medications: Respiratory Therapy Consult, hydrALAZINE, acetaminophen, senna-docusate **AND** polyethylene glycol/lytes, docusate sodium, magnesium hydroxide, carboxymethylcellulose, benzocaine-menthol, mag hydrox-al hydrox-simeth, ondansetron **OR** ondansetron, sodium chloride, oxyCODONE immediate-release **OR** oxyCODONE immediate-release, melatonin    Diet:  Current Diet Order   Procedures    Diet Order Diet: Low Fiber(GI Soft) (Advance Diet as Tolerated)       Medical Decision Making and Plan:  GI bleed  -Admitted with dark red clots in stool.  Hemoglobin found to be 7.7, dropped to 6.4. CT abd/pelv negative for acute GI bleeding. EGD showed hiatal hernia, nonobstructing Schatzki ring, gastric erosion.  -PPI BID  -PT and OT for mobility and ADLs. Per guidelines, 15 hours per week between PT, OT and/or SLP.  -Follow up with GI     Acute blood loss anemia  -Received transfusion at acute hospital  -On admission to rehab: Most recent Hgb 9.4.  Check morning labs.     Distal LE weakness, distal motor neuropathy  -on admission: has weakness in bilateral ankle dorsiflexion and plantarflexion.  Maybe due to edema.  Monitor for now.    -4/16: Persistent distal weakness. Consistent with distal motor polyneuropathy. There may be a history of worsening distal weakness prior to this hospitalization.   -Recommend neurology evaluation, may benefit from NCS/EMG    Hypertension  -on admission: coreg 25mg BID, losartan 50mg BID, spironolactone 12.5mg daily. Monitor for need to adjust medications  -IM following  -4/16: BP low. Coreg to 12.5mg BID.   -Continue Coreg, losartan, spironolactone     LE venous insufficiency   LE edema  -on admission: chronic swelling.  Recent ultrasound Doppler negative for DVT. compression stockings.  lasix 20mg daily.   -4/15: IM consult.   -: BNP 3040, echo pending  -Continue Lasix      Cough, pneumonia  -on admission: has nonproductive cough. Check CXR. Check AM labs.   -4/15: CXR appears to have fluid overload in lungs, possible pneumonia. IM consulted. Patient started on augmentin.   -continue Augmentin, guaifenesin     Acute respiratory failure  -On admission: On 1 L supplemental O2.  Wean as tolerated.     History of CKD stage III  -Creatinine has remained within normal limits recently  -monitor     History of pacemaker  -Known history of prior complete heart block, status post pacemaker in 2019    Hyperlipidemia  -On home dose Lipitor     Pain - prn Tylenol, oxycodone    Vit D deficiency - 4/15: vit D 11. Plan for ergocalciferol 50,000u weekly while in rehab. Will need follow up with PCP.      Poor mood, adjustment disorder  -   about 6 months ago  - Monitor mood, can consider SSRI.     Bladder management  -monitor for urinary retention. Bladder scan and ICP per protocol.      Bowel management  -senna/docusate for prophylaxis     Skin, sacral pressure injury present on admission  -Continue daily wound care  -Continue turn schedule     DVT Ppx - Patient not cleared for DVT chemoprophylaxis.  Ultrasound negative for DVT on .      UPCOMING LABS/IMAGING: per IM      HOSPITALIST FOLLOWING: Yes     DISPO: Home. Lives in [place] with [address]STE. Her son lives with her and can provide support upon discharge.      STIVEN:      DISCHARGE SPECIALIST FOLLOW UP: Gastroenterology       Patient to scheduled follow up with their PCP within 2 weeks from discharge from the [facility].      ____________________________________    Ludwig Moeller D.O.  Physical Medicine & Rehabilitation   ____________________________________    Total time:  62 minutes. Time spent included pre-rounding review of vitals and tests, unit/floor time, face-to-face time with the patient including physical examination,  care coordination, counseling of patient and/or family, ordering medications/procedures/tests, discussion with CM, PT, OT, SLP and/or other healthcare providers, and documentation in the electronic medical record.

## 2025-04-16 NOTE — THERAPY
Physical Therapy   Daily Treatment     Patient Name:  Priscilla Hdz  Age:  85 y.o., Sex:  female  Medical Record #:  [id number]  Today's Date: 4/16/2025     Precautions  Precautions: Fall Risk, Cardiopulmonary  Fall Risk: Poor balance, History of falls  Cardiopulmonary: Pacemaker  Comments: 1L wean  B foot drop of unknown origin    Subjective: seated in wc, agreeable to PT    Objective:    04/16/25 0831   PT Charge Group   PT Gait Training (Units) 1   PT Therapeutic Exercise (Units) 2   PT Therapeutic Activities (Units) 1   Supervising Physical Therapist Irwin Jaime   PT Total Time Spent   PT Individual Total Time Spent (Mins) 60   Sit to Stand   Level of Assist Contact Guard Assist   Assistive Devices FWW   Additional Description Cueing needed;Extra time needed   Ambulation   Level of Assist Minimal Assist   Assistive Device FWW   Additional Description Cueing needed;Extra time needed   Distance 25x2   Patient Scheduling Information   PT Time 30/60 minutes   Primary or Coverage PT AM   OT Time 30/60 minutes   Primary or Coverage OT LZ   Early Morning Treatment No 0830 or 0700 Treatment   Interdisciplinary Plan of Care Collaboration   IDT Collaboration with  Physical Therapist;Physician;Family / Caregiver;Occupational Therapist         Therapeutic Activities  Purpose: to improve performance and function of daily activities, to provide patient and family education, and to increase safety with activities of daily life and mobility related to activities of daily life  Interventions:   Sit to stand training  Patient or family education    Therapeutic Exercise  Purpose: to improve strength and to improve functional endurance  Interventions:   Lower body exercises:   Seated program -   Ankle pumps, 2 sets of 10 and Bilateral  Hip abduction, 2 sets of 10 and Bilateral  Hip adduction, 2 sets of 10 and Bilateral  Long arc quad, 2 sets of 10 and Bilateral  Marching, 2 sets of 10  Hamstring curl, 2 sets of 10, orange  resistance band,     Gait Training  Purpose: to improve functional ambulation, to address gait deviations, and to improve walking endurance  Interventions:   Safe use of device  Walking endurance  Facilitation of gait  Deviations (laterality in comments):  Bradykinetic  Shuffled pattern  Decreased heel strike  Steppage pattern  B foot drop    Assessment: pt participated in seated LE exercises with AA/PROM for DF/PF in gravity minimized position. Pt presents with significant B LE edema, discussed with MD vazquez to manoj compression stockings. Pts' gait is characterized by B foot drop, steppage pattern, and bradykinesia. Pt requires Parth to safely amb, needs cues for proximity to the walker and to maintain within the walker during transition in to sitting. Pt is at a high risk for falls 2/2 her foot drop, the patient will most certainly need to have B AFO's before DC. Primary barriers, decreased CV endurance, generalized weakness, decreased motivation. Per pt's son Akil mom spends most of her time in the bed and did not often want to get up to walker around during the day. Son assist with all mobility, uses FWW at baseline    Strengths: Able to follow instructions, Alert and oriented, Effective communication skills, Good insight into deficits/needs, Motivated for self care and independence, Pleasant and cooperative, Willingly participates in therapeutic activities  Barriers: Decreased endurance, Fatigue, Generalized weakness, Home accessibility, Impaired activity tolerance, Pain, Limited mobility    Plan:   Focus on functional strengthening for bed mobility, standing tolerance, progressive amb with FWW-will benefit from DF assist/ace wraps due to B LE edema  Trial step in //  Cont to wean 02  Edu pt on diaphragmatic and PLB techniques    DME    PT DME Recommendations  Wheelchair:  (TBD)  Assistive Device:  (pt owns a FWW and 4WW)      Passport items to be completed:  Get in/out of bed safely, in/out of a vehicle, safely  use mobility device, walk or wheel around home/community, navigate up and down stairs, show how to get up/down from the ground, ensure home is accessible, demonstrate HEP, complete caregiver training    Physical Therapy Problems (Active)       Problem: Mobility       Dates: Start:  04/15/25         Goal: STG-Within one week, patient will ambulate 40ft c/ FWW and CGA       Dates: Start:  04/15/25            Goal: STG-Within one week, patient will ascend and descend 3 stairs c/ BHR and ModA       Dates: Start:  04/15/25               Problem: Mobility Transfers       Dates: Start:  04/15/25         Goal: STG-Within one week, patient will perform bed mobility c/ SPV       Dates: Start:  04/15/25            Goal: STG-Within one week, patient will sit to stand c/ FWW and CGA       Dates: Start:  04/15/25            Goal: STG-Within one week, patient will transfer bed to chair c/ FWW and CGA       Dates: Start:  04/15/25               Problem: PT-Long Term Goals       Dates: Start:  04/15/25         Goal: LTG-By discharge, patient will ambulate 50ft c/ FWW and SBA       Dates: Start:  04/15/25            Goal: LTG-By discharge, patient will transfer one surface to another c/ FWW and SBA       Dates: Start:  04/15/25            Goal: LTG-By discharge, patient will perform home exercise program c/ set-up       Dates: Start:  04/15/25            Goal: LTG-By discharge, patient will ambulate up/down 3 stairs c/ RHR and Kelly       Dates: Start:  04/15/25            Goal: LTG-By discharge, patient will transfer in/out of a car c/ FWW and Kelly       Dates: Start:  04/15/25

## 2025-04-17 ENCOUNTER — APPOINTMENT (OUTPATIENT)
Dept: OCCUPATIONAL THERAPY | Facility: REHABILITATION | Age: 85
DRG: 073 | End: 2025-04-17
Attending: STUDENT IN AN ORGANIZED HEALTH CARE EDUCATION/TRAINING PROGRAM
Payer: MEDICARE

## 2025-04-17 ENCOUNTER — APPOINTMENT (OUTPATIENT)
Dept: PHYSICAL THERAPY | Facility: REHABILITATION | Age: 85
DRG: 073 | End: 2025-04-17
Attending: STUDENT IN AN ORGANIZED HEALTH CARE EDUCATION/TRAINING PROGRAM
Payer: MEDICARE

## 2025-04-17 ENCOUNTER — ANCILLARY PROCEDURE (OUTPATIENT)
Dept: CARDIOLOGY | Facility: REHABILITATION | Age: 85
DRG: 073 | End: 2025-04-17
Attending: HOSPITALIST
Payer: MEDICARE

## 2025-04-17 PROBLEM — J18.9 PNEUMONIA: Status: ACTIVE | Noted: 2018-12-28

## 2025-04-17 LAB — LV EJECT FRACT  99904: 70

## 2025-04-17 PROCEDURE — 93306 TTE W/DOPPLER COMPLETE: CPT

## 2025-04-17 PROCEDURE — 97110 THERAPEUTIC EXERCISES: CPT

## 2025-04-17 PROCEDURE — A9270 NON-COVERED ITEM OR SERVICE: HCPCS | Performed by: STUDENT IN AN ORGANIZED HEALTH CARE EDUCATION/TRAINING PROGRAM

## 2025-04-17 PROCEDURE — 97112 NEUROMUSCULAR REEDUCATION: CPT

## 2025-04-17 PROCEDURE — 700102 HCHG RX REV CODE 250 W/ 637 OVERRIDE(OP): Performed by: STUDENT IN AN ORGANIZED HEALTH CARE EDUCATION/TRAINING PROGRAM

## 2025-04-17 PROCEDURE — A9270 NON-COVERED ITEM OR SERVICE: HCPCS | Performed by: HOSPITALIST

## 2025-04-17 PROCEDURE — 700102 HCHG RX REV CODE 250 W/ 637 OVERRIDE(OP): Performed by: HOSPITALIST

## 2025-04-17 PROCEDURE — 97535 SELF CARE MNGMENT TRAINING: CPT

## 2025-04-17 PROCEDURE — 770010 HCHG ROOM/CARE - REHAB SEMI PRIVAT*

## 2025-04-17 PROCEDURE — 99232 SBSQ HOSP IP/OBS MODERATE 35: CPT | Performed by: STUDENT IN AN ORGANIZED HEALTH CARE EDUCATION/TRAINING PROGRAM

## 2025-04-17 PROCEDURE — 97150 GROUP THERAPEUTIC PROCEDURES: CPT

## 2025-04-17 PROCEDURE — 99232 SBSQ HOSP IP/OBS MODERATE 35: CPT | Performed by: HOSPITALIST

## 2025-04-17 PROCEDURE — 93306 TTE W/DOPPLER COMPLETE: CPT | Mod: 26 | Performed by: INTERNAL MEDICINE

## 2025-04-17 PROCEDURE — 97116 GAIT TRAINING THERAPY: CPT

## 2025-04-17 RX ORDER — CARVEDILOL 3.12 MG/1
6.25 TABLET ORAL 2 TIMES DAILY WITH MEALS
Status: DISCONTINUED | OUTPATIENT
Start: 2025-04-17 | End: 2025-04-18

## 2025-04-17 RX ADMIN — CARVEDILOL 6.25 MG: 3.12 TABLET, FILM COATED ORAL at 17:52

## 2025-04-17 RX ADMIN — LOSARTAN POTASSIUM 50 MG: 50 TABLET, FILM COATED ORAL at 17:52

## 2025-04-17 RX ADMIN — SPIRONOLACTONE 12.5 MG: 25 TABLET ORAL at 05:12

## 2025-04-17 RX ADMIN — GUAIFENESIN SYRUP AND DEXTROMETHORPHAN 5 ML: 100; 10 SYRUP ORAL at 08:31

## 2025-04-17 RX ADMIN — LOSARTAN POTASSIUM 50 MG: 50 TABLET, FILM COATED ORAL at 05:12

## 2025-04-17 RX ADMIN — FERROUS GLUCONATE 324 MG: 324 TABLET ORAL at 08:31

## 2025-04-17 RX ADMIN — OMEPRAZOLE 20 MG: 20 CAPSULE, DELAYED RELEASE ORAL at 20:40

## 2025-04-17 RX ADMIN — CARVEDILOL 12.5 MG: 12.5 TABLET, FILM COATED ORAL at 08:32

## 2025-04-17 RX ADMIN — FUROSEMIDE 20 MG: 20 TABLET ORAL at 05:12

## 2025-04-17 RX ADMIN — HYDRALAZINE HYDROCHLORIDE 100 MG: 50 TABLET ORAL at 20:40

## 2025-04-17 RX ADMIN — GUAIFENESIN SYRUP AND DEXTROMETHORPHAN 5 ML: 100; 10 SYRUP ORAL at 20:40

## 2025-04-17 RX ADMIN — AMOXICILLIN AND CLAVULANATE POTASSIUM 1 TABLET: 875; 125 TABLET, FILM COATED ORAL at 08:32

## 2025-04-17 RX ADMIN — ATORVASTATIN CALCIUM 20 MG: 10 TABLET, FILM COATED ORAL at 20:40

## 2025-04-17 RX ADMIN — AMOXICILLIN AND CLAVULANATE POTASSIUM 1 TABLET: 875; 125 TABLET, FILM COATED ORAL at 20:39

## 2025-04-17 RX ADMIN — GUAIFENESIN SYRUP AND DEXTROMETHORPHAN 5 ML: 100; 10 SYRUP ORAL at 12:15

## 2025-04-17 RX ADMIN — HYDRALAZINE HYDROCHLORIDE 100 MG: 50 TABLET ORAL at 08:31

## 2025-04-17 RX ADMIN — GUAIFENESIN SYRUP AND DEXTROMETHORPHAN 5 ML: 100; 10 SYRUP ORAL at 17:52

## 2025-04-17 RX ADMIN — OMEPRAZOLE 20 MG: 20 CAPSULE, DELAYED RELEASE ORAL at 08:31

## 2025-04-17 ASSESSMENT — PAIN DESCRIPTION - PAIN TYPE: TYPE: ACUTE PAIN;CHRONIC PAIN

## 2025-04-17 ASSESSMENT — ENCOUNTER SYMPTOMS
NAUSEA: 0
COUGH: 1
VOMITING: 0
ABDOMINAL PAIN: 0
MUSCULOSKELETAL NEGATIVE: 1
BRUISES/BLEEDS EASILY: 0
EYES NEGATIVE: 1
FEVER: 0
SHORTNESS OF BREATH: 0
CHILLS: 0
PALPITATIONS: 0
POLYDIPSIA: 0

## 2025-04-17 NOTE — THERAPY
Occupational Therapy  Daily Treatment     Patient Name:  Priscilla Hdz  Age:  85 y.o., Sex:  female  Medical Record #:  [id number]  Today's Date:  4/17/2025    Precautions  Precautions: (P) Fall Risk, Cardiopulmonary, Orthopedic  Fall Risk: (P) Poor balance, History of falls  Cardiopulmonary: (P) Pacemaker  Orthopedic: (P) Other (see comments) (B foot drop of unknown origin)  Comments: (P) 0.5-1L O2 wean    Subjective: Pt seen 2x today (0830 and 1100), pleasant and agreeable to both sessions. Son present during beginning of second session.       Objective:    04/17/25 0831   OT Charge Group   OT Self Care / ADL (Units) 2   OT Neuromuscular Re-education / Balance (Units) 2   OT Total Time Spent   OT Individual Total Time Spent (Mins) 60   Precautions   Precautions Fall Risk;Cardiopulmonary;Orthopedic   Fall Risk Poor balance;History of falls   Cardiopulmonary Pacemaker   Orthopedic Other (see comments)  (B foot drop of unknown origin)   Comments 0.5-1L O2 wean   Vitals   Pulse 60   Blood Pressure  123/63   Pulse Oximetry 92 %   Vitals Comments 89-92% on 1L following activity   Bladder   Urine Color Yellow   Number of Times Voided 1   Bladder Device Bathroom   Oral Hygiene   Level of Assist Set Up;Supervised   Patient Position Seated   Grooming   Level of Assist Modified Independent   Patient Position Seated   Additional Description Hair care;Hand hygiene   Putting On/Taking Off Footwear   Level of Assist Maximal Assist   Patient Position Seated   Footwear Type Compression Stockings;Treaded Socks   Toilet Transfer   Level of Assist Contact Guard Assist   Transfer Type Stand Pivot Transfer   Adaptive Equipment Grab bars   Assistive Device FWW   Toileting Hygiene   Level of Assist Contact Guard Assist   Additional Description Assist for standing balance;Grab bars;Extra time needed   Interdisciplinary Plan of Care Collaboration   IDT Collaboration with  Physical Therapist;Nursing   Patient Position at End of Therapy  Seated;Chair Alarm On  (Hand off to PT in main gym)   Collaboration Comments Hand off from RN, hand off to PT         0830 Session  Therapeutic Exercise  Purpose: to improve strength and to improve functional endurance  Interventions:   Grecia Med Cycle: Gear Level 2, reduced to 1 following complaints of R shoulder pain and Time 2 min 30 sec - UE only    Activities of Daily Living (ADL's)  Purpose: to improve function, safety, and independence with activities of daily living and to provide patient and family education  Interventions:   Oral Hygiene  Grooming  Toilet Transfers  Toilet Hygiene  Neuro Re-Education  Purpose: to improve balance and to improve timing, coordination, and recruitment of muscles  Interventions:   Static/Dynamic standing balance training  Facilitation/Cueing: Facilitation  Pt completed standing balance/endurance activity with a focus on static/dynamic standing balance, activity tolerance, and endurance to increase IND during ADLs and IADLs. In // bars with 1 arm support, pt completed:  2x5 dynamic reaching to right and left with 2# DB  2x10 dynamic anterior and post reaching with 2# DB  1x5 dynamic reaching with LH reacher to  5 items from floor     04/17/25 1101   OT Charge Group   OT Self Care / ADL (Units) 1   OT Therapeutic Exercise (Units) 1   OT Total Time Spent   OT Individual Total Time Spent (Mins) 30   Pain 0 - 10 Group   Location Coccyx;Shoulder   Location Orientation Mid;Right   Pain Rating Scale (NPRS) 4   Description Aching  (Heat provided for shoulder pain, pt educated on pressure relief while sitting in chair for coccyx pain)   Bladder   Urine Color Straw   Number of Times Voided 1   Bladder Device Bathroom   Grooming   Level of Assist Modified Independent   Patient Position Seated   Additional Description Hand hygiene   Toilet Transfer   Level of Assist Contact Guard Assist   Transfer Type Stand Pivot Transfer   Adaptive Equipment Grab bars   Assistive Device Wheelchair    Toileting Hygiene   Level of Assist Minimal Assist   Additional Description Assist to pull pants up;Assist for standing balance   Interdisciplinary Plan of Care Collaboration   IDT Collaboration with  Family / Caregiver   Patient Position at End of Therapy Seated;Chair Alarm On;Self Releasing Lap Belt Applied;Call Light within Reach;Tray Table within Reach   Collaboration Comments Son present at beginning of session       1100 Session  Therapeutic Exercise  Purpose: to improve strength, to improve ROM/flexibility, and to improve functional endurance  Interventions:   Rickshaw: Forward: 2x10 at 15#, 1x10 at 10# - weight reduced to reports of shoulder pain  Pt provided education on pressure relieve using arms of w/c when seated - receptive to [facility]    Activities of Daily Living (ADL's)  Purpose: to improve function, safety, and independence with activities of daily living and to provide patient and family education  Interventions:   Toilet Transfers  Toilet Hygiene    Assessment:    0830 Session: Pt tolerated session fair - limited by generalized weakness and poor endurance/activity tolerance. Pt required seated RB following each standing activity for the first set, however able to complete the second set without RB. Pt also improved on standing balance, using less UE support on // bar during second round. Pt continues to require 1L O2 during activity and SpO2 ranges from 89-92% during/following activity. Observed improvement in LE edema from yesterday to today.    1100 Session: Pt tolerated session well - receptive to [facility] regarding pressure relief during sitting. Pt does better during clothing management during toileting with FWW compared to GB. Will use FWW for all future toileting to improve IND during toileting. Pt with minor shoulder pain following therex, improved with heat provided to shoulder.    Strengths: Alert and oriented, Supportive family  Barriers: Agitation, Decreased endurance, Difficulty following  instructions, Fatigue, Generalized weakness, Impaired activity tolerance, Impaired balance, Impaired carryover of learning, Impaired functional cognition, Limited mobility    Plan:  Pt would benefit from skilled OT services to address:  - ADLs/IADLs (laundry)  - Strength/endurance/activity tolerance  - Sitting/standing balance  - Funct Mob  - Transfers  - Sit to Stand Training  - UE strength/ROM (shoulder flex/ext)    DME  OT DME Recommendations  Additional Equipment (NOT TYPICALLY COVERED BY INSURANCE): Sock aid, Reacher, Long handled shoe horn    Passport items to be completed:  Perform bathroom transfers, complete dressing, complete feeding, get ready for the day, prepare a simple meal, participate in household tasks, adapt home for safety needs, demonstrate home exercise program, complete caregiver training     Occupational Therapy Goals (Active)       Problem: Bathing       Dates: Start:  04/15/25         Goal: STG-Within one week, patient will bathe CGA w/ AE PRN       Dates: Start:  04/15/25               Problem: Dressing       Dates: Start:  04/15/25         Goal: STG-Within one week, patient will dress EOB Parth with AE PRN       Dates: Start:  04/15/25               Problem: Functional Transfers       Dates: Start:  04/15/25         Goal: STG-Within one week, patient will transfer to toilet SBA w/ LRD       Dates: Start:  04/15/25               Problem: Grooming       Dates: Start:  04/15/25         Goal: STG-Within one week, patient will complete grooming standing at sink SBA       Dates: Start:  04/15/25               Problem: OT Long Term Goals       Dates: Start:  04/15/25         Goal: LTG-By discharge, patient will complete basic self care tasks SUP-Jovani       Dates: Start:  04/15/25            Goal: LTG-By discharge, patient will perform bathroom transfers SUP-Jovani       Dates: Start:  04/15/25            Goal: LTG-By discharge, patient will dress EOB SUP-Jovani w/ AE PRN       Dates: Start:  04/15/25             Goal: LTG-By discharge, patient will complete funct mob household distance SBA-SUP w/ LRD       Dates: Start:  04/15/25

## 2025-04-17 NOTE — THERAPY
Physical Therapy   Daily Treatment     Patient Name:  Priscilla Hdz  Age:  85 y.o., Sex:  female  Medical Record #:  [id number]  Today's Date: 4/17/2025     Precautions  Precautions: Fall Risk, Cardiopulmonary, Orthopedic  Fall Risk: Poor balance, History of falls  Cardiopulmonary: Pacemaker  Orthopedic: Other (see comments)  Comments: 0.5-1L O2 wean    Subjective: agreeable to PT therapy group for LE strengthening    Objective:    04/17/25 1401   PT Charge Group   PT Group Therapy Group Activities   PT Total Time Spent   PT Group Total Time Spent (Mins) 60   Precautions   Precautions Fall Risk;Cardiopulmonary;Orthopedic   Fall Risk Poor balance;History of falls   Cardiopulmonary Pacemaker   Orthopedic Other (see comments)   Comments 0.5-1L O2 wean   Interdisciplinary Plan of Care Collaboration   IDT Collaboration with  Family / Caregiver;Other (See Comments)   Collaboration Comments son present at end of session, tech assist to gather         Group Therapy  Purpose: to increase active participation through peer pressure, to enhance motivation, and to validate increased independence with skills  Interventions:   UE exercise group  LE exercise group- 3 sets of 10 bilateral: ankle pumps, SAQ, hip abduction,   Hip adduction  - UE group 2 sets of 10 no dowel used, limited with right shoulder ROM: bicep curls, shoulder flexion, bench press  -category ball 5min x 3, trivia questions    Assessment: able to tolerate seated there-ex, used modified form for ankle pumps without increase in pain, limited by fatigue, ready for rest break after session    Strengths: Able to follow instructions, Alert and oriented, Effective communication skills, Good insight into deficits/needs, Motivated for self care and independence, Pleasant and cooperative, Willingly participates in therapeutic activities  Barriers: Decreased endurance, Fatigue, Generalized weakness, Home accessibility, Impaired activity tolerance, Pain, Limited  mobility    Plan:          DME    PT DME Recommendations  Wheelchair:  (TBD)  Assistive Device:  (pt owns a FWW and 4WW)      Passport items to be completed:  Get in/out of bed safely, in/out of a vehicle, safely use mobility device, walk or wheel around home/community, navigate up and down stairs, show how to get up/down from the ground, ensure home is accessible, demonstrate HEP, complete caregiver training    Physical Therapy Problems (Active)       Problem: Mobility       Dates: Start:  04/15/25         Goal: STG-Within one week, patient will ambulate 40ft c/ FWW and CGA       Dates: Start:  04/15/25            Goal: STG-Within one week, patient will ascend and descend 3 stairs c/ BHR and ModA       Dates: Start:  04/15/25               Problem: Mobility Transfers       Dates: Start:  04/15/25         Goal: STG-Within one week, patient will perform bed mobility c/ SPV       Dates: Start:  04/15/25            Goal: STG-Within one week, patient will sit to stand c/ FWW and CGA       Dates: Start:  04/15/25            Goal: STG-Within one week, patient will transfer bed to chair c/ FWW and CGA       Dates: Start:  04/15/25               Problem: PT-Long Term Goals       Dates: Start:  04/15/25         Goal: LTG-By discharge, patient will ambulate 50ft c/ FWW and SBA       Dates: Start:  04/15/25            Goal: LTG-By discharge, patient will transfer one surface to another c/ FWW and SBA       Dates: Start:  04/15/25            Goal: LTG-By discharge, patient will perform home exercise program c/ set-up       Dates: Start:  04/15/25            Goal: LTG-By discharge, patient will ambulate up/down 3 stairs c/ RHR and Kelly       Dates: Start:  04/15/25            Goal: LTG-By discharge, patient will transfer in/out of a car c/ FWW and Kelly       Dates: Start:  04/15/25

## 2025-04-17 NOTE — CARE PLAN
The patient is Watcher - Medium risk of patient condition declining or worsening    Shift Goals  Clinical Goals: Safety  Patient Goals: Rest    Problem: Risk for Aspiration  Goal: Patient's risk for aspiration will be absent or decrease  Outcome: Progressing    Patient is able to swallow whole pills with thin liquids without difficulty.  No s/s of aspiration noted.     Problem: Nutrition  Goal: Patient's nutritional and fluid intake will be adequate or improve  Outcome: Progressing    Patient's nutritional intake is adequate at this time.

## 2025-04-17 NOTE — DIETARY
Nutrition Services: Follow-up for Nutrition Care Plan   Day 3 of admit. Priscilla Hdz is a 85 y.o. female with admitting DX of GI bleed [K92.2]    PO intake per ADLs has been <25% x1 meal, no other intakes of meals or supplements documented this admit.     RD visited pt at bedside. She reports is drinking Ensure shake in a.m. and would like to keep this QD. Reports sometimes eating 50%, sometimes all of her meals but is overall eating less than normal for her. Declined most snacks but is agreeable to yogurt. She inquired after a milkshake; RD will order high protein milkshake w/ dinners.    Current diet order:   Low fiber diet  Ensure Plus High Protein (provides 350 calories, 20 g protein per 8 fl oz) daily     Nutrition Dx:   Increased nutrient Needs related to healing needs as evidenced by unstageable wound to coccyx per wound team assessment 4/9.     Nutrition Dx Status: Ongoing     Problem: Nutritional:  Goal: Achieve adequate nutritional intake  Description: Patient will consume >50-75% of meals and supplements  Outcome: Progressing      Plan/ Recommendations:      Provide p.m. snack yogurt and high protein milkshake w/ dinner.  Encourage intake of meals, goal for >50-75% consumption from meals and supplements  Document intake of all meals as % taken in ADLs to provide interdisciplinary communication across all shifts.   Monitor weight.  Nutrition rep available to see patient for ongoing meal and snack preferences.       RD following

## 2025-04-17 NOTE — CARE PLAN
The patient is Watcher - Medium risk of patient condition declining or worsening    Shift Goals  Clinical Goals: Safety  Patient Goals: Safety, Comfort    Progress made toward(s) clinical / shift goals:    Problem: Skin Integrity  Goal: Skin integrity is maintained or improved  Note: Patient's heels floated with heel boots. Declined to be repositioned every 2 hours and requested not to be awakened while sleeping. Patient also said she preferred to remain on sitting position while in bed as she found it intolerable to lie down.Educated patient regarding pressure injury prevention and further skin breakdown. Patient said she's been sleeping this way because it is what's most comfortable for her. Patient requires additional reinforcement regarding pressure injury prevention measures.   Will continue encouraging patient to repositioning her while awake or after she uses the bathroom. Encouraged to call when she feels uncomfortable in her current position so we can provide her support and assistance.      Problem: Respiratory  Goal: Patient will understand use and administration of respiratory medications to improve respiratory function  Outcome: Progressing  Note: O2 at 1 li per NC, respiration even/non labored.      Problem: Bladder / Voiding  Goal: Patient will establish and maintain regular urinary output  Outcome: Progressing

## 2025-04-17 NOTE — PROGRESS NOTES
Hospital Medicine Daily Progress Note        Chief Complaint  Pneumonia  Edema  Hypertension    Interval Problem Update  No chest pain, shortness of breath, or palpitations.  Cough and leg swelling both better.    Disposition  Per Physiatry    Review of Systems  Review of Systems   Constitutional:  Negative for chills and fever.   HENT: Negative.     Eyes: Negative.    Respiratory:  Positive for cough. Negative for shortness of breath.    Cardiovascular:  Positive for leg swelling. Negative for chest pain and palpitations.   Gastrointestinal:  Negative for abdominal pain, nausea and vomiting.   Musculoskeletal: Negative.    Skin:  Negative for itching and rash.   Endo/Heme/Allergies:  Negative for polydipsia. Does not bruise/bleed easily.        Physical Exam  Temp:  [36.4 °C (97.6 °F)-36.8 °C (98.3 °F)] 36.6 °C (97.8 °F)  Pulse:  [60-74] 65  Resp:  [17-20] 20  BP: (106-123)/(46-63) 109/53  SpO2:  [94 %-96 %] 96 %    Physical Exam  Vitals reviewed.   Constitutional:       General: She is not in acute distress.     Appearance: Normal appearance. She is not ill-appearing.   HENT:      Head: Normocephalic and atraumatic.      Right Ear: External ear normal.      Left Ear: External ear normal.      Nose: Nose normal.      Mouth/Throat:      Pharynx: Oropharynx is clear.   Eyes:      General:         Right eye: No discharge.         Left eye: No discharge.      Extraocular Movements: Extraocular movements intact.      Conjunctiva/sclera: Conjunctivae normal.   Cardiovascular:      Rate and Rhythm: Normal rate and regular rhythm.   Pulmonary:      Effort: No respiratory distress.      Breath sounds: No wheezing.      Comments: Decreased BS  Abdominal:      General: Bowel sounds are normal. There is no distension.      Palpations: Abdomen is soft.      Tenderness: There is no abdominal tenderness.   Musculoskeletal:      Cervical back: Normal range of motion and neck supple.      Right lower leg: Edema present.      Left  lower leg: Edema present.   Skin:     General: Skin is warm and dry.   Neurological:      Mental Status: She is alert.      Comments: Awake and alert         Fluids    Intake/Output Summary (Last 24 hours) at 4/17/2025 1051  Last data filed at 4/16/2025 1923  Gross per 24 hour   Intake 180 ml   Output --   Net 180 ml        Laboratory  Recent Labs     04/15/25  0528 04/16/25  0516   WBC 5.3 9.1   RBC 3.19* 3.08*   HEMOGLOBIN 9.1* 8.8*   HEMATOCRIT 29.2* 28.2*   MCV 91.5 91.6   MCH 28.5 28.6   MCHC 31.2* 31.2*   RDW 50.3* 50.7*   PLATELETCT 220 203   MPV 10.6 10.7     Recent Labs     04/15/25  0528 04/16/25  0516   SODIUM 140 138   POTASSIUM 4.1 4.0   CHLORIDE 103 103   CO2 28 27   GLUCOSE 88 97   BUN 17 19   CREATININE 0.89 0.88   CALCIUM 8.3* 8.2*                   Assessment/Plan  * GI bleed- (present on admission)  Assessment & Plan  Pt presented w/ bloody stools  4/8/25 CT Abd/Pelvis no active GIB, diverticulosis, RLL PNA  EGD 4/9/25 non-obstructing Schatzki ring, hiatal hernia, and gastric erosion  Colonoscopy 4/10/25 no active bleeding  S/P PRBC at [facility]  Continue PPI bid  GI F/U    Anemia  Assessment & Plan  Has normocytic indices  Fe 14, continue supplements  Follow H/H    Edema  Assessment & Plan  U/S BLE negative for DVT  BNP 3040  Echo still pending  Continue Lasix and Aldactone as tolerated by blood pressure and renal function    Complete heart block by electrocardiogram (HCC)- (present on admission)  Assessment & Plan  S/P PPM    Pneumonia- (present on admission)  Assessment & Plan  4/8/25 CT Abd/Pelvis no active GIB, diverticulosis, RLL PNA  COVID/FLU/RSV negative  Continue empiric Augmentin  Also on Guaifenesin, IS, and RT protocol    Vitamin D deficiency disease- (present on admission)  Assessment & Plan  Vit D level 11  Continue supplementation    Essential hypertension- (present on admission)  Assessment & Plan  On Coreg, Losartan, Hydralazine, Lasix, and Aldactone  Continue to decrease Coreg for  hypotensive trends    Dyslipidemia- (present on admission)  Assessment & Plan  On Lipitor       Full Code    Discussed w/ pt and Charge RN (Roberta)

## 2025-04-17 NOTE — PROGRESS NOTES
NURSING DAILY NOTE    Name: Priscilla Hdz   Date of Admission: 4/14/2025   Admitting Diagnosis: GI bleed  Attending Physician: KWAKU MARTINEZ D.O.  Allergies: Other environmental    Safety  Patient Assist  CGAx1  Patient Precautions  Precautions: Fall Risk, Cardiopulmonary  Fall Risk: Poor balance, History of falls  Cardiopulmonary: Pacemaker  Orthopedic: Other (see comments) (B foot drop of unknown origin)  Comments: 1L wean  Bed Transfer Status  Minimal Assist  Toilet Transfer Status   Contact Guard Assist  Assistive Devices  Gait Belt, Rails, Wheelchair  Oxygen  Silicone Nasal Cannula  Diet/Therapeutic Dining  Current Diet Order   Procedures    Diet Order Diet: Low Fiber(GI Soft) (Advance Diet as Tolerated)     Pill Administration  whole one at a time  Agitated Behavioral Scale  20  ABS Level of Severity  No Agitation    Fall Risk  Has the patient had a fall this admission?      [place] Fall Risk Scoring  14, MODERATE RISK  Fall Risk Safety Measures  bed alarm, chair alarm, and poor balance    Vitals  Temperature: 36.4 °C (97.6 °F)  Temp src: Oral  Pulse: 74  Respiration: 17  Blood Pressure : 123/52  Blood Pressure MAP (Calculated): 76 MM HG  BP Location: Right, Upper Arm  Patient BP Position: Abdul's Position     Oxygen  Pulse Oximetry: 94 %  O2 (LPM): 0.5  O2 Delivery Device: Silicone Nasal Cannula  Incentive Spirometer Volume: 750 mL    Bowel and Bladder  Last Bowel Movement  04/16/25  Stool Type  Type 4: Like a sausage or snake, smooth and soft  Bowel Device  Bathroom  Continent  Bladder: Did not void   Bowel: No movement  Bladder Function  Urine Void (mL): 350 ml  Number of Times Voided: 1  Urine Color: Yellow  Genitourinary Assessment   Bladder Assessment (WDL):  Within Defined Limits  Urine Color: Yellow  Bladder Device: Bathroom  Bladder Scan: Post Void  $ Bladder Scan Results (mL): 72    Skin  Ezequiel Score   16  Sensory Interventions   Bed Types: Standard/Trauma Mattress  Skin Preventative  Measures: Pillows in Use for Support / Positioning  Moisture Interventions  Moisturizers/Barriers: Moisturizer       Pain  Pain Rating Scale  0 - No Pain  Pain Location  Coccyx, Leg  Pain Location Orientation  Right, Mid  Pain Interventions   Distraction    ADLs    Bathing   Shower, Staff (OT)  Linen Change   Partial  Personal Hygiene     Chlorhexidine Bath      Oral Care     Teeth/Dentures     Shave     Nutrition Percentage Eaten  *  * Meal *  *, Breakfast, Less than 25% Consumed  Environmental Precautions     Patient Turns/Positioning   (patient refused repositioning, RN Polly present)  Patient Turns Assistance/Tolerance     Bed Positions  Bed Controls On, Bed Locked  Head of Bed Elevated         Psychosocial/Neurologic Assessment  Psychosocial Assessment  Psychosocial (WDL):  WDL Except  Patient Behaviors: Fatigue (can be anxious at times. Calm and cooperative during this assessment.)  Neurologic Assessment  Neuro (WDL): Exceptions to WDL  Level of Consciousness: Alert  Orientation Level: Oriented X4  Cognition: Appropriate judgement, Appropriate safety awareness, Follows commands  Speech: Clear  Muscle Strength Right Arm: Good Strength Against Gravity and Moderate Resistance  Muscle Strength Left Arm: Good Strength Against Gravity and Moderate Resistance  Muscle Strength Right Leg: Fair Strength against Gravity but No Resistance  Muscle Strength Left Leg: Fair Strength against Gravity but No Resistance  EENT (WDL):  WDL Except    Cardio/Pulmonary Assessment  Edema   RLE Edema: 2+  LLE Edema: 2+  Respiratory Breath Sounds  RUL Breath Sounds: Clear  RML Breath Sounds: Diminished  RLL Breath Sounds: Clear  KEVIN Breath Sounds: Clear  LLL Breath Sounds: Diminished  Cardiac Assessment   Cardiac (WDL):  WDL Except (Pacemaker)

## 2025-04-17 NOTE — PROGRESS NOTES
NURSING DAILY NOTE    Name: Priscilla Hdz   Date of Admission: 4/14/2025   Admitting Diagnosis: GI bleed  Attending Physician: KWAKU MARTINEZ D.O.  Allergies: Other environmental    Safety  Patient Assist  CGAx1  Patient Precautions  Precautions: Fall Risk, Cardiopulmonary  Fall Risk: Poor balance, History of falls  Cardiopulmonary: Pacemaker  Orthopedic: Other (see comments) (B foot drop of unknown origin)  Comments: 1L wean  Bed Transfer Status  Minimal Assist  Toilet Transfer Status   Contact Guard Assist  Assistive Devices  Hand held assist, Rails, Wheelchair  Oxygen  Silicone Nasal Cannula  Diet/Therapeutic Dining  Current Diet Order   Procedures    Diet Order Diet: Low Fiber(GI Soft) (Advance Diet as Tolerated)     Pill Administration  whole and one at a time  Agitated Behavioral Scale  20  ABS Level of Severity  No Agitation    Fall Risk  Has the patient had a fall this admission?      Greer [place] Fall Risk Scoring  14, MODERATE RISK  Fall Risk Safety Measures  bed alarm, chair alarm, and poor balance    Vitals  Temperature: 36.4 °C (97.6 °F)  Temp src: Oral  Pulse: 73  Respiration: 17  Blood Pressure : 121/56  Blood Pressure MAP (Calculated): 78 MM HG  BP Location: Right, Upper Arm  Patient BP Position: Abdul's Position     Oxygen  Pulse Oximetry: 94 %  O2 (LPM): 0.5  O2 Delivery Device: Silicone Nasal Cannula  Incentive Spirometer Volume: 750 mL    Bowel and Bladder  Last Bowel Movement  04/16/25  Stool Type  Type 4: Like a sausage or snake, smooth and soft  Bowel Device  Bathroom  Continent  Bladder: Did not void   Bowel: No movement  Bladder Function  Urine Void (mL): 350 ml  Number of Times Voided: 1  Urine Color: Eleonora  Genitourinary Assessment   Bladder Assessment (WDL):  Within Defined Limits  Urine Color: Eleonora  Bladder Device: Bathroom  $ Bladder Scan Results (mL): 105    Skin  Ezequiel Score   16  Sensory Interventions   Bed Types: Standard/Trauma Mattress  Skin Preventative Measures: Pillows  in Use for Support / Positioning  Moisture Interventions  Moisturizers/Barriers: Moisturizer       Pain  Pain Rating Scale  3 - Sometimes distracts me  Pain Location  Coccyx, Leg  Pain Location Orientation  Right, Mid  Pain Interventions   Distraction    ADLs    Bathing   Shower, Staff (OT)  Linen Change   Partial  Personal Hygiene     Chlorhexidine Bath      Oral Care     Teeth/Dentures     Shave     Nutrition Percentage Eaten  *  * Meal *  *, Breakfast, Less than 25% Consumed  Environmental Precautions     Patient Turns/Positioning  Patient turns self independently side to side without assistance, to offload sacral area  Patient Turns Assistance/Tolerance     Bed Positions     Head of Bed Elevated         Psychosocial/Neurologic Assessment  Psychosocial Assessment  Psychosocial (WDL):  WDL Except  Patient Behaviors: Anxious, Agitated, Fatigue  Neurologic Assessment  Neuro (WDL): Exceptions to WDL  Level of Consciousness: Alert  Orientation Level: Oriented X4  Cognition: Appropriate judgement, Appropriate safety awareness, Follows commands  Speech: Clear  Muscle Strength Right Arm: Good Strength Against Gravity and Moderate Resistance  Muscle Strength Left Arm: Good Strength Against Gravity and Moderate Resistance  Muscle Strength Right Leg: Fair Strength against Gravity but No Resistance  Muscle Strength Left Leg: Fair Strength against Gravity but No Resistance  EENT (WDL):  WDL Except    Cardio/Pulmonary Assessment  Edema   RLE Edema: 2+  LLE Edema: 2+  Respiratory Breath Sounds  RUL Breath Sounds: Clear  RML Breath Sounds: Clear  RLL Breath Sounds: Clear  KEVIN Breath Sounds: Clear  LLL Breath Sounds: Clear  Cardiac Assessment   Cardiac (WDL):  WDL Except (pacemaker)

## 2025-04-17 NOTE — PROGRESS NOTES
"  Physical Medicine & Rehabilitation Progress Note    Encounter Date: 4/17/2025    Interval Events (Subjective):  VS: 0.5L O2.   Last BM 4/16. Voiding urine.   No new labs today  Missed doses of scheduled meds: refused senna/docusate last night  PRNs used: none    Patient seen at bedside today.  We discussed her sacral wound and ways to help it heal. She admits to sleeping sitting up at night which helps her breathing. I emphasized turning frequently when sitting on in bed. Breathing feels a little better today.     ____________________  Interdisciplinary Team Conference   Most recent IDT on 4/16/2025     Discharge Date/Disposition:  4/24/2025  _____________________________________       Objective:  Vital signs: /63   Pulse 70   Temp 36.8 °C (98.3 °F) (Oral)   Resp 18   Ht 1.651 m (5' 5\")   Wt 65.5 kg (144 lb 6.4 oz)   SpO2 96%   BMI 24.03 kg/m²   General: not in distress.  HEENT: PERRL. EOMI.  Pulmonary: Breathing comfortably on 1L O2.   Cardiovascular: Rate as above, warm extremities  Gastrointestinal: Abdomen non-distended  Extremities/MSK: Bilateral lower extremity edema  Integumentary:   4/16: sacrum      Psychiatric: Normal affect.  Neurologic: Awake and alert. Answering questions appropriately. Normal speech.       Laboratory Values:  Recent Results (from the past 72 hours)   CBC with Differential    Collection Time: 04/15/25  5:28 AM   Result Value Ref Range    WBC 5.3 4.8 - 10.8 K/uL    RBC 3.19 (L) 4.20 - 5.40 M/uL    Hemoglobin 9.1 (L) 12.0 - 16.0 g/dL    Hematocrit 29.2 (L) 37.0 - 47.0 %    MCV 91.5 81.4 - 97.8 fL    MCH 28.5 27.0 - 33.0 pg    MCHC 31.2 (L) 32.2 - 35.5 g/dL    RDW 50.3 (H) 35.9 - 50.0 fL    Platelet Count 220 164 - 446 K/uL    MPV 10.6 9.0 - 12.9 fL    Neutrophils-Polys 77.90 (H) 44.00 - 72.00 %    Lymphocytes 14.50 (L) 22.00 - 41.00 %    Monocytes 4.60 0.00 - 13.40 %    Eosinophils 1.30 0.00 - 6.90 %    Basophils 0.20 0.00 - 1.80 %    Immature Granulocytes 1.50 (H) 0.00 - " 0.90 %    Nucleated RBC 0.00 0.00 - 0.20 /100 WBC    Neutrophils (Absolute) 4.09 1.82 - 7.42 K/uL    Lymphs (Absolute) 0.76 (L) 1.00 - 4.80 K/uL    Monos (Absolute) 0.24 0.00 - 0.85 K/uL    Eos (Absolute) 0.07 0.00 - 0.51 K/uL    Baso (Absolute) 0.01 0.00 - 0.12 K/uL    Immature Granulocytes (abs) 0.08 0.00 - 0.11 K/uL    NRBC (Absolute) 0.00 K/uL   Comp Metabolic Panel (CMP)    Collection Time: 04/15/25  5:28 AM   Result Value Ref Range    Sodium 140 135 - 145 mmol/L    Potassium 4.1 3.6 - 5.5 mmol/L    Chloride 103 96 - 112 mmol/L    Co2 28 20 - 33 mmol/L    Anion Gap 9.0 7.0 - 16.0    Glucose 88 65 - 99 mg/dL    Bun 17 8 - 22 mg/dL    Creatinine 0.89 0.50 - 1.40 mg/dL    Calcium 8.3 (L) 8.5 - 10.5 mg/dL    Correct Calcium 9.3 8.5 - 10.5 mg/dL    AST(SGOT) 32 12 - 45 U/L    ALT(SGPT) 11 2 - 50 U/L    Alkaline Phosphatase 53 30 - 99 U/L    Total Bilirubin 0.3 0.1 - 1.5 mg/dL    Albumin 2.8 (L) 3.2 - 4.9 g/dL    Total Protein 6.1 6.0 - 8.2 g/dL    Globulin 3.3 1.9 - 3.5 g/dL    A-G Ratio 0.8 g/dL   HEMOGLOBIN A1C    Collection Time: 04/15/25  5:28 AM   Result Value Ref Range    Glycohemoglobin 6.1 (H) 4.0 - 5.6 %    Est Avg Glucose 128 mg/dL   Vitamin D, 25-hydroxy (blood)    Collection Time: 04/15/25  5:28 AM   Result Value Ref Range    25-Hydroxy   Vitamin D 25 11 (L) 30 - 100 ng/mL   ESTIMATED GFR    Collection Time: 04/15/25  5:28 AM   Result Value Ref Range    GFR (CKD-EPI) 63 >60 mL/min/1.73 m 2   POC CoV-2, FLU A/B, RSV by PCR    Collection Time: 04/15/25  6:40 PM   Result Value Ref Range    POC Influenza A RNA, PCR Negative Negative    POC Influenza B RNA, PCR Negative Negative    POC RSV, by PCR Negative Negative    POC SARS-CoV-2, PCR NotDetected NotDetected   CBC WITHOUT DIFFERENTIAL    Collection Time: 04/16/25  5:16 AM   Result Value Ref Range    WBC 9.1 4.8 - 10.8 K/uL    RBC 3.08 (L) 4.20 - 5.40 M/uL    Hemoglobin 8.8 (L) 12.0 - 16.0 g/dL    Hematocrit 28.2 (L) 37.0 - 47.0 %    MCV 91.6 81.4 - 97.8 fL     MCH 28.6 27.0 - 33.0 pg    MCHC 31.2 (L) 32.2 - 35.5 g/dL    RDW 50.7 (H) 35.9 - 50.0 fL    Platelet Count 203 164 - 446 K/uL    MPV 10.7 9.0 - 12.9 fL   Basic Metabolic Panel    Collection Time: 04/16/25  5:16 AM   Result Value Ref Range    Sodium 138 135 - 145 mmol/L    Potassium 4.0 3.6 - 5.5 mmol/L    Chloride 103 96 - 112 mmol/L    Co2 27 20 - 33 mmol/L    Glucose 97 65 - 99 mg/dL    Bun 19 8 - 22 mg/dL    Creatinine 0.88 0.50 - 1.40 mg/dL    Calcium 8.2 (L) 8.5 - 10.5 mg/dL    Anion Gap 8.0 7.0 - 16.0   proBrain Natriuretic Peptide, NT    Collection Time: 04/16/25  5:16 AM   Result Value Ref Range    NT-proBNP 3040 (H) 0 - 125 pg/mL   PROCALCITONIN    Collection Time: 04/16/25  5:16 AM   Result Value Ref Range    Procalcitonin 0.11 <0.25 ng/mL   ESTIMATED GFR    Collection Time: 04/16/25  5:16 AM   Result Value Ref Range    GFR (CKD-EPI) 63 >60 mL/min/1.73 m 2       Medications:  Scheduled Medications   Medication Dose Frequency    carvedilol  12.5 mg BID WITH MEALS    vitamin D2 (Ergocalciferol)  50,000 Units Q7 DAYS    amoxicillin-clavulanate  1 Tablet Q12HRS    guaiFENesin dextromethorphan  5 mL 4X/DAY WITH MEALS + NIGHTLY    ferrous gluconate  324 mg QDAY with Breakfast    Pharmacy Consult Request  1 Each PHARMACY TO DOSE    senna-docusate  2 Tablet Q EVENING    atorvastatin  20 mg QDAY    furosemide  20 mg DAILY    hydrALAZINE  100 mg BID    losartan  50 mg BID    omeprazole  20 mg BID    spironolactone  12.5 mg DAILY     PRN medications: Respiratory Therapy Consult, hydrALAZINE, acetaminophen, senna-docusate **AND** polyethylene glycol/lytes, docusate sodium, magnesium hydroxide, carboxymethylcellulose, benzocaine-menthol, mag hydrox-al hydrox-simeth, ondansetron **OR** ondansetron, sodium chloride, oxyCODONE immediate-release **OR** oxyCODONE immediate-release, melatonin    Diet:  Current Diet Order   Procedures    Diet Order Diet: Low Fiber(GI Soft) (Advance Diet as Tolerated)       Medical Decision  Making and Plan:  GI bleed  -Admitted with dark red clots in stool.  Hemoglobin found to be 7.7, dropped to 6.4. CT abd/pelv negative for acute GI bleeding. EGD showed hiatal hernia, nonobstructing Schatzki ring, gastric erosion.  -PPI BID  -PT and OT for mobility and ADLs. Per guidelines, 15 hours per week between PT, OT and/or SLP.  -Follow up with GI     Acute blood loss anemia  -Received transfusion at acute hospital  -On admission to rehab: Most recent Hgb 9.4.  Check morning labs.  -4/16: hgb 8.8.      Distal LE weakness, distal motor neuropathy  -on admission: has weakness in bilateral ankle dorsiflexion and plantarflexion.  Maybe due to edema.  Monitor for now.    -4/16: Persistent distal weakness. Consistent with distal motor polyneuropathy. There may be a history of worsening distal weakness prior to this hospitalization.   -Recommend neurology evaluation, may benefit from NCS/EMG    4/17: IGC change. IGC Code / Diagnosis to Support: 0003.3 - Neurologic Conditions: Polyneuropathy     Hypertension  -on admission: coreg 25mg BID, losartan 50mg BID, spironolactone 12.5mg daily. Monitor for need to adjust medications  -IM following  -4/16: BP low. Coreg to 12.5mg BID.   -Continue Coreg, losartan, spironolactone     LE venous insufficiency   LE edema  -on admission: chronic swelling.  Recent ultrasound Doppler negative for DVT. compression stockings. lasix 20mg daily.   -4/15: IM consult.   -4/16: BNP 3040  -echo pending  -Continue Lasix      Cough, pneumonia  -on admission: has nonproductive cough. Check CXR. Check AM labs.   -4/15: CXR appears to have fluid overload in lungs, possible pneumonia. IM consulted. Patient started on augmentin.   -continue Augmentin, guaifenesin     Acute respiratory failure  -On admission: On 1 L supplemental O2.  Wean as tolerated.  -4/17: 0.5L O2     History of CKD stage III  -Creatinine has remained within normal limits recently  -monitor     History of pacemaker  -Known  history of prior complete heart block, status post pacemaker in 2019    Hyperlipidemia  -On home dose Lipitor     Pain - prn Tylenol, oxycodone    Vit D deficiency - 4/15: vit D 11. Plan for ergocalciferol 50,000u weekly while in rehab. Will need follow up with PCP.      Poor mood, adjustment disorder  -  about 6 months ago  -Monitor mood, can consider SSRI.     Bladder management  -monitor for urinary retention. Bladder scan and ICP per protocol.      Bowel management  -senna/docusate for prophylaxis     Sacral pressure injury, present on admission  -slough covering entire wound base, most consistent with unstagable pressure injury.   -Continue daily wound care  -Continue turn schedule     DVT Ppx - Patient not cleared for DVT chemoprophylaxis.  Ultrasound negative for DVT on .      UPCOMING LABS/IMAGING: per IM      HOSPITALIST FOLLOWING: Yes     DISPO: Home. Lives in [facility] with [facility]. Her son lives with her and can provide support upon discharge.      STIVEN:      DISCHARGE SPECIALIST FOLLOW UP: Gastroenterology       Patient to scheduled follow up with their PCP within 2 weeks from discharge from the [facility].      ____________________________________    Ludwig Moeller D.O.  Physical Medicine & Rehabilitation   ____________________________________    Total time:  39 minutes. Time spent included pre-rounding review of vitals and tests, unit/floor time, face-to-face time with the patient including physical examination, care coordination, counseling of patient and/or family, ordering medications/procedures/tests, discussion with CM, PT, OT, SLP and/or other healthcare providers, and documentation in the electronic medical record.

## 2025-04-17 NOTE — THERAPY
"Physical Therapy   Daily Treatment     Patient Name:  Priscilla Hdz  Age:  85 y.o., Sex:  female  Medical Record #:  [id number]  Today's Date: 4/17/2025     Precautions  Precautions: (P) Fall Risk, Cardiopulmonary, Orthopedic  Fall Risk: Poor balance, History of falls  Cardiopulmonary: Pacemaker  Orthopedic: Other (see comments) (B foot drop of unknown origin)  Comments: (P) 0.5-1L O2 wean    Subjective: Pt was seated in w/c upon arrival and agreeable to treatment.      Objective:    04/17/25 0931   PT Charge Group   PT Gait Training (Units) 2   PT Total Time Spent   PT Individual Total Time Spent (Mins) 30   Precautions   Fall Risk Poor balance;History of falls   Cardiopulmonary Pacemaker   Orthopedic Other (see comments)  (B foot drop of unknown origin)   Vitals   Pulse 71  (after activity)   Pulse Oximetry 92 %  (after activity)   O2 (LPM) 1   O2 Delivery Device Nasal Cannula   Ambulation   Level of Assist Contact Guard Assist   Assistive Device FWW   Additional Description Cueing needed;Extra time needed   Distance 40 feet   Stairs   Level of Assist Minimal Assist   Stair Height 4\" step   Additional Description Completed in parallel bars   Stairs Amount 4   Interdisciplinary Plan of Care Collaboration   Patient Position at End of Therapy Seated;Chair Alarm On;Call Light within Reach;Tray Table within Reach;Self Releasing Lap Belt Applied;Phone within Reach     Completed gait and initial stair training with focus on sequencing, safety, and compensation for B foot drop.       Assessment: Pt able to progress to initial stair training this session, but remains limited secondary to fatigue and decreased activity tolerance. Pt demonstrated continued steppage gait pattern to compensate for B foot drop.     Strengths: Able to follow instructions, Alert and oriented, Effective communication skills, Good insight into deficits/needs, Motivated for self care and independence, Pleasant and cooperative, Willingly " participates in therapeutic activities  Barriers: Decreased endurance, Fatigue, Generalized weakness, Home accessibility, Impaired activity tolerance, Pain, Limited mobility    Plan:   Focus on functional strengthening for bed mobility, standing tolerance, progressive amb with FWW-will benefit from DF assist/ace wraps due to B LE edema  Continue stair training  Cont to wean 02  Edu pt on diaphragmatic and PLB techniques  Trial B AFOs    DME     PT DME Recommendations  Wheelchair:  (TBD)  Assistive Device:  (pt owns a FWW and 4WW)        Passport items to be completed:  Get in/out of bed safely, in/out of a vehicle, safely use mobility device, walk or wheel around home/community, navigate up and down stairs, show how to get up/down from the ground, ensure home is accessible, demonstrate HEP, complete caregiver training     Physical Therapy Problems (Active)       Problem: Mobility       Dates: Start:  04/15/25         Goal: STG-Within one week, patient will ambulate 40ft c/ FWW and CGA       Dates: Start:  04/15/25            Goal: STG-Within one week, patient will ascend and descend 3 stairs c/ BHR and ModA       Dates: Start:  04/15/25               Problem: Mobility Transfers       Dates: Start:  04/15/25         Goal: STG-Within one week, patient will perform bed mobility c/ SPV       Dates: Start:  04/15/25            Goal: STG-Within one week, patient will sit to stand c/ FWW and CGA       Dates: Start:  04/15/25            Goal: STG-Within one week, patient will transfer bed to chair c/ FWW and CGA       Dates: Start:  04/15/25               Problem: PT-Long Term Goals       Dates: Start:  04/15/25         Goal: LTG-By discharge, patient will ambulate 50ft c/ FWW and SBA       Dates: Start:  04/15/25            Goal: LTG-By discharge, patient will transfer one surface to another c/ FWW and SBA       Dates: Start:  04/15/25            Goal: LTG-By discharge, patient will perform home exercise program c/ set-up        Dates: Start:  04/15/25            Goal: LTG-By discharge, patient will ambulate up/down 3 stairs c/ RHR and Kelly       Dates: Start:  04/15/25            Goal: LTG-By discharge, patient will transfer in/out of a car c/ FWW and Kelly       Dates: Start:  04/15/25

## 2025-04-18 ENCOUNTER — APPOINTMENT (OUTPATIENT)
Dept: OCCUPATIONAL THERAPY | Facility: REHABILITATION | Age: 85
DRG: 073 | End: 2025-04-18
Attending: STUDENT IN AN ORGANIZED HEALTH CARE EDUCATION/TRAINING PROGRAM
Payer: MEDICARE

## 2025-04-18 ENCOUNTER — APPOINTMENT (OUTPATIENT)
Dept: PHYSICAL THERAPY | Facility: REHABILITATION | Age: 85
DRG: 073 | End: 2025-04-18
Attending: STUDENT IN AN ORGANIZED HEALTH CARE EDUCATION/TRAINING PROGRAM
Payer: MEDICARE

## 2025-04-18 PROCEDURE — 97530 THERAPEUTIC ACTIVITIES: CPT

## 2025-04-18 PROCEDURE — A9270 NON-COVERED ITEM OR SERVICE: HCPCS | Performed by: HOSPITALIST

## 2025-04-18 PROCEDURE — 99232 SBSQ HOSP IP/OBS MODERATE 35: CPT | Performed by: HOSPITALIST

## 2025-04-18 PROCEDURE — 770010 HCHG ROOM/CARE - REHAB SEMI PRIVAT*

## 2025-04-18 PROCEDURE — 700102 HCHG RX REV CODE 250 W/ 637 OVERRIDE(OP): Performed by: HOSPITALIST

## 2025-04-18 PROCEDURE — 700102 HCHG RX REV CODE 250 W/ 637 OVERRIDE(OP): Performed by: STUDENT IN AN ORGANIZED HEALTH CARE EDUCATION/TRAINING PROGRAM

## 2025-04-18 PROCEDURE — 97110 THERAPEUTIC EXERCISES: CPT

## 2025-04-18 PROCEDURE — 97150 GROUP THERAPEUTIC PROCEDURES: CPT | Mod: CO

## 2025-04-18 PROCEDURE — A9270 NON-COVERED ITEM OR SERVICE: HCPCS | Performed by: STUDENT IN AN ORGANIZED HEALTH CARE EDUCATION/TRAINING PROGRAM

## 2025-04-18 PROCEDURE — 97535 SELF CARE MNGMENT TRAINING: CPT

## 2025-04-18 PROCEDURE — 99232 SBSQ HOSP IP/OBS MODERATE 35: CPT | Performed by: STUDENT IN AN ORGANIZED HEALTH CARE EDUCATION/TRAINING PROGRAM

## 2025-04-18 RX ORDER — CARVEDILOL 3.12 MG/1
3.12 TABLET ORAL 2 TIMES DAILY WITH MEALS
Status: DISCONTINUED | OUTPATIENT
Start: 2025-04-18 | End: 2025-04-20

## 2025-04-18 RX ADMIN — OMEPRAZOLE 20 MG: 20 CAPSULE, DELAYED RELEASE ORAL at 20:50

## 2025-04-18 RX ADMIN — GUAIFENESIN SYRUP AND DEXTROMETHORPHAN 5 ML: 100; 10 SYRUP ORAL at 08:01

## 2025-04-18 RX ADMIN — LOSARTAN POTASSIUM 50 MG: 50 TABLET, FILM COATED ORAL at 05:26

## 2025-04-18 RX ADMIN — FERROUS GLUCONATE 324 MG: 324 TABLET ORAL at 08:01

## 2025-04-18 RX ADMIN — AMOXICILLIN AND CLAVULANATE POTASSIUM 1 TABLET: 875; 125 TABLET, FILM COATED ORAL at 20:50

## 2025-04-18 RX ADMIN — GUAIFENESIN SYRUP AND DEXTROMETHORPHAN 5 ML: 100; 10 SYRUP ORAL at 20:50

## 2025-04-18 RX ADMIN — CARVEDILOL 3.12 MG: 3.12 TABLET, FILM COATED ORAL at 17:37

## 2025-04-18 RX ADMIN — GUAIFENESIN SYRUP AND DEXTROMETHORPHAN 5 ML: 100; 10 SYRUP ORAL at 11:36

## 2025-04-18 RX ADMIN — HYDRALAZINE HYDROCHLORIDE 100 MG: 50 TABLET ORAL at 08:01

## 2025-04-18 RX ADMIN — SPIRONOLACTONE 12.5 MG: 25 TABLET ORAL at 05:26

## 2025-04-18 RX ADMIN — GUAIFENESIN SYRUP AND DEXTROMETHORPHAN 5 ML: 100; 10 SYRUP ORAL at 17:37

## 2025-04-18 RX ADMIN — ATORVASTATIN CALCIUM 20 MG: 10 TABLET, FILM COATED ORAL at 20:50

## 2025-04-18 RX ADMIN — LOSARTAN POTASSIUM 50 MG: 50 TABLET, FILM COATED ORAL at 17:37

## 2025-04-18 RX ADMIN — AMOXICILLIN AND CLAVULANATE POTASSIUM 1 TABLET: 875; 125 TABLET, FILM COATED ORAL at 08:01

## 2025-04-18 RX ADMIN — CARVEDILOL 6.25 MG: 3.12 TABLET, FILM COATED ORAL at 08:01

## 2025-04-18 RX ADMIN — OMEPRAZOLE 20 MG: 20 CAPSULE, DELAYED RELEASE ORAL at 08:01

## 2025-04-18 RX ADMIN — FUROSEMIDE 20 MG: 20 TABLET ORAL at 05:26

## 2025-04-18 RX ADMIN — HYDRALAZINE HYDROCHLORIDE 100 MG: 50 TABLET ORAL at 20:50

## 2025-04-18 ASSESSMENT — BALANCE ASSESSMENTS
PICK UP OBJECT FROM THE FLOOR FROM A STANDING POSITION: 0
STANDING UNSUPPORTED ONE FOOT IN FRONT: 2
REACHING FORWARD WITH OUTSTRETCHED ARM WHILE STANDING: 3
PLACE ALTERNATE FOOT ON STEP OR STOOL WHILE STANDING UNSUPPORTED: 0
LOOK OVER LEFT AND RIGHT SHOULDERS WHILE STANDING: 1
STANDING UNSUPPORTED WITH EYES CLOSED: 2
STANDING UNSUPPORTED WITH FEET TOGETHER: 2
STANDING TO SITTING: 3
MEDICARE IMPAIRMENT PERCENTAGE: 55
LONG VERSION TOTAL SCORE (MAX 56): 25
TURN 360 DEGREES: 0
STANDING ON ONE LEG: 0
SITTING UNSUPPORTED: 4
LONG VERSION TOTAL SCORE (MAX 56): 25
STANDING UNSUPPORTED: 2
TRANSFERS: 3
SITTING TO STANDING: 3

## 2025-04-18 ASSESSMENT — ENCOUNTER SYMPTOMS
POLYDIPSIA: 0
VOMITING: 0
BRUISES/BLEEDS EASILY: 0
CHILLS: 0
EYES NEGATIVE: 1
COUGH: 1
NAUSEA: 0
MUSCULOSKELETAL NEGATIVE: 1
PALPITATIONS: 0
FEVER: 0
ABDOMINAL PAIN: 0
SHORTNESS OF BREATH: 0

## 2025-04-18 ASSESSMENT — PAIN DESCRIPTION - PAIN TYPE
TYPE: ACUTE PAIN
TYPE: ACUTE PAIN
TYPE: ACUTE PAIN;CHRONIC PAIN

## 2025-04-18 NOTE — CARE PLAN
The patient is Stable - Low risk of patient condition declining or worsening    Shift Goals  Clinical Goals: safety  Patient Goals: rest    Progress made toward(s) clinical / shift goals:    Problem: Fall Risk - Rehab  Goal: Patient will remain free from falls  Outcome: Progressing     Problem: Psychosocial  Goal: Patient's level of anxiety will decrease  Outcome: Progressing     Problem: Bladder / Voiding  Goal: Patient will establish and maintain regular urinary output  Outcome: Progressing     Problem: Pain - Standard  Goal: Alleviation of pain or a reduction in pain to the patient’s comfort goal  Outcome: Progressing

## 2025-04-18 NOTE — THERAPY
Occupational Therapy  Daily Treatment     Patient Name:  Priscilla Hdz  Age:  85 y.o., Sex:  female  Medical Record #:  [id number]  Today's Date:  4/18/2025    Precautions  Precautions: Fall Risk, Cardiopulmonary  Fall Risk: Poor balance, History of falls  Cardiopulmonary: Pacemaker  Orthopedic: Other (see comments)  Comments: 0.5-1L O2 wean    Subjective:   0900 Session Pt in bed with RN, agreeable to OT session, reporting she didn't sleep again last night. Not as frustrated as previous session, declined ear buds again. Pleasant at end of session and looking forward to getting off O2.  1300 Session pt seated in w/c, agreeable to OT session requesting to use the restroom. Pt stated she felt her R leg was feeling weak.     Objective:    04/18/25 0901   OT Charge Group   OT Self Care / ADL (Units) 3   OT Therapy Activity (Units) 1   OT Total Time Spent   OT Individual Total Time Spent (Mins) 60   Precautions   Precautions Fall Risk;Cardiopulmonary   Fall Risk Poor balance;History of falls   Cardiopulmonary Pacemaker   Vitals   Pulse Oximetry 93 %   Room Air Oximetry 94   O2 (LPM) 1   Vitals Comments SpO2 ranged from 92-94% following activity and during seated RB, RT/RN made aware for RA trial   Pain 0 - 10 Group   Location Buttock   Location Orientation Mid   Pain Rating Scale (NPRS) 2   Description Aching  (Pt repositioned for comfort)   Oral Hygiene   Level of Assist Modified Independent   Patient Position Seated   Grooming   Level of Assist Independent   Patient Position Seated   Upper Body Dressing   Level of Assist Minimal Assist   Patient Position Seated   Clothing Item(s) Pullover shirt   Additional Description Assist with clearing head   Lower Body Dressing   Level of Assist Contact Guard Assist   Patient Position Seated;Standing   Clothing Item(s) Pants;Disposable Brief  (Doff/manoj)   Adaptive Equipment Reacher;Dressing Stick   Additional Description Cueing needed;Extra time needed   Putting On/Taking Off  Footwear   Level of Assist Stand by Assist;Maximal Assist   Patient Position Seated   Footwear Type Treaded Socks;Compression Stockings  (SBA for treaded socks, max for compression)   Adaptive Equipment Reacher;Sock Aid   Additional Description Extra time needed   Shower/Bathe Self   Level of Assist Contact Guard Assist   Patient Position Seated;Standing   Adaptive Equipment Grab Bars   Additional Description Bed bath/sponge bath completed  (Pt refused shower, completed sponge bath in w/c)   Sit to Stand   Level of Assist Contact Guard Assist   Assistive Devices FWW   Chair/Bed-to-Chair Transfer   Level of Assist Contact Guard Assist   Transfer Type Stand Step Transfer   Assistive Devices FWW;Wheelchair   Interdisciplinary Plan of Care Collaboration   IDT Collaboration with  Nursing;Respiratory Therapist;Family / Caregiver   Patient Position at End of Therapy Seated;Chair Alarm On;Self Releasing Lap Belt Applied;Call Light within Reach;Tray Table within Reach   Collaboration Comments Son present at beginning of session, RT/RN aware of SpO2 RA       Therapeutic Activities  Purpose: to improve performance and function of daily activities, to provide patient and family education, and to increase safety with activities of daily life and mobility related to activities of daily life  Interventions:  Pt participated in funct mob ~30' x2, CGA c/ FWW with seated RB to trial RA. Pt SpO2 maintained 92-94% through activity and seated following on RA. Pt educated on pursed lipped breathing, receptive to edu.     Activities of Daily Living (ADL's)  Purpose: to improve function, safety, and independence with activities of daily living and to provide patient and family education  Interventions:   Oral Hygiene  Grooming  Shower/Bathing  Upper Body Dressing  Lower Body Dressing         04/18/25 1301   OT Charge Group   OT Self Care / ADL (Units) 1   OT Therapeutic Exercise (Units) 1   OT Total Time Spent   OT Individual Total Time  Spent (Mins) 30   Precautions   Precautions Fall Risk;Cardiopulmonary   Fall Risk Poor balance;History of falls   Cardiopulmonary Pacemaker   Vitals   O2 Delivery Device None - Room Air   Vitals Comments SpO2 at 93% following activity on RA   Bladder   Urine Color Yellow   Number of Times Voided 1   Bladder Device Bathroom   Grooming   Level of Assist Independent   Patient Position Seated   Additional Description Hand hygiene   Upper Body Dressing   Level of Assist Minimal Assist   Patient Position Seated   Clothing Item(s) Pullover shirt   Additional Description Assist with clearing head  (Juan/doff sweater)   Toilet Transfer   Level of Assist Contact Guard Assist   Transfer Type Stand Pivot Transfer   Adaptive Equipment Grab bars   Assistive Device Wheelchair   Toileting Hygiene   Level of Assist Stand By Assist   Additional Description Grab bars   Interdisciplinary Plan of Care Collaboration   Patient Position at End of Therapy Seated;Chair Alarm On;Self Releasing Lap Belt Applied;Call Light within Reach;Tray Table within Reach       Therapeutic Exercise  Purpose: to improve strength, to improve ROM/flexibility, and to improve functional endurance  Interventions:   Lower body exercises:   Seated program -   Long arc quad, 2 sets of 10 and Bilateral  Standing program -   Mini squat, 2 sets of 10 - B UE support in // bars    Activities of Daily Living (ADL's)  Purpose: to improve function, safety, and independence with activities of daily living and to provide patient and family education  Interventions:   Grooming  Lower Body Dressing  Toilet Transfers  Toilet Hygiene    Assessment:    0900 Session: Pt tolerated session well, improved on LB dressing with AE, still requiring assist to doff/juan compression socks, Parth for UB dressing. Pt improved on STS and funct mob at CGA level, with 1 cue for hand placement, good follow-through for all other transfers. Trailed activity on RA and SpO2 maintained above 90%. RN/RT  aware.    1300 Session: Pt tolerated session well with a focus on LE strengthening. No LOB during standing therex with SpO2 maintaining above 90% throughout.  Strengths: Alert and oriented, Supportive family  Barriers: Agitation, Decreased endurance, Difficulty following instructions, Fatigue, Generalized weakness, Impaired activity tolerance, Impaired balance, Impaired carryover of learning, Impaired functional cognition, Limited mobility    Plan:  Pt would benefit from skilled OT services to address:  - ADLs/IADLs (laundry)  - Strength/endurance/activity tolerance  - Sitting/standing balance  - Funct Mob  - Transfers  - Sit to Stand Training  - UE strength/ROM (shoulder flex/ext)    DME  OT DME Recommendations  Additional Equipment (NOT TYPICALLY COVERED BY INSURANCE): Sock aid, Reacher, Long handled shoe horn    Passport items to be completed:  Perform bathroom transfers, complete dressing, complete feeding, get ready for the day, prepare a simple meal, participate in household tasks, adapt home for safety needs, demonstrate home exercise program, complete caregiver training     Occupational Therapy Goals (Active)       Problem: Bathing       Dates: Start:  04/15/25         Goal: STG-Within one week, patient will bathe CGA w/ AE PRN       Dates: Start:  04/15/25               Problem: Dressing       Dates: Start:  04/15/25         Goal: STG-Within one week, patient will dress EOB Parth with AE PRN       Dates: Start:  04/15/25               Problem: Functional Transfers       Dates: Start:  04/15/25         Goal: STG-Within one week, patient will transfer to toilet SBA w/ LRD       Dates: Start:  04/15/25               Problem: Grooming       Dates: Start:  04/15/25         Goal: STG-Within one week, patient will complete grooming standing at sink SBA       Dates: Start:  04/15/25               Problem: OT Long Term Goals       Dates: Start:  04/15/25         Goal: LTG-By discharge, patient will complete basic  self care tasks SUP-Jovani       Dates: Start:  04/15/25            Goal: LTG-By discharge, patient will perform bathroom transfers SUP-Jovani       Dates: Start:  04/15/25            Goal: LTG-By discharge, patient will dress EOB SUP-Jovani w/ AE PRN       Dates: Start:  04/15/25            Goal: LTG-By discharge, patient will complete funct mob household distance SBA-SUP w/ LRD       Dates: Start:  04/15/25

## 2025-04-18 NOTE — CARE PLAN
The patient is Watcher - Medium risk of patient condition declining or worsening    Shift Goals  Clinical Goals: Safety  Patient Goals: Rest    Problem: Respiratory  Goal: Patient will understand use and administration of respiratory medications to improve respiratory function  Outcome: Progressing    Patient's oxygen saturation was 94% on RA.  She was previously on 0.5L oxygen and was able to wean off.     Problem: Bowel Elimination  Goal: Patient will participate in bowel management program  Outcome: Progressing    Last BM:  4/18/25

## 2025-04-18 NOTE — THERAPY
Occupational Therapy  Daily Treatment     Patient Name:  Priscilla Hdz  Age:  85 y.o., Sex:  female  Medical Record #:  [id number]  Today's Date:  4/18/2025    Precautions  Precautions: Fall Risk, Cardiopulmonary  Fall Risk: Poor balance, History of falls  Cardiopulmonary: Pacemaker  Orthopedic: Other (see comments)  Comments: 0.5-1L O2 wean    Subjective:  agreeable to group  activity this session      Objective:/Assessment:    04/18/25 1031   OT Charge Group   OT Group Therapy Group Activities   OT Total Time Spent   OT Group Total Time Spent (Mins) 60   Precautions   Precautions Fall Risk;Cardiopulmonary   Fall Risk Poor balance;History of falls   Interdisciplinary Plan of Care Collaboration   Patient Position at End of Therapy Seated;Chair Alarm On;Self Releasing Lap Belt Applied;Call Light within Reach;Tray Table within Reach;Phone within Reach  (declined to eat in dining room despite moderate staff encouragement)         Group Therapy  Purpose: to reinforce strengthening and movement through group format demonstration  Interventions:   UE exercise group   2 sets of 10 2lb weight  right / left   biceps curls,   supination/pronation   internal / external rotation  3lb weight bilateral  ( held with both hands)   chest press  front arm raise to shoulder height   shoulder press  and arm circles    During rest breaks participated in  Easter holiday  Adomos  questions      Standing  leisure activity with peers     playing [employer]    Michael was able to stand approximately 10  minutes   and 4 minutes remainder completed in sitting due to fatigue          Limited right UE  ROM  for  front arm raise and shoulder press    Participates to the best of her ability    Good social interaction with peers.     Strengths: Alert and oriented, Supportive family  Barriers: Agitation, Decreased endurance, Difficulty following instructions, Fatigue, Generalized weakness, Impaired activity tolerance, Impaired balance, Impaired  carryover of learning, Impaired functional cognition, Limited mobility    Plan:  Pt would benefit from skilled OT services to address:  - ADLs/IADLs (laundry)  - Strength/endurance/activity tolerance  - Sitting/standing balance  - Funct Mob  - Transfers  - Sit to Stand Training  - UE strength/ROM (shoulder flex/ext)    DME  OT DME Recommendations  Additional Equipment (NOT TYPICALLY COVERED BY INSURANCE): Sock aid, Reacher, Long handled shoe horn      Occupational Therapy Goals (Active)       Problem: Bathing       Dates: Start:  04/15/25         Goal: STG-Within one week, patient will bathe CGA w/ AE PRN       Dates: Start:  04/15/25               Problem: Dressing       Dates: Start:  04/15/25         Goal: STG-Within one week, patient will dress EOB Parth with AE PRN       Dates: Start:  04/15/25               Problem: Functional Transfers       Dates: Start:  04/15/25         Goal: STG-Within one week, patient will transfer to toilet SBA w/ LRD       Dates: Start:  04/15/25               Problem: Grooming       Dates: Start:  04/15/25         Goal: STG-Within one week, patient will complete grooming standing at sink SBA       Dates: Start:  04/15/25               Problem: OT Long Term Goals       Dates: Start:  04/15/25         Goal: LTG-By discharge, patient will complete basic self care tasks SUP-Jovani       Dates: Start:  04/15/25            Goal: LTG-By discharge, patient will perform bathroom transfers SUP-Jovani       Dates: Start:  04/15/25            Goal: LTG-By discharge, patient will dress EOB SUP-Jovani w/ AE PRN       Dates: Start:  04/15/25            Goal: LTG-By discharge, patient will complete funct mob household distance SBA-SUP w/ LRD       Dates: Start:  04/15/25

## 2025-04-18 NOTE — PROGRESS NOTES
NURSING DAILY NOTE    Name: Priscilla Hdz   Date of Admission: 4/14/2025   Admitting Diagnosis: GI bleed  Attending Physician: KWAKU MARTINEZ D.O.  Allergies: Other environmental    Safety  Patient Assist  CGAx1  Patient Precautions  Precautions: Fall Risk, Cardiopulmonary, Orthopedic  Fall Risk: Poor balance, History of falls  Cardiopulmonary: Pacemaker  Orthopedic: Other (see comments)  Comments: 0.5-1L O2 wean  Bed Transfer Status  Minimal Assist  Toilet Transfer Status   Contact Guard Assist  Assistive Devices  Rails, Wheelchair  Oxygen  Nasal Cannula  Diet/Therapeutic Dining  Current Diet Order   Procedures    Diet Order Diet: Low Fiber(GI Soft) (Advance Diet as Tolerated)     Pill Administration  whole  Agitated Behavioral Scale  20  ABS Level of Severity  No Agitation    Fall Risk  Has the patient had a fall this admission?      [place] Fall Risk Scoring  14, MODERATE RISK  Fall Risk Safety Measures  bed alarm and chair alarm    Vitals  Temperature: 36.8 °C (98.3 °F)  Temp src: Temporal  Pulse: 78  Respiration: 18  Blood Pressure : (!) 142/50  Blood Pressure MAP (Calculated): 81 MM HG  BP Location: Right, Upper Arm  Patient BP Position: Sitting     Oxygen  Pulse Oximetry: 94 %  O2 (LPM): 1  O2 Delivery Device: Nasal Cannula  Incentive Spirometer Volume: 750 mL    Bowel and Bladder  Last Bowel Movement  04/16/25  Stool Type  Type 4: Like a sausage or snake, smooth and soft  Bowel Device  Bathroom  Continent  Bladder: Did not void   Bowel: No movement  Bladder Function  Urine Void (mL): 350 ml  Number of Times Voided: 1  Urine Color: Straw  Genitourinary Assessment   Bladder Assessment (WDL):  Within Defined Limits  Urine Color: Straw  Bladder Device: Bathroom  Bladder Scan: Post Void  $ Bladder Scan Results (mL): 72    Skin  Ezequiel Score   16  Sensory Interventions   Bed Types: Standard/Trauma Mattress  Skin Preventative Measures: Pillows in Use for Support / Positioning  Moisture  Interventions  Moisturizers/Barriers: Moisturizer       Pain  Pain Rating Scale  4 - Distracts me, can do usual activities  Pain Location  Coccyx, Shoulder  Pain Location Orientation  Mid, Right  Pain Interventions   Distraction    ADLs    Bathing   Patient Refused Bathing  Linen Change   Partial  Personal Hygiene     Chlorhexidine Bath      Oral Care     Teeth/Dentures     Shave     Nutrition Percentage Eaten  *  * Meal *  *, Breakfast, Less than 25% Consumed  Environmental Precautions     Patient Turns/Positioning  Patient declines - understands and verbalizes importance  Patient Turns Assistance/Tolerance     Bed Positions  Bed Controls On, Bed Locked  Head of Bed Elevated         Psychosocial/Neurologic Assessment  Psychosocial Assessment  Psychosocial (WDL):  WDL Except  Patient Behaviors: Fatigue  Neurologic Assessment  Neuro (WDL): Exceptions to WDL  Level of Consciousness: Alert  Orientation Level: Oriented X4  Cognition: Appropriate judgement, Appropriate safety awareness, Follows commands  Speech: Clear  Muscle Strength Right Arm: Good Strength Against Gravity and Moderate Resistance  Muscle Strength Left Arm: Good Strength Against Gravity and Moderate Resistance  Muscle Strength Right Leg: Fair Strength against Gravity but No Resistance  Muscle Strength Left Leg: Fair Strength against Gravity but No Resistance  EENT (WDL):  WDL Except    Cardio/Pulmonary Assessment  Edema   RLE Edema: 2+  LLE Edema: 2+  Respiratory Breath Sounds  RUL Breath Sounds: Clear  RML Breath Sounds: Diminished  RLL Breath Sounds: Clear  KEVIN Breath Sounds: Clear  LLL Breath Sounds: Diminished  Cardiac Assessment   Cardiac (WDL):  WDL Except (pacemaker)

## 2025-04-18 NOTE — THERAPY
Physical Therapy   Daily Treatment     Patient Name:  Priscilla Hdz  Age:  85 y.o., Sex:  female  Medical Record #:  [id number]  Today's Date: 4/18/2025     Precautions  Precautions: Fall Risk, Cardiopulmonary  Fall Risk: Poor balance, History of falls  Cardiopulmonary: Pacemaker  Orthopedic: Other (see comments)  Comments: 0.5-1L O2 wean    Subjective: Patient is found seated up in chair, is agreeable to participate but would like to go back to bed when we're done.     Objective:    04/18/25 1415   PT Charge Group   PT Therapeutic Exercise (Units) 2   PT Therapeutic Activities (Units) 1   PT Total Time Spent   PT Individual Total Time Spent (Mins) 45   Sit to Lying   Level of Assist Minimal Assist   Additional Description Extra time needed  (difficulty getting comfortable)   Toilet Transfer   Level of Assist Contact Guard Assist   Transfer Type Stand Pivot Transfer   Adaptive Equipment Grab bars   Assistive Device Wheelchair   Additional Description Extra time needed   Toileting Hygiene   Level of Assist Stand By Assist   Additional Description Grab bars   Interdisciplinary Plan of Care Collaboration   Patient Position at End of Therapy In Bed;Call Light within Reach;Tray Table within Reach;Phone within Reach;Bed Alarm On        04/18/25 1415   Outcome Measures   Outcome Measures Utilized Khoury Balance Scale   Khoury Balance Scale   Sitting Unsupported (Score 0-4) 4   Change Of Positon: Sitting To Standing (Score 0-4) 3   Change Of Positon: Standing To Sitting (Score 0-4) 3   Transfers (Score 0-4) 3   Standing Unsupported (Score 0-4) 2   Standing With Eyes Closed (Score 0-4) 2   Standing With Feet Together (Score 0-4) 2   Tandem Standing (Score 0-4) 2   Standing On One Leg (Score 0-4) 0   Turning Trunk (Feet Fixed) (Score 0-4) 1   Retrieving Objects From Floor (Score 0-4) 0   Turning 360 Degrees (Score 0-4) 0   Stool Stepping (Score 0-4) 0   Reaching Forward While Standing (Score 0-4) 3   Khoury Balance Total Score  (0-56) 25       Therapeutic Activities  Purpose: to improve performance and function of daily activities and to increase safety with activities of daily life and mobility related to activities of daily life  Interventions:   Toilet transfer  Bed mobility training (scooting, supine to sit, sit to supine, rolling)  Patient or family education    Therapeutic Exercise  Purpose: to improve strength, to improve ROM/flexibility, and to improve functional endurance  Interventions:   Lower body exercises:   Standing program -   Hamstring curl, 2 sets of 10 and Bilateral  Hip extension, 2 sets of 10 and Bilateral  Hip abduction, 2 sets of 10 and Bilateral  Mini squat, Partial and 2 sets of 10    Assessment: Patient demonstrates significant weakness in all major muscle groups in BLEs. This contributes to her poor stamina and poor balance with static standing tasks. She will benefit from further strength training throughout to improve her functional mobility.     Strengths: Able to follow instructions, Alert and oriented, Effective communication skills, Good insight into deficits/needs, Motivated for self care and independence, Pleasant and cooperative, Willingly participates in therapeutic activities  Barriers: Decreased endurance, Fatigue, Generalized weakness, Home accessibility, Impaired activity tolerance, Pain, Limited mobility    Plan:   Focus on functional strengthening for bed mobility, standing tolerance, progressive amb with FWW-will benefit from DF assist/ace wraps due to B LE edema  Continue stair training  Cont to wean 02  Edu pt on diaphragmatic and PLB techniques  Trial B AFOs    DME    PT DME Recommendations  Wheelchair:  (TBD)  Assistive Device:  (pt owns a FWW and 4WW)      Passport items to be completed:  Get in/out of bed safely, in/out of a vehicle, safely use mobility device, walk or wheel around home/community, navigate up and down stairs, show how to get up/down from the ground, ensure home is  accessible, demonstrate HEP, complete caregiver training    Physical Therapy Problems (Active)       Problem: Mobility       Dates: Start:  04/15/25         Goal: STG-Within one week, patient will ambulate 40ft c/ FWW and CGA       Dates: Start:  04/15/25            Goal: STG-Within one week, patient will ascend and descend 3 stairs c/ BHR and ModA       Dates: Start:  04/15/25               Problem: Mobility Transfers       Dates: Start:  04/15/25         Goal: STG-Within one week, patient will perform bed mobility c/ SPV       Dates: Start:  04/15/25            Goal: STG-Within one week, patient will sit to stand c/ FWW and CGA       Dates: Start:  04/15/25            Goal: STG-Within one week, patient will transfer bed to chair c/ FWW and CGA       Dates: Start:  04/15/25               Problem: PT-Long Term Goals       Dates: Start:  04/15/25         Goal: LTG-By discharge, patient will ambulate 50ft c/ FWW and SBA       Dates: Start:  04/15/25            Goal: LTG-By discharge, patient will transfer one surface to another c/ FWW and SBA       Dates: Start:  04/15/25            Goal: LTG-By discharge, patient will perform home exercise program c/ set-up       Dates: Start:  04/15/25            Goal: LTG-By discharge, patient will ambulate up/down 3 stairs c/ RHR and Kelly       Dates: Start:  04/15/25            Goal: LTG-By discharge, patient will transfer in/out of a car c/ FWW and Kelly       Dates: Start:  04/15/25

## 2025-04-18 NOTE — PROGRESS NOTES
NURSING DAILY NOTE    Name: Priscilla Hdz  Date of Admission: 4/14/2025  Admitting Diagnosis: GI bleed  Attending Physician: Ludwig Moeller D.o.  Allergies: Other environmental    Safety  Patient Assist  ocga  Patient Precautions  o   Precaution Comments  o   Bed Transfer Status  o   Toilet Transfer Status  o   Assistive Devices  oGait Belt, Rails, Wheelchair  Oxygen  oNasal Cannula  Diet/Therapeutic Dining  o  Current Diet Order   Procedures    Diet Order Diet: Low Fiber(GI Soft) (Advance Diet as Tolerated)     Pill Administration  owhole  Agitated Behavioral Scale  o20  ABS Level of Severity  Priscilla Agitation    Fall Risk  Has the patient had a fall this admission?  Priscilla  Greer Chino Fall Risk Scoring  o14, MODERATE RISK  Fall Risk Safety Measures  martine alarm and chair alarm    Vitals  Temperature: 36.5 °C (97.7 °F)  Temp src: Oral  Pulse: 71  Respiration: 18  Blood Pressure : 136/54 (notified RN Em)  Blood Pressure MAP (Calculated): 81 MM HG  BP Location: Right, Upper Arm  Patient BP Position: Supine    Oxygen  Pulse Oximetry: 97 %  O2 (LPM): 0.5  O2 Delivery Device: Nasal Cannula  Incentive Spirometer Volume: 750 mL    Bowel and Bladder  Last Bowel Movement  o04/16/25  Stool Type  oType 4: Like a sausage or snake, smooth and soft  Bowel Device  oBathroom  Continent  oBladder: Did not void  oBowel: No movement  Bladder Function  oUrine Void (mL):  (large)  Number of Times Voided: 1  Urine Color: Yellow  Genitourinary Assessment  oBladder Assessment (WDL):  Within Defined Limits  Kumar Catheter: Not Applicable  Urine Color: Yellow  Bladder Device: Bathroom  Bladder Scan: Post Void  $ Bladder Scan Results (mL): 63    Skin  Ezequiel Score  o 16  Sensory Interventions  o Bed Types: Low Airloss  Skin Preventative Measures: Pillows in Use for Support / Positioning  Skin Preventative Dressing: Foam Sacral Protector  Moisture Interventions  oMoisturizers/Barriers: Barrier Wipes      Pain  Pain Rating Scale  o3 -  Sometimes distracts me  Pain Location  Palma, Coccyx  Pain Location Orientation  Rianna, Right  Pain Interventions  oMedication (see MAR), Distraction    ADLs    Bathing  oPatient Refused Bathing  Linen Change  oPartial  Personal Hygiene  o   Chlorhexidine Bath  o   Oral Care  o   Teeth/Dentures  o   Shave  o   Nutrition Percentage Eaten  o*  * Meal *  *, Breakfast, Less than 25% Consumed  Environmental Precautions  o   Patient Turns/Positioning  oPatient declines - understands and verbalizes importance  Patient Turns Assistance/Tolerance  o   Bed Positions  Michael Controls On, Bed Locked  Head of Bed Elevated  o       Psychosocial/Neurologic Assessment  Psychosocial Assessment  oPsychosocial (WDL):  WDL Except  Patient Behaviors: Fatigue, Forgetful  Neurologic Assessment  oNeuro (WDL): Exceptions to WDL  Level of Consciousness: Alert  Orientation Level: Oriented X4  Cognition: Appropriate judgement, Appropriate safety awareness, Follows commands  Speech: Clear  Motor Function/Sensation Assessment: Motor strength  Muscle Strength Right Arm: Good Strength Against Gravity and Moderate Resistance  Muscle Strength Left Arm: Good Strength Against Gravity and Moderate Resistance  Muscle Strength Right Leg: Fair Strength against Gravity but No Resistance  Muscle Strength Left Leg: Fair Strength against Gravity but No Resistance  oEENT (WDL):  WDL Except    Cardio/Pulmonary Assessment  Edema  oRLE Edema: 2+  LLE Edema: 2+  Respiratory Breath Sounds  oRUL Breath Sounds: Clear  RML Breath Sounds: Diminished  RLL Breath Sounds: Clear  KEVIN Breath Sounds: Clear  LLL Breath Sounds: Diminished  Cardiac Assessment  oCardiac (WDL):  WDL Except (pacemaker)

## 2025-04-18 NOTE — PROGRESS NOTES
"  Physical Medicine & Rehabilitation Progress Note    Encounter Date: 4/18/2025    Interval Events (Subjective):  VS: 0.5-1L O2.   Last BM 4/18. Voiding urine.   No new labs today  Missed doses of scheduled meds: refused senna/docusate   PRNs used: none    Patient seen at bedside today.  Patient feeling well overall today.  Is participating in therapies well, but does feel tired.  Not sleeping well at night getting maybe 4 to 5 hours of sleep.  Not interested in any medications to help her sleep at this time.    ____________________  Interdisciplinary Team Conference   Most recent IDT on 4/16/2025     Discharge Date/Disposition:  4/24/2025  _____________________________________       Objective:  Vital signs: /44   Pulse 69   Temp 36.6 °C (97.8 °F) (Temporal)   Resp 18   Ht 1.651 m (5' 5\")   Wt 65.5 kg (144 lb 6.4 oz)   SpO2 93%   BMI 24.03 kg/m²   General: not in distress.  HEENT: PERRL. EOMI.  Pulmonary: Breathing comfortably on 1L O2.   Cardiovascular: Rate as above, warm extremities  Gastrointestinal: Abdomen non-distended  Extremities/MSK: Bilateral lower extremity edema  Integumentary:   4/16: sacrum      Psychiatric: Normal affect.  Neurologic: Awake and alert. Answering questions appropriately. Normal speech.       Laboratory Values:  Recent Results (from the past 72 hours)   POC CoV-2, FLU A/B, RSV by PCR    Collection Time: 04/15/25  6:40 PM   Result Value Ref Range    POC Influenza A RNA, PCR Negative Negative    POC Influenza B RNA, PCR Negative Negative    POC RSV, by PCR Negative Negative    POC SARS-CoV-2, PCR NotDetected NotDetected   CBC WITHOUT DIFFERENTIAL    Collection Time: 04/16/25  5:16 AM   Result Value Ref Range    WBC 9.1 4.8 - 10.8 K/uL    RBC 3.08 (L) 4.20 - 5.40 M/uL    Hemoglobin 8.8 (L) 12.0 - 16.0 g/dL    Hematocrit 28.2 (L) 37.0 - 47.0 %    MCV 91.6 81.4 - 97.8 fL    MCH 28.6 27.0 - 33.0 pg    MCHC 31.2 (L) 32.2 - 35.5 g/dL    RDW 50.7 (H) 35.9 - 50.0 fL    Platelet Count " 203 164 - 446 K/uL    MPV 10.7 9.0 - 12.9 fL   Basic Metabolic Panel    Collection Time: 04/16/25  5:16 AM   Result Value Ref Range    Sodium 138 135 - 145 mmol/L    Potassium 4.0 3.6 - 5.5 mmol/L    Chloride 103 96 - 112 mmol/L    Co2 27 20 - 33 mmol/L    Glucose 97 65 - 99 mg/dL    Bun 19 8 - 22 mg/dL    Creatinine 0.88 0.50 - 1.40 mg/dL    Calcium 8.2 (L) 8.5 - 10.5 mg/dL    Anion Gap 8.0 7.0 - 16.0   proBrain Natriuretic Peptide, NT    Collection Time: 04/16/25  5:16 AM   Result Value Ref Range    NT-proBNP 3040 (H) 0 - 125 pg/mL   PROCALCITONIN    Collection Time: 04/16/25  5:16 AM   Result Value Ref Range    Procalcitonin 0.11 <0.25 ng/mL   ESTIMATED GFR    Collection Time: 04/16/25  5:16 AM   Result Value Ref Range    GFR (CKD-EPI) 63 >60 mL/min/1.73 m 2   EC-ECHOCARDIOGRAM COMPLETE W/O CONT    Collection Time: 04/17/25  4:48 PM   Result Value Ref Range    Left Ventrical Ejection Fraction 70        Medications:  Scheduled Medications   Medication Dose Frequency    carvedilol  6.25 mg BID WITH MEALS    vitamin D2 (Ergocalciferol)  50,000 Units Q7 DAYS    amoxicillin-clavulanate  1 Tablet Q12HRS    guaiFENesin dextromethorphan  5 mL 4X/DAY WITH MEALS + NIGHTLY    ferrous gluconate  324 mg QDAY with Breakfast    Pharmacy Consult Request  1 Each PHARMACY TO DOSE    senna-docusate  2 Tablet Q EVENING    atorvastatin  20 mg QDAY    furosemide  20 mg DAILY    hydrALAZINE  100 mg BID    losartan  50 mg BID    omeprazole  20 mg BID    spironolactone  12.5 mg DAILY     PRN medications: Respiratory Therapy Consult, hydrALAZINE, acetaminophen, senna-docusate **AND** polyethylene glycol/lytes, docusate sodium, magnesium hydroxide, carboxymethylcellulose, benzocaine-menthol, mag hydrox-al hydrox-simeth, ondansetron **OR** ondansetron, sodium chloride, oxyCODONE immediate-release **OR** oxyCODONE immediate-release, melatonin    Diet:  Current Diet Order   Procedures    Diet Order Diet: Low Fiber(GI Soft) (Advance Diet as  Tolerated)       Medical Decision Making and Plan:  GI bleed  -Admitted with dark red clots in stool.  Hemoglobin found to be 7.7, dropped to 6.4. CT abd/pelv negative for acute GI bleeding. EGD showed hiatal hernia, nonobstructing Schatzki ring, gastric erosion.  -PPI BID  -PT and OT for mobility and ADLs. Per guidelines, 15 hours per week between PT, OT and/or SLP.  -Follow up with GI     Acute blood loss anemia  -Received transfusion at acute hospital  -On admission to rehab: Most recent Hgb 9.4.  Check morning labs.  -4/16: hgb 8.8.      Distal LE weakness, distal motor neuropathy  -on admission: has weakness in bilateral ankle dorsiflexion and plantarflexion.  Maybe due to edema.  Monitor for now.    -4/16: Persistent distal weakness. Consistent with distal motor polyneuropathy. There may be a history of worsening distal weakness prior to this hospitalization.   -Recommend neurology evaluation, may benefit from NCS/EMG    4/17: IGC change. [facility] Code / Diagnosis to Support: 0003.3 - Neurologic Conditions: Polyneuropathy     Hypertension  -on admission: coreg 25mg BID, losartan 50mg BID, spironolactone 12.5mg daily, hydralazine 100mg BID. Monitor for need to adjust medications  -IM following  -4/16: BP low. Coreg to 12.5mg BID.   -4/17: Coreg to 6.25mg BID  -Continue Coreg, losartan, spironolactone, hydrdalazine     LE venous insufficiency   LE edema  Hx of lymphedema  -on admission: chronic swelling.  Recent ultrasound Doppler negative for DVT. compression stockings. lasix 20mg daily.   -4/15: IM consult.   -4/16: BNP 3040  -4/7: Echo completed: Normal left ventricular systolic function. Right ventricular systolic pressure is estimated to be  33 mmHg. No significant valvular disease. EF 70%.   -Continue Lasix      Cough, pneumonia  -on admission: has nonproductive cough. Check CXR. Check AM labs.   -4/15: CXR appears to have fluid overload in lungs, possible pneumonia. IM consulted. Patient started on augmentin.    -continue Augmentin (ends ), guaifenesin     Acute respiratory failure  -On admission: On 1 L supplemental O2.  Wean as tolerated.  -: 0.5L O2     History of CKD stage III  -Creatinine has remained within normal limits recently  -monitor     History of pacemaker  -Known history of prior complete heart block, status post pacemaker in 2019    Hyperlipidemia  -On home dose Lipitor     Pain - prn Tylenol, oxycodone    Vit D deficiency - 4/15: vit D 11. Plan for ergocalciferol 50,000u weekly while in rehab. Will need follow up with PCP.      Poor mood, adjustment disorder  -  about 6 months ago  -Monitor mood, can consider SSRI.     Bladder management  -monitor for urinary retention. Bladder scan and ICP per protocol.      Bowel management  -senna/docusate for prophylaxis     Sacral pressure injury, present on admission  -slough covering entire wound base, most consistent with unstagable pressure injury.   -Continue daily wound care  -Continue turn schedule     DVT Ppx - Patient not cleared for DVT chemoprophylaxis.  Ultrasound negative for DVT on .      UPCOMING LABS/IMAGING: per IM      HOSPITALIST FOLLOWING: Yes     DISPO: Home. Lives in [place] with 3STE. Her son lives with her and can provide support upon discharge.      STIVEN:      DISCHARGE SPECIALIST FOLLOW UP: Gastroenterology       Patient to scheduled follow up with their PCP within 2 weeks from discharge from the [facility].      ____________________________________    Ludwig Moeller D.O.  Physical Medicine & Rehabilitation   ____________________________________    Total time:  42 minutes. Time spent included pre-rounding review of vitals and tests, unit/floor time, face-to-face time with the patient including physical examination, care coordination, counseling of patient and/or family, ordering medications/procedures/tests, discussion with CM, PT, OT, SLP and/or other healthcare providers, and documentation in the  electronic medical record.

## 2025-04-18 NOTE — PROGRESS NOTES
Hospital Medicine Daily Progress Note        Chief Complaint  Pneumonia  Edema  Hypertension    Interval Problem Update  Weaning off oxygen.  Blood pressures and heart rates still somewhat low.  Imaging results reviewed.    Disposition  Per Physiatry    Review of Systems  Review of Systems   Constitutional:  Negative for chills and fever.   HENT: Negative.     Eyes: Negative.    Respiratory:  Positive for cough. Negative for shortness of breath.    Cardiovascular:  Positive for leg swelling. Negative for chest pain and palpitations.   Gastrointestinal:  Negative for abdominal pain, nausea and vomiting.   Musculoskeletal: Negative.    Skin:  Negative for itching and rash.   Endo/Heme/Allergies:  Negative for polydipsia. Does not bruise/bleed easily.        Physical Exam  Temp:  [36.5 °C (97.7 °F)-36.8 °C (98.3 °F)] 36.6 °C (97.8 °F)  Pulse:  [67-78] 69  Resp:  [18] 18  BP: ()/(44-54) 122/44  SpO2:  [93 %-97 %] 93 %    Physical Exam  Vitals reviewed.   Constitutional:       General: She is not in acute distress.     Appearance: Normal appearance. She is not ill-appearing.   HENT:      Head: Normocephalic and atraumatic.      Right Ear: External ear normal.      Left Ear: External ear normal.      Nose: Nose normal.      Mouth/Throat:      Pharynx: Oropharynx is clear.   Eyes:      General:         Right eye: No discharge.         Left eye: No discharge.      Extraocular Movements: Extraocular movements intact.      Conjunctiva/sclera: Conjunctivae normal.   Cardiovascular:      Rate and Rhythm: Normal rate and regular rhythm.   Pulmonary:      Effort: No respiratory distress.      Breath sounds: No wheezing.      Comments: Decreased BS  Abdominal:      General: Bowel sounds are normal. There is no distension.      Palpations: Abdomen is soft.      Tenderness: There is no abdominal tenderness.   Musculoskeletal:      Cervical back: Normal range of motion and neck supple.      Right lower leg: Edema present.       Left lower leg: Edema present.   Skin:     General: Skin is warm and dry.   Neurological:      Mental Status: She is alert.      Comments: Awake and alert         Fluids  No intake or output data in the 24 hours ending 04/18/25 1143       Laboratory  Recent Labs     04/16/25  0516   WBC 9.1   RBC 3.08*   HEMOGLOBIN 8.8*   HEMATOCRIT 28.2*   MCV 91.6   MCH 28.6   MCHC 31.2*   RDW 50.7*   PLATELETCT 203   MPV 10.7     Recent Labs     04/16/25  0516   SODIUM 138   POTASSIUM 4.0   CHLORIDE 103   CO2 27   GLUCOSE 97   BUN 19   CREATININE 0.88   CALCIUM 8.2*                   Assessment/Plan  * GI bleed- (present on admission)  Assessment & Plan  Pt presented w/ bloody stools  4/8/25 CT Abd/Pelvis no active GIB, diverticulosis, RLL PNA  EGD 4/9/25 non-obstructing Schatzki ring, hiatal hernia, and gastric erosion  Colonoscopy 4/10/25 no active bleeding  S/P [facility] at [facility]  Continue PPI bid  GI F/U    Anemia  Assessment & Plan  Has normocytic indices  Fe 14, continue supplements  Follow H/H  Check F/U labs in AM     Edema  Assessment & Plan  U/S BLE negative for DVT  Echo EF 70%, RVSP 33 mmHg  BNP 3040  Continue Lasix and Aldactone as tolerated by blood pressure and renal function    Complete heart block by electrocardiogram (HCC)- (present on admission)  Assessment & Plan  S/P PPM    Pneumonia- (present on admission)  Assessment & Plan  4/8/25 CT Abd/Pelvis no active GIB, diverticulosis, RLL PNA  COVID/FLU/RSV negative  Continue empiric Augmentin  Also on Guaifenesin, IS, and RT protocol    Vitamin D deficiency disease- (present on admission)  Assessment & Plan  Vit D level 11  Continue supplementation    Essential hypertension- (present on admission)  Assessment & Plan  On Coreg, Losartan, Hydralazine, Lasix, and Aldactone  Will further decrease Coreg for hypotensive trends    Dyslipidemia- (present on admission)  Assessment & Plan  On Lipitor       Full Code

## 2025-04-19 ENCOUNTER — APPOINTMENT (OUTPATIENT)
Dept: PHYSICAL THERAPY | Facility: REHABILITATION | Age: 85
DRG: 073 | End: 2025-04-19
Attending: STUDENT IN AN ORGANIZED HEALTH CARE EDUCATION/TRAINING PROGRAM
Payer: MEDICARE

## 2025-04-19 ENCOUNTER — APPOINTMENT (OUTPATIENT)
Dept: OCCUPATIONAL THERAPY | Facility: REHABILITATION | Age: 85
DRG: 073 | End: 2025-04-19
Attending: STUDENT IN AN ORGANIZED HEALTH CARE EDUCATION/TRAINING PROGRAM
Payer: MEDICARE

## 2025-04-19 LAB
ALBUMIN SERPL BCP-MCNC: 2.9 G/DL (ref 3.2–4.9)
ALBUMIN/GLOB SERPL: 0.8 G/DL
ALP SERPL-CCNC: 58 U/L (ref 30–99)
ALT SERPL-CCNC: 14 U/L (ref 2–50)
ANION GAP SERPL CALC-SCNC: 9 MMOL/L (ref 7–16)
AST SERPL-CCNC: 37 U/L (ref 12–45)
BILIRUB SERPL-MCNC: 0.3 MG/DL (ref 0.1–1.5)
BUN SERPL-MCNC: 28 MG/DL (ref 8–22)
CALCIUM ALBUM COR SERPL-MCNC: 9.4 MG/DL (ref 8.5–10.5)
CALCIUM SERPL-MCNC: 8.5 MG/DL (ref 8.5–10.5)
CHLORIDE SERPL-SCNC: 107 MMOL/L (ref 96–112)
CO2 SERPL-SCNC: 24 MMOL/L (ref 20–33)
CREAT SERPL-MCNC: 0.81 MG/DL (ref 0.5–1.4)
ERYTHROCYTE [DISTWIDTH] IN BLOOD BY AUTOMATED COUNT: 51.8 FL (ref 35.9–50)
GFR SERPLBLD CREATININE-BSD FMLA CKD-EPI: 70 ML/MIN/1.73 M 2
GLOBULIN SER CALC-MCNC: 3.5 G/DL (ref 1.9–3.5)
GLUCOSE SERPL-MCNC: 94 MG/DL (ref 65–99)
HCT VFR BLD AUTO: 28.7 % (ref 37–47)
HGB BLD-MCNC: 8.7 G/DL (ref 12–16)
MCH RBC QN AUTO: 28.1 PG (ref 27–33)
MCHC RBC AUTO-ENTMCNC: 30.3 G/DL (ref 32.2–35.5)
MCV RBC AUTO: 92.6 FL (ref 81.4–97.8)
PLATELET # BLD AUTO: 201 K/UL (ref 164–446)
PMV BLD AUTO: 11.3 FL (ref 9–12.9)
POTASSIUM SERPL-SCNC: 4.2 MMOL/L (ref 3.6–5.5)
PROT SERPL-MCNC: 6.4 G/DL (ref 6–8.2)
RBC # BLD AUTO: 3.1 M/UL (ref 4.2–5.4)
SODIUM SERPL-SCNC: 140 MMOL/L (ref 135–145)
WBC # BLD AUTO: 5.4 K/UL (ref 4.8–10.8)

## 2025-04-19 PROCEDURE — 80053 COMPREHEN METABOLIC PANEL: CPT

## 2025-04-19 PROCEDURE — 99232 SBSQ HOSP IP/OBS MODERATE 35: CPT | Performed by: HOSPITALIST

## 2025-04-19 PROCEDURE — A9270 NON-COVERED ITEM OR SERVICE: HCPCS | Performed by: HOSPITALIST

## 2025-04-19 PROCEDURE — 36415 COLL VENOUS BLD VENIPUNCTURE: CPT

## 2025-04-19 PROCEDURE — 97116 GAIT TRAINING THERAPY: CPT

## 2025-04-19 PROCEDURE — A9270 NON-COVERED ITEM OR SERVICE: HCPCS | Performed by: STUDENT IN AN ORGANIZED HEALTH CARE EDUCATION/TRAINING PROGRAM

## 2025-04-19 PROCEDURE — 97530 THERAPEUTIC ACTIVITIES: CPT

## 2025-04-19 PROCEDURE — 97150 GROUP THERAPEUTIC PROCEDURES: CPT

## 2025-04-19 PROCEDURE — 97110 THERAPEUTIC EXERCISES: CPT

## 2025-04-19 PROCEDURE — 85027 COMPLETE CBC AUTOMATED: CPT

## 2025-04-19 PROCEDURE — 770010 HCHG ROOM/CARE - REHAB SEMI PRIVAT*

## 2025-04-19 PROCEDURE — 700102 HCHG RX REV CODE 250 W/ 637 OVERRIDE(OP): Performed by: STUDENT IN AN ORGANIZED HEALTH CARE EDUCATION/TRAINING PROGRAM

## 2025-04-19 PROCEDURE — 700102 HCHG RX REV CODE 250 W/ 637 OVERRIDE(OP): Performed by: HOSPITALIST

## 2025-04-19 RX ADMIN — CARVEDILOL 3.12 MG: 3.12 TABLET, FILM COATED ORAL at 10:10

## 2025-04-19 RX ADMIN — GUAIFENESIN SYRUP AND DEXTROMETHORPHAN 5 ML: 100; 10 SYRUP ORAL at 10:10

## 2025-04-19 RX ADMIN — AMOXICILLIN AND CLAVULANATE POTASSIUM 1 TABLET: 875; 125 TABLET, FILM COATED ORAL at 10:11

## 2025-04-19 RX ADMIN — LOSARTAN POTASSIUM 50 MG: 50 TABLET, FILM COATED ORAL at 17:03

## 2025-04-19 RX ADMIN — HYDRALAZINE HYDROCHLORIDE 100 MG: 50 TABLET ORAL at 20:49

## 2025-04-19 RX ADMIN — GUAIFENESIN SYRUP AND DEXTROMETHORPHAN 5 ML: 100; 10 SYRUP ORAL at 20:49

## 2025-04-19 RX ADMIN — CARVEDILOL 3.12 MG: 3.12 TABLET, FILM COATED ORAL at 17:03

## 2025-04-19 RX ADMIN — ATORVASTATIN CALCIUM 20 MG: 10 TABLET, FILM COATED ORAL at 20:48

## 2025-04-19 RX ADMIN — OMEPRAZOLE 20 MG: 20 CAPSULE, DELAYED RELEASE ORAL at 10:11

## 2025-04-19 RX ADMIN — SPIRONOLACTONE 12.5 MG: 25 TABLET ORAL at 05:54

## 2025-04-19 RX ADMIN — HYDRALAZINE HYDROCHLORIDE 100 MG: 50 TABLET ORAL at 10:11

## 2025-04-19 RX ADMIN — OMEPRAZOLE 20 MG: 20 CAPSULE, DELAYED RELEASE ORAL at 20:49

## 2025-04-19 RX ADMIN — FERROUS GLUCONATE 324 MG: 324 TABLET ORAL at 10:10

## 2025-04-19 RX ADMIN — GUAIFENESIN SYRUP AND DEXTROMETHORPHAN 5 ML: 100; 10 SYRUP ORAL at 12:50

## 2025-04-19 RX ADMIN — FUROSEMIDE 20 MG: 20 TABLET ORAL at 05:54

## 2025-04-19 RX ADMIN — LOSARTAN POTASSIUM 50 MG: 50 TABLET, FILM COATED ORAL at 05:54

## 2025-04-19 RX ADMIN — GUAIFENESIN SYRUP AND DEXTROMETHORPHAN 5 ML: 100; 10 SYRUP ORAL at 17:03

## 2025-04-19 RX ADMIN — AMOXICILLIN AND CLAVULANATE POTASSIUM 1 TABLET: 875; 125 TABLET, FILM COATED ORAL at 20:47

## 2025-04-19 ASSESSMENT — ENCOUNTER SYMPTOMS
SHORTNESS OF BREATH: 0
ABDOMINAL PAIN: 0
NAUSEA: 0
BRUISES/BLEEDS EASILY: 0
EYES NEGATIVE: 1
CHILLS: 0
COUGH: 0
FEVER: 0
VOMITING: 0
POLYDIPSIA: 0
PALPITATIONS: 0
MUSCULOSKELETAL NEGATIVE: 1

## 2025-04-19 ASSESSMENT — PAIN DESCRIPTION - PAIN TYPE
TYPE: ACUTE PAIN
TYPE: ACUTE PAIN

## 2025-04-19 NOTE — PROGRESS NOTES
NURSING DAILY NOTE    Name: Priscilla Hdz   Date of Admission: 4/14/2025   Admitting Diagnosis: GI bleed  Attending Physician: KWAKU MARTINEZ D.O.  Allergies: Other environmental    Safety  Patient Assist  cga  Patient Precautions  Precautions: Fall Risk, Cardiopulmonary  Fall Risk: Poor balance, History of falls  Cardiopulmonary: Pacemaker  Orthopedic: Other (see comments)  Comments: 0.5-1L O2 wean  Bed Transfer Status  Contact Guard Assist  Toilet Transfer Status   Contact Guard Assist  Assistive Devices  Rails, Wheelchair  Oxygen  None - Room Air  Diet/Therapeutic Dining  Current Diet Order   Procedures    Diet Order Diet: Low Fiber(GI Soft) (Advance Diet as Tolerated)     Pill Administration  whole  Agitated Behavioral Scale  20  ABS Level of Severity  No Agitation    Fall Risk  Has the patient had a fall this admission?      Greer Chino Fall Risk Scoring  15, HIGH RISK  Fall Risk Safety Measures  bed alarm, chair alarm, poor balance, and low vision/hearing    Vitals  Temperature: 36.6 °C (97.9 °F)  Temp src: Temporal  Pulse: 82  Respiration: 18  Blood Pressure : (!) 142/65  Blood Pressure MAP (Calculated): 91 MM HG  BP Location: Left, Upper Arm  Patient BP Position: Supine     Oxygen  Pulse Oximetry: 93 %  O2 (LPM): 0  O2 Delivery Device: None - Room Air  Incentive Spirometer Volume: 750 mL    Bowel and Bladder  Last Bowel Movement  04/19/25  Stool Type  Type 6: Fluffy pieces with ragged edges, a mushy stool  Bowel Device  Bathroom  Continent  Bladder: Did not void   Bowel: No movement  Bladder Function  Urine Void (mL):  (Large)  Number of Times Voided: 1  Urine Color: Yellow  Genitourinary Assessment   Bladder Assessment (WDL):  Within Defined Limits  Kumar Catheter: Not Applicable  Urine Color: Yellow  Bladder Device: Bathroom  Bladder Scan: Post Void  $ Bladder Scan Results (mL): 153    Skin  Ezequiel Score   16  Sensory Interventions   Bed Types: Low Airloss  Skin Preventative Measures: Pillows in Use  to Float Heels, Pillows in Use for Support / Positioning  Skin Preventative Dressing:  (Barros boots, pt refused)  Moisture Interventions  Moisturizers/Barriers: Barrier Wipes      Pain  Pain Rating Scale  0 - No Pain  Pain Location  Buttock  Pain Location Orientation  Mid  Pain Interventions   Declines    ADLs    Bathing   Patient Refused Bathing  Linen Change   Partial  Personal Hygiene  Perineal Care, Moist Tiff Wipes  Chlorhexidine Bath      Oral Care  Brushed Teeth  Teeth/Dentures     Shave     Nutrition Percentage Eaten  *  * Meal *  *, Breakfast, Less than 25% Consumed  Environmental Precautions  Treaded Slipper Socks on Patient, Personal Belongings, Wastebasket, Call Bell etc. in Easy Reach, Bed in Low Position, Communication Sign for Patients & Families  Patient Turns/Positioning  Patient declines - understands and verbalizes importance  Patient Turns Assistance/Tolerance     Bed Positions  Bed Controls On, Bed Locked  Head of Bed Elevated  Self regulated      Psychosocial/Neurologic Assessment  Psychosocial Assessment  Psychosocial (WDL):  WDL Except  Patient Behaviors: Fatigue, Forgetful, Irritable  Neurologic Assessment  Neuro (WDL): Exceptions to WDL  Level of Consciousness: Alert  Orientation Level: Oriented X4  Cognition: Appropriate judgement, Appropriate safety awareness, Follows commands  Speech: Clear  Pupil Assesment: No  Motor Function/Sensation Assessment: Motor strength  Muscle Strength Right Arm: Good Strength Against Gravity and Moderate Resistance  Muscle Strength Left Arm: Good Strength Against Gravity and Moderate Resistance  Muscle Strength Right Leg: Fair Strength against Gravity but No Resistance  Muscle Strength Left Leg: Fair Strength against Gravity but No Resistance  EENT (WDL):  WDL Except    Cardio/Pulmonary Assessment  Edema   RLE Edema: 2+  LLE Edema: 2+  Respiratory Breath Sounds  RUL Breath Sounds: Clear  RML Breath Sounds: Diminished  RLL Breath Sounds: Clear  KEVIN Breath  Sounds: Clear  LLL Breath Sounds: Diminished  Cardiac Assessment   Cardiac (WDL):  WDL Except (Hx of HTN, HLD, Pacemaker)

## 2025-04-19 NOTE — CARE PLAN
The patient is Watcher - Medium risk of patient condition declining or worsening    Shift Goals  Clinical Goals: Safety, wound care  Patient Goals: Rest  Family Goals: NILSON    Problem: Bladder / Voiding  Goal: Patient will establish and maintain regular urinary output  Outcome: Progressing    Patient has urinary frequency d/t lasix.  Voiding without difficulty.  No s/s of retaining.     Problem: Mobility  Goal: Patient's capacity to carry out activities will improve  Outcome: Progressing    Patient is able to transfer with min assist.

## 2025-04-19 NOTE — PROGRESS NOTES
NURSING DAILY NOTE    Name: Priscilla Hdz   Date of Admission: 4/14/2025   Admitting Diagnosis: GI bleed  Attending Physician: KWAKU MARTINEZ D.O.  Allergies: Other environmental    Safety  Patient Assist  cga  Patient Precautions  Precautions: Fall Risk, Cardiopulmonary  Fall Risk: Poor balance, History of falls  Cardiopulmonary: Pacemaker  Orthopedic: Other (see comments)  Comments: 0.5-1L O2 wean  Bed Transfer Status  Contact Guard Assist  Toilet Transfer Status   Contact Guard Assist  Assistive Devices  Rails, Wheelchair  Oxygen  None - Room Air  Diet/Therapeutic Dining  Current Diet Order   Procedures    Diet Order Diet: Low Fiber(GI Soft) (Advance Diet as Tolerated)     Pill Administration  whole  Agitated Behavioral Scale  20  ABS Level of Severity  No Agitation    Fall Risk  Has the patient had a fall this admission?      Greer Chino Fall Risk Scoring  14, MODERATE RISK  Fall Risk Safety Measures  bed alarm and chair alarm    Vitals  Temperature: 36.8 °C (98.2 °F)  Temp src: Temporal  Pulse: 77  Respiration: 18  Blood Pressure : (!) 166/69  Blood Pressure MAP (Calculated): 101 MM HG  BP Location: Left, Upper Arm  Patient BP Position: Abdul's Position     Oxygen  Pulse Oximetry: 93 %  O2 (LPM): 0  O2 Delivery Device: None - Room Air  Incentive Spirometer Volume: 750 mL    Bowel and Bladder  Last Bowel Movement  04/18/25  Stool Type  Not observed  Bowel Device  Bathroom  Continent  Bladder: Did not void   Bowel: No movement  Bladder Function  Urine Void (mL):  (large)  Number of Times Voided: 1  Urine Color: Yellow  Genitourinary Assessment   Bladder Assessment (WDL):  Within Defined Limits  Kumar Catheter: Not Applicable  Urine Color: Yellow  Bladder Device: Bathroom  Bladder Scan: Post Void  $ Bladder Scan Results (mL): 182    Skin  Ezequiel Score   16  Sensory Interventions   Bed Types: Low Airloss  Skin Preventative Measures: Pillows in Use for Support / Positioning  Skin Preventative Dressing:  (moon  boots)  Moisture Interventions  Moisturizers/Barriers: Barrier Wipes      Pain  Pain Rating Scale  2 - Notice Pain, does not interfere with activities  Pain Location  Buttock  Pain Location Orientation  Mid  Pain Interventions   Declines, Rest (Refused repositioning)    ADLs    Bathing   Patient Refused Bathing  Linen Change   Partial  Personal Hygiene  Moist Tiff Wipes  Chlorhexidine Bath      Oral Care     Teeth/Dentures     Shave     Nutrition Percentage Eaten  *  * Meal *  *, Breakfast, Less than 25% Consumed  Environmental Precautions     Patient Turns/Positioning  Patient declines - understands and verbalizes importance  Patient Turns Assistance/Tolerance     Bed Positions  Bed Controls On, Bed Locked  Head of Bed Elevated         Psychosocial/Neurologic Assessment  Psychosocial Assessment  Psychosocial (WDL):  WDL Except  Patient Behaviors: Fatigue, Forgetful, Irritable  Neurologic Assessment  Neuro (WDL): Exceptions to WDL  Level of Consciousness: Alert  Orientation Level: Oriented X4  Cognition: Appropriate judgement, Appropriate safety awareness, Follows commands  Speech: Clear  Motor Function/Sensation Assessment: Motor strength  Muscle Strength Right Arm: Good Strength Against Gravity and Moderate Resistance  Muscle Strength Left Arm: Good Strength Against Gravity and Moderate Resistance  Muscle Strength Right Leg: Fair Strength against Gravity but No Resistance  Muscle Strength Left Leg: Fair Strength against Gravity but No Resistance  EENT (WDL):  WDL Except    Cardio/Pulmonary Assessment  Edema   RLE Edema: 2+  LLE Edema: 2+  Respiratory Breath Sounds  RUL Breath Sounds: Clear  RML Breath Sounds: Diminished  RLL Breath Sounds: Clear  KVEIN Breath Sounds: Clear  LLL Breath Sounds: Diminished  Cardiac Assessment   Cardiac (WDL):  WDL Except (pacemaker)

## 2025-04-19 NOTE — THERAPY
Physical Therapy   Daily Treatment     Patient Name:  Priscilla Hdz  Age:  85 y.o., Sex:  female  Medical Record #:  [id number]  Today's Date: 4/19/2025     Precautions  Precautions: Fall Risk, Cardiopulmonary  Fall Risk: Poor balance, History of falls  Cardiopulmonary: Pacemaker  Orthopedic: Other (see comments)  Comments: 0.5-1L O2 wean    Subjective: Patient is found seated up in chair, is just finishing with breakfast. Son is at bedside, he is agreeable to attend therapy with patient. She was toileted prior to going down to gym    Objective:    04/19/25 0831   PT Charge Group   PT Gait Training (Units) 2   PT Therapeutic Exercise (Units) 2   PT Total Time Spent   PT Individual Total Time Spent (Mins) 60   Ambulation   Level of Assist Contact Guard Assist   Assistive Device FWW   Additional Description Cueing needed;Extra time needed   Distance 120 ft x 3   Interdisciplinary Plan of Care Collaboration   IDT Collaboration with  Family / Caregiver   Patient Position at End of Therapy Seated;Chair Alarm On;Call Light within Reach;Tray Table within Reach;Phone within Reach;Family / Friend in Room   Collaboration Comments son attended therapy with patient         Therapeutic Exercise  Purpose: to improve strength and to improve functional endurance  Interventions:   Lower body exercises:   Seated program -   Hip abduction, 2 sets of 10, Bilateral, and Light Resistance Theraband  Long arc quad, 2 sets of 10, Bilateral, and Weight 2.5# ankle weight  Marching, Reciprocal and 2 sets of 10  Hamstring curl, 2 sets of 10, Bilateral, Weight 2.5# ankle weigh, and Light Resistance Theraband  Sit to stand, 1 set of 10 and Other Resistance no UE support, 24 inch elevated mat table surface with emphasis on sufficient forward flexion and rising to full posture without leveraging thighs on table. Requires light blocking at knees to prevent buckling.       Gait Training  Purpose: to improve functional ambulation and to improve  walking endurance  Interventions:   Safe use of device  Walking endurance  Deviations (laterality in comments):  Trendelenburg  Forefoot contact  Steppage pattern  Step-Through Pattern  Increased Terrell  Decreased heel strike  Amb around mat tables in main gym 2 x 1.5 laps (approx 120 ft each). Ambulated back to her room from gym, approx 150 ft.     Assessment: Patient is unable to provide history on development of bilateral foot drop with steppage gait; she denies having any AFOs/ bracing to maintain neutral dorsiflexion or improve stability at knee with weight shift. She was unable to attempt heel rise in standing with FWW and BUE support. She may benefit from trialing anterior leaf spring AFO for stability with gait and sit <> stand, although she does not currently own any shoes with a heel due to chronic LE edema. Maintained on room air throughout, no desaturation noted, vitals stable throughout.     Strengths: Able to follow instructions, Alert and oriented, Effective communication skills, Good insight into deficits/needs, Motivated for self care and independence, Pleasant and cooperative, Willingly participates in therapeutic activities  Barriers: Decreased endurance, Fatigue, Generalized weakness, Home accessibility, Impaired activity tolerance, Pain, Limited mobility    Plan:   Focus on functional strengthening for bed mobility, standing tolerance, progressive amb with FWW-will benefit from DF assist/ace wraps due to B LE edema  Continue stair training  Cont to wean 02  Edu pt on diaphragmatic and PLB techniques  Trial B AFOs    DME    PT DME Recommendations  Wheelchair:  (TBD)  Assistive Device:  (pt owns a FWW and 4WW)      Passport items to be completed:  Get in/out of bed safely, in/out of a vehicle, safely use mobility device, walk or wheel around home/community, navigate up and down stairs, show how to get up/down from the ground, ensure home is accessible, demonstrate HEP, complete caregiver  training    Physical Therapy Problems (Active)       Problem: Mobility       Dates: Start:  04/15/25         Goal: STG-Within one week, patient will ambulate 40ft c/ FWW and CGA       Dates: Start:  04/15/25            Goal: STG-Within one week, patient will ascend and descend 3 stairs c/ BHR and ModA       Dates: Start:  04/15/25               Problem: Mobility Transfers       Dates: Start:  04/15/25         Goal: STG-Within one week, patient will perform bed mobility c/ SPV       Dates: Start:  04/15/25            Goal: STG-Within one week, patient will sit to stand c/ FWW and CGA       Dates: Start:  04/15/25            Goal: STG-Within one week, patient will transfer bed to chair c/ FWW and CGA       Dates: Start:  04/15/25               Problem: PT-Long Term Goals       Dates: Start:  04/15/25         Goal: LTG-By discharge, patient will ambulate 50ft c/ FWW and SBA       Dates: Start:  04/15/25            Goal: LTG-By discharge, patient will transfer one surface to another c/ FWW and SBA       Dates: Start:  04/15/25            Goal: LTG-By discharge, patient will perform home exercise program c/ set-up       Dates: Start:  04/15/25            Goal: LTG-By discharge, patient will ambulate up/down 3 stairs c/ RHR and Kelly       Dates: Start:  04/15/25            Goal: LTG-By discharge, patient will transfer in/out of a car c/ FWW and Kelly       Dates: Start:  04/15/25

## 2025-04-19 NOTE — THERAPY
Physical Therapy   Daily Treatment     Patient Name:  Priscilla Hdz  Age:  85 y.o., Sex:  female  Medical Record #:  [id number]  Today's Date: 4/19/2025     Precautions  Precautions: Fall Risk, Cardiopulmonary  Fall Risk: Poor balance, History of falls  Cardiopulmonary: Pacemaker  Orthopedic: Other (see comments)  Comments: 0.5-1L O2 wean    Subjective: patient reports feeling fatigued from this morning, is willing to review seated and supine therex and fall prevention strategies.     Objective:    04/19/25 1531   PT Charge Group   PT Therapeutic Activities (Units) 2   PT Total Time Spent   PT Individual Total Time Spent (Mins) 30   Interdisciplinary Plan of Care Collaboration   Patient Position at End of Therapy In Bed;Bed Alarm On;Call Light within Reach;Tray Table within Reach;Phone within Reach         Discharge Interventions  Purpose: to review final discharge recommendations and education  Interventions:   discussed home safety  discussed floor recovery/fall prevention  reviewed HEP with handout provided  reviewed relevant precautions to maximize safety during home and community mobility, ADLs, and IADLs    Assessment: Fall prevention strategies, home exercise strategies discussed with handouts reviewed. Patient verbalizes understanding, is eager to keep making progress for safe return to home.     Strengths: Able to follow instructions, Alert and oriented, Effective communication skills, Good insight into deficits/needs, Motivated for self care and independence, Pleasant and cooperative, Willingly participates in therapeutic activities  Barriers: Decreased endurance, Fatigue, Generalized weakness, Home accessibility, Impaired activity tolerance, Pain, Limited mobility    Plan:   Focus on functional strengthening for bed mobility, standing tolerance, progressive amb with FWW-will benefit from DF assist/ace wraps due to B LE edema  Continue stair training  Cont to wean 02  Edu pt on diaphragmatic and PLB  techniques  Trial B AFOs    DME    PT DME Recommendations  Wheelchair:  (TBD)  Assistive Device:  (pt owns a FWW and 4WW)      Passport items to be completed:  Get in/out of bed safely, in/out of a vehicle, safely use mobility device, walk or wheel around home/community, navigate up and down stairs, show how to get up/down from the ground, ensure home is accessible, demonstrate HEP, complete caregiver training    Physical Therapy Problems (Active)       Problem: Mobility       Dates: Start:  04/15/25         Goal: STG-Within one week, patient will ambulate 40ft c/ FWW and CGA       Dates: Start:  04/15/25            Goal: STG-Within one week, patient will ascend and descend 3 stairs c/ BHR and ModA       Dates: Start:  04/15/25               Problem: Mobility Transfers       Dates: Start:  04/15/25         Goal: STG-Within one week, patient will perform bed mobility c/ SPV       Dates: Start:  04/15/25            Goal: STG-Within one week, patient will sit to stand c/ FWW and CGA       Dates: Start:  04/15/25            Goal: STG-Within one week, patient will transfer bed to chair c/ FWW and CGA       Dates: Start:  04/15/25               Problem: PT-Long Term Goals       Dates: Start:  04/15/25         Goal: LTG-By discharge, patient will ambulate 50ft c/ FWW and SBA       Dates: Start:  04/15/25            Goal: LTG-By discharge, patient will transfer one surface to another c/ FWW and SBA       Dates: Start:  04/15/25            Goal: LTG-By discharge, patient will perform home exercise program c/ set-up       Dates: Start:  04/15/25            Goal: LTG-By discharge, patient will ambulate up/down 3 stairs c/ RHR and Kelly       Dates: Start:  04/15/25            Goal: LTG-By discharge, patient will transfer in/out of a car c/ FWW and Kelly       Dates: Start:  04/15/25

## 2025-04-19 NOTE — PROGRESS NOTES
Hospital Medicine Daily Progress Note        Chief Complaint  Pneumonia  Edema  Hypertension    Interval Problem Update  Feeling better, wants to know when she can go home--defer to Physiatry.  Laboratory results reviewed and discussed.    Disposition  Per Physiatry    Review of Systems  Review of Systems   Constitutional:  Negative for chills and fever.   HENT: Negative.     Eyes: Negative.    Respiratory:  Negative for cough and shortness of breath.    Cardiovascular:  Positive for leg swelling. Negative for chest pain and palpitations.   Gastrointestinal:  Negative for abdominal pain, nausea and vomiting.   Musculoskeletal: Negative.    Skin:  Negative for itching and rash.   Endo/Heme/Allergies:  Negative for polydipsia. Does not bruise/bleed easily.        Physical Exam  Temp:  [36.6 °C (97.9 °F)-37.1 °C (98.8 °F)] 37.1 °C (98.8 °F)  Pulse:  [74-82] 74  Resp:  [16-18] 16  BP: (123-166)/(47-69) 123/47  SpO2:  [93 %-95 %] 95 %    Physical Exam  Vitals reviewed.   Constitutional:       General: She is not in acute distress.     Appearance: Normal appearance. She is not ill-appearing.   HENT:      Head: Normocephalic and atraumatic.      Right Ear: External ear normal.      Left Ear: External ear normal.      Nose: Nose normal.      Mouth/Throat:      Pharynx: Oropharynx is clear.   Eyes:      General:         Right eye: No discharge.         Left eye: No discharge.      Extraocular Movements: Extraocular movements intact.      Conjunctiva/sclera: Conjunctivae normal.   Cardiovascular:      Rate and Rhythm: Normal rate and regular rhythm.   Pulmonary:      Effort: No respiratory distress.      Breath sounds: No wheezing.      Comments: Decreased BS  Abdominal:      General: Bowel sounds are normal. There is no distension.      Palpations: Abdomen is soft.      Tenderness: There is no abdominal tenderness.   Musculoskeletal:      Cervical back: Normal range of motion and neck supple.      Right lower leg: Edema  present.      Left lower leg: Edema present.   Skin:     General: Skin is warm and dry.   Neurological:      Mental Status: She is alert.      Comments: Awake and alert         Fluids    Intake/Output Summary (Last 24 hours) at 4/19/2025 1110  Last data filed at 4/18/2025 2300  Gross per 24 hour   Intake 420 ml   Output --   Net 420 ml          Laboratory  Recent Labs     04/19/25  0532   WBC 5.4   RBC 3.10*   HEMOGLOBIN 8.7*   HEMATOCRIT 28.7*   MCV 92.6   MCH 28.1   MCHC 30.3*   RDW 51.8*   PLATELETCT 201   MPV 11.3     Recent Labs     04/19/25  0532   SODIUM 140   POTASSIUM 4.2   CHLORIDE 107   CO2 24   GLUCOSE 94   BUN 28*   CREATININE 0.81   CALCIUM 8.5                   Assessment/Plan  * GI bleed- (present on admission)  Assessment & Plan  Pt presented w/ bloody stools  4/8/25 CT Abd/Pelvis no active GIB, diverticulosis, RLL PNA  EGD 4/9/25 non-obstructing Schatzki ring, hiatal hernia, and gastric erosion  Colonoscopy 4/10/25 no active bleeding  S/P [facility]  Continue PPI bid  GI F/U    Anemia  Assessment & Plan  Has normocytic indices  Fe 14, continue supplements  Follow H/H    Edema  Assessment & Plan  U/S BLE negative for DVT  Echo EF 70%, RVSP 33 mmHg  BNP 3040  On Lasix and Aldactone  Will discontinue Aldactone for azotemia    Complete heart block by electrocardiogram (HCC)- (present on admission)  Assessment & Plan  S/P PPM    Pneumonia- (present on admission)  Assessment & Plan  4/8/25 CT Abd/Pelvis no active GIB, diverticulosis, RLL PNA  COVID/FLU/RSV negative  O2 requirements decreasing on empiric abx  Continue Augmentin x 5 days  Also on Guaifenesin, IS, and RT protocol    Vitamin D deficiency disease- (present on admission)  Assessment & Plan  Vit D level 11  Continue supplementation    Essential hypertension- (present on admission)  Assessment & Plan  On Coreg, Losartan, Hydralazine, Lasix, and Aldactone  Will discontinue Aldactone for azotemia    Dyslipidemia- (present on  admission)  Assessment & Plan  On Lipitor       Full Code

## 2025-04-19 NOTE — THERAPY
Occupational Therapy  Daily Treatment     Patient Name:  Priscilla Hdz  Age:  85 y.o., Sex:  female  Medical Record #:  [id number]  Today's Date:  4/19/2025    Precautions  Precautions: Fall Risk, Cardiopulmonary  Fall Risk: Poor balance, History of falls  Cardiopulmonary: Pacemaker  Orthopedic: Other (see comments)  Comments: 0.5-1L O2 wean    Subjective: pt seated in w/c in main gym upon arrival. Pt pleasant and cooperative, agreeable to OT group treatment.     Objective:    04/19/25 1301   OT Charge Group   Charges Yes   OT Group Therapy Group Activities   OT Total Time Spent   OT Group Total Time Spent (Mins) 60   Vitals   O2 Delivery Device None - Room Air   Interdisciplinary Plan of Care Collaboration   IDT Collaboration with  Therapy Tech   Patient Position at End of Therapy Seated;Chair Alarm On;Self Releasing Lap Belt Applied;Other (Comments)  (with therapy tech to assist back to room)   Collaboration Comments tech assist with group therapy         Therapeutic Exercise  Purpose: to improve strength and to improve functional endurance  Interventions:   Upper body exercises:   Seated program -   Chest press, 3 sets of 10, Bilateral, and Other Resistance body weight  Front arm raise, 3 sets of 10, Bilateral, and Other Resistance body weight  Shoulder press, Right, Left, and Other Resistance body weight, 3x5  Bicep curls, 3 sets of 10, Bilateral, and Weight 2 lbs dumbbells  Pronation/Supination, 3 sets of 10, Bilateral, and Weight 2 lbs dumbbells  Wrist flexion/extension, 3 sets of 10, Bilateral, and Weight 2 lbs dumbbells    Gross Motor Skills  Purpose: to improve function, safety, and independence with tasks requiring gross motor skills and increase reacher proficiency   Interventions:   Ring toss  Pt completed 1 round of ring toss seated in w/c with supervision. Pt used RUE to throw 6 rings on board placed on floor. Pt then used reacher to retrieve rings from board from low positions.     Group  "Therapy  Purpose: to increase active participation through peer pressure, to enhance motivation, to validate increased independence with skills, to increase patient interaction during physical recovery, to facilitate goal discussion, to reinforce strengthening and movement through group format demonstration, and to decrease anxiety through new skill performance  Interventions:   UE exercise group  During rest breaks, pt participated in, \"get to know you\" questions to encourage social interaction and group discussion.     Assessment:    Pt completed BUE exercise to improve overall strength and cardiovascular endurance in order to maximize independence and safety with ADL/IADLs and functional mobility tasks. Pt limited by impaired RUE strength during shoulder press and chest press, but completed to best of their abilities with no complaints of pain. Pt demonstrated good social interaction skills with fellow group members.      Strengths: Alert and oriented, Supportive family  Barriers: Agitation, Decreased endurance, Difficulty following instructions, Fatigue, Generalized weakness, Impaired activity tolerance, Impaired balance, Impaired carryover of learning, Impaired functional cognition, Limited mobility    Plan:  Pt would benefit from skilled OT services to address:  - ADLs/IADLs (laundry)  - Strength/endurance/activity tolerance  - Sitting/standing balance  - Funct Mob  - Transfers  - Sit to Stand Training  - UE strength/ROM (shoulder flex/ext)    DME  OT DME Recommendations  Additional Equipment (NOT TYPICALLY COVERED BY INSURANCE): Sock aid, Reacher, Long handled shoe horn    Passport items to be completed:  Perform bathroom transfers, complete dressing, complete feeding, get ready for the day, prepare a simple meal, participate in household tasks, adapt home for safety needs, demonstrate home exercise program, complete caregiver training     Occupational Therapy Goals (Active)       Problem: Bathing       " Dates: Start:  04/15/25         Goal: STG-Within one week, patient will bathe CGA w/ AE PRN       Dates: Start:  04/15/25               Problem: Dressing       Dates: Start:  04/15/25         Goal: STG-Within one week, patient will dress EOB Parth with AE PRN       Dates: Start:  04/15/25               Problem: Functional Transfers       Dates: Start:  04/15/25         Goal: STG-Within one week, patient will transfer to toilet SBA w/ LRD       Dates: Start:  04/15/25               Problem: Grooming       Dates: Start:  04/15/25         Goal: STG-Within one week, patient will complete grooming standing at sink SBA       Dates: Start:  04/15/25               Problem: OT Long Term Goals       Dates: Start:  04/15/25         Goal: LTG-By discharge, patient will complete basic self care tasks SUP-Jovani       Dates: Start:  04/15/25            Goal: LTG-By discharge, patient will perform bathroom transfers SUP-Jovani       Dates: Start:  04/15/25            Goal: LTG-By discharge, patient will dress EOB SUP-Jovani w/ AE PRN       Dates: Start:  04/15/25            Goal: LTG-By discharge, patient will complete funct mob household distance SBA-SUP w/ LRD       Dates: Start:  04/15/25

## 2025-04-19 NOTE — PROGRESS NOTES
Pt refusing Q2 turns with pillows at this time. Provided education to the pt regarding the importance of Q2 turns to prevent pressure injuries from forming or worsening. Pt verbalizes understanding and continues to refuse Q2 turns. Marisela Charge RN, aware of the situation.

## 2025-04-19 NOTE — CARE PLAN
The patient is Stable - Low risk of patient condition declining or worsening    Shift Goals  Clinical Goals: Safety, Wound care, Pain control  Patient Goals: Rest  Family Goals: NILSON    Progress made toward(s) clinical / shift goals:      Problem: Knowledge Deficit - Standard  Goal: Patient and family/care givers will demonstrate understanding of plan of care, disease process/condition, diagnostic tests and medications  Outcome: Progressing     Problem: Skin Integrity  Goal: Skin integrity is maintained or improved  Outcome: Progressing  Ezequiel Scale: 16  Interventions:  -Barrier wipes in use  -Wheelchair cushion in place  -Pillows for positioning and floating heels  -Low air loss mattress in place     Problem: Fall Risk - Rehab  Goal: Patient will remain free from falls  Outcome: Progressing  Greer Matti Score: 15  Interventions:  -Bed/Strip alarm in place  -Chair strip alarm in place  -Treaded socks on pt  -Fall risk band and communication signs in place  -Bed rails in place  -Call light and belongings within reach

## 2025-04-20 ENCOUNTER — APPOINTMENT (OUTPATIENT)
Dept: PHYSICAL THERAPY | Facility: REHABILITATION | Age: 85
DRG: 073 | End: 2025-04-20
Attending: STUDENT IN AN ORGANIZED HEALTH CARE EDUCATION/TRAINING PROGRAM
Payer: MEDICARE

## 2025-04-20 PROCEDURE — A9270 NON-COVERED ITEM OR SERVICE: HCPCS | Performed by: STUDENT IN AN ORGANIZED HEALTH CARE EDUCATION/TRAINING PROGRAM

## 2025-04-20 PROCEDURE — 700102 HCHG RX REV CODE 250 W/ 637 OVERRIDE(OP): Performed by: STUDENT IN AN ORGANIZED HEALTH CARE EDUCATION/TRAINING PROGRAM

## 2025-04-20 PROCEDURE — A9270 NON-COVERED ITEM OR SERVICE: HCPCS | Performed by: HOSPITALIST

## 2025-04-20 PROCEDURE — 700102 HCHG RX REV CODE 250 W/ 637 OVERRIDE(OP): Performed by: HOSPITALIST

## 2025-04-20 PROCEDURE — 97110 THERAPEUTIC EXERCISES: CPT | Mod: CQ

## 2025-04-20 PROCEDURE — 97530 THERAPEUTIC ACTIVITIES: CPT | Mod: CQ

## 2025-04-20 PROCEDURE — 97116 GAIT TRAINING THERAPY: CPT | Mod: CQ

## 2025-04-20 PROCEDURE — 99232 SBSQ HOSP IP/OBS MODERATE 35: CPT | Performed by: HOSPITALIST

## 2025-04-20 PROCEDURE — 770010 HCHG ROOM/CARE - REHAB SEMI PRIVAT*

## 2025-04-20 RX ORDER — CARVEDILOL 3.12 MG/1
6.25 TABLET ORAL 2 TIMES DAILY WITH MEALS
Status: DISCONTINUED | OUTPATIENT
Start: 2025-04-20 | End: 2025-04-24 | Stop reason: HOSPADM

## 2025-04-20 RX ADMIN — CARVEDILOL 6.25 MG: 3.12 TABLET, FILM COATED ORAL at 17:20

## 2025-04-20 RX ADMIN — GUAIFENESIN SYRUP AND DEXTROMETHORPHAN 5 ML: 100; 10 SYRUP ORAL at 17:19

## 2025-04-20 RX ADMIN — OMEPRAZOLE 20 MG: 20 CAPSULE, DELAYED RELEASE ORAL at 21:11

## 2025-04-20 RX ADMIN — OXYCODONE 2.5 MG: 5 TABLET ORAL at 21:13

## 2025-04-20 RX ADMIN — GUAIFENESIN SYRUP AND DEXTROMETHORPHAN 5 ML: 100; 10 SYRUP ORAL at 08:16

## 2025-04-20 RX ADMIN — CARVEDILOL 3.12 MG: 3.12 TABLET, FILM COATED ORAL at 08:16

## 2025-04-20 RX ADMIN — FERROUS GLUCONATE 324 MG: 324 TABLET ORAL at 08:16

## 2025-04-20 RX ADMIN — FUROSEMIDE 20 MG: 20 TABLET ORAL at 05:04

## 2025-04-20 RX ADMIN — HYDRALAZINE HYDROCHLORIDE 100 MG: 50 TABLET ORAL at 08:16

## 2025-04-20 RX ADMIN — GUAIFENESIN SYRUP AND DEXTROMETHORPHAN 5 ML: 100; 10 SYRUP ORAL at 21:11

## 2025-04-20 RX ADMIN — LOSARTAN POTASSIUM 50 MG: 50 TABLET, FILM COATED ORAL at 17:20

## 2025-04-20 RX ADMIN — GUAIFENESIN SYRUP AND DEXTROMETHORPHAN 5 ML: 100; 10 SYRUP ORAL at 12:32

## 2025-04-20 RX ADMIN — AMOXICILLIN AND CLAVULANATE POTASSIUM 1 TABLET: 875; 125 TABLET, FILM COATED ORAL at 08:16

## 2025-04-20 RX ADMIN — HYDRALAZINE HYDROCHLORIDE 100 MG: 50 TABLET ORAL at 21:11

## 2025-04-20 RX ADMIN — OMEPRAZOLE 20 MG: 20 CAPSULE, DELAYED RELEASE ORAL at 08:16

## 2025-04-20 RX ADMIN — LOSARTAN POTASSIUM 50 MG: 50 TABLET, FILM COATED ORAL at 05:04

## 2025-04-20 RX ADMIN — ATORVASTATIN CALCIUM 20 MG: 10 TABLET, FILM COATED ORAL at 21:09

## 2025-04-20 ASSESSMENT — ENCOUNTER SYMPTOMS
FEVER: 0
SHORTNESS OF BREATH: 0
NAUSEA: 0
MUSCULOSKELETAL NEGATIVE: 1
POLYDIPSIA: 0
BRUISES/BLEEDS EASILY: 0
CHILLS: 0
COUGH: 0
ABDOMINAL PAIN: 0
PALPITATIONS: 0
EYES NEGATIVE: 1
VOMITING: 0

## 2025-04-20 ASSESSMENT — PATIENT HEALTH QUESTIONNAIRE - PHQ9
1. LITTLE INTEREST OR PLEASURE IN DOING THINGS: NOT AT ALL
SUM OF ALL RESPONSES TO PHQ9 QUESTIONS 1 AND 2: 0
1. LITTLE INTEREST OR PLEASURE IN DOING THINGS: NOT AT ALL
SUM OF ALL RESPONSES TO PHQ9 QUESTIONS 1 AND 2: 0
2. FEELING DOWN, DEPRESSED, IRRITABLE, OR HOPELESS: NOT AT ALL
2. FEELING DOWN, DEPRESSED, IRRITABLE, OR HOPELESS: NOT AT ALL

## 2025-04-20 ASSESSMENT — PAIN DESCRIPTION - PAIN TYPE
TYPE: ACUTE PAIN
TYPE: ACUTE PAIN

## 2025-04-20 NOTE — CARE PLAN
The patient is Stable - Low risk of patient condition declining or worsening    Shift Goals  Clinical Goals: Pain control, wound care  Patient Goals: Rest  Family Goals: NILSON    Progress made toward(s) clinical / shift goals:    Problem: Pain - Standard  Goal: Alleviation of pain or a reduction in pain to the patient’s comfort goal  Outcome: Progressing       Patient able to verbalize pain level and verbalize an acceptable level of pain.

## 2025-04-20 NOTE — PROGRESS NOTES
NURSING DAILY NOTE    Name: Priscilla Hdz   Date of Admission: 4/14/2025   Admitting Diagnosis: GI bleed  Attending Physician: KWAKU MARTINEZ D.O.  Allergies: Other environmental    Safety  Patient Assist  min a  Patient Precautions  Precautions: Fall Risk, Cardiopulmonary  Fall Risk: Poor balance, History of falls  Cardiopulmonary: Pacemaker  Orthopedic: Other (see comments)  Comments: 0.5-1L O2 wean  Bed Transfer Status  Contact Guard Assist  Toilet Transfer Status   Contact Guard Assist  Assistive Devices  Wheelchair  Oxygen  None - Room Air  Diet/Therapeutic Dining  Current Diet Order   Procedures    Diet Order Diet: Low Fiber(GI Soft) (Advance Diet as Tolerated)     Pill Administration  whole  Agitated Behavioral Scale  20  ABS Level of Severity  No Agitation    Fall Risk  Has the patient had a fall this admission?      Greer Chino Fall Risk Scoring  15, HIGH RISK  Fall Risk Safety Measures  bed alarm and chair alarm    Vitals  Temperature: 36.8 °C (98.3 °F)  Temp src: Temporal  Pulse: 80  Respiration: 16  Blood Pressure : (!) 154/72  Blood Pressure MAP (Calculated): 99 MM HG  BP Location: Left, Upper Arm  Patient BP Position: Sitting     Oxygen  Pulse Oximetry: 96 %  O2 (LPM): 0  O2 Delivery Device: None - Room Air  Incentive Spirometer Volume: 750 mL    Bowel and Bladder  Last Bowel Movement  04/19/25  Stool Type  Type 4: Like a sausage or snake, smooth and soft  Bowel Device  Bathroom  Continent  Bladder: Did not void   Bowel: No movement  Bladder Function  Urine Void (mL):  (Large)  Number of Times Voided: 1  Urine Color: Yellow  Genitourinary Assessment   Bladder Assessment (WDL):  Within Defined Limits  Kumar Catheter: Not Applicable  Urine Color: Yellow  Bladder Device: Bathroom  Bladder Scan: Post Void  $ Bladder Scan Results (mL): 39    Skin  Ezequiel Score   17  Sensory Interventions   Bed Types: Low Airloss  Skin Preventative Measures: Pillows in Use for Support / Positioning  Skin Preventative  Dressing:  (Barros boots, pt refused)  Moisture Interventions  Moisturizers/Barriers: Barrier Wipes      Pain  Pain Rating Scale  3 - Sometimes distracts me  Pain Location  Buttock  Pain Location Orientation  Mid  Pain Interventions   Declines, Repositioned    ADLs    Bathing   Patient Refused Bathing (rn notified)  Linen Change   Partial  Personal Hygiene  Perineal Care, Moist Tiff Wipes  Chlorhexidine Bath      Oral Care  Brushed Teeth  Teeth/Dentures     Shave     Nutrition Percentage Eaten  Refused, 0% Consumed  Environmental Precautions  Treaded Slipper Socks on Patient, Personal Belongings, Wastebasket, Call Bell etc. in Easy Reach, Bed in Low Position, Communication Sign for Patients & Families  Patient Turns/Positioning  Sitting up in wheelchair  Patient Turns Assistance/Tolerance     Bed Positions  Bed Controls On, Bed Locked  Head of Bed Elevated  Self regulated      Psychosocial/Neurologic Assessment  Psychosocial Assessment  Psychosocial (WDL):  WDL Except  Patient Behaviors: Fatigue, Irritable  Neurologic Assessment  Neuro (WDL): Exceptions to WDL  Level of Consciousness: Alert  Orientation Level: Oriented X4  Cognition: Appropriate judgement, Appropriate safety awareness, Follows commands  Speech: Clear  Pupil Assesment: No  Motor Function/Sensation Assessment: Motor strength  Muscle Strength Right Arm: Good Strength Against Gravity and Moderate Resistance  Muscle Strength Left Arm: Good Strength Against Gravity and Moderate Resistance  Muscle Strength Right Leg: Fair Strength against Gravity but No Resistance  Muscle Strength Left Leg: Fair Strength against Gravity but No Resistance  EENT (WDL):  WDL Except    Cardio/Pulmonary Assessment  Edema   RLE Edema: 1+  LLE Edema: 1+  Respiratory Breath Sounds  RUL Breath Sounds: Clear  RML Breath Sounds: Diminished  RLL Breath Sounds: Clear  KEVIN Breath Sounds: Clear  LLL Breath Sounds: Diminished  Cardiac Assessment   Cardiac (WDL):  WDL Except (HTN, HLD,  pacemaker)

## 2025-04-20 NOTE — CARE PLAN
"The patient is Stable - Low risk of patient condition declining or worsening    Shift Goals  Clinical Goals: Safety, wound care  Patient Goals: Rest  Family Goals: NILSON    Problem: Skin Integrity  Goal: Skin integrity is maintained or improved  Outcome: Progressing  Note:   Ezequiel Score: 17    Patient's skin remains intact and free from new or accidental injury this shift; no s/s of infection. RN wound protocol checked. Encouraged hydration and educated about the importance of nutrition to keep skin integrity. Will continue to monitor.        Problem: Fall Risk - Rehab  Goal: Patient will remain free from falls  Outcome: Progressing  Note: Greer Chino Fall risk Assessment Score: 15    High fall risk Interventions   - Bed and strip alarm   - Yellow sign by the door   - Yellow wrist band \"Fall risk\"  - Room near to the nurse station  - Do not leave patient unattended in the bathroom  - Fall risk education provided     Pt using call light appropriately when in need of assistance.            "

## 2025-04-20 NOTE — PROGRESS NOTES
Pt refused pericolace medication, this RN provided education on this medication and risks of refusal. Risks includes constipation.

## 2025-04-20 NOTE — PROGRESS NOTES
Hospital Medicine Daily Progress Note        Chief Complaint  Pneumonia  Edema  Hypertension    Interval Problem Update  Pt upset that she didn't get a shower yesterday, wants one today.  Otherwise, denies any new physical complaints.  Blood pressures trending up; pt asymptomatic.    Disposition  Per Physiatry    Review of Systems  Review of Systems   Constitutional:  Negative for chills and fever.   HENT: Negative.     Eyes: Negative.    Respiratory:  Negative for cough and shortness of breath.    Cardiovascular:  Positive for leg swelling. Negative for chest pain and palpitations.   Gastrointestinal:  Negative for abdominal pain, nausea and vomiting.   Musculoskeletal: Negative.    Skin:  Negative for itching and rash.   Endo/Heme/Allergies:  Negative for polydipsia. Does not bruise/bleed easily.        Physical Exam  Temp:  [36.8 °C (98.3 °F)] 36.8 °C (98.3 °F)  Pulse:  [77-82] 82  Resp:  [16-18] 18  BP: (144-156)/(56-76) 156/76  SpO2:  [96 %] 96 %    Physical Exam  Vitals reviewed.   Constitutional:       General: She is not in acute distress.     Appearance: Normal appearance. She is not ill-appearing.   HENT:      Head: Normocephalic and atraumatic.      Right Ear: External ear normal.      Left Ear: External ear normal.      Nose: Nose normal.      Mouth/Throat:      Pharynx: Oropharynx is clear.   Eyes:      General:         Right eye: No discharge.         Left eye: No discharge.      Extraocular Movements: Extraocular movements intact.      Conjunctiva/sclera: Conjunctivae normal.   Cardiovascular:      Rate and Rhythm: Normal rate and regular rhythm.   Pulmonary:      Effort: No respiratory distress.      Breath sounds: No wheezing.      Comments: Decreased BS  Abdominal:      General: Bowel sounds are normal. There is no distension.      Palpations: Abdomen is soft.      Tenderness: There is no abdominal tenderness.   Musculoskeletal:      Cervical back: Normal range of motion and neck supple.      Right  lower leg: Edema present.      Left lower leg: Edema present.   Skin:     General: Skin is warm and dry.   Neurological:      Mental Status: She is alert and oriented to person, place, and time.         Fluids    Intake/Output Summary (Last 24 hours) at 4/20/2025 1120  Last data filed at 4/19/2025 2100  Gross per 24 hour   Intake 220 ml   Output --   Net 220 ml          Laboratory  Recent Labs     04/19/25  0532   WBC 5.4   RBC 3.10*   HEMOGLOBIN 8.7*   HEMATOCRIT 28.7*   MCV 92.6   MCH 28.1   MCHC 30.3*   RDW 51.8*   PLATELETCT 201   MPV 11.3     Recent Labs     04/19/25  0532   SODIUM 140   POTASSIUM 4.2   CHLORIDE 107   CO2 24   GLUCOSE 94   BUN 28*   CREATININE 0.81   CALCIUM 8.5                   Assessment/Plan  * GI bleed- (present on admission)  Assessment & Plan  Pt presented w/ bloody stools  4/8/25 CT Abd/Pelvis no active GIB, diverticulosis, RLL PNA  EGD 4/9/25 non-obstructing Schatzki ring, hiatal hernia, and gastric erosion  Colonoscopy 4/10/25 no active bleeding  S/P [facility]  Continue PPI bid  GI F/U    Anemia  Assessment & Plan  Has normocytic indices  Fe 14, continue supplements  Follow H/H  Check F/U labs in AM     Edema  Assessment & Plan  U/S BLE negative for DVT  Echo EF 70%, RVSP 33 mmHg  BNP 3040  Continue Lasix  Discontinued Aldactone for azotemia  Check F/U labs in AM    Complete heart block by electrocardiogram (HCC)- (present on admission)  Assessment & Plan  S/P PPM    Pneumonia- (present on admission)  Assessment & Plan  4/8/25 CT Abd/Pelvis no active GIB, diverticulosis, RLL PNA  COVID/FLU/RSV negative  O2 requirements decreasing on empiric abx  Continue Augmentin x 5 days, complete today  Also on Guaifenesin, IS, and RT protocol    Vitamin D deficiency disease- (present on admission)  Assessment & Plan  Vit D level 11  Continue supplementation    Essential hypertension- (present on admission)  Assessment & Plan  On Coreg, Losartan, Hydralazine, and Lasix  Discontinued Aldactone  for azotemia  Will increase Coreg to optimize blood pressure control    Dyslipidemia- (present on admission)  Assessment & Plan  On Lipitor       Full Code

## 2025-04-20 NOTE — PROGRESS NOTES
NURSING DAILY NOTE    Name: Priscilla Hdz   Date of Admission: 4/14/2025   Admitting Diagnosis: GI bleed  Attending Physician: KWAKU MARTINEZ D.O.  Allergies: Other environmental    Safety  Patient Assist  min a  Patient Precautions  Precautions: Fall Risk, Cardiopulmonary  Fall Risk: Poor balance, History of falls  Cardiopulmonary: Pacemaker  Orthopedic: Other (see comments)  Comments: 0.5-1L O2 wean  Bed Transfer Status  Contact Guard Assist  Toilet Transfer Status   Contact Guard Assist  Assistive Devices  Rails, Wheelchair  Oxygen  None - Room Air  Diet/Therapeutic Dining  Current Diet Order   Procedures    Diet Order Diet: Low Fiber(GI Soft) (Advance Diet as Tolerated)     Pill Administration  whole  Agitated Behavioral Scale  20  ABS Level of Severity  No Agitation    Fall Risk  Has the patient had a fall this admission?      Greer Chino Fall Risk Scoring  15, HIGH RISK  Fall Risk Safety Measures  bed alarm and chair alarm    Vitals  Temperature: 36.8 °C (98.3 °F)  Temp src: Temporal  Pulse: 82  Respiration: 18  Blood Pressure : (!) 156/76  Blood Pressure MAP (Calculated): 103 MM HG  BP Location: Left, Upper Arm  Patient BP Position: Supine     Oxygen  Pulse Oximetry: 96 %  O2 (LPM): 0  O2 Delivery Device: None - Room Air  Incentive Spirometer Volume: 750 mL    Bowel and Bladder  Last Bowel Movement  04/19/25  Stool Type  Type 4: Like a sausage or snake, smooth and soft  Bowel Device  Bathroom  Continent  Bladder: Did not void   Bowel: No movement  Bladder Function  Urine Void (mL):  (Large)  Number of Times Voided: 1  Urine Color: Unable To Evaluate  Genitourinary Assessment   Bladder Assessment (WDL):  Within Defined Limits  Kumar Catheter: Not Applicable  Urine Color: Unable To Evaluate  Bladder Device: Bathroom  Bladder Scan: Post Void  $ Bladder Scan Results (mL): 69    Skin  Ezequiel Score   17  Sensory Interventions   Bed Types: Low Airloss  Skin Preventative Measures: Pillows in Use for Support /  Positioning  Skin Preventative Dressing:  (patient refused moon boots)  Moisture Interventions  Moisturizers/Barriers:  (Cavilon)      Pain  Pain Rating Scale  3 - Sometimes distracts me  Pain Location  Buttock  Pain Location Orientation  Mid  Pain Interventions   Medication (see MAR)    ADLs    Bathing   Patient Refused Bathing (rn notified)  Linen Change   Partial  Personal Hygiene  Perineal Care, Moist Tiff Wipes  Chlorhexidine Bath      Oral Care  Brushed Teeth  Teeth/Dentures     Shave     Nutrition Percentage Eaten  Refused, 0% Consumed  Environmental Precautions  Treaded Slipper Socks on Patient, Personal Belongings, Wastebasket, Call Bell etc. in Easy Reach, Bed in Low Position, Communication Sign for Patients & Families  Patient Turns/Positioning  Sitting up in wheelchair  Patient Turns Assistance/Tolerance     Bed Positions  Bed Controls On, Bed Locked  Head of Bed Elevated  Self regulated      Psychosocial/Neurologic Assessment  Psychosocial Assessment  Psychosocial (WDL):  WDL Except  Patient Behaviors: Fatigue, Irritable  Neurologic Assessment  Neuro (WDL): Exceptions to WDL  Level of Consciousness: Alert  Orientation Level: Oriented X4  Cognition: Appropriate judgement, Appropriate safety awareness, Follows commands  Speech: Clear  Pupil Assesment: No  Motor Function/Sensation Assessment: Motor strength  Muscle Strength Right Arm: Good Strength Against Gravity and Moderate Resistance  Muscle Strength Left Arm: Good Strength Against Gravity and Moderate Resistance  Muscle Strength Right Leg: Fair Strength against Gravity but No Resistance  Muscle Strength Left Leg: Fair Strength against Gravity but No Resistance  EENT (WDL):  WDL Except    Cardio/Pulmonary Assessment  Edema   RLE Edema: 1+  LLE Edema: 1+  Respiratory Breath Sounds  RUL Breath Sounds: Clear  RML Breath Sounds: Diminished  RLL Breath Sounds: Clear  KEVIN Breath Sounds: Clear  LLL Breath Sounds: Diminished  Cardiac Assessment   Cardiac  (WDL):  WDL Except (HTN, HLD, pacemaker)

## 2025-04-21 ENCOUNTER — APPOINTMENT (OUTPATIENT)
Dept: OCCUPATIONAL THERAPY | Facility: REHABILITATION | Age: 85
DRG: 073 | End: 2025-04-21
Attending: STUDENT IN AN ORGANIZED HEALTH CARE EDUCATION/TRAINING PROGRAM
Payer: MEDICARE

## 2025-04-21 ENCOUNTER — APPOINTMENT (OUTPATIENT)
Dept: PHYSICAL THERAPY | Facility: REHABILITATION | Age: 85
DRG: 073 | End: 2025-04-21
Attending: STUDENT IN AN ORGANIZED HEALTH CARE EDUCATION/TRAINING PROGRAM
Payer: MEDICARE

## 2025-04-21 LAB
ANION GAP SERPL CALC-SCNC: 10 MMOL/L (ref 7–16)
BUN SERPL-MCNC: 23 MG/DL (ref 8–22)
CALCIUM SERPL-MCNC: 8.2 MG/DL (ref 8.5–10.5)
CHLORIDE SERPL-SCNC: 107 MMOL/L (ref 96–112)
CO2 SERPL-SCNC: 23 MMOL/L (ref 20–33)
CREAT SERPL-MCNC: 0.78 MG/DL (ref 0.5–1.4)
ERYTHROCYTE [DISTWIDTH] IN BLOOD BY AUTOMATED COUNT: 49.5 FL (ref 35.9–50)
GFR SERPLBLD CREATININE-BSD FMLA CKD-EPI: 73 ML/MIN/1.73 M 2
GLUCOSE SERPL-MCNC: 95 MG/DL (ref 65–99)
HCT VFR BLD AUTO: 27.3 % (ref 37–47)
HGB BLD-MCNC: 8.7 G/DL (ref 12–16)
MCH RBC QN AUTO: 28.5 PG (ref 27–33)
MCHC RBC AUTO-ENTMCNC: 31.9 G/DL (ref 32.2–35.5)
MCV RBC AUTO: 89.5 FL (ref 81.4–97.8)
NT-PROBNP SERPL IA-MCNC: 2580 PG/ML (ref 0–125)
PLATELET # BLD AUTO: 213 K/UL (ref 164–446)
PMV BLD AUTO: 11 FL (ref 9–12.9)
POTASSIUM SERPL-SCNC: 3.8 MMOL/L (ref 3.6–5.5)
RBC # BLD AUTO: 3.05 M/UL (ref 4.2–5.4)
SODIUM SERPL-SCNC: 140 MMOL/L (ref 135–145)
WBC # BLD AUTO: 4.8 K/UL (ref 4.8–10.8)

## 2025-04-21 PROCEDURE — 85027 COMPLETE CBC AUTOMATED: CPT

## 2025-04-21 PROCEDURE — A9270 NON-COVERED ITEM OR SERVICE: HCPCS | Performed by: HOSPITALIST

## 2025-04-21 PROCEDURE — 80048 BASIC METABOLIC PNL TOTAL CA: CPT

## 2025-04-21 PROCEDURE — 99232 SBSQ HOSP IP/OBS MODERATE 35: CPT | Performed by: STUDENT IN AN ORGANIZED HEALTH CARE EDUCATION/TRAINING PROGRAM

## 2025-04-21 PROCEDURE — 99232 SBSQ HOSP IP/OBS MODERATE 35: CPT | Performed by: HOSPITALIST

## 2025-04-21 PROCEDURE — 700102 HCHG RX REV CODE 250 W/ 637 OVERRIDE(OP): Performed by: HOSPITALIST

## 2025-04-21 PROCEDURE — 97112 NEUROMUSCULAR REEDUCATION: CPT

## 2025-04-21 PROCEDURE — 83880 ASSAY OF NATRIURETIC PEPTIDE: CPT

## 2025-04-21 PROCEDURE — A9270 NON-COVERED ITEM OR SERVICE: HCPCS | Performed by: STUDENT IN AN ORGANIZED HEALTH CARE EDUCATION/TRAINING PROGRAM

## 2025-04-21 PROCEDURE — 36415 COLL VENOUS BLD VENIPUNCTURE: CPT

## 2025-04-21 PROCEDURE — 700102 HCHG RX REV CODE 250 W/ 637 OVERRIDE(OP): Performed by: STUDENT IN AN ORGANIZED HEALTH CARE EDUCATION/TRAINING PROGRAM

## 2025-04-21 PROCEDURE — 97150 GROUP THERAPEUTIC PROCEDURES: CPT | Mod: CQ

## 2025-04-21 PROCEDURE — 97535 SELF CARE MNGMENT TRAINING: CPT

## 2025-04-21 PROCEDURE — 770010 HCHG ROOM/CARE - REHAB SEMI PRIVAT*

## 2025-04-21 PROCEDURE — 97110 THERAPEUTIC EXERCISES: CPT | Mod: CO

## 2025-04-21 RX ORDER — POTASSIUM CHLORIDE 1500 MG/1
10 TABLET, EXTENDED RELEASE ORAL DAILY
Status: DISCONTINUED | OUTPATIENT
Start: 2025-04-21 | End: 2025-04-23

## 2025-04-21 RX ADMIN — GUAIFENESIN SYRUP AND DEXTROMETHORPHAN 5 ML: 100; 10 SYRUP ORAL at 08:14

## 2025-04-21 RX ADMIN — OMEPRAZOLE 20 MG: 20 CAPSULE, DELAYED RELEASE ORAL at 08:14

## 2025-04-21 RX ADMIN — GUAIFENESIN SYRUP AND DEXTROMETHORPHAN 5 ML: 100; 10 SYRUP ORAL at 11:27

## 2025-04-21 RX ADMIN — OMEPRAZOLE 20 MG: 20 CAPSULE, DELAYED RELEASE ORAL at 20:52

## 2025-04-21 RX ADMIN — ATORVASTATIN CALCIUM 20 MG: 10 TABLET, FILM COATED ORAL at 20:52

## 2025-04-21 RX ADMIN — LOSARTAN POTASSIUM 50 MG: 50 TABLET, FILM COATED ORAL at 06:02

## 2025-04-21 RX ADMIN — GUAIFENESIN SYRUP AND DEXTROMETHORPHAN 5 ML: 100; 10 SYRUP ORAL at 17:21

## 2025-04-21 RX ADMIN — POTASSIUM CHLORIDE 10 MEQ: 1500 TABLET, EXTENDED RELEASE ORAL at 12:13

## 2025-04-21 RX ADMIN — CARVEDILOL 6.25 MG: 3.12 TABLET, FILM COATED ORAL at 17:21

## 2025-04-21 RX ADMIN — GUAIFENESIN SYRUP AND DEXTROMETHORPHAN 5 ML: 100; 10 SYRUP ORAL at 20:51

## 2025-04-21 RX ADMIN — FUROSEMIDE 20 MG: 20 TABLET ORAL at 06:02

## 2025-04-21 RX ADMIN — FERROUS GLUCONATE 324 MG: 324 TABLET ORAL at 08:15

## 2025-04-21 RX ADMIN — LOSARTAN POTASSIUM 50 MG: 50 TABLET, FILM COATED ORAL at 17:21

## 2025-04-21 RX ADMIN — CARVEDILOL 6.25 MG: 3.12 TABLET, FILM COATED ORAL at 08:14

## 2025-04-21 RX ADMIN — HYDRALAZINE HYDROCHLORIDE 100 MG: 50 TABLET ORAL at 08:14

## 2025-04-21 RX ADMIN — HYDRALAZINE HYDROCHLORIDE 100 MG: 50 TABLET ORAL at 20:52

## 2025-04-21 ASSESSMENT — ENCOUNTER SYMPTOMS
ABDOMINAL PAIN: 0
FEVER: 0
POLYDIPSIA: 0
CHILLS: 0
BRUISES/BLEEDS EASILY: 0
SHORTNESS OF BREATH: 0
VOMITING: 0
NAUSEA: 0
PALPITATIONS: 0
COUGH: 0
MUSCULOSKELETAL NEGATIVE: 1
EYES NEGATIVE: 1

## 2025-04-21 ASSESSMENT — PAIN DESCRIPTION - PAIN TYPE: TYPE: ACUTE PAIN

## 2025-04-21 ASSESSMENT — PATIENT HEALTH QUESTIONNAIRE - PHQ9
1. LITTLE INTEREST OR PLEASURE IN DOING THINGS: NOT AT ALL
SUM OF ALL RESPONSES TO PHQ9 QUESTIONS 1 AND 2: 0
2. FEELING DOWN, DEPRESSED, IRRITABLE, OR HOPELESS: NOT AT ALL
1. LITTLE INTEREST OR PLEASURE IN DOING THINGS: NOT AT ALL
SUM OF ALL RESPONSES TO PHQ9 QUESTIONS 1 AND 2: 0
2. FEELING DOWN, DEPRESSED, IRRITABLE, OR HOPELESS: NOT AT ALL

## 2025-04-21 NOTE — CARE PLAN
"The patient is Stable - Low risk of patient condition declining or worsening    Shift Goals  Clinical Goals: Pain control, Safety  Patient Goals: Rest  Family Goals: NILSON    Progress made toward(s) clinical / shift goals:    Problem: Fall Risk - Rehab  Goal: Patient will remain free from falls  Outcome: Progressing       Greer Chino Fall risk Assessment : 15    High fall risk Interventions   - Bed and strip alarm   - Yellow sign by the door   - Yellow wrist band \"Fall risk\"  - Room near to the nurse station  - Do not leave patient unattended in the bathroom  - Fall risk education provided    Pt uses call light consistently and appropriately. Waits for assistance does not attempt self transfer this shift. Able to verbalize needs.      "

## 2025-04-21 NOTE — PROGRESS NOTES
"  Physical Medicine & Rehabilitation Progress Note    Encounter Date: 4/21/2025    Interval Events (Subjective):  VS: intermittent hypertension, on RA.  Last BM 4/19. Voiding urine.   Pertinent labs today: hgb 8.7, BUN 23, proBNP 2580  Missed doses of scheduled meds: refused senna/docusate   PRNs used: oxycodone 2.5mg yesterday    Patient seen at bedside today. Has been making improvements in therapy. Cough is much improved, now on RA. GI cleared for DVT ppx, but patient is moving a lot better. Risks and benefits discussed and patient wants to avoid blood thinners for now.     ____________________  Interdisciplinary Team Conference   Most recent IDT on 4/16/2025     Discharge Date/Disposition:  4/24/2025  _____________________________________       Objective:  Vital signs: /56   Pulse 82   Temp 36.6 °C (97.9 °F) (Temporal)   Resp 18   Ht 1.651 m (5' 5\")   Wt 65.5 kg (144 lb 6.4 oz)   SpO2 94%   BMI 24.03 kg/m²   General: not in distress.  HEENT: PERRL. EOMI.  Pulmonary: Breathing comfortably on room air  Cardiovascular: Rate as above, warm extremities  Gastrointestinal: Abdomen non-distended  Extremities/MSK: Bilateral lower extremity edema  Integumentary:   4/20: bilateral shins    4/20: sacrum        Psychiatric: Normal affect.  Neurologic: Awake and alert. Answering questions appropriately. Normal speech.       Laboratory Values:  Recent Results (from the past 72 hours)   CBC WITHOUT DIFFERENTIAL    Collection Time: 04/19/25  5:32 AM   Result Value Ref Range    WBC 5.4 4.8 - 10.8 K/uL    RBC 3.10 (L) 4.20 - 5.40 M/uL    Hemoglobin 8.7 (L) 12.0 - 16.0 g/dL    Hematocrit 28.7 (L) 37.0 - 47.0 %    MCV 92.6 81.4 - 97.8 fL    MCH 28.1 27.0 - 33.0 pg    MCHC 30.3 (L) 32.2 - 35.5 g/dL    RDW 51.8 (H) 35.9 - 50.0 fL    Platelet Count 201 164 - 446 K/uL    MPV 11.3 9.0 - 12.9 fL   Comp Metabolic Panel    Collection Time: 04/19/25  5:32 AM   Result Value Ref Range    Sodium 140 135 - 145 mmol/L    Potassium " 4.2 3.6 - 5.5 mmol/L    Chloride 107 96 - 112 mmol/L    Co2 24 20 - 33 mmol/L    Anion Gap 9.0 7.0 - 16.0    Glucose 94 65 - 99 mg/dL    Bun 28 (H) 8 - 22 mg/dL    Creatinine 0.81 0.50 - 1.40 mg/dL    Calcium 8.5 8.5 - 10.5 mg/dL    Correct Calcium 9.4 8.5 - 10.5 mg/dL    AST(SGOT) 37 12 - 45 U/L    ALT(SGPT) 14 2 - 50 U/L    Alkaline Phosphatase 58 30 - 99 U/L    Total Bilirubin 0.3 0.1 - 1.5 mg/dL    Albumin 2.9 (L) 3.2 - 4.9 g/dL    Total Protein 6.4 6.0 - 8.2 g/dL    Globulin 3.5 1.9 - 3.5 g/dL    A-G Ratio 0.8 g/dL   ESTIMATED GFR    Collection Time: 04/19/25  5:32 AM   Result Value Ref Range    GFR (CKD-EPI) 70 >60 mL/min/1.73 m 2   CBC WITHOUT DIFFERENTIAL    Collection Time: 04/21/25  6:51 AM   Result Value Ref Range    WBC 4.8 4.8 - 10.8 K/uL    RBC 3.05 (L) 4.20 - 5.40 M/uL    Hemoglobin 8.7 (L) 12.0 - 16.0 g/dL    Hematocrit 27.3 (L) 37.0 - 47.0 %    MCV 89.5 81.4 - 97.8 fL    MCH 28.5 27.0 - 33.0 pg    MCHC 31.9 (L) 32.2 - 35.5 g/dL    RDW 49.5 35.9 - 50.0 fL    Platelet Count 213 164 - 446 K/uL    MPV 11.0 9.0 - 12.9 fL   Basic Metabolic Panel    Collection Time: 04/21/25  6:51 AM   Result Value Ref Range    Sodium 140 135 - 145 mmol/L    Potassium 3.8 3.6 - 5.5 mmol/L    Chloride 107 96 - 112 mmol/L    Co2 23 20 - 33 mmol/L    Glucose 95 65 - 99 mg/dL    Bun 23 (H) 8 - 22 mg/dL    Creatinine 0.78 0.50 - 1.40 mg/dL    Calcium 8.2 (L) 8.5 - 10.5 mg/dL    Anion Gap 10.0 7.0 - 16.0   proBrain Natriuretic Peptide, NT    Collection Time: 04/21/25  6:51 AM   Result Value Ref Range    NT-proBNP 2580 (H) 0 - 125 pg/mL   ESTIMATED GFR    Collection Time: 04/21/25  6:51 AM   Result Value Ref Range    GFR (CKD-EPI) 73 >60 mL/min/1.73 m 2       Medications:  Scheduled Medications   Medication Dose Frequency    carvedilol  6.25 mg BID WITH MEALS    vitamin D2 (Ergocalciferol)  50,000 Units Q7 DAYS    guaiFENesin dextromethorphan  5 mL 4X/DAY WITH MEALS + NIGHTLY    ferrous gluconate  324 mg QDAY with Breakfast     Pharmacy Consult Request  1 Each PHARMACY TO DOSE    senna-docusate  2 Tablet Q EVENING    atorvastatin  20 mg QDAY    furosemide  20 mg DAILY    hydrALAZINE  100 mg BID    losartan  50 mg BID    omeprazole  20 mg BID     PRN medications: Respiratory Therapy Consult, hydrALAZINE, acetaminophen, senna-docusate **AND** polyethylene glycol/lytes, docusate sodium, magnesium hydroxide, carboxymethylcellulose, benzocaine-menthol, mag hydrox-al hydrox-simeth, ondansetron **OR** ondansetron, sodium chloride, oxyCODONE immediate-release **OR** oxyCODONE immediate-release, melatonin    Diet:  Current Diet Order   Procedures    Diet Order Diet: Low Fiber(GI Soft) (Advance Diet as Tolerated)       Medical Decision Making and Plan:  GI bleed  -Presented with dark red clots in stool.  Hemoglobin found to be 7.7, dropped to 6.4. CT abd/pelv negative for acute GI bleeding. EGD showed hiatal hernia, nonobstructing Schatzki ring, gastric erosion.  -PPI BID  -PT and OT for mobility and ADLs. Per guidelines, 15 hours per week between PT, OT and/or SLP.  -Follow up with GI     Acute blood loss anemia  -Received transfusion at acute hospital  -On admission to rehab: Most recent Hgb 9.4.  Check morning labs.  -hgb 9.1 --> 8.8 --> 8.7 --> 8.7 (4/21)     Distal LE weakness, distal motor neuropathy  -on admission: has weakness in bilateral ankle dorsiflexion and plantarflexion.  Maybe due to edema.  Monitor for now.    -4/16: Persistent distal weakness. Consistent with distal motor polyneuropathy. There may be a history of worsening distal weakness prior to this hospitalization.   -Recommend neurology evaluation, may benefit from NCS/EMG  -referral to neurology placed    4/17: IGC change. [facility] Code / Diagnosis to Support: 0003.3 - Neurologic Conditions: Polyneuropathy     Hypertension  -on admission: coreg 25mg BID, losartan 50mg BID, spironolactone 12.5mg daily, hydralazine 100mg BID. Monitor for need to adjust medications  -IM  following  -: BP low. Coreg to 12.5mg BID.   -: Coreg to 6.25mg BID  -: stop spironolactone.   -Continue Coreg, losartan, hydralazine     LE venous insufficiency   LE edema  Hx of lymphedema  -on admission: chronic swelling.  Recent ultrasound Doppler negative for DVT. compression stockings. lasix 20mg daily.   -4/15: IM consult.   -: BNP 3040  -: Echo completed: Normal left ventricular systolic function. Right ventricular systolic pressure is estimated to be  33 mmHg. No significant valvular disease. EF 70%.   -Continue Lasix      Cough, pneumonia  -on admission: has nonproductive cough. Check CXR. Check AM labs.   -4/15: CXR appears to have fluid overload in lungs, possible pneumonia. IM consulted. Patient started on augmentin.   -Augmentin ended )  -continue guaifenesin     Acute respiratory failure  -On admission: On 1 L supplemental O2.  Wean as tolerated.  -: 0.5L O2  -: now on RA.     History of CKD stage III, azotemia  -Creatinine has remained within normal limits recently  -: BUN elevated 28.   -monitor     History of pacemaker  -Known history of prior complete heart block, status post pacemaker in 2019    Hyperlipidemia  -On home dose Lipitor     Pain - prn Tylenol, oxycodone    Vit D deficiency - 4/15: vit D 11. Plan for ergocalciferol 50,000u weekly while in rehab. Will need follow up with PCP.      Poor mood, adjustment disorder  -  about 6 months ago  -Monitor mood, can consider SSRI.     Bladder management  -monitor for urinary retention. Bladder scan and ICP per protocol.      Bowel management  -senna/docusate for prophylaxis     Sacral pressure injury, present on admission  -slough covering entire wound base, most consistent with unstagable pressure injury.   -Continue daily wound care  -Continue turn schedule     DVT Ppx - Patient not cleared for DVT chemoprophylaxis.  Ultrasound negative for DVT on .  discussed with jj ROSA for DVT ppx.  However patient is moving better with therapy, risks and benefits discussed and patient wants to hold off on blood thinners.       UPCOMING LABS/IMAGING: per IM      HOSPITALIST FOLLOWING: Yes     DISPO: Home. Lives in [place] [facility] [facility] with [facility]. Her son lives with her and can provide support upon discharge.      STIVEN: 4/24     DISCHARGE SPECIALIST FOLLOW UP: Gastroenterology       Patient to scheduled follow up with their PCP within 2 weeks from discharge from the [facility].      ____________________________________    Ludwig Moeller D.O.  Physical Medicine & Rehabilitation   ____________________________________    Total time:  40 minutes. Time spent included pre-rounding review of vitals and tests, unit/floor time, face-to-face time with the patient including physical examination, care coordination, counseling of patient and/or family, ordering medications/procedures/tests, discussion with CM, PT, OT, SLP and/or other healthcare providers, and documentation in the electronic medical record.

## 2025-04-21 NOTE — PROGRESS NOTES
NURSING DAILY NOTE    Name: Priscilla Hdz   Date of Admission: 4/14/2025   Admitting Diagnosis: GI bleed  Attending Physician: KWAKU MARTINEZ D.O.  Allergies: Other environmental    Safety  Patient Assist  min assist  Patient Precautions  Precautions: Fall Risk  Fall Risk: Poor balance  Cardiopulmonary: Pacemaker  Orthopedic: Other (see comments)  Comments: 0.5-1L O2 wean  Bed Transfer Status  Contact Guard Assist  Toilet Transfer Status   Contact Guard Assist  Assistive Devices  Rails, Wheelchair  Oxygen  None - Room Air  Diet/Therapeutic Dining  Current Diet Order   Procedures    Diet Order Diet: Low Fiber(GI Soft) (Advance Diet as Tolerated)     Pill Administration  whole  Agitated Behavioral Scale  20  ABS Level of Severity  No Agitation    Fall Risk  Has the patient had a fall this admission?      Greer Chino Fall Risk Scoring  15, HIGH RISK  Fall Risk Safety Measures  bed alarm and chair alarm    Vitals  Temperature: 36.6 °C (97.9 °F)  Temp src: Temporal  Pulse: 82  Respiration: 18  Blood Pressure : 135/56  Blood Pressure MAP (Calculated): 82 MM HG  BP Location: Left, Upper Arm  Patient BP Position: Supine     Oxygen  Pulse Oximetry: 94 %  O2 (LPM): 0  O2 Delivery Device: None - Room Air  Incentive Spirometer Volume: 750 mL    Bowel and Bladder  Last Bowel Movement  04/19/25  Stool Type  Type 4: Like a sausage or snake, smooth and soft  Bowel Device  Bathroom  Continent  Bladder: Did not void   Bowel: No movement  Bladder Function  Urine Void (mL):  (large)  Number of Times Voided: 1  Urine Color: Yellow  Genitourinary Assessment   Bladder Assessment (WDL):  Within Defined Limits  Kumar Catheter: Not Applicable  Urine Color: Yellow  Bladder Device: Bathroom  Bladder Scan: Post Void  $ Bladder Scan Results (mL): 69    Skin  Ezequiel Score   17  Sensory Interventions   Bed Types: Low Airloss  Skin Preventative Measures: Pillows in Use for Support / Positioning  Skin Preventative Dressing:  (refused moon  boots)  Moisture Interventions  Moisturizers/Barriers: Barrier Wipes      Pain  Pain Rating Scale  5 - Interrupts some activities  Pain Location  Buttock  Pain Location Orientation  Mid  Pain Interventions   Medication (see MAR)    ADLs    Bathing   Patient Refused Bathing (rn notified)  Linen Change   Partial  Personal Hygiene  Perineal Care, Moist Tiff Wipes  Chlorhexidine Bath      Oral Care  Brushed Teeth  Teeth/Dentures     Shave     Nutrition Percentage Eaten  Refused, 0% Consumed  Environmental Precautions  Treaded Slipper Socks on Patient, Personal Belongings, Wastebasket, Call Bell etc. in Easy Reach, Bed in Low Position, Communication Sign for Patients & Families  Patient Turns/Positioning  Patient declines - understands and verbalizes importance  Patient Turns Assistance/Tolerance     Bed Positions  Bed Controls On, Bed Locked  Head of Bed Elevated  Self regulated      Psychosocial/Neurologic Assessment  Psychosocial Assessment  Psychosocial (WDL):  WDL Except  Patient Behaviors: Fatigue, Irritable  Neurologic Assessment  Neuro (WDL): Exceptions to WDL  Level of Consciousness: Alert  Orientation Level: Oriented X4  Cognition: Appropriate judgement, Appropriate safety awareness, Follows commands  Speech: Clear  Pupil Assesment: No  Motor Function/Sensation Assessment: Motor strength  Muscle Strength Right Arm: Good Strength Against Gravity and Moderate Resistance  Muscle Strength Left Arm: Good Strength Against Gravity and Moderate Resistance  Muscle Strength Right Leg: Fair Strength against Gravity but No Resistance  Muscle Strength Left Leg: Fair Strength against Gravity but No Resistance  EENT (WDL):  WDL Except    Cardio/Pulmonary Assessment  Edema   RLE Edema: 1+  LLE Edema: 1+  Respiratory Breath Sounds  RUL Breath Sounds: Clear  RML Breath Sounds: Diminished  RLL Breath Sounds: Clear  KEVIN Breath Sounds: Clear  LLL Breath Sounds: Diminished  Cardiac Assessment   Cardiac (WDL):  WDL Except (HTN, HLD,  pacemaker)

## 2025-04-21 NOTE — CARE PLAN
"The patient is Stable - Low risk of patient condition declining or worsening    Shift Goals  Clinical Goals: Pain control  Patient Goals: Rest    Problem: Fall Risk - Rehab  Goal: Patient will remain free from falls  Outcome: Progressing  Note: Greer Chino Fall risk Assessment Score: 15    High fall risk Interventions   - Bed and strip alarm   - Yellow sign by the door   - Yellow wrist band \"Fall risk\"  - Room near to the nurse station  - Do not leave patient unattended in the bathroom  - Fall risk education provided     Pt using call light appropriately when in need of assistance.        Problem: Skin Integrity  Goal: Skin integrity is maintained or improved  Outcome: Not Progressing  Note:   Ezequiel Score: 17    Patient's skin remains free from new or accidental injury this shift; no s/s of infection. RN wound protocol checked. Encouraged hydration and educated about the importance of nutrition to keep skin integrity. Patient refused to place moon boots but offloaded using pillows. Patient was advised to turn every 2 hours and use wedges to make sure she'll be off her bottom however patient refused several times despite education. She stated she will not be able to move by herself and needed to change position anytime she like. Patient was advised that staff will switch her position every 2 hours however patient still refused. Patient is on low air loss mattress. Patient educated and advised refusal for switching position with wedges can cause worsening of pressure injury on her buttocks. Patient was also advised to place her head not more than 30 degrees so it wont put more pressure on her buttocks however she refused despite education. Will continue to monitor.        "

## 2025-04-21 NOTE — THERAPY
Occupational Therapy  Daily Treatment     Patient Name:  Priscilla Hdz  Age:  85 y.o., Sex:  female  Medical Record #:  [id number]  Today's Date:  4/21/2025    Precautions  Precautions: Fall Risk  Fall Risk: Poor balance  Cardiopulmonary: Pacemaker  Orthopedic: Other (see comments)  Comments: 0.5-1L O2 wean    Subjective: Pt seen 2x today (0830 & 1300), pleasant and agreeable to both OT sessions. During PM session, pt reported feeling more fatigued than AM session - vitals taken and WFL. Pt returned to seated in bed to rest.     Objective:    04/21/25 0831 04/21/25 1301   OT Charge Group   OT Self Care / ADL (Units) 3 1   OT Neuromuscular Re-education / Balance (Units)  --  1   OT Therapeutic Exercise (Units) 1  --    OT Total Time Spent   OT Individual Total Time Spent (Mins) 60 30   Precautions   Precautions Fall Risk Fall Risk   Fall Risk Poor balance Poor balance   Cardiopulmonary Pacemaker Pacemaker   Pain 0 - 10 Group   Pain Rating Scale (NPRS) 0  --    Bladder   Urine Color Yellow Yellow   Number of Times Voided 1 1   Bladder Device Bathroom Bathroom   Oral Hygiene   Level of Assist Independent  --    Patient Position Seated  --    Grooming   Level of Assist Independent Contact Guard Assist   Patient Position Seated Standing   Additional Description Hair care Hand hygiene   Upper Body Dressing   Level of Assist Stand by Assist;Set Up  --    Patient Position Seated  --    Clothing Item(s) Pullover shirt  --    Lower Body Dressing   Level of Assist Minimal Assist  --    Patient Position Seated;Standing  --    Clothing Item(s) Pants;Disposable Brief  --    Adaptive Equipment Reacher;Dressing Stick  --    Additional Description Cueing needed;Extra time needed;Assist with threading pant leg  --    Putting On/Taking Off Footwear   Level of Assist Minimal Assist;Maximal Assist  --    Patient Position Seated  --    Footwear Type Treaded Socks;Compression Stockings  (Parth for treaded, max for compression)  --     Adaptive Equipment Reacher;Sock Aid  --    Additional Description Extra time needed  --    Shower/Bathe Self   Level of Assist Contact Guard Assist  --    Patient Position Seated;Standing  --    Adaptive Equipment Grab Bars  --    Additional Description Extra time needed  --    Tub/Shower Transfer   Level of Assist Contact Guard Assist  --    Transfer Type Stand step transfer  --    Adaptive Equipment Grab bars  --    Assistive Device FWW  --    Toilet Transfer   Level of Assist Stand By Assist;Contact Guard Assist  (close SBA) Stand By Assist  (close SBA)   Transfer Type Stand Step Transfer Stand Step Transfer   Adaptive Equipment Grab bars Grab bars   Assistive Device FWW FWW   Toileting Hygiene   Level of Assist Stand By Assist  (Close SBA, occassional CGA) Stand By Assist;Contact Guard Assist  (Close SBA, occassional CGA)   Additional Description Grab bars Grab bars   Sit to Stand   Level of Assist Stand By Assist  --    Assistive Devices FWW  --    Chair/Bed-to-Chair Transfer   Level of Assist  --  Contact Guard Assist   Transfer Type  --  Stand Step Transfer   Assistive Devices  --  FWW   Interdisciplinary Plan of Care Collaboration   Patient Position at End of Therapy Seated;Chair Alarm On;Call Light within Reach;Tray Table within Reach In Bed;Bed Alarm On;Call Light within Reach;Tray Table within Reach     AM Session:  Therapeutic Exercise  Purpose: to improve strength, to improve ROM/flexibility, and to improve functional endurance  Interventions:   Lower body exercises:   Standing program -   Marching, Reciprocal and 1 set of 10  Mini squat, 3 sets of 10  SBA in // bars with B UE support    Activities of Daily Living (ADL's)  Purpose: to improve function, safety, and independence with activities of daily living and to provide patient and family education  Interventions:   Oral Hygiene  Grooming  Shower/Bathing  Upper Body Dressing  Lower Body Dressing  Toilet Transfers  Toilet Hygiene  Shower Transfers:  Wet    PM Session:  Activities of Daily Living (ADL's)  Purpose: to improve function, safety, and independence with activities of daily living and to provide patient and family education  Interventions:   Grooming  Toilet Transfers  Toilet Hygiene  Neuro Re-Education  Purpose: to improve balance and to improve timing, coordination, and recruitment of muscles  Interventions:   Static/Dynamic standing balance training  Facilitation/Cueing: Facilitation  Pt completed dynamic reaching activity in // bars with a focus on simulated clothing management during toileting and to improve standing balance during ADLs:   1x5 B standing bicep curls c/ theraband  1x5 B shoulder ext c/ theraband   1x5 B shoulder flex c/ theraband  1x5 pulling theraband from mid thigh to hip - 1 arm support on // bars  1x5 pulling theraband from mid thigh to hip - no arm support  Completed all above CGA w/ Parth for 2 LOB, RB between each set    Assessment:    Pt tolerated both sessions well, limited by fatigue at PM session but still willing to participate in standing balance activity to improve IND during toileting. Pt did require assist for LB dressing w/ AE compared to previous session (Parth for threading and demo of AE), will trial from lower surface. Pt did improve on STS to SBA.   Strengths: Alert and oriented, Supportive family  Barriers: Agitation, Decreased endurance, Difficulty following instructions, Fatigue, Generalized weakness, Impaired activity tolerance, Impaired balance, Impaired carryover of learning, Impaired functional cognition, Limited mobility    Plan:  Pt would benefit from skilled OT services to address:  - ADLs/IADLs (laundry)  - Strength/endurance/activity tolerance  - Sitting/standing balance  - Funct Mob  - Transfers  - Sit to Stand Training  - UE strength/ROM (shoulder flex/ext)    DME  OT DME Recommendations  Additional Equipment (NOT TYPICALLY COVERED BY INSURANCE): Sock aid, Reacher, Long handled shoe  horn    Passport items to be completed:  Perform bathroom transfers, complete dressing, , prepare a simple meal, participate in household tasks, adapt home for safety needs, demonstrate home exercise program, complete caregiver training     Occupational Therapy Goals (Active)       Problem: Bathing       Dates: Start:  04/15/25         Goal: STG-Within one week, patient will bathe CGA w/ AE PRN       Dates: Start:  04/15/25    Expected End:  04/24/25               Problem: Dressing       Dates: Start:  04/15/25         Goal: STG-Within one week, patient will dress EOB Parth with AE PRN       Dates: Start:  04/15/25    Expected End:  04/24/25               Problem: Functional Transfers       Dates: Start:  04/15/25         Goal: STG-Within one week, patient will transfer to toilet SBA w/ LRD       Dates: Start:  04/15/25    Expected End:  04/24/25               Problem: Grooming       Dates: Start:  04/15/25         Goal: STG-Within one week, patient will complete grooming standing at sink SBA       Dates: Start:  04/15/25    Expected End:  04/24/25               Problem: OT Long Term Goals       Dates: Start:  04/15/25         Goal: LTG-By discharge, patient will complete basic self care tasks SUP-Jovani       Dates: Start:  04/15/25    Expected End:  04/24/25            Goal: LTG-By discharge, patient will perform bathroom transfers SUP-Jovani       Dates: Start:  04/15/25    Expected End:  04/24/25            Goal: LTG-By discharge, patient will dress EOB SUP-Jovani w/ AE PRN       Dates: Start:  04/15/25    Expected End:  04/24/25            Goal: LTG-By discharge, patient will complete funct mob household distance SBA-SUP w/ LRD       Dates: Start:  04/15/25    Expected End:  04/24/25

## 2025-04-21 NOTE — THERAPY
"Occupational Therapy  Daily Treatment     Patient Name:  Priscilla Hdz  Age:  85 y.o., Sex:  female  Medical Record #:  [id number]  Today's Date:  4/21/2025    Precautions  Precautions: (P) Fall Risk  Fall Risk: (P) Poor balance  Cardiopulmonary: (P) Pacemaker  Orthopedic: Other (see comments)  Comments: 0.5-1L O2 wean    Subjective: Pt was resting in bed upon arrival and agreeable for OT session after slowly awakening.      Objective:    04/21/25 1401   OT Charge Group   OT Therapeutic Exercise (Units) 2   OT Total Time Spent   OT Individual Total Time Spent (Mins) 30   Precautions   Precautions Fall Risk   Fall Risk Poor balance   Cardiopulmonary Pacemaker   Roll Left and Right   Level of Assist Contact Guard Assist   Additional Description Head of bed elevated;Extra time needed   Sit to Lying   Level of Assist Contact Guard Assist   Additional Description Head of bed elevated;Use of bed rail;Extra time needed   Sit to Stand   Level of Assist Contact Guard Assist   Assistive Devices FWW   Additional Description Extra time needed;Cueing needed   Chair/Bed-to-Chair Transfer   Level of Assist Contact Guard Assist   Transfer Type Stand Step Transfer   Assistive Devices FWW   Additional Description Extra time needed   Interdisciplinary Plan of Care Collaboration   Patient Position at End of Therapy Seated;Chair Alarm On;Call Light within Reach;Tray Table within Reach;Phone within Reach       Therapeutic Exercise  Purpose: to improve strength, to improve ROM/flexibility, and to improve functional endurance  Interventions:   Upper body exercises:   Seated program -   Upper extremity bike, Level 3 Resistance and Minutes/ Rest Break: 12 min and 2 adequate rest breaks.     Assessment:    Pt tolerated session fairly w/ focus on strengthening/endurance. Pt needed increase time to arouse stating, \" I am really really tired\" however was agreeable for UE bike.   Strengths: Alert and oriented, Supportive family  Barriers: " Agitation, Decreased endurance, Difficulty following instructions, Fatigue, Generalized weakness, Impaired activity tolerance, Impaired balance, Impaired carryover of learning, Impaired functional cognition, Limited mobility    Plan:  Pt would benefit from skilled OT services to address:  - ADLs/IADLs (laundry)  - Strength/endurance/activity tolerance  - Sitting/standing balance  - Funct Mob  - Transfers  - Sit to Stand Training  - UE strength/ROM (shoulder flex/ext)    DME  OT DME Recommendations  Additional Equipment (NOT TYPICALLY COVERED BY INSURANCE): Sock aid, Reacher, Long handled shoe horn    Passport items to be completed:  Perform bathroom transfers, complete dressing, complete feeding, get ready for the day, prepare a simple meal, participate in household tasks, adapt home for safety needs, demonstrate home exercise program, complete caregiver training     Occupational Therapy Goals (Active)       Problem: Bathing       Dates: Start:  04/15/25         Goal: STG-Within one week, patient will bathe CGA w/ AE PRN       Dates: Start:  04/15/25    Expected End:  04/24/25               Problem: Dressing       Dates: Start:  04/15/25         Goal: STG-Within one week, patient will dress EOB Parth with AE PRN       Dates: Start:  04/15/25    Expected End:  04/24/25               Problem: Functional Transfers       Dates: Start:  04/15/25         Goal: STG-Within one week, patient will transfer to toilet SBA w/ LRD       Dates: Start:  04/15/25    Expected End:  04/24/25               Problem: Grooming       Dates: Start:  04/15/25         Goal: STG-Within one week, patient will complete grooming standing at sink SBA       Dates: Start:  04/15/25    Expected End:  04/24/25               Problem: OT Long Term Goals       Dates: Start:  04/15/25         Goal: LTG-By discharge, patient will complete basic self care tasks SUP-Jovani       Dates: Start:  04/15/25    Expected End:  04/24/25            Goal: LTG-By  discharge, patient will perform bathroom transfers SUP-Jovani       Dates: Start:  04/15/25    Expected End:  04/24/25            Goal: LTG-By discharge, patient will dress EOB SUP-Jovani w/ AE PRN       Dates: Start:  04/15/25    Expected End:  04/24/25            Goal: LTG-By discharge, patient will complete funct mob household distance SBA-SUP w/ LRD       Dates: Start:  04/15/25    Expected End:  04/24/25

## 2025-04-21 NOTE — PROGRESS NOTES
Hospital Medicine Daily Progress Note        Chief Complaint  Pneumonia  Edema  Hypertension    Interval Problem Update  Pt denies new complaints.  Now off O2.  Laboratory results reviewed and discussed improving kidney function.    Disposition  Per Physiatry    Review of Systems  Review of Systems   Constitutional:  Negative for chills and fever.   HENT: Negative.     Eyes: Negative.    Respiratory:  Negative for cough and shortness of breath.    Cardiovascular:  Positive for leg swelling. Negative for chest pain and palpitations.   Gastrointestinal:  Negative for abdominal pain, nausea and vomiting.   Musculoskeletal: Negative.    Skin:  Negative for itching and rash.   Endo/Heme/Allergies:  Negative for polydipsia. Does not bruise/bleed easily.        Physical Exam  Temp:  [36.6 °C (97.9 °F)-37.2 °C (98.9 °F)] 36.6 °C (97.9 °F)  Pulse:  [53-82] 53  Resp:  [17-20] 17  BP: (115-150)/(54-57) 115/54  SpO2:  [94 %-95 %] 94 %    Physical Exam  Vitals reviewed.   Constitutional:       General: She is not in acute distress.     Appearance: Normal appearance. She is not ill-appearing.   HENT:      Head: Normocephalic and atraumatic.      Right Ear: External ear normal.      Left Ear: External ear normal.      Nose: Nose normal.      Mouth/Throat:      Pharynx: Oropharynx is clear.   Eyes:      General:         Right eye: No discharge.         Left eye: No discharge.      Extraocular Movements: Extraocular movements intact.      Conjunctiva/sclera: Conjunctivae normal.   Cardiovascular:      Rate and Rhythm: Normal rate and regular rhythm.   Pulmonary:      Effort: No respiratory distress.      Breath sounds: No wheezing.      Comments: Decreased BS  Abdominal:      General: Bowel sounds are normal. There is no distension.      Palpations: Abdomen is soft.      Tenderness: There is no abdominal tenderness.   Musculoskeletal:      Cervical back: Normal range of motion and neck supple.      Right lower leg: Edema present.       Left lower leg: Edema present.   Skin:     General: Skin is warm and dry.   Neurological:      Mental Status: She is alert and oriented to person, place, and time.         Fluids    Intake/Output Summary (Last 24 hours) at 4/21/2025 1252  Last data filed at 4/20/2025 2100  Gross per 24 hour   Intake 100 ml   Output --   Net 100 ml          Laboratory  Recent Labs     04/19/25  0532 04/21/25  0651   WBC 5.4 4.8   RBC 3.10* 3.05*   HEMOGLOBIN 8.7* 8.7*   HEMATOCRIT 28.7* 27.3*   MCV 92.6 89.5   MCH 28.1 28.5   MCHC 30.3* 31.9*   RDW 51.8* 49.5   PLATELETCT 201 213   MPV 11.3 11.0     Recent Labs     04/19/25  0532 04/21/25  0651   SODIUM 140 140   POTASSIUM 4.2 3.8   CHLORIDE 107 107   CO2 24 23   GLUCOSE 94 95   BUN 28* 23*   CREATININE 0.81 0.78   CALCIUM 8.5 8.2*                   Assessment/Plan  * GI bleed- (present on admission)  Assessment & Plan  Pt presented w/ bloody stools  4/8/25 CT Abd/Pelvis no active GIB, diverticulosis, RLL PNA  EGD 4/9/25 non-obstructing Schatzki ring, hiatal hernia, and gastric erosion  Colonoscopy 4/10/25 no active bleeding  S/P [facility] at [facility] ([place]  Continue PPI bid  GI F/U    Anemia  Assessment & Plan  Has normocytic indices  Fe 14, continue supplements  Follow H/H    Edema  Assessment & Plan  U/S BLE negative for DVT  Echo EF 70%, RVSP 33 mmHg  BNP improving  Continue Lasix, add K+ supplements  Discontinued Aldactone for azotemia    Complete heart block by electrocardiogram (HCC)- (present on admission)  Assessment & Plan  S/P PPM    Pneumonia- (present on admission)  Assessment & Plan  4/8/25 CT Abd/Pelvis no active GIB, diverticulosis, RLL PNA  COVID/FLU/RSV negative  Now off supplemental O2 S/P empiric abx  Was on Augmentin x 5 days, completed 4/20/25  Also on Guaifenesin, IS, and RT protocol    Vitamin D deficiency disease- (present on admission)  Assessment & Plan  Vit D level 11  Continue supplementation    Essential hypertension- (present on admission)  Assessment & Plan  On  Coreg, Losartan, Hydralazine, and Lasix  Discontinued Aldactone for azotemia  Monitor blood pressure trends on increased Coreg    Dyslipidemia- (present on admission)  Assessment & Plan  On Lipitor       Full Code

## 2025-04-22 ENCOUNTER — APPOINTMENT (OUTPATIENT)
Dept: OCCUPATIONAL THERAPY | Facility: REHABILITATION | Age: 85
DRG: 073 | End: 2025-04-22
Attending: STUDENT IN AN ORGANIZED HEALTH CARE EDUCATION/TRAINING PROGRAM
Payer: MEDICARE

## 2025-04-22 ENCOUNTER — APPOINTMENT (OUTPATIENT)
Dept: PHYSICAL THERAPY | Facility: REHABILITATION | Age: 85
DRG: 073 | End: 2025-04-22
Attending: STUDENT IN AN ORGANIZED HEALTH CARE EDUCATION/TRAINING PROGRAM
Payer: MEDICARE

## 2025-04-22 PROCEDURE — 97150 GROUP THERAPEUTIC PROCEDURES: CPT

## 2025-04-22 PROCEDURE — 700102 HCHG RX REV CODE 250 W/ 637 OVERRIDE(OP): Performed by: HOSPITALIST

## 2025-04-22 PROCEDURE — 99232 SBSQ HOSP IP/OBS MODERATE 35: CPT | Performed by: HOSPITALIST

## 2025-04-22 PROCEDURE — A9270 NON-COVERED ITEM OR SERVICE: HCPCS | Performed by: HOSPITALIST

## 2025-04-22 PROCEDURE — A9270 NON-COVERED ITEM OR SERVICE: HCPCS | Performed by: STUDENT IN AN ORGANIZED HEALTH CARE EDUCATION/TRAINING PROGRAM

## 2025-04-22 PROCEDURE — 97535 SELF CARE MNGMENT TRAINING: CPT

## 2025-04-22 PROCEDURE — 99232 SBSQ HOSP IP/OBS MODERATE 35: CPT | Performed by: STUDENT IN AN ORGANIZED HEALTH CARE EDUCATION/TRAINING PROGRAM

## 2025-04-22 PROCEDURE — 97530 THERAPEUTIC ACTIVITIES: CPT | Mod: CQ

## 2025-04-22 PROCEDURE — 770010 HCHG ROOM/CARE - REHAB SEMI PRIVAT*

## 2025-04-22 PROCEDURE — 700102 HCHG RX REV CODE 250 W/ 637 OVERRIDE(OP): Performed by: STUDENT IN AN ORGANIZED HEALTH CARE EDUCATION/TRAINING PROGRAM

## 2025-04-22 PROCEDURE — 97116 GAIT TRAINING THERAPY: CPT | Mod: CQ

## 2025-04-22 RX ORDER — GUAIFENESIN/DEXTROMETHORPHAN 100-10MG/5
5 SYRUP ORAL EVERY 6 HOURS PRN
Status: DISCONTINUED | OUTPATIENT
Start: 2025-04-22 | End: 2025-04-24 | Stop reason: HOSPADM

## 2025-04-22 RX ORDER — POLYETHYLENE GLYCOL 3350 17 G/17G
1 POWDER, FOR SOLUTION ORAL DAILY
Status: DISCONTINUED | OUTPATIENT
Start: 2025-04-22 | End: 2025-04-24 | Stop reason: HOSPADM

## 2025-04-22 RX ADMIN — ATORVASTATIN CALCIUM 20 MG: 10 TABLET, FILM COATED ORAL at 20:42

## 2025-04-22 RX ADMIN — LOSARTAN POTASSIUM 50 MG: 50 TABLET, FILM COATED ORAL at 17:12

## 2025-04-22 RX ADMIN — CARVEDILOL 6.25 MG: 3.12 TABLET, FILM COATED ORAL at 17:12

## 2025-04-22 RX ADMIN — HYDRALAZINE HYDROCHLORIDE 100 MG: 50 TABLET ORAL at 08:09

## 2025-04-22 RX ADMIN — SENNOSIDES AND DOCUSATE SODIUM 2 TABLET: 50; 8.6 TABLET ORAL at 20:43

## 2025-04-22 RX ADMIN — OXYCODONE 2.5 MG: 5 TABLET ORAL at 20:44

## 2025-04-22 RX ADMIN — LOSARTAN POTASSIUM 50 MG: 50 TABLET, FILM COATED ORAL at 06:05

## 2025-04-22 RX ADMIN — OXYCODONE 2.5 MG: 5 TABLET ORAL at 02:16

## 2025-04-22 RX ADMIN — FUROSEMIDE 20 MG: 20 TABLET ORAL at 06:05

## 2025-04-22 RX ADMIN — CARVEDILOL 6.25 MG: 3.12 TABLET, FILM COATED ORAL at 08:09

## 2025-04-22 RX ADMIN — GUAIFENESIN SYRUP AND DEXTROMETHORPHAN 5 ML: 100; 10 SYRUP ORAL at 08:09

## 2025-04-22 RX ADMIN — GUAIFENESIN SYRUP AND DEXTROMETHORPHAN 5 ML: 100; 10 SYRUP ORAL at 11:43

## 2025-04-22 RX ADMIN — POTASSIUM CHLORIDE 10 MEQ: 1500 TABLET, EXTENDED RELEASE ORAL at 08:09

## 2025-04-22 RX ADMIN — OMEPRAZOLE 20 MG: 20 CAPSULE, DELAYED RELEASE ORAL at 08:09

## 2025-04-22 RX ADMIN — POLYETHYLENE GLYCOL 3350 1 PACKET: 17 POWDER, FOR SOLUTION ORAL at 08:09

## 2025-04-22 RX ADMIN — HYDRALAZINE HYDROCHLORIDE 100 MG: 50 TABLET ORAL at 20:43

## 2025-04-22 RX ADMIN — ERGOCALCIFEROL 50000 UNITS: 1.25 CAPSULE ORAL at 08:48

## 2025-04-22 RX ADMIN — FERROUS GLUCONATE 324 MG: 324 TABLET ORAL at 08:09

## 2025-04-22 RX ADMIN — OMEPRAZOLE 20 MG: 20 CAPSULE, DELAYED RELEASE ORAL at 20:42

## 2025-04-22 ASSESSMENT — ENCOUNTER SYMPTOMS
SHORTNESS OF BREATH: 0
BRUISES/BLEEDS EASILY: 0
CHILLS: 0
MUSCULOSKELETAL NEGATIVE: 1
NAUSEA: 0
PALPITATIONS: 0
COUGH: 0
VOMITING: 0
ABDOMINAL PAIN: 0
POLYDIPSIA: 0
FEVER: 0
EYES NEGATIVE: 1

## 2025-04-22 ASSESSMENT — PAIN DESCRIPTION - PAIN TYPE
TYPE: ACUTE PAIN
TYPE: ACUTE PAIN

## 2025-04-22 ASSESSMENT — PATIENT HEALTH QUESTIONNAIRE - PHQ9
1. LITTLE INTEREST OR PLEASURE IN DOING THINGS: NOT AT ALL
2. FEELING DOWN, DEPRESSED, IRRITABLE, OR HOPELESS: NOT AT ALL
SUM OF ALL RESPONSES TO PHQ9 QUESTIONS 1 AND 2: 0

## 2025-04-22 NOTE — PROGRESS NOTES
"  Physical Medicine & Rehabilitation Progress Note    Encounter Date: 4/22/2025    Interval Events (Subjective):  VS: wnl.   Last BM 4/19. Voiding urine.   No new labs today  Missed doses of scheduled meds: refused senna/docusate   PRNs used: oxycodone, miralax    Patient seen at bedside today.  Patient's only complaint today is not eating enough vegetables.  Patient has been on GI soft diet.  However will upgrade to regular diet today.  Patient excited to discharge later this week.    ____________________  Interdisciplinary Team Conference   Most recent IDT on 4/16/2025     Discharge Date/Disposition:  4/24/2025  _____________________________________       Objective:  Vital signs: /62   Pulse 85   Temp 37.6 °C (99.7 °F) (Temporal)   Resp 18   Ht 1.651 m (5' 5\")   Wt 65.5 kg (144 lb 6.4 oz)   SpO2 93%   BMI 24.03 kg/m²   General: not in distress.  HEENT: PERRL. EOMI.  Pulmonary: Breathing comfortably on room air  Cardiovascular: Rate as above, warm extremities  Gastrointestinal: Abdomen non-distended  Extremities/MSK: Bilateral lower extremity edema  Integumentary:   4/20: bilateral shins    4/20: sacrum        Psychiatric: Normal affect.  Neurologic: Awake and alert. Answering questions appropriately. Normal speech.       Laboratory Values:  Recent Results (from the past 72 hours)   CBC WITHOUT DIFFERENTIAL    Collection Time: 04/21/25  6:51 AM   Result Value Ref Range    WBC 4.8 4.8 - 10.8 K/uL    RBC 3.05 (L) 4.20 - 5.40 M/uL    Hemoglobin 8.7 (L) 12.0 - 16.0 g/dL    Hematocrit 27.3 (L) 37.0 - 47.0 %    MCV 89.5 81.4 - 97.8 fL    MCH 28.5 27.0 - 33.0 pg    MCHC 31.9 (L) 32.2 - 35.5 g/dL    RDW 49.5 35.9 - 50.0 fL    Platelet Count 213 164 - 446 K/uL    MPV 11.0 9.0 - 12.9 fL   Basic Metabolic Panel    Collection Time: 04/21/25  6:51 AM   Result Value Ref Range    Sodium 140 135 - 145 mmol/L    Potassium 3.8 3.6 - 5.5 mmol/L    Chloride 107 96 - 112 mmol/L    Co2 23 20 - 33 mmol/L    Glucose 95 65 - " 99 mg/dL    Bun 23 (H) 8 - 22 mg/dL    Creatinine 0.78 0.50 - 1.40 mg/dL    Calcium 8.2 (L) 8.5 - 10.5 mg/dL    Anion Gap 10.0 7.0 - 16.0   proBrain Natriuretic Peptide, NT    Collection Time: 04/21/25  6:51 AM   Result Value Ref Range    NT-proBNP 2580 (H) 0 - 125 pg/mL   ESTIMATED GFR    Collection Time: 04/21/25  6:51 AM   Result Value Ref Range    GFR (CKD-EPI) 73 >60 mL/min/1.73 m 2       Medications:  Scheduled Medications   Medication Dose Frequency    potassium chloride SA  10 mEq DAILY    carvedilol  6.25 mg BID WITH MEALS    vitamin D2 (Ergocalciferol)  50,000 Units Q7 DAYS    guaiFENesin dextromethorphan  5 mL 4X/DAY WITH MEALS + NIGHTLY    ferrous gluconate  324 mg QDAY with Breakfast    Pharmacy Consult Request  1 Each PHARMACY TO DOSE    senna-docusate  2 Tablet Q EVENING    atorvastatin  20 mg QDAY    furosemide  20 mg DAILY    hydrALAZINE  100 mg BID    losartan  50 mg BID    omeprazole  20 mg BID     PRN medications: Respiratory Therapy Consult, hydrALAZINE, acetaminophen, senna-docusate **AND** polyethylene glycol/lytes, docusate sodium, magnesium hydroxide, carboxymethylcellulose, benzocaine-menthol, mag hydrox-al hydrox-simeth, ondansetron **OR** ondansetron, sodium chloride, oxyCODONE immediate-release **OR** oxyCODONE immediate-release, melatonin    Diet:  Current Diet Order   Procedures    Diet Order Diet: Low Fiber(GI Soft) (Advance Diet as Tolerated)       Medical Decision Making and Plan:  GI bleed  -Presented with dark red clots in stool.  Hemoglobin found to be 7.7, dropped to 6.4. CT abd/pelv negative for acute GI bleeding. EGD showed hiatal hernia, nonobstructing Schatzki ring, gastric erosion.  -PPI BID  -PT and OT for mobility and ADLs. Per guidelines, 15 hours per week between PT, OT and/or SLP.  -Follow up with GI     Acute blood loss anemia  -Received transfusion at acute hospital  -On admission to rehab: Most recent Hgb 9.4.  Check morning labs.  -hgb 9.1 --> 8.8 --> 8.7 --> 8.7  (4/21)     Distal LE weakness, distal motor neuropathy  -on admission: has weakness in bilateral ankle dorsiflexion and plantarflexion.  Maybe due to edema.  Monitor for now.    -4/16: Persistent distal weakness. Consistent with distal motor polyneuropathy. There may be a history of worsening distal weakness prior to this hospitalization.   -Recommend neurology evaluation, may benefit from NCS/EMG  -referral to neurology placed    4/17: IGC change. IGC Code / Diagnosis to Support: 0003.3 - Neurologic Conditions: Polyneuropathy     Hypertension  -on admission: coreg 25mg BID, losartan 50mg BID, spironolactone 12.5mg daily, hydralazine 100mg BID. Monitor for need to adjust medications  -IM following  -4/16: BP low. Coreg to 12.5mg BID.   -4/17: Coreg to 6.25mg BID  -4/20: stop spironolactone.   -Continue Coreg, losartan, hydralazine     LE venous insufficiency   LE edema  Hx of lymphedema  -on admission: chronic swelling.  Recent ultrasound Doppler negative for DVT. compression stockings. lasix 20mg daily.   -4/15: IM consult.   -4/16: BNP 3040  -4/7: Echo completed: Normal left ventricular systolic function. Right ventricular systolic pressure is estimated to be  33 mmHg. No significant valvular disease. EF 70%.   -Continue Lasix, Kcl 10mEq daily      Cough, pneumonia  -on admission: has nonproductive cough. Check CXR. Check AM labs.   -4/15: CXR appears to have fluid overload in lungs, possible pneumonia. IM consulted. Patient started on augmentin.   -Augmentin ended 4/20)  -continue guaifenesin     Acute respiratory failure  -On admission: On 1 L supplemental O2.  Wean as tolerated.  -4/17: 0.5L O2  -4/21: now on RA.     History of CKD stage III, azotemia  -Creatinine has remained within normal limits recently  -4/19: BUN elevated 28.   -monitor     History of pacemaker  -Known history of prior complete heart block, status post pacemaker in 2019    Hyperlipidemia  -On home dose Lipitor     Pain - prn Tylenol,  oxycodone    Vit D deficiency - 4/15: vit D 11. Plan for ergocalciferol 50,000u weekly while in rehab. Will need follow up with PCP.      Poor mood, adjustment disorder  -  about 6 months ago  -Monitor mood, can consider SSRI. Patient does not want to talk with our neuropsychologist here.     Bladder management  -monitor for urinary retention. Bladder scan and ICP per protocol.      Bowel management  -senna/docusate for prophylaxis  -: add miralax daily.      Sacral pressure injury, present on admission  -on admission: slough covering entire wound base, most consistent with unstagable pressure injury.   -Continue daily wound care  -Continue turn schedule     DVT Ppx - Patient not cleared for DVT chemoprophylaxis.  Ultrasound negative for DVT on .  discussed with GI, okay for DVT ppx. However patient is moving better with therapy, risks and benefits discussed and patient wants to hold off on blood thinners.       UPCOMING LABS/IMAGING: per IM      HOSPITALIST FOLLOWING: Yes     DISPO: Home. Lives in [place] with [facility]STE. Her son lives with her and can provide support upon discharge.      STIVEN:      DISCHARGE SPECIALIST FOLLOW UP: Gastroenterology       Patient to scheduled follow up with their PCP within 2 weeks from discharge from the [facility].      ____________________________________    Ludwig Moeller D.O.  Physical Medicine & Rehabilitation   ____________________________________    Total time:  37 minutes. Time spent included pre-rounding review of vitals and tests, unit/floor time, face-to-face time with the patient including physical examination, care coordination, counseling of patient and/or family, ordering medications/procedures/tests, discussion with CM, PT, OT, SLP and/or other healthcare providers, and documentation in the electronic medical record.

## 2025-04-22 NOTE — PROGRESS NOTES
NURSING DAILY NOTE    Name: Priscilla Hdz   Date of Admission: 4/14/2025   Admitting Diagnosis: GI bleed  Attending Physician: KWAKU MARTINEZ D.O.  Allergies: Other environmental    Safety  Patient Assist  Min assist  Patient Precautions  Precautions: Fall Risk  Fall Risk: Poor balance  Cardiopulmonary: Pacemaker  Orthopedic: Other (see comments)  Comments: 0.5-1L O2 wean  Bed Transfer Status  Contact Guard Assist  Toilet Transfer Status   Stand By Assist (close SBA)  Assistive Devices  Rails, Wheelchair  Oxygen  None - Room Air  Diet/Therapeutic Dining  Current Diet Order   Procedures    Diet Order Diet: Low Fiber(GI Soft) (Advance Diet as Tolerated)     Pill Administration  whole  Agitated Behavioral Scale  20  ABS Level of Severity  No Agitation    Fall Risk  Has the patient had a fall this admission?      Greer [place]ino Fall Risk Scoring  15, HIGH RISK  Fall Risk Safety Measures  bed alarm and chair alarm    Vitals  Temperature: 37.6 °C (99.7 °F)  Temp src: Temporal  Pulse: 86  Respiration: 18  Blood Pressure : 130/54  Blood Pressure MAP (Calculated): 79 MM HG  BP Location: Right, Upper Arm  Patient BP Position: Abdul's Position     Oxygen  Pulse Oximetry: 93 %  O2 (LPM): 0  O2 Delivery Device: None - Room Air  Incentive Spirometer Volume: 750 mL    Bowel and Bladder  Last Bowel Movement  04/19/25  Stool Type  Type 4: Like a sausage or snake, smooth and soft  Bowel Device  Bathroom  Continent  Bladder: Did not void   Bowel: No movement  Bladder Function  Urine Void (mL):  (large)  Number of Times Voided: 1  Urine Color: Yellow  Genitourinary Assessment   Bladder Assessment (WDL):  Within Defined Limits  Kumar Catheter: Not Applicable  Urine Color: Yellow  Bladder Device: Bathroom  Bladder Scan: Post Void  $ Bladder Scan Results (mL): 69    Skin  Ezequiel Score   17  Sensory Interventions   Bed Types: Low Airloss  Skin Preventative Measures: Pillows in Use for Support / Positioning  Skin Preventative Dressing:   (refused moon boots)  Moisture Interventions  Moisturizers/Barriers: Barrier Wipes      Pain  Pain Rating Scale  6 - Hard to ignore, avoid usual activities  Pain Location  Back  Pain Location Orientation  Lower  Pain Interventions   Medication (see MAR)    ADLs    Bathing   Patient Refused Bathing (rn notified)  Linen Change   Partial  Personal Hygiene  Perineal Care, Moist Tiff Wipes  Chlorhexidine Bath      Oral Care  Brushed Teeth  Teeth/Dentures     Shave     Nutrition Percentage Eaten  Refused, 0% Consumed  Environmental Precautions  Treaded Slipper Socks on Patient, Personal Belongings, Wastebasket, Call Bell etc. in Easy Reach, Bed in Low Position, Communication Sign for Patients & Families  Patient Turns/Positioning  Sitting up in wheelchair  Patient Turns Assistance/Tolerance     Bed Positions  Bed Controls On, Bed Locked  Head of Bed Elevated  Self regulated      Psychosocial/Neurologic Assessment  Psychosocial Assessment  Psychosocial (WDL):  WDL Except  Patient Behaviors: Fatigue  Neurologic Assessment  Neuro (WDL): Exceptions to WDL  Level of Consciousness: Alert  Orientation Level: Oriented X4  Cognition: Appropriate judgement, Appropriate safety awareness, Follows commands  Speech: Clear  Pupil Assesment: No  Motor Function/Sensation Assessment: Motor strength  Muscle Strength Right Arm: Good Strength Against Gravity and Moderate Resistance  Muscle Strength Left Arm: Good Strength Against Gravity and Moderate Resistance  Muscle Strength Right Leg: Fair Strength against Gravity but No Resistance  Muscle Strength Left Leg: Fair Strength against Gravity but No Resistance  EENT (WDL):  WDL Except    Cardio/Pulmonary Assessment  Edema   RLE Edema: 1+  LLE Edema: 1+  Respiratory Breath Sounds  RUL Breath Sounds: Clear  RML Breath Sounds: Diminished  RLL Breath Sounds: Clear  KEVIN Breath Sounds: Clear  LLL Breath Sounds: Diminished  Cardiac Assessment   Cardiac (WDL):  WDL Except (HTN, HLD,  pacemaker)

## 2025-04-22 NOTE — THERAPY
Occupational Therapy  Daily Treatment     Patient Name:  Priscilla Hdz  Age:  85 y.o., Sex:  female  Medical Record #:  [id number]  Today's Date:  4/22/2025    Precautions  Precautions: Fall Risk  Fall Risk: Poor balance  Skin: Current wound, Low air loss mattress, Turning schedule, Barros boots  Cardiopulmonary: Pacemaker  Orthopedic: Other (see comments)  Comments: 0.5-1L O2 wean    Subjective: Pt seated in w/c upon arrival, pleasant and cooperative, agreeable to group therapy       Objective:    04/22/25 1401   OT Charge Group   OT Group Therapy Group Activities   OT Total Time Spent   OT Group Total Time Spent (Mins) 60   Interdisciplinary Plan of Care Collaboration   Patient Position at End of Therapy Call Light within Reach;Tray Table within Reach;In Bed;Bed Alarm On     Group Therapy  Purpose: to increase active participation through peer pressure, to enhance motivation, to validate increased independence with skills, to increase patient interaction during physical recovery, to reinforce strengthening and movement through group format demonstration, and to decrease anxiety through new skill performance  Interventions:   UE exercise group:   balloon volleyball x 6 min  Stretches: cervical rotation, lateral flexion, trunk rotation, forward reach, wrist circles, shoulder circles, shoulder abduction, trunk lateral flexion reaches, elbow flexion x 10 each  orange theraband: chest pulls, partnered rows, diagonal pulls, shoulder ER pulls, bicep curls 2 sets x 10 each  2lb dumbbell: bicep curls, rows, chest press, horizontal add/abduction 2 sets x 10    Assessment:    Pt completed UB exercises in a group setting to improve overall strength and cardiovascular endurance in order to maximize independence and safety with ADL's and functional mobility tasks. Pt completed to the best of their abilities with no complaints of pain, exercises modified d/t impaired shoulder ROM    Plan:  Refer to Primary OT POC/goals      Occupational Therapy Goals (Active)       Problem: Bathing       Dates: Start:  04/15/25         Goal: STG-Within one week, patient will bathe CGA w/ AE PRN       Dates: Start:  04/15/25    Expected End:  04/24/25               Problem: Dressing       Dates: Start:  04/15/25         Goal: STG-Within one week, patient will dress EOB Parth with AE PRN       Dates: Start:  04/15/25    Expected End:  04/24/25               Problem: Functional Transfers       Dates: Start:  04/15/25         Goal: STG-Within one week, patient will transfer to toilet SBA w/ LRD       Dates: Start:  04/15/25    Expected End:  04/24/25               Problem: Grooming       Dates: Start:  04/15/25         Goal: STG-Within one week, patient will complete grooming standing at sink SBA       Dates: Start:  04/15/25    Expected End:  04/24/25               Problem: OT Long Term Goals       Dates: Start:  04/15/25         Goal: LTG-By discharge, patient will complete basic self care tasks SUP-Jovani       Dates: Start:  04/15/25    Expected End:  04/24/25            Goal: LTG-By discharge, patient will perform bathroom transfers SUP-Jovani       Dates: Start:  04/15/25    Expected End:  04/24/25            Goal: LTG-By discharge, patient will dress EOB SUP-Jovani w/ AE PRN       Dates: Start:  04/15/25    Expected End:  04/24/25            Goal: LTG-By discharge, patient will complete funct mob household distance SBA-SUP w/ LRD       Dates: Start:  04/15/25    Expected End:  04/24/25

## 2025-04-22 NOTE — THERAPY
"Physical Therapy   Daily Treatment     Patient Name:  Priscilla Hdz  Age:  85 y.o., Sex:  female  Medical Record #:  [id number]  Today's Date: 4/22/2025     Precautions  Precautions: Fall Risk  Fall Risk: Poor balance  Cardiopulmonary: Pacemaker  Orthopedic: Other (see comments)  Comments: 0.5-1L O2 wean    Subjective: pt seated in wc upon arrival, agreeable to PT    Objective:    04/22/25 0901   PT Charge Group   PT Gait Training (Units) 2   PT Therapeutic Activities (Units) 2   Supervising Physical Therapist Angeli Johnston   PT Total Time Spent   PT Individual Total Time Spent (Mins) 60   Roll Left and Right   Level of Assist Supervised   Additional Description Completed on regular bed   Sit to Lying   Level of Assist Supervised   Additional Description Completed on regular bed   Lying to Sitting on Side of Bed   Level of Assist Stand By Assist   Additional Description Completed on regular bed   Sit to Stand   Level of Assist Stand By Assist   Assistive Devices FWW   Additional Description Cueing needed;Extra time needed   Chair/Bed-to-Chair Transfer   Level of Assist Stand By Assist   Transfer Type Stand Step Transfer   Assistive Devices FWW   Additional Description Cueing needed;Extra time needed   Car Transfer   Level of Assist Contact Guard Assist   Transfer Type Stand Step Transfer   Assistive Device FWW   Additional Description Cueing needed   Ambulation   Level of Assist Contact Guard Assist   Assistive Device FWW   Additional Description Cueing needed;Extra time needed   Distance 110' x 2 and 150' x 1   Curbs   Level of Assist Contact Guard Assist   Assistive Device FWW   Curb Height 4\" step   Additional Description Cueing needed;Extra time needed   Stairs   Level of Assist Contact Guard Assist   Stair Height 4\" step   Additional Description L handrail;Step to pattern;Cueing needed;Extra time needed   Stairs Amount 12   PT DME Recommendations   Wheelchair   (pt has wc-per son)   Assistive Device   (owns " FWW and 4WW)   Vendor Equipment   (deferring AFO's to HH or out patient PT-per pt request)   Interdisciplinary Plan of Care Collaboration   IDT Collaboration with  Occupational Therapist   Patient Position at End of Therapy Seated   Collaboration Comments CLORAMU        04/22/25 1031   PT Charge Group   PT Therapeutic Activities (Units) 2   Supervising Physical Therapist Angeli Johnston   PT Total Time Spent   PT Individual Total Time Spent (Mins) 30   Interdisciplinary Plan of Care Collaboration   IDT Collaboration with  Occupational Therapist;Family / Caregiver   Patient Position at End of Therapy Seated;Self Releasing Lap Belt Applied;Family / Friend in Room   Collaboration Comments FT wtih sulma walton. transfer of care to OT       Therapeutic Activities  Purpose: to improve performance and function of daily activities, to provide patient and family education, and to increase safety with activities of daily life and mobility related to activities of daily life  Interventions:   Bed/chair transfer training  Toilet transfer  Sit to stand training  Car transfer  Donning/doffing brace or orthotic    Gait Training  Purpose: to improve functional ambulation, to address gait deviations, and to improve walking endurance  Interventions:   Walking endurance  Facilitation of gait  Ambulation with use of orthotic or prosthetic  Deviations (laterality in comments):  Bradykinetic  Steppage pattern  Decreased toe off  Decreased heel strike  Stair Training  Purpose: to improve functional use of stairs and to improve stair climbing endurance  Interventions:   Safe use of device  Stair training with use of orthotic or prosthetic    Discharge Interventions  Purpose: to complete discharge assessments in preparation for upcoming discharge from facility and to review final discharge recommendations and education  Interventions:   completed discharge IRF-BARBY items  completed patient passport  reviewed relevant DME and adaptive equipment  recommendations  discussed home safety  discussed floor recovery/fall prevention  reviewed HEP with handout provided  reviewed relevant precautions to maximize safety during home and community mobility, ADLs, and IADLs    Assessment: hands on CG training with pt and sonYnes Cosmo provided on safety with mobility, recommendation for CGA, use of gait belt  during stairs and with FWW, completed car transfer with FWW and CGA. Discussed STS sequencing and pros/cons of B ankle bracing for foot drop. At this time pt does not want to pursue AFO's.     Strengths: Able to follow instructions, Alert and oriented, Effective communication skills, Good insight into deficits/needs, Motivated for self care and independence, Pleasant and cooperative, Willingly participates in therapeutic activities  Barriers: Decreased endurance, Fatigue, Generalized weakness, Home accessibility, Impaired activity tolerance, Pain, Limited mobility    Plan:   Focus on functional strengthening for bed mobility, standing tolerance, progressive amb with FWW-    Continue stair training  Prepare for DC    DME    PT DME Recommendations  Wheelchair:  (pt has wc-per son)  Assistive Device:  (owns FWW and 4WW)  Vendor Equipment:  (deferring AFO's to HH or out patient PT-per pt request)      Passport items to be completed:    Physical Therapy Problems (Active)       Problem: Mobility       Dates: Start:  04/15/25         Goal: STG-Within one week, patient will ambulate 40ft c/ FWW and CGA       Dates: Start:  04/15/25    Expected End:  04/24/25            Goal: STG-Within one week, patient will ascend and descend 3 stairs c/ BHR and ModA       Dates: Start:  04/15/25    Expected End:  04/24/25               Problem: Mobility Transfers       Dates: Start:  04/15/25         Goal: STG-Within one week, patient will perform bed mobility c/ SPV       Dates: Start:  04/15/25    Expected End:  04/24/25            Goal: STG-Within one week, patient will sit to stand  c/ FWW and CGA       Dates: Start:  04/15/25    Expected End:  04/24/25            Goal: STG-Within one week, patient will transfer bed to chair c/ FWW and CGA       Dates: Start:  04/15/25    Expected End:  04/24/25               Problem: PT-Long Term Goals       Dates: Start:  04/15/25         Goal: LTG-By discharge, patient will ambulate 50ft c/ FWW and SBA       Dates: Start:  04/15/25    Expected End:  04/24/25            Goal: LTG-By discharge, patient will transfer one surface to another c/ FWW and SBA       Dates: Start:  04/15/25    Expected End:  04/24/25            Goal: LTG-By discharge, patient will perform home exercise program c/ set-up       Dates: Start:  04/15/25    Expected End:  04/24/25            Goal: LTG-By discharge, patient will ambulate up/down 3 stairs c/ RHR and Kelly       Dates: Start:  04/15/25    Expected End:  04/24/25            Goal: LTG-By discharge, patient will transfer in/out of a car c/ FWW and Kelly       Dates: Start:  04/15/25    Expected End:  04/24/25

## 2025-04-22 NOTE — PROGRESS NOTES
0210 HRS -Pt still awake, pt stated in pain, offered  to give pain med to relieve pain and discomfort , explained importance of pain rmgt and relief to aid the body to  heal, pt receptive of  health teachings, consented to take oxycodone 2.5 mg. Repositioned pt in bed to right side to keep pressure off the sacral area. Cont monitored.

## 2025-04-22 NOTE — THERAPY
Physical Therapy   Daily Treatment     Patient Name:  Priscilla Hdz  Age:  85 y.o., Sex:  female  Medical Record #:  [id number]  Today's Date: 4/21/2025     Precautions  Precautions: Fall Risk  Fall Risk: Poor balance  Cardiopulmonary: Pacemaker  Orthopedic: Other (see comments)  Comments: 0.5-1L O2 wean    Subjective: pt enjoyed the group PT she participated in    Objective:    04/21/25 1501   PT Charge Group   PT Group Therapy Group Activities   Supervising Physical Therapist Angeli Johnston   PT Total Time Spent   PT Group Total Time Spent (Mins) 60   Interdisciplinary Plan of Care Collaboration   IDT Collaboration with  Therapy Tech   Patient Position at End of Therapy Seated;Chair Alarm On;Self Releasing Lap Belt Applied   Collaboration Comments A with group tx           Group Therapy  Purpose: to increase active participation through peer pressure, to enhance motivation, to increase patient interaction during physical recovery, to facilitate goal discussion, and to reinforce strengthening and movement through group format demonstration  Interventions:   LE exercise group      Seated warm up full body ROM activity: balloon volley using straight cane held horizontally with B UE to tap balloon between group participants. Completed multiple rounds up to 5 minutes total   Patients participated in seated LE progressive resistance exercises with #2lb ankle weights 2-3 sets of 10 reps each  Ankle pumps  LAQ  Hip flexion   Hip abduction  Hip adduction  Ham string curls  Seated UE resistance exercises with lb dumbbell 2-3 sets of 10 reps  Elbow flexion  Shoulder flexion  Wrist flexion   Wrist extension  Shoulder IR  Ball bounce/toss across to partner x 2 minutes for CV endurance and ROM    Assessment    The participants in the group were all able to focus on the goal of improving UE and LE strength and progressive functional mobility.  In addition, the group setting allowed for social interaction between patients as  each of them were able to provide positive feedback and encouragement to the other participants.    Strengths: Motivated for self care and independence, Pleasant and cooperative, Supportive family, Willingly participates in therapeutic activities, Independent prior level of function, Making steady progress towards goals  Barriers: Confused, Decreased endurance, Dementia, Fatigue, Hearing impairment, Impaired activity tolerance, Impaired balance, Impaired functional cognition, Pain, Limited mobility    Plan:   Cont per PT POC          DME    PT DME Recommendations  Wheelchair:  (TBD)  Assistive Device:  (pt owns a FWW and 4WW)  Vendor Equipment: [employer]      Passport items to be completed:      Physical Therapy Problems (Active)       Problem: Mobility       Dates: Start:  04/15/25         Goal: STG-Within one week, patient will ambulate 40ft c/ FWW and CGA       Dates: Start:  04/15/25    Expected End:  04/24/25            Goal: STG-Within one week, patient will ascend and descend 3 stairs c/ BHR and ModA       Dates: Start:  04/15/25    Expected End:  04/24/25               Problem: Mobility Transfers       Dates: Start:  04/15/25         Goal: STG-Within one week, patient will perform bed mobility c/ SPV       Dates: Start:  04/15/25    Expected End:  04/24/25            Goal: STG-Within one week, patient will sit to stand c/ FWW and CGA       Dates: Start:  04/15/25    Expected End:  04/24/25            Goal: STG-Within one week, patient will transfer bed to chair c/ FWW and CGA       Dates: Start:  04/15/25    Expected End:  04/24/25               Problem: PT-Long Term Goals       Dates: Start:  04/15/25         Goal: LTG-By discharge, patient will ambulate 50ft c/ FWW and SBA       Dates: Start:  04/15/25    Expected End:  04/24/25            Goal: LTG-By discharge, patient will transfer one surface to another c/ FWW and SBA       Dates: Start:  04/15/25    Expected End:  04/24/25            Goal: LTG-By discharge,  patient will perform home exercise program c/ set-up       Dates: Start:  04/15/25    Expected End:  04/24/25            Goal: LTG-By discharge, patient will ambulate up/down 3 stairs c/ RHR and Kelly       Dates: Start:  04/15/25    Expected End:  04/24/25            Goal: LTG-By discharge, patient will transfer in/out of a car c/ FWW and Kelly       Dates: Start:  04/15/25    Expected End:  04/24/25

## 2025-04-22 NOTE — PROGRESS NOTES
Hospital Medicine Daily Progress Note        Chief Complaint  Pneumonia  Edema  Hypertension    Interval Problem Update  Doing well, cough better.    Disposition  Per Physiatry    Review of Systems  Review of Systems   Constitutional:  Negative for chills and fever.   HENT: Negative.     Eyes: Negative.    Respiratory:  Negative for cough and shortness of breath.    Cardiovascular:  Positive for leg swelling. Negative for chest pain and palpitations.   Gastrointestinal:  Negative for abdominal pain, nausea and vomiting.   Musculoskeletal: Negative.    Skin:  Negative for itching and rash.   Endo/Heme/Allergies:  Negative for polydipsia. Does not bruise/bleed easily.        Physical Exam  Temp:  [36.9 °C (98.5 °F)-37.6 °C (99.7 °F)] 37.6 °C (99.7 °F)  Pulse:  [71-86] 85  Resp:  [17-18] 18  BP: (125-130)/(49-62) 130/62  SpO2:  [93 %-95 %] 93 %    Physical Exam  Vitals reviewed.   Constitutional:       General: She is not in acute distress.     Appearance: Normal appearance. She is not ill-appearing.   HENT:      Head: Normocephalic and atraumatic.      Right Ear: External ear normal.      Left Ear: External ear normal.      Nose: Nose normal.      Mouth/Throat:      Pharynx: Oropharynx is clear.   Eyes:      General:         Right eye: No discharge.         Left eye: No discharge.      Extraocular Movements: Extraocular movements intact.      Conjunctiva/sclera: Conjunctivae normal.   Cardiovascular:      Rate and Rhythm: Normal rate and regular rhythm.   Pulmonary:      Effort: No respiratory distress.      Breath sounds: No wheezing.      Comments: Decreased BS  Abdominal:      General: Bowel sounds are normal. There is no distension.      Palpations: Abdomen is soft.      Tenderness: There is no abdominal tenderness.   Musculoskeletal:      Cervical back: Normal range of motion and neck supple.      Right lower leg: Edema present.      Left lower leg: Edema present.   Skin:     General: Skin is warm and dry.    Neurological:      Mental Status: She is alert and oriented to person, place, and time.         Fluids    Intake/Output Summary (Last 24 hours) at 4/22/2025 1445  Last data filed at 4/22/2025 1301  Gross per 24 hour   Intake 480 ml   Output --   Net 480 ml          Laboratory  Recent Labs     04/21/25  0651   WBC 4.8   RBC 3.05*   HEMOGLOBIN 8.7*   HEMATOCRIT 27.3*   MCV 89.5   MCH 28.5   MCHC 31.9*   RDW 49.5   PLATELETCT 213   MPV 11.0     Recent Labs     04/21/25  0651   SODIUM 140   POTASSIUM 3.8   CHLORIDE 107   CO2 23   GLUCOSE 95   BUN 23*   CREATININE 0.78   CALCIUM 8.2*                   Assessment/Plan  * GI bleed- (present on admission)  Assessment & Plan  Pt presented w/ bloody stools  4/8/25 CT Abd/Pelvis no active GIB, diverticulosis, RLL PNA  EGD 4/9/25 non-obstructing Schatzki ring, hiatal hernia, and gastric erosion  Colonoscopy 4/10/25 no active bleeding  S/P [facility] at [facility]  Continue PPI bid  GI F/U    Anemia  Assessment & Plan  Has normocytic indices  Fe 14, continue supplements  Follow H/H    Edema  Assessment & Plan  U/S BLE negative for DVT  Echo EF 70%, RVSP 33 mmHg  BNP improving w/ diuresis  Continue Lasix as tolerated  Discontinued Aldactone for azotemia  Check F/U labs in AM    Complete heart block by electrocardiogram (HCC)- (present on admission)  Assessment & Plan  S/P PPM    Pneumonia- (present on admission)  Assessment & Plan  4/8/25 CT Abd/Pelvis no active GIB, diverticulosis, RLL PNA  COVID/FLU/RSV negative  Now off supplemental O2 S/P empiric abx  Was on Augmentin x 5 days, completed 4/20/25  Change scheduled Guaifenesin to prn  IS and RT protocol    Vitamin D deficiency disease- (present on admission)  Assessment & Plan  Vit D level 11  Continue supplementation    Essential hypertension- (present on admission)  Assessment & Plan  On Coreg, Losartan, Hydralazine, and Lasix  Monitor blood pressure trends  Discontinued Aldactone for azotemia    Dyslipidemia- (present on  admission)  Assessment & Plan  On Lipitor       Full Code

## 2025-04-22 NOTE — PROGRESS NOTES
NURSING DAILY NOTE    Name: Priscilla Hdz   Date of Admission: 4/14/2025   Admitting Diagnosis: GI bleed  Attending Physician: KWAKU MARTINEZ D.O.  Allergies: Other environmental    Safety  Patient Assist  Min assist  Patient Precautions  Precautions: Fall Risk  Fall Risk: Poor balance  Cardiopulmonary: Pacemaker  Orthopedic: Other (see comments)  Comments: 0.5-1L O2 wean  Bed Transfer Status  Contact Guard Assist  Toilet Transfer Status   Stand By Assist (close SBA)  Assistive Devices  Rails, Wheelchair  Oxygen  None - Room Air  Diet/Therapeutic Dining  Current Diet Order   Procedures    Diet Order Diet: Low Fiber(GI Soft) (Advance Diet as Tolerated)     Pill Administration  whole  Agitated Behavioral Scale  20  ABS Level of Severity  No Agitation    Fall Risk  Has the patient had a fall this admission?      [place] Fall Risk Scoring  15, HIGH RISK  Fall Risk Safety Measures  bed alarm and chair alarm    Vitals  Temperature: 36.9 °C (98.5 °F)  Temp src: Oral  Pulse: 71  Respiration: 17  Blood Pressure : 125/49  Blood Pressure MAP (Calculated): 74 MM HG  BP Location: Left, Upper Arm  Patient BP Position: Sitting     Oxygen  Pulse Oximetry: 95 %  O2 (LPM): 0  O2 Delivery Device: None - Room Air  Incentive Spirometer Volume: 750 mL    Bowel and Bladder  Last Bowel Movement  04/19/25  Stool Type  Type 4: Like a sausage or snake, smooth and soft  Bowel Device  Bathroom  Continent  Bladder: Did not void   Bowel: No movement  Bladder Function  Urine Void (mL):  (large)  Number of Times Voided: 1  Urine Color: Yellow  Genitourinary Assessment   Bladder Assessment (WDL):  Within Defined Limits  Kumar Catheter: Not Applicable  Urine Color: Yellow  Bladder Device: Bathroom  Bladder Scan: Post Void  $ Bladder Scan Results (mL): 69    Skin  Ezequiel Score   17  Sensory Interventions   Bed Types: Low Airloss  Skin Preventative Measures: Pillows in Use for Support / Positioning  Skin Preventative Dressing:  (refused moon  boots)  Moisture Interventions  Moisturizers/Barriers: Barrier Wipes      Pain  Pain Rating Scale  0 - No Pain  Pain Location  Buttock  Pain Location Orientation  Mid  Pain Interventions   Declines    ADLs    Bathing   Patient Refused Bathing (rn notified)  Linen Change   Partial  Personal Hygiene  Perineal Care, Moist Tiff Wipes  Chlorhexidine Bath      Oral Care  Brushed Teeth  Teeth/Dentures     Shave     Nutrition Percentage Eaten  Refused, 0% Consumed  Environmental Precautions  Treaded Slipper Socks on Patient, Personal Belongings, Wastebasket, Call Bell etc. in Easy Reach, Bed in Low Position, Communication Sign for Patients & Families  Patient Turns/Positioning  Sitting up in wheelchair  Patient Turns Assistance/Tolerance     Bed Positions  Bed Controls On, Bed Locked  Head of Bed Elevated  Self regulated      Psychosocial/Neurologic Assessment  Psychosocial Assessment  Psychosocial (WDL):  WDL Except  Patient Behaviors: Fatigue, Irritable  Neurologic Assessment  Neuro (WDL): Exceptions to WDL  Level of Consciousness: Alert  Orientation Level: Oriented X4  Cognition: Appropriate judgement, Appropriate safety awareness, Follows commands  Speech: Clear  Pupil Assesment: No  Motor Function/Sensation Assessment: Motor strength  Muscle Strength Right Arm: Good Strength Against Gravity and Moderate Resistance  Muscle Strength Left Arm: Good Strength Against Gravity and Moderate Resistance  Muscle Strength Right Leg: Fair Strength against Gravity but No Resistance  Muscle Strength Left Leg: Fair Strength against Gravity but No Resistance  EENT (WDL):  WDL Except    Cardio/Pulmonary Assessment  Edema   RLE Edema: 1+  LLE Edema: 1+  Respiratory Breath Sounds  RUL Breath Sounds: Clear  RML Breath Sounds: Diminished  RLL Breath Sounds: Clear  KEVIN Breath Sounds: Clear  LLL Breath Sounds: Diminished  Cardiac Assessment   Cardiac (WDL):  WDL Except (HTN, HLD, pacemaker)

## 2025-04-22 NOTE — DIETARY
Nutrition Services: Follow-up for PO intake   Day 8 of admit. Priscilla Hdz is a 85 y.o. female with admitting DX of GI bleed [K92.2]    Per provider's note today, pt was wanting more vegetables. Provider changed diet from low fiber to regular.     Current diet order:   Regular diet  Ensure Plus High Protein (provides 350 calories, 20 g protein per 8 fl oz) daily. High protein strawberry milkshake daily.     Recorded PO is variable w/ PO of 50-75% x 2 at breakfast and 0 intake x 2 at lunch. No dinners recorded. PO intake may improve now that diet has been advanced.      Nutrition Dx: Increased nutrient Needs related to healing needs as evidenced by unstageable wound to coccyx per wound team assessment 4/9.          Nutrition Dx Status: Ongoing      Problem: Nutritional:  Goal: Achieve adequate nutritional intake  Description: Patient will consume >50-75% of meals  Outcome: progressing slower than expected      Plan/ Recommendations:      Encourage intake of meals, goal for >50% consumption from meals and/or supplements  Document intake of all meals as % taken in ADLs to provide interdisciplinary communication across all shifts.   Monitor weight.  Nutrition rep available to see patient for ongoing meal and snack preferences.  Continue with Ensure supplement and high protein milkshake each 1x daily.  Provider liberalized low fiber diet to regular.     RD following

## 2025-04-22 NOTE — PROGRESS NOTES
2100 hrs- Pt in [address]- complained that she could not sleep coz of the loud TV Iin [address] . Writer spoke to the pt in [address] if  the volume can be lowered , pt stated , it's okay, I'll just turn it off.     2300 hrs - Pt asleep in a fowlers position. With ease in breathing , appears comfortable.  .   0010 hrs-- Having midnight rounds , heard pt moaning ,   pt stated she's  having back pan, offered pain med , and to be repositioned in bed, Refused repositioning and pain med . 'Pt stated I could not use the TV ? Reiterated that it can be turned on as long as it's not loud and would not disturb her room mate.. Pt decided not to turn on the TV.    0020 HRS- Pt seen getting out of the bed without caling for assistance . Reinforced importance of using the call light for assistance. Assisted oob to bathroom by CNA .Supervisor Aida made aware of the situation..

## 2025-04-22 NOTE — THERAPY
Occupational Therapy  Daily Treatment     Patient Name:  Priscilla Hdz  Age:  85 y.o., Sex:  female  Medical Record #:  [id number]  Today's Date:  4/22/2025    Precautions  Precautions: (P) Fall Risk  Fall Risk: (P) Poor balance  Cardiopulmonary: (P) Pacemaker  Orthopedic: Other (see comments)  Comments: 0.5-1L O2 wean    Subjective: Pt and son, Akil, seated in main gym, agreeable to OT session. Pt agitated and responding with short answers. Akil and OT providing emotional support and active listening.     Objective:    04/22/25 1101   OT Charge Group   OT Self Care / ADL (Units) 2   OT Total Time Spent   OT Individual Total Time Spent (Mins) 30   Precautions   Precautions Fall Risk   Fall Risk Poor balance   Cardiopulmonary Pacemaker   Bladder   Urine Color Straw   Number of Times Voided 1   Bladder Device Bathroom   Grooming   Level of Assist Contact Guard Assist;Stand By Assist  (CGA to close SBA)   Patient Position Standing   Additional Description Hand hygiene   Tub/Shower Transfer   Level of Assist Contact Guard Assist;Stand by Assist   Transfer Type Stand step transfer   Adaptive Equipment Grab bars   Assistive Device FWW   Toilet Transfer   Level of Assist Stand By Assist   Transfer Type Stand Step Transfer   Adaptive Equipment Grab bars   Assistive Device FWW   Toileting Hygiene   Level of Assist Stand By Assist;Contact Guard Assist  (Close SBA)   Additional Description Grab bars   Family Training   Training Completed   Date 04/22/25   Family Member(s) Present Son   Interdisciplinary Plan of Care Collaboration   IDT Collaboration with  Family / Caregiver;Physical Therapist Assistant (PTA)   Patient Position at End of Therapy Seated;Chair Alarm On;Call Light within Reach;Tray Table within Reach;Family / Friend in Room   Collaboration Comments FT with son Akil, hand off from PTA         Activities of Daily Living (ADL's)  Purpose: to improve function, safety, and independence with activities of daily living  and to provide patient and family education  Interventions:   Grooming  Toilet Transfers  Toilet Hygiene  Shower Transfers: Dry  Discharge Interventions  Purpose: to review final discharge recommendations and education  Interventions:   reviewed relevant DME and adaptive equipment recommendations  discussed home safety  reviewed relevant precautions to maximize safety during home and community mobility, ADLs, and IADLs  Pt and son, Akil, participated in family training session. During session, family and pt provided education and demo of pt's CLOF (CGA-SBA) for all transfers/bathing, and Jovani for seated ADLs. Son declined hands on training.   Family education also included:  Adapting the home for home safety including dry transfers prior to activity including shower transfers, decluttering, adequate lighting for PM hours, and removing or securing throw rugs for safe ambulation with FWW  AE recommendations: sock aid, reacher, LH shoe horn, dressing stick  Home safety, energy conservation, and work simplification strategies  Transition with HH therapies  Caregiver burden and caregiver relief    Assessment:    Pt and son tolerated FT session and all questions/concerned address. Pt and son receptive to all edu and confident on d/c home. Pt able to complete bathroom transfers and dressing CGA-SBA.  Strengths: Alert and oriented, Supportive family  Barriers: Agitation, Decreased endurance, Difficulty following instructions, Fatigue, Generalized weakness, Impaired activity tolerance, Impaired balance, Impaired carryover of learning, Impaired functional cognition, Limited mobility    Plan:  Pt would benefit from skilled OT services to address:  - ADLs/IADLs (laundry)  - Strength/endurance/activity tolerance  - Sitting/standing balance  - Funct Mob  - Transfers  - Sit to Stand Training  - UE strength/ROM (shoulder flex/ext)    DME  OT DME Recommendations  Additional Equipment (NOT TYPICALLY COVERED BY INSURANCE): Sock aid,  Reacher, Long handled shoe horn    Passport items to be completed:  Perform bathroom transfers, complete dressing, complete feeding, get ready for the day, prepare a simple meal, participate in household tasks, adapt home for safety needs, demonstrate home exercise program, complete caregiver training     Occupational Therapy Goals (Active)       Problem: Bathing       Dates: Start:  04/15/25         Goal: STG-Within one week, patient will bathe CGA w/ AE PRN       Dates: Start:  04/15/25    Expected End:  04/24/25               Problem: Dressing       Dates: Start:  04/15/25         Goal: STG-Within one week, patient will dress EOB Parth with AE PRN       Dates: Start:  04/15/25    Expected End:  04/24/25               Problem: Functional Transfers       Dates: Start:  04/15/25         Goal: STG-Within one week, patient will transfer to toilet SBA w/ LRD       Dates: Start:  04/15/25    Expected End:  04/24/25               Problem: Grooming       Dates: Start:  04/15/25         Goal: STG-Within one week, patient will complete grooming standing at sink SBA       Dates: Start:  04/15/25    Expected End:  04/24/25               Problem: OT Long Term Goals       Dates: Start:  04/15/25         Goal: LTG-By discharge, patient will complete basic self care tasks SUP-Jovani       Dates: Start:  04/15/25    Expected End:  04/24/25            Goal: LTG-By discharge, patient will perform bathroom transfers SUP-Jovani       Dates: Start:  04/15/25    Expected End:  04/24/25            Goal: LTG-By discharge, patient will dress EOB SUP-Jovani w/ AE PRN       Dates: Start:  04/15/25    Expected End:  04/24/25            Goal: LTG-By discharge, patient will complete funct mob household distance SBA-SUP w/ LRD       Dates: Start:  04/15/25    Expected End:  04/24/25

## 2025-04-22 NOTE — CARE PLAN
The patient is Watcher - Medium risk of patient condition declining or worsening    Shift Goals  Clinical Goals: skin integrity     Problem: Skin Integrity  Goal: Skin integrity is maintained or improved  Note: Patient has swelling and redness to BLE, refused to float heels with either moon boot and pillow, compression stockings on. Sacral wound was redressed and photo uploaded, patient allowed nursing to reposition her to her side when in bed.      Problem: Bowel Elimination  Goal: Patient will participate in bowel management program  Note: PRN miralax was given along with prune juices, no BM thus far.

## 2025-04-23 ENCOUNTER — APPOINTMENT (OUTPATIENT)
Dept: PHYSICAL THERAPY | Facility: REHABILITATION | Age: 85
DRG: 073 | End: 2025-04-23
Attending: STUDENT IN AN ORGANIZED HEALTH CARE EDUCATION/TRAINING PROGRAM
Payer: MEDICARE

## 2025-04-23 ENCOUNTER — APPOINTMENT (OUTPATIENT)
Dept: OCCUPATIONAL THERAPY | Facility: REHABILITATION | Age: 85
DRG: 073 | End: 2025-04-23
Attending: STUDENT IN AN ORGANIZED HEALTH CARE EDUCATION/TRAINING PROGRAM
Payer: MEDICARE

## 2025-04-23 LAB
ANION GAP SERPL CALC-SCNC: 12 MMOL/L (ref 7–16)
BUN SERPL-MCNC: 29 MG/DL (ref 8–22)
CALCIUM SERPL-MCNC: 8.4 MG/DL (ref 8.5–10.5)
CHLORIDE SERPL-SCNC: 105 MMOL/L (ref 96–112)
CO2 SERPL-SCNC: 18 MMOL/L (ref 20–33)
CREAT SERPL-MCNC: 0.83 MG/DL (ref 0.5–1.4)
GFR SERPLBLD CREATININE-BSD FMLA CKD-EPI: 68 ML/MIN/1.73 M 2
GLUCOSE SERPL-MCNC: 78 MG/DL (ref 65–99)
POTASSIUM SERPL-SCNC: 5.3 MMOL/L (ref 3.6–5.5)
SODIUM SERPL-SCNC: 135 MMOL/L (ref 135–145)

## 2025-04-23 PROCEDURE — 97110 THERAPEUTIC EXERCISES: CPT | Mod: CQ

## 2025-04-23 PROCEDURE — 36415 COLL VENOUS BLD VENIPUNCTURE: CPT

## 2025-04-23 PROCEDURE — 700102 HCHG RX REV CODE 250 W/ 637 OVERRIDE(OP): Performed by: HOSPITALIST

## 2025-04-23 PROCEDURE — 80048 BASIC METABOLIC PNL TOTAL CA: CPT

## 2025-04-23 PROCEDURE — 99233 SBSQ HOSP IP/OBS HIGH 50: CPT | Performed by: STUDENT IN AN ORGANIZED HEALTH CARE EDUCATION/TRAINING PROGRAM

## 2025-04-23 PROCEDURE — 94760 N-INVAS EAR/PLS OXIMETRY 1: CPT

## 2025-04-23 PROCEDURE — 97530 THERAPEUTIC ACTIVITIES: CPT | Mod: CQ

## 2025-04-23 PROCEDURE — A9270 NON-COVERED ITEM OR SERVICE: HCPCS | Performed by: HOSPITALIST

## 2025-04-23 PROCEDURE — 97535 SELF CARE MNGMENT TRAINING: CPT

## 2025-04-23 PROCEDURE — 99232 SBSQ HOSP IP/OBS MODERATE 35: CPT | Performed by: HOSPITALIST

## 2025-04-23 PROCEDURE — 770010 HCHG ROOM/CARE - REHAB SEMI PRIVAT*

## 2025-04-23 PROCEDURE — 97112 NEUROMUSCULAR REEDUCATION: CPT

## 2025-04-23 PROCEDURE — 97110 THERAPEUTIC EXERCISES: CPT

## 2025-04-23 PROCEDURE — 97116 GAIT TRAINING THERAPY: CPT | Mod: CQ

## 2025-04-23 PROCEDURE — A9270 NON-COVERED ITEM OR SERVICE: HCPCS | Performed by: STUDENT IN AN ORGANIZED HEALTH CARE EDUCATION/TRAINING PROGRAM

## 2025-04-23 PROCEDURE — 700102 HCHG RX REV CODE 250 W/ 637 OVERRIDE(OP): Performed by: STUDENT IN AN ORGANIZED HEALTH CARE EDUCATION/TRAINING PROGRAM

## 2025-04-23 RX ORDER — CARVEDILOL 6.25 MG/1
6.25 TABLET ORAL 2 TIMES DAILY WITH MEALS
Qty: 60 TABLET | Refills: 0 | Status: SHIPPED | OUTPATIENT
Start: 2025-04-23

## 2025-04-23 RX ORDER — FERROUS GLUCONATE 324(38)MG
324 TABLET ORAL
Qty: 30 TABLET | Refills: 0 | Status: SHIPPED | OUTPATIENT
Start: 2025-04-24

## 2025-04-23 RX ORDER — OMEPRAZOLE 20 MG/1
20 CAPSULE, DELAYED RELEASE ORAL 2 TIMES DAILY
Qty: 60 CAPSULE | Refills: 0 | Status: SHIPPED | OUTPATIENT
Start: 2025-04-23

## 2025-04-23 RX ORDER — ERGOCALCIFEROL 1.25 MG/1
50000 CAPSULE, LIQUID FILLED ORAL
Qty: 4 CAPSULE | Refills: 0 | Status: SHIPPED | OUTPATIENT
Start: 2025-04-29

## 2025-04-23 RX ADMIN — OXYCODONE 5 MG: 5 TABLET ORAL at 20:58

## 2025-04-23 RX ADMIN — LOSARTAN POTASSIUM 50 MG: 50 TABLET, FILM COATED ORAL at 05:29

## 2025-04-23 RX ADMIN — FERROUS GLUCONATE 324 MG: 324 TABLET ORAL at 08:36

## 2025-04-23 RX ADMIN — LOSARTAN POTASSIUM 50 MG: 50 TABLET, FILM COATED ORAL at 17:15

## 2025-04-23 RX ADMIN — OXYCODONE 5 MG: 5 TABLET ORAL at 14:15

## 2025-04-23 RX ADMIN — OMEPRAZOLE 20 MG: 20 CAPSULE, DELAYED RELEASE ORAL at 20:51

## 2025-04-23 RX ADMIN — POTASSIUM CHLORIDE 10 MEQ: 1500 TABLET, EXTENDED RELEASE ORAL at 08:35

## 2025-04-23 RX ADMIN — HYDRALAZINE HYDROCHLORIDE 100 MG: 50 TABLET ORAL at 08:35

## 2025-04-23 RX ADMIN — CARVEDILOL 6.25 MG: 3.12 TABLET, FILM COATED ORAL at 08:35

## 2025-04-23 RX ADMIN — CARVEDILOL 6.25 MG: 3.12 TABLET, FILM COATED ORAL at 17:15

## 2025-04-23 RX ADMIN — HYDRALAZINE HYDROCHLORIDE 100 MG: 50 TABLET ORAL at 20:52

## 2025-04-23 RX ADMIN — FUROSEMIDE 20 MG: 20 TABLET ORAL at 05:30

## 2025-04-23 RX ADMIN — ATORVASTATIN CALCIUM 20 MG: 10 TABLET, FILM COATED ORAL at 20:50

## 2025-04-23 ASSESSMENT — ENCOUNTER SYMPTOMS
SHORTNESS OF BREATH: 0
CHILLS: 0
EYES NEGATIVE: 1
BRUISES/BLEEDS EASILY: 0
FEVER: 0
COUGH: 0
VOMITING: 0
POLYDIPSIA: 0
MUSCULOSKELETAL NEGATIVE: 1
PALPITATIONS: 0
ABDOMINAL PAIN: 0
NAUSEA: 0

## 2025-04-23 ASSESSMENT — ACTIVITIES OF DAILY LIVING (ADL)
TOILETING_LEVEL_OF_ASSIST: ABLE TO COMPLETE TOILETING WITHOUT ASSIST
TOILET_TRANSFER_LEVEL_OF_ASSIST: REQUIRES SUPERVISION WITH TOILET TRANSFER
SHOWER_TRANSFER_LEVEL_OF_ASSIST: REQUIRES SUPERVISION WITH SHOWER TRANSFER

## 2025-04-23 ASSESSMENT — BRIEF INTERVIEW FOR MENTAL STATUS (BIMS)
ASKED TO RECALL BED: YES, AFTER CUEING (A PIECE OF FURNITURE")"
BIMS SUMMARY SCORE: 14
ASKED TO RECALL BLUE: YES, NO CUE REQUIRED
INITIAL REPETITION OF BED BLUE SOCK - FIRST ATTEMPT: 3
WHAT MONTH IS IT: ACCURATE WITHIN 5 DAYS
ASKED TO RECALL SOCK: YES, NO CUE REQUIRED
WHAT YEAR IS IT: CORRECT
WHAT DAY OF THE WEEK IS IT: CORRECT

## 2025-04-23 ASSESSMENT — PAIN DESCRIPTION - PAIN TYPE
TYPE: ACUTE PAIN
TYPE: ACUTE PAIN

## 2025-04-23 NOTE — CARE PLAN
Problem: Grooming  Goal: STG-Within one week, patient will complete grooming standing at sink SBA  Outcome: Met     Problem: Bathing  Goal: STG-Within one week, patient will bathe CGA w/ AE PRN  Outcome: Met     Problem: Dressing  Goal: STG-Within one week, patient will dress EOB Parth with AE PRN  Outcome: Met     Problem: Functional Transfers  Goal: STG-Within one week, patient will transfer to toilet SBA w/ LRD  Outcome: Met     Problem: OT Long Term Goals  Goal: LTG-By discharge, patient will complete basic self care tasks SUP-Jovani  Outcome: Met  Goal: LTG-By discharge, patient will perform bathroom transfers SUP-Jovani  Outcome: Met  Goal: LTG-By discharge, patient will complete funct mob household distance SBA-SUP w/ LRD  Outcome: Met

## 2025-04-23 NOTE — PROGRESS NOTES
NURSING DAILY NOTE    Name: Priscilla Hdz   Date of Admission: 4/14/2025   Admitting Diagnosis: GI bleed  Attending Physician: KWAKU MARTINEZ D.O.  Allergies: Other environmental    Safety  Patient Assist  Min assist  Patient Precautions  Precautions: Fall Risk, Skin  Fall Risk: Poor balance  Skin: Current wound, Low air loss mattress  Cardiopulmonary: Pacemaker  Orthopedic: Other (see comments)  Comments: 0.5-1L O2 wean  Bed Transfer Status  Stand By Assist  Toilet Transfer Status   Stand By Assist  Assistive Devices  Hand held assist, Rails, Wheelchair  Oxygen  None - Room Air  Diet/Therapeutic Dining  Current Diet Order   Procedures    Diet Order Diet: Regular (Advance Diet as Tolerated)     Pill Administration  whole  Agitated Behavioral Scale  20  ABS Level of Severity  No Agitation    Fall Risk  Has the patient had a fall this admission?      Greer Chino Fall Risk Scoring  21, HIGH RISK  Fall Risk Safety Measures  chair alarm    Vitals  Temperature: 36.6 °C (97.8 °F)  Temp src: Oral  Pulse: 78  Respiration: 17  Blood Pressure : 121/55  Blood Pressure MAP (Calculated): 77 MM HG  BP Location: Right, Upper Arm  Patient BP Position: Abdul's Position     Oxygen  Pulse Oximetry: 93 %  O2 (LPM): 0  O2 Delivery Device: None - Room Air  Incentive Spirometer Volume: 750 mL    Bowel and Bladder  Last Bowel Movement  04/23/25  Stool Type  Type 3: Like a sausage, but with cracks on its surface  Bowel Device  Bathroom  Continent  Bladder: Did not void   Bowel: No movement  Bladder Function  Urine Void (mL):  (large)  Number of Times Voided: 1  Urine Color: Straw  Genitourinary Assessment   Bladder Assessment (WDL):  Within Defined Limits  Kumar Catheter: Not Applicable  Urine Color: Straw  Bladder Device: Bathroom  Bladder Scan: Post Void  $ Bladder Scan Results (mL): 69    Skin  Ezequiel Score   17  Sensory Interventions   Bed Types: Low Airloss  Skin Preventative Measures: Heel Float Boots in Use , Pillows in Use to  Float Heels, Pillows in Use for Support / Positioning  Skin Preventative Dressing: Foam Sacral Protector  Moisture Interventions  Moisturizers/Barriers: Barrier Wipes      Pain  Pain Rating Scale  8 - Awful, hard to do anything  Pain Location  Coccyx, Buttock  Pain Location Orientation  Mid  Pain Interventions   Medication (see MAR)    ADLs    Bathing   Shower, Staff (OT)  Linen Change   Partial  Personal Hygiene  Perineal Care, Moist Tiff Wipes  Chlorhexidine Bath      Oral Care  Brushed Teeth  Teeth/Dentures     Shave     Nutrition Percentage Eaten  *  * Meal *  *, Lunch  Environmental Precautions  Treaded Slipper Socks on Patient, Personal Belongings, Wastebasket, Call Bell etc. in Easy Reach, Bed in Low Position  Patient Turns/Positioning  Q2 turns w/ pillows, Sitting up in wheelchair  Patient Turns Assistance/Tolerance  Assistance of One  Bed Positions  Bed Controls On, Bed Locked  Head of Bed Elevated  Self regulated      Psychosocial/Neurologic Assessment  Psychosocial Assessment  Psychosocial (WDL):  Within Defined Limits  Patient Behaviors: Fatigue  Neurologic Assessment  Neuro (WDL): Exceptions to WDL  Level of Consciousness: Alert  Orientation Level: Oriented X4  Cognition: Follows commands, Appropriate attention/concentration, Memory Loss  Speech: Clear  Pupil Assesment: No  Motor Function/Sensation Assessment: Motor strength  Muscle Strength Right Arm: Good Strength Against Gravity and Moderate Resistance  Muscle Strength Left Arm: Good Strength Against Gravity and Moderate Resistance  Muscle Strength Right Leg: Good Strength Against Gravity and Moderate Resistance  Muscle Strength Left Leg: Good Strength Against Gravity and Moderate Resistance  EENT (WDL):  WDL Except    Cardio/Pulmonary Assessment  Edema   RLE Edema: 2+  LLE Edema: 2+  Respiratory Breath Sounds  RUL Breath Sounds: Clear  RML Breath Sounds: Diminished  RLL Breath Sounds: Clear  KEVIN Breath Sounds: Clear  LLL Breath Sounds:  Diminished  Cardiac Assessment   Cardiac (WDL):  WDL Except

## 2025-04-23 NOTE — PROGRESS NOTES
Physical Medicine & Rehabilitation Progress Note    Encounter Date: 4/23/2025    Interval Events (Subjective):  VS: wnl.   Last BM 4/23. Voiding urine.   Pertinent labs today: Potassium 5.3.   Missed doses of scheduled meds: refused omeprazole this morning  PRNs used: oxycodone, miralax    Patient seen at bedside today. She is tolerated her diet better today. Feeling well overall. Is very anxious to discharge home.     Patient was discussed in IDT today; please see details below.   ____________________  Interdisciplinary Team Conference   Most recent IDT on 4/23/2025    ILudwig D.O., was present and led the interdisciplinary team conference on 4/23/2025.  I led the IDT conference and agree with the IDT conference documentation and plan of care as noted below.     Nursing:  Diet Current Diet Order   Procedures    Diet Order Diet: Regular (Advance Diet as Tolerated)       Eating ADL       % of Last Meal  Oral Nutrition: Breakfast, Between % Consumed   Sleep    Bowel Last BM: 04/23/25   Bladder continent   Barriers to Discharge Home:   Wound care      Physical Therapy:  Bed Mobility Roll Left and Right: Supervised  Sit to Lying: Supervised  Lying to Sitting: Stand By Assist   Transfers Sit to Stand: Stand By Assist  Chair/Bed to Chair: Stand By Assist  Toilet: Stand By Assist  Car: Contact Guard Assist   Mobility Ambulation: Contact Guard Assist  Device: FWW  Ambulation Distance: 110' x 2 and 150' x 1  Wheelchair:    Type:    Wheelchair Distance:     Stairs/Curbs Stairs: Contact Guard Assist  Stairs Amount: 12  Curbs: Contact Guard Assist   Barriers to Discharge Home:   none      Occupational Therapy:  Oral Hygiene Independent   Grooming Contact Guard Assist, Stand By Assist (CGA to close SBA)   Shower/Bathing Contact Guard Assist   UB Dressing Stand by Assist, Set Up   LB Dressing Minimal Assist  Minimal Assist, Maximal Assist   Toileting Transfer: Stand By Assist  Hygiene: Stand By Assist, Contact  "Guard Assist (Close SBA)   Shower & Transfer Contact Guard Assist, Stand by Assist     Barriers to Discharge Home:   Poor balance, poor insight into deficits    Respiratory Therapy:  O2 (LPM): (P) 0  O2 Delivery Device: None - Room Air    Case Management:  Continues to work on disposition and DME needs.        Discharge Date/Disposition:  4/24/2025  _____________________________________         Objective:  Vital signs: /55   Pulse 78   Temp 36.6 °C (97.8 °F) (Oral)   Resp 17   Ht 1.651 m (5' 5\")   Wt 65.5 kg (144 lb 6.4 oz)   SpO2 93%   BMI 24.03 kg/m²   General: not in distress.  HEENT: PERRL. EOMI.  Pulmonary: Breathing comfortably on room air  Cardiovascular: Rate as above, warm extremities  Gastrointestinal: Abdomen non-distended  Extremities/MSK: Bilateral lower extremity edema  Integumentary:   4/20: bilateral shins    4/23: sacrum        Psychiatric: Normal affect.  Neurologic: Awake and alert. Answering questions appropriately. Normal speech.       Laboratory Values:  Recent Results (from the past 72 hours)   CBC WITHOUT DIFFERENTIAL    Collection Time: 04/21/25  6:51 AM   Result Value Ref Range    WBC 4.8 4.8 - 10.8 K/uL    RBC 3.05 (L) 4.20 - 5.40 M/uL    Hemoglobin 8.7 (L) 12.0 - 16.0 g/dL    Hematocrit 27.3 (L) 37.0 - 47.0 %    MCV 89.5 81.4 - 97.8 fL    MCH 28.5 27.0 - 33.0 pg    MCHC 31.9 (L) 32.2 - 35.5 g/dL    RDW 49.5 35.9 - 50.0 fL    Platelet Count 213 164 - 446 K/uL    MPV 11.0 9.0 - 12.9 fL   Basic Metabolic Panel    Collection Time: 04/21/25  6:51 AM   Result Value Ref Range    Sodium 140 135 - 145 mmol/L    Potassium 3.8 3.6 - 5.5 mmol/L    Chloride 107 96 - 112 mmol/L    Co2 23 20 - 33 mmol/L    Glucose 95 65 - 99 mg/dL    Bun 23 (H) 8 - 22 mg/dL    Creatinine 0.78 0.50 - 1.40 mg/dL    Calcium 8.2 (L) 8.5 - 10.5 mg/dL    Anion Gap 10.0 7.0 - 16.0   proBrain Natriuretic Peptide, NT    Collection Time: 04/21/25  6:51 AM   Result Value Ref Range    NT-proBNP 2580 (H) 0 - 125 pg/mL "   ESTIMATED GFR    Collection Time: 04/21/25  6:51 AM   Result Value Ref Range    GFR (CKD-EPI) 73 >60 mL/min/1.73 m 2   Basic Metabolic Panel    Collection Time: 04/23/25  5:36 AM   Result Value Ref Range    Sodium 135 135 - 145 mmol/L    Potassium 5.3 3.6 - 5.5 mmol/L    Chloride 105 96 - 112 mmol/L    Co2 18 (L) 20 - 33 mmol/L    Glucose 78 65 - 99 mg/dL    Bun 29 (H) 8 - 22 mg/dL    Creatinine 0.83 0.50 - 1.40 mg/dL    Calcium 8.4 (L) 8.5 - 10.5 mg/dL    Anion Gap 12.0 7.0 - 16.0   ESTIMATED GFR    Collection Time: 04/23/25  5:36 AM   Result Value Ref Range    GFR (CKD-EPI) 68 >60 mL/min/1.73 m 2       Medications:  Scheduled Medications   Medication Dose Frequency    polyethylene glycol/lytes  1 Packet DAILY    potassium chloride SA  10 mEq DAILY    carvedilol  6.25 mg BID WITH MEALS    vitamin D2 (Ergocalciferol)  50,000 Units Q7 DAYS    ferrous gluconate  324 mg QDAY with Breakfast    Pharmacy Consult Request  1 Each PHARMACY TO DOSE    senna-docusate  2 Tablet Q EVENING    atorvastatin  20 mg QDAY    furosemide  20 mg DAILY    hydrALAZINE  100 mg BID    losartan  50 mg BID    omeprazole  20 mg BID     PRN medications: guaiFENesin dextromethorphan, Respiratory Therapy Consult, hydrALAZINE, acetaminophen, senna-docusate **AND** polyethylene glycol/lytes, docusate sodium, magnesium hydroxide, carboxymethylcellulose, benzocaine-menthol, mag hydrox-al hydrox-simeth, ondansetron **OR** ondansetron, sodium chloride, oxyCODONE immediate-release **OR** oxyCODONE immediate-release, melatonin    Diet:  Current Diet Order   Procedures    Diet Order Diet: Regular (Advance Diet as Tolerated)       Medical Decision Making and Plan:  GI bleed  -Presented with dark red clots in stool.  Hemoglobin found to be 7.7, dropped to 6.4. CT abd/pelv negative for acute GI bleeding. EGD showed hiatal hernia, nonobstructing Schatzki ring, gastric erosion.  -PPI BID  -PT and OT for mobility and ADLs. Per guidelines, 15 hours per week  between PT, OT and/or SLP.  -Follow up with GI     Acute blood loss anemia  -Received transfusion at acute hospital  -On admission to rehab: Most recent Hgb 9.4.  Check morning labs.  -hgb 9.1 --> 8.8 --> 8.7 --> 8.7 (4/21)     Distal LE weakness, distal motor neuropathy  -on admission: has weakness in bilateral ankle dorsiflexion and plantarflexion.  Maybe due to edema.  Monitor for now.    -4/16: Persistent distal weakness. Consistent with distal motor polyneuropathy. There may be a history of worsening distal weakness prior to this hospitalization.   -Recommend neurology evaluation, may benefit from NCS/EMG  -referral to neurology placed    4/17: IGC change. C Code / Diagnosis to Support: 0003.3 - Neurologic Conditions: Polyneuropathy     Hypertension  -on admission: coreg 25mg BID, losartan 50mg BID, spironolactone 12.5mg daily, hydralazine 100mg BID. Monitor for need to adjust medications  -IM following  -4/16: BP low. Coreg to 12.5mg BID.   -4/17: Coreg to 6.25mg BID  -4/20: stop spironolactone.   -Continue Coreg, losartan, hydralazine     LE venous insufficiency   LE edema  Hx of lymphedema  -on admission: chronic swelling.  Recent ultrasound Doppler negative for DVT. compression stockings. lasix 20mg daily.   -4/15: IM consult.   -4/16: BNP 3040  -4/7: Echo completed: Normal left ventricular systolic function. Right ventricular systolic pressure is estimated to be  33 mmHg. No significant valvular disease. EF 70%.   -4/23: stop lasix and KCL       Cough, pneumonia  -on admission: has nonproductive cough. Check CXR. Check AM labs.   -4/15: CXR appears to have fluid overload in lungs, possible pneumonia. IM consulted. Patient started on augmentin.   -Augmentin ended 4/20  -continue guaifenesin prn     Acute respiratory failure  -On admission: On 1 L supplemental O2.  Wean as tolerated.  -4/17: 0.5L O2  -4/21: now on RA.     History of CKD stage III, azotemia  -Creatinine has remained within normal limits  recently  -: BUN elevated 28.   -: BUN 29  -monitor     History of pacemaker  -Known history of prior complete heart block, status post pacemaker in 2019    Hyperlipidemia  -On home dose Lipitor     Pain - prn Tylenol, oxycodone    Vit D deficiency - 4/15: vit D 11. Plan for ergocalciferol 50,000u weekly while in rehab. Will need follow up with PCP.      Poor mood, adjustment disorder  -  about 6 months ago  -Monitor mood, can consider SSRI. Patient does not want to talk with our neuropsychologist here.     Bladder management  -monitor for urinary retention. Bladder scan and ICP per protocol.      Bowel management  -senna/docusate for prophylaxis  -: add miralax daily.      Sacral pressure injury, present on admission  -on admission: slough covering entire wound base, most consistent with unstagable pressure injury.   -Continue daily wound care  -Continue turn schedule     DVT Ppx - Patient not cleared for DVT chemoprophylaxis.  Ultrasound negative for DVT on .  discussed with GI, okay for DVT ppx. However patient is moving better with therapy, risks and benefits discussed and patient wants to hold off on blood thinners.       UPCOMING LABS/IMAGING: per IM      HOSPITALIST FOLLOWING: Yes     DISPO: Home. Lives in [facility] with [facility]. Her son lives with her and can provide support upon discharge.      STIVEN:      DISCHARGE SPECIALIST FOLLOW UP: Gastroenterology       Patient to scheduled follow up with their PCP within 2 weeks from discharge from the [facility].      ____________________________________    Ludwig Moeller D.O.  Physical Medicine & Rehabilitation   ____________________________________    Total time:  58 minutes. Time spent included pre-rounding review of vitals and tests, unit/floor time, face-to-face time with the patient including physical examination, care coordination, counseling of patient and/or family, ordering medications/procedures/tests,  discussion with CM, PT, OT, SLP and/or other healthcare providers, and documentation in the electronic medical record.

## 2025-04-23 NOTE — CARE PLAN
Problem: Mobility  Goal: STG-Within one week, patient will ambulate 40ft c/ FWW and CGA  Outcome: Met  Goal: STG-Within one week, patient will ascend and descend 3 stairs c/ BHR and ModA  Outcome: Met     Problem: Mobility Transfers  Goal: STG-Within one week, patient will perform bed mobility c/ SPV  Outcome: Met  Goal: STG-Within one week, patient will sit to stand c/ FWW and CGA  Outcome: Met  Goal: STG-Within one week, patient will transfer bed to chair c/ FWW and CGA  Outcome: Met

## 2025-04-23 NOTE — THERAPY
"Occupational Therapy   Discharge Summary     Patient Name:  Priscilla Hdz  Age:  85 y.o., Sex:  female  Medical Record #:  [id number]  Today's Date:  4/23/2025     Precautions  Precautions: Fall Risk, Skin  Fall Risk: Poor balance  Skin: Current wound, Low air loss mattress, Turning schedule  Cardiopulmonary: Pacemaker  Orthopedic: Other (see comments)  Comments: 0.5-1L O2 wean    Subjective: Pt supine in bed asleep, easily aroused and agreeable to OT session reporting she slept well last night but she hoped she was going home today.     Objective:    04/23/25 1031   OT Charge Group   OT Self Care / ADL (Units) 2   OT Neuromuscular Re-education / Balance (Units) 2   OT Total Time Spent   OT Individual Total Time Spent (Mins) 60   Precautions   Precautions Fall Risk;Skin   Fall Risk Poor balance   Skin Current wound;Low air loss mattress;Turning schedule   Cardiopulmonary Pacemaker   Pain 0 - 10 Group   Location Coccyx   Location Orientation Mid   Pain Rating Scale (NPRS) 2   Description Aching  (Following wound care, declined intervention)   Bladder   Urine Color Straw   Number of Times Voided 1   Bladder Device Bathroom   Cognitive Pattern Assessment   Cognitive Pattern Assessment Used BIMS   Brief Interview for Mental Status (BIMS)   Repetition of Three Words (First Attempt) 3   Temporal Orientation: Year Correct   Temporal Orientation: Month Accurate within 5 days   Temporal Orientation: Day Correct   Recall: \"Sock\" Yes, no cue required   Recall: \"Blue\" Yes, no cue required   Recall: \"Bed\" Yes, after cueing (\"a piece of furniture\")   BIMS Summary Score 14   Confusion Assessment Method (CAM)   Is there evidence of an acute change in mental status from the patient's baseline? No   Inattention Behavior not present   Disorganized thinking Behavior not present   Altered level of consciousness Behavior not present   Eating   Assistance Needed Independent   CARE Score - Eating 6   Oral Hygiene   Assistance Needed " Independent   CARE Score - Oral Hygiene 6   Oral Hygiene   Level of Assist Independent   Patient Position Seated   Grooming   Level of Assist Independent   Patient Position Seated   Additional Description Hair care   Upper Body Dressing   Assistance Needed Independent   CARE Score - Upper Body Dressing 6   Upper Body Dressing   Level of Assist Set Up;Modified Independent   Patient Position Seated   Clothing Item(s) Pullover shirt   Lower Body Dressing   Assistance Needed Adaptive equipment   CARE Score - Lower Body Dressing 6   Lower Body Dressing   Level of Assist Set Up   Patient Position Seated;Standing   Clothing Item(s) Pants;Disposable Brief   Putting On/Taking Off Footwear   Assistance Needed Verbal cues   CARE Score - Putting On/Taking Off Footwear 4   Shower/Bathe Self   Assistance Needed Independent   CARE Score - Shower/Bathe Self 6   Toilet Transfer   Assistance Needed Independent   CARE Score - Toilet Transfer 6   Toilet Transfer   Level of Assist Stand By Assist   Toileting Hygiene   Assistance Needed Independent   CARE Score - Toileting Hygiene 6   Toileting Hygiene   Level of Assist Stand By Assist   Interdisciplinary Plan of Care Collaboration   Patient Position at End of Therapy Seated;Call Light within Reach;Tray Table within Reach   OT Discharge Summary    Discharge Location  Home   Patient Discharging with Assist of Family    Level of Supervision Required Intermittent Supervision   Recommended Equipment for Discharge Front-Wheeled Walker;Shower Chair;Grab Bars by Toilet;Grab Bars in Tub / Shower;Hand Held Shower Head;Sock Aid;Reacher   Recommended Services Upon Discharge Home Health Occupational Therapy   Long Term Goals Met 4   Long Term Goals Not Met 1   Reason(s) for Goals Not Met Progressing, pt uses different AE at home that is not available here   Criteria for Termination of Services Maximum Function Achieved for Inpatient Rehabilitation   Discharge Instructions to Patient   Level of  Assist Required for Eating Able to Complete Eating without Assist   Level of Assist Required for Grooming Able to Complete Grooming without Assist   Level of Assist Required for Dressing Requires Physical Assist with Dressing  (Initial assist with compression socks)   Equipment for Dressing Sock Aid;Reacher;Long Handled Shoe Horn;Dressing Stick   Level of Assist Required for Toileting Able to Complete Toileting without Assist   Level of Assist Required for Toilet Transfer Requires Supervision with Toilet Transfer   Equipment for Toilet Transfer Grab Bars by Toilet   Level of Assist Required for Bathing Able to Complete Bathing without Assist   Equipment for Bathing Long Handled Sponge;Hand Held Shower Head;Grab Bars in Tub / Shower;Shower Chair   Level of Assist Required for Shower Transfer Requires Supervision with Shower Transfer   Equipment for Shower Transfer Shower Chair;Grab Bars in Tub / Shower   Level of Assist Required for Home Mgmt Requires Physical Assist with Home Management   Level of Assist Required for Meal Prep Requires Physical Assist with Meal Preparation   Driving May not Drive, Please Contact Physician for Further Information   Home Exercise Program Refer to Home Exercise Program Handout for Details       Activities of Daily Living (ADL's)  Purpose: to improve function, safety, and independence with activities of daily living and to provide patient and family education  Interventions:   Oral Hygiene  Grooming  Shower/Bathing  Upper Body Dressing  Lower Body Dressing  Toilet Transfers  Toilet Hygiene  Shower Transfers: Wet  Neuro Re-Education  Purpose: to improve balance, to improve timing, coordination, and recruitment of muscles, and to improve visuoperceptual function  Interventions:   Static/Dynamic standing balance training  Facilitation/Cueing: Facilitation  Pt completed functional activity (corn hole) with a focus on dynamic standing balance,  hand-eye coordination, visual tracking,  coordination, activity tolerance, and functional mobility. With FWW, SBA-CGA, pt would throw bean bag to cornhole, ambulate to board, and ambulate back to starting point. Pt completed 5X, no LOB w/ 2 seated RB.    Discharge Interventions  Purpose: to complete discharge assessments in preparation for upcoming discharge from facility and to review final discharge recommendations and education  Interventions:   completed discharge IRF-BARBY items  reviewed relevant DME and adaptive equipment recommendations  discussed home safety  reviewed relevant precautions to maximize safety during home and community mobility, ADLs, and IADLs      Therapeutic Exercise  Purpose: to improve strength, to improve ROM/flexibility, and to improve functional endurance  Interventions:   Grecia Med Cycle: Gear Level 2 and Time 5 min    Assessment: Pt tolerated session well and is SBA-Jovain for standing and seated ADLs with AE (with exception of doffing/donning compression socks as pt uses different AE at home than provided at rehab). Pt receptive to all safety edu and is on track for projected d/c home tomorrow with intermittent family support, HHOT.    Strengths: Alert and oriented, Supportive family  Barriers: Agitation, Decreased endurance, Difficulty following instructions, Fatigue, Generalized weakness, Impaired activity tolerance, Impaired balance, Impaired carryover of learning, Impaired functional cognition, Limited mobility    Plan:   DC home tomorrow - HHOT    DME  OT DME Recommendations  Additional Equipment (NOT TYPICALLY COVERED BY INSURANCE): Sock aid, Reacher, Long handled shoe horn      OT Care Plan (Active)       There are no active problems.

## 2025-04-23 NOTE — DISCHARGE INSTRUCTIONS
[facility] NURSING DISCHARGE INSTRUCTIONS    Blood Pressure : 132/56  Weight: 65.5 kg (144 lb 6.4 oz)  Nursing recommendations for Priscilla Hdz at time of discharge are as follows:  Client and Family Member verbalized understanding of all discharge instructions and prescriptions.     Review all your home medications and newly ordered medications with your doctor and/or pharmacist. Follow medication instructions as directed by your doctor and/or pharmacist.    Pain Management:   Discharge Pain Medication Instructions:  Comfort Goal: Comfort with Movement, Perform Activity  Notify your primary care provider if pain is unrelieved with these measures, if the pain is new, or increased in intensity.    Discharge Skin Characteristics: Warm  Discharge Skin Exam:  (buttocks with unstageable pressure injury, seen by wound care RN, DRESSING CHANGED)  Wound 04/08/25 Pressure Injury Coccyx Medial (Active)   Wound Image   04/23/25 1100   Site Assessment Yellow 04/23/25 1100   Periwound Assessment Red 04/23/25 1100   Margins Attached edges 04/23/25 1100   Drainage Amount Scant 04/23/25 1100   Drainage Description Clear 04/23/25 1100   Treatments Cleansed;Irrigation;Nonselective debridement 04/23/25 1100   Wound Cleansing Approved Wound Cleanser 04/23/25 1100   Dressing Status Removed 04/23/25 1100   Dressing Changed Observed 04/23/25 2103   Dressing Options Hydrocolloid Thick 04/23/25 2103   Dressing Change/Treatment Frequency Every 72 hrs, and As Needed 04/23/25 2103   NEXT Dressing Change/Treatment Date 04/25/25 04/23/25 0720   NEXT Weekly Photo (Inpatient Only) 04/20/25 04/19/25 1650   Wound Team Following Weekly 04/23/25 2103   Wound Length (cm) 0.2 cm 04/23/25 1100   Wound Width (cm) 0.2 cm 04/23/25 1100   Wound Depth (cm) 0.2 cm 04/23/25 1100   Wound Surface Area (cm^2) 0.04 cm^2 04/23/25 1100   Wound Volume (cm^3) 0.008 cm^3 04/23/25 1100   Wound Healing % 0 04/23/25 1100   Wound Bed Slough (%)  100 % 04/23/25 1100   WOUND NURSE ONLY - Time Spent with Patient (mins) 30 04/23/25 1100       Wound 04/14/25 Pretibial Right wound (Active)   Wound Image   04/14/25 1420   Site Assessment Clean;Dry;Intact 04/23/25 2103   Periwound Assessment Red;Edema 04/23/25 0720   Margins Attached edges 04/23/25 0720   Closure Open to air 04/23/25 0720   Drainage Amount None 04/23/25 0720   Treatments Offloading 04/22/25 2000   Offloading/DME Heel float boot 04/22/25 2000   Dressing Status Open to Air 04/23/25 2103   NEXT Weekly Photo (Inpatient Only) 04/27/25 04/21/25 0735       Wound 04/14/25 Pretibial Left Wound (Active)   Site Assessment Dry;Black;Scabbed 04/23/25 0720   Periwound Assessment Red;Edema 04/23/25 0720   Margins Attached edges 04/23/25 0720   Closure Open to air 04/23/25 0720   Drainage Amount None 04/23/25 0720   Treatments Offloading 04/22/25 2000   Offloading/DME Heel float boot 04/22/25 2000   Dressing Status Open to Air 04/23/25 2103   NEXT Weekly Photo (Inpatient Only) 04/27/25 04/21/25 0735       Wound 04/16/25 Pressure Injury Coccyx Right unstagable (Active)   Wound Image   04/23/25 1100   Site Assessment Clean;Dry;Intact 04/23/25 2103   Periwound Assessment Red 04/23/25 1100   Margins Attached edges 04/23/25 1100   Closure Approximated 04/22/25 2000   Drainage Amount Scant 04/23/25 1100   Drainage Description Purulent 04/22/25 1500   Treatments Cleansed;Irrigation;Nonselective debridement 04/23/25 1100   Wound Cleansing Normal Saline Irrigation 04/23/25 1100   Periwound Protectant Hydrocolloid 04/23/25 1100   Dressing Status Removed 04/23/25 1100   Dressing Changed Observed 04/23/25 2103   Dressing Options Hydrocolloid Thick 04/23/25 2103   Dressing Change/Treatment Frequency Every 72 hrs, and As Needed 04/23/25 2103   NEXT Dressing Change/Treatment Date 04/26/25 04/23/25 2103   NEXT Weekly Photo (Inpatient Only) 04/27/25 04/21/25 0735   ** WOUND NURSE ONLY BELOW THIS LINE** WOUND 04/16/25 1923   Wound  Team Following Weekly 04/21/25 0735   WOUND NURSE ONLY - Pressure Injury Stage U 04/23/25 1100   Wound Length (cm) 0.5 cm 04/23/25 1100   Wound Width (cm) 0.6 cm 04/23/25 1100   Wound Depth (cm) 0.5 cm 04/23/25 1100   Wound Surface Area (cm^2) 0.3 cm^2 04/23/25 1100   Wound Volume (cm^3) 0.15 cm^3 04/23/25 1100   Wound Healing % 29 04/23/25 1100   Wound Bed Granulation (%) 50 % 04/23/25 1100   Wound Bed Slough (%) 50 % 04/23/25 1100   Shape circular 04/23/25 1100   WOUND NURSE ONLY - Time Spent with Patient (mins) 20 04/23/25 1100       Wound 04/16/25 Pressure Injury Ankle Posterior Right none blanching callous stage 1 PI (Active)   Wound Image   04/16/25 1100   Site Assessment Clean;Dry;Intact 04/23/25 2103   Periwound Assessment Pink 04/23/25 0720   Margins Defined edges 04/21/25 2000   Closure Open to air 04/23/25 0720   Drainage Amount None 04/23/25 0720   Treatments Site care 04/18/25 2050   Offloading/DME Other (comment) 04/19/25 1650   Wound Cleansing Approved Wound Cleanser 04/16/25 1100   Dressing Status Open to Air 04/23/25 2103   NEXT Weekly Photo (Inpatient Only) 04/23/25 04/19/25 1650   Wound Team Following Weekly 04/16/25 1100   WOUND NURSE ONLY - Pressure Injury Stage 1 04/16/25 1100   Post-Procedure Length (cm) 1 cm 04/16/25 1100   Post-Procedure Width (cm) 2 cm 04/16/25 1100   Post-Procedure Surface Area (cm^2) 2 cm^2 04/16/25 1100   WOUND NURSE ONLY - Time Spent with Patient (mins) 30 04/16/25 1100     Skin / Wound Care Instructions: Please contact your primary care physician for any change in skin integrity.     If You Have Surgical Incisions / Wounds:  Monitor surgical site(s) for signs of increased swelling, redness or symptoms of drainage from the site or fever as this could indicate signs and symptoms of infection. If these symptoms are noted, notifiy your primary care provider.      Discharge Safety Instructions: Should Have ADULT SUPERVISION     Discharge Safety Concerns: Balance Problems  (Dizziness, Light Headedness)  The interdisciplinary team has made recommendation that you should have adult supervision in the house due to weakness  Anti-embolic stockings are not required to increase circulation to the lower extremities.    Discharge Diet: Regular     Discharge Liquids: Thin  Discharge Bowel Function: Continent  Please contact your primary care physician for any changes in bowel habits.  Discharge Bowel Program:    Discharge Bladder Function: Continent  Discharge Urinary Devices:        Nursing Discharge Plan:        Case Management Discharge Instructions:   Discharge Location: Home  Agency Name/Address/Phone:    Home Health:    Outpatient Services:    DME Provider/Phone:    Medical Equipment Ordered:    Prescription Faxed to:        Discharge Medication Instructions:  Below are the medications your physician expects you to take upon discharge:  Gastrointestinal Bleeding  Gastrointestinal (GI) bleeding is bleeding anywhere along the digestive tract. The digestive tract carries food from your mouth until it leaves your body as waste, or poop. You may have blood in your poop or have black poop. If you vomit, there may be blood in it too.  This condition can be mild, serious, or even life-threatening. If you have a lot of bleeding, you may need to stay in the hospital.  What are the causes?  This condition may be caused by:  Irritation and swelling of the esophagus (esophagitis). The esophagus is part of the body that moves food from your mouth to your stomach.  Swollen veins in the butt (hemorrhoids).  Tears in the opening of the butt. These are often caused by passing hard poop.  Pouches that form on the colon (diverticulosis).  Irritation and swelling of pouches that have formed in your colon (diverticulitis).  Growths (polyps) or cancer.  Irritation of the stomach lining (gastritis).  Sores (ulcers) in the stomach.  What increases the risk?  You are more likely to develop this condition if:  You  have a certain type of infection in your stomach called Helicobacter pylori infection.  You take certain medicines.  You smoke.  You drink alcohol.  What are the signs or symptoms?  Common symptoms of this condition include:  Vomiting material that has bright red blood in it. It may look like coffee grounds.  Changes in your poop. The poop:  May have red blood in it.  May be black, look like tar, and smell stronger than normal.  May be red.  Pain or cramping in the belly (abdomen).  How is this treated?  Treatment for this condition depends on the cause of the bleeding. For example:  Your doctor may treat the bleeding during diagnosis. Common ways of diagnosing this condition is endoscopy or colonoscopy.  Medicines can be used to:  Help control irritation, swelling, or infection.  Reduce acid in your stomach.  Certain problems can be treated with:  Creams.  Medicines that are put in the butt (suppositories).  Warm baths.  Surgery is sometimes needed.  If you lose a lot of blood, you may need a blood transfusion.  If there is a lot of bleeding, you will need to stay in the hospital. If bleeding is mild, you may be allowed to go home.  Follow these instructions at home:    Take over-the-counter and prescription medicines only as told by your doctor.  Eat foods that have a lot of fiber in them. These foods include beans, whole grains, and fresh fruits and vegetables. You can also try eating 1-3 prunes each day.  Drink enough fluid to keep your pee (urine) pale yellow.  Keep all follow-up visits.  Contact a doctor if:  Your symptoms do not get better with treatment.  Get help right away if:  Your bleeding does not stop.  You feel dizzy or you pass out (faint).  You feel weak.  You have very bad cramps in your back or belly.  You pass large clumps of blood (clots) in your poop.  Your symptoms are getting worse.  You have chest pain or fast heartbeats.  These symptoms may be an emergency. Get help right away. Call  911.  Do not wait to see if the symptoms will go away.  Do not drive yourself to the hospital.  Summary  Gastrointestinal (GI) bleeding is bleeding anywhere along the digestive tract. The digestive tract carries food from your mouth until it leaves your body as waste, or poop.  This bleeding can be caused by many things. Treatment depends on the cause of the bleeding.  Take medicines only as told by your doctor.  Keep all follow-up visits.  This information is not intended to replace advice given to you by your health care provider. Make sure you discuss any questions you have with your health care provider.  Document Revised: 07/22/2022 Document Reviewed: 07/22/2022  [employer] Patient Education © 2023 [employer]  Pressure Injury    A pressure injury is damage to the skin and underlying tissue that results from pressure being applied to an area of the body. It often affects people who must spend a long time in a bed or chair because of a medical condition.  Pressure injuries usually occur:  Over bony parts of the body, such as the tailbone, shoulders, elbows, hips, heels, spine, ankles, and back of the head.  Under medical devices that make contact with the body, such as respiratory equipment, stockings, tubes, and splints.  Pressure injuries start as reddened areas on the skin and can lead to pain and an open wound.  What are the causes?  This condition is caused by frequent or constant pressure to an area of the body. Decreased blood flow to the skin can eventually cause the skin tissue to die and break down, causing a wound.  What increases the risk?  You are more likely to develop this condition if you:  Are in the hospital or an extended care facility.  Are bedridden or in a wheelchair.  Have an injury or disease that keeps you from:  Moving normally.  Feeling pain or pressure.  Have a condition that:  Makes you sleepy or less alert.  Causes poor blood flow.  Need to wear a medical device.  Have poor control  of your bladder or bowel functions (incontinence).  Have poor nutrition (malnutrition).  If you are at risk for pressure injuries, your health care provider may recommend certain types of mattresses, mattress covers, pillows, cushions, or boots to help prevent them. These may include products filled with air, foam, gel, or sand.  What are the signs or symptoms?  Symptoms of this condition depend on the severity of the injury. Symptoms may include:  Red or dark areas of the skin.  Pain, warmth, or a change of skin texture.  Blisters.  An open wound.  How is this diagnosed?  This condition is diagnosed with a medical history and physical exam. You may also have tests, such as:  Blood tests.  Imaging tests.  Blood flow tests.  Your pressure injury will be staged based on its severity. Staging is based on:  The depth of the tissue injury, including whether there is exposure of muscle, bone, or tendon.  The cause of the pressure injury.  How is this treated?  This condition may be treated by:  Relieving or redistributing pressure on your skin. This includes:  Frequently changing your position.  Avoiding positions that caused the wound or that can make the wound worse.  Using specific bed mattresses, chair cushions, or protective boots.  Moving medical devices from an area of pressure, or placing padding between the skin and the device.  Using foams, creams, or powders to prevent rubbing (friction) on the skin.  Keeping your skin clean and dry. This may include using a skin cleanser or skin barrier as told by your health care provider.  Cleaning your injury and removing any dead tissue from the wound (debridement).  Placing a bandage (dressing) over your injury.  Using medicines for pain or to prevent or treat infection.  Surgery may be needed if other treatments are not working or if your injury is very deep.  Follow these instructions at home:  Wound care  Follow instructions from your health care provider about how to  take care of your wound. Make sure you:  Wash your hands with soap and water before and after you change your bandage (dressing). If soap and water are not available, use hand .  Change your dressing as told by your health care provider.   Check your wound every day for signs of infection. Have a caregiver do this for you if you are not able. Check for:  Redness, swelling, or increased pain.  More fluid or blood.  Warmth.  Pus or a bad smell.  Skin care  Keep your skin clean and dry. Gently pat your skin dry.  Do not rub or massage your skin.  You or a caregiver should check your skin every day for any changes in color or any new blisters or sores (ulcers).  Medicines  Take over-the-counter and prescription medicines only as told by your health care provider.  If you were prescribed an antibiotic medicine, take or apply it as told by your health care provider. Do not stop using the antibiotic even if your condition improves.  Reducing and redistributing pressure  Do not lie or sit in one position for a long time. Move or change position every 1-2 hours, or as told by your health care provider.  Use pillows or cushions to reduce pressure. Ask your health care provider to recommend cushions or pads for you.  General instructions    Eat a healthy diet that includes lots of protein.  Drink enough fluid to keep your urine pale yellow.  Be as active as you can every day. Ask your health care provider to suggest safe exercises or activities.  Do not abuse drugs or alcohol.  Do not use any products that contain nicotine or tobacco, such as cigarettes, e-cigarettes, and chewing tobacco. If you need help quitting, ask your health care provider.  Keep all follow-up visits as told by your health care provider. This is important.  Contact a health care provider if:  You have:  A fever or chills.  Pain that is not helped by medicine.  Any changes in skin color.  New blisters or sores.  Pus or a bad smell coming from your  wound.  Redness, swelling, or pain around your wound.  More fluid or blood coming from your wound.  Your wound does not improve after 1-2 weeks of treatment.  Summary  A pressure injury is damage to the skin and underlying tissue that results from pressure being applied to an area of the body.  Do not lie or sit in one position for a long time. Your health care provider may advise you to move or change position every 1-2 hours.  Follow instructions from your health care provider about how to take care of your wound.  Keep all follow-up visits as told by your health care provider. This is important.  This information is not intended to replace advice given to you by your health care provider. Make sure you discuss any questions you have with your health care provider.  Document Revised: 10/13/2022 Document Reviewed: 10/13/2022  [employer] Patient Education © 2023 [employer]      Understanding Your Risk for Falls  Each year, millions of people have serious injuries from falls. It is important to understand your risk for falling. Talk with your health care provider about your risk and what you can do to lower it. There are actions you can take at home to lower your risk and prevent falls.  If you do have a serious fall, make sure to tell your health care provider. Falling once raises your risk of falling again.  How can falls affect me?  Serious injuries from falls are common. These include:  Broken bones, such as hip fractures.  Head injuries, such as traumatic brain injuries (TBI) or concussion.  A fear of falling can cause you to avoid activities and stay at home. This can make your muscles weaker and actually raise your risk for a fall.  What can increase my risk?  There are a number of risk factors that increase your risk for falling. The more risk factors you have, the higher your risk of falling. Serious injuries from a fall happen most often to people older than age 65. Children and young adults ages 15-29 are  also at higher risk.  Common risk factors include:  Weakness in the lower body.  Lack (deficiency) of vitamin D.  Being generally weak or confused due to long-term (chronic) illness.  Dizziness or balance problems.  Poor vision.  Medicines that cause dizziness or drowsiness. These can include medicines for your blood pressure, heart, anxiety, insomnia, or edema, as well as pain medicines and muscle relaxants.  Other risk factors include:  Drinking alcohol.  Having had a fall in the past.  Having depression.  Having foot pain or wearing improper footwear.  Working at a dangerous job.  Having any of the following in your home:  Tripping hazards, such as floor clutter or loose rugs.  Poor lighting.  Pets.  Having dementia or memory loss.  What actions can I take to lower my risk of falling?         Physical activity  Maintain physical fitness. Do strength and balance exercises. Consider taking a regular class to build strength and balance. Yoga and baljeet chi are good options.  Vision  Have your eyes checked every year and your vision prescription updated as needed.  Walking aids and footwear  Wear nonskid shoes. Do not wear high heels.  Do not walk around the house in socks or slippers.  Use a cane or walker as told by your health care provider.  Home safety  Attach secure railings on both sides of your stairs.  Install grab bars for your tub, shower, and toilet. Use a bath mat in your tub or shower.  Use good lighting in all rooms. Keep a flashlight near your bed.  Make sure there is a clear path from your bed to the bathroom. Use night-lights.  Do not use throw rugs. Make sure all carpeting is taped or tacked down securely.  Remove all clutter from walkways and stairways, including extension cords.  Repair uneven or broken steps.  Avoid walking on icy or slippery surfaces. Walk on the grass instead of on icy or slick sidewalks. Use ice melt to get rid of ice on walkways.  Use a cordless phone.  Questions to ask your  health care provider  Can you help me check my risk for a fall?  Do any of my medicines make me more likely to fall?  Should I take a vitamin D supplement?  What exercises can I do to improve my strength and balance?  Should I make an appointment to have my vision checked?  Do I need a bone density test to check for weak bones or osteoporosis?  Would it help to use a cane or a walker?  Where to find more information  Centers for Disease Control and PreventionJD: www.cdc.gov  Community-Based Fall Prevention Programs: www.cdc.gov  National [place] on Aging: www.ella.nih.gov  Contact a health care provider if:  You fall at home.  You are afraid of falling at home.  You feel weak, drowsy, or dizzy.  Summary  Serious injuries from a fall happen most often to people older than age 65. Children and young adults ages 15-29 are also at higher risk.  Talk with your health care provider about your risks for falling and how to lower those risks.  Taking certain precautions at home can lower your risk for falling.  If you fall, always tell your health care provider.  This information is not intended to replace advice given to you by your health care provider. Make sure you discuss any questions you have with your health care provider.  Document Revised: 07/21/2021 Document Reviewed: 07/21/2021  [employer] Patient Education © 2023 [employer]        Occupational Therapy Discharge Instructions for Priscilla Hdz    4/23/2025    Level of Assist Required for Eating: Able to Complete Eating without Assist  Level of Assist Required for Grooming: Able to Complete Grooming without Assist  Level of Assist Required for Dressing: Requires Physical Assist with Dressing (Initial assist with compression socks)  Equipment for Dressing: Sock Aid, Reacher, Long Handled Shoe Horn, Dressing Stick  Level of Assist Required for Toileting: Able to Complete Toileting without Assist  Level of Assist Required for Toilet Transfer: Requires  Supervision with Toilet Transfer  Equipment for Toilet Transfer: Grab Bars by Toilet  Level of Assist Required for Bathing: Able to Complete Bathing without Assist  Equipment for Bathing: Long Handled Sponge, Hand Held Shower Head, Grab Bars in Tub / Shower, Shower Chair  Level of Assist Required for Shower Transfer: Requires Supervision with Shower Transfer  Equipment for Shower Transfer: Shower Chair, Grab Bars in Tub / Shower  Level of Assist Required for Home Mgmt: Requires Physical Assist with Home Management  Level of Assist Required for Meal Prep: Requires Physical Assist with Meal Preparation  Driving: May not Drive, Please Contact Physician for Further Information  Home Exercise Program: Refer to Home Exercise Program Handout for Details     Great job Priscilla - keep up the good work - keep moving and be safe! Elsa Cam, [email or web address]/L [email or web address]    Physical Therapy Discharge Instructions for Priscilla Hdz      4/23/2025    Level of Assist Required for Ambulation: Supervision on Flat Surfaces, Assist for Balance on Curbs, Assist for Balance on Stairs  Distance Patient May Ambulate: house hold distances with walker and caregiver  Device Recommended for Ambulation: Front-Wheeled Walker  Level of Assist Required for Transfers: Supervision  Device Recommended for Transfers: Front-Wheeled Walker  Home Exercise Program: Refer to Home Exercise Program Handout for Details  Prosthesis / Orthosis Recommendation / Location: Right Leg, Left Leg-follow up with home health or outpatient to discuss ankle braces for both feet.    Thank you for being such a hard worker Priscilla, you have come a long way in your time at the rehab hospital, good luck in your continued recovery! [email or web address]

## 2025-04-23 NOTE — CARE PLAN
"  Problem: Fall Risk - Rehab  Goal: Patient will remain free from falls  Outcome: Progressing  Note: Greer Matti Fall risk Assessment Score: 21    High fall risk Interventions   - Alarming seatbelt  - Bed and strip alarm   - Yellow sign by the door   - Yellow wrist band \"Fall risk\"  - Room near to the nurse station  - Do not leave patient unattended in the bathroom  - Fall risk education provided       Problem: Skin Integrity  Goal: Skin integrity is maintained or improved  Outcome: Progressing  Note: Bilateral feet swollen, teds off for the night , heel float boots on, lower extremities elevated.Buttocks area with offloading dressing, assisted to turn and repositioned to sides. Cont monitored.     Problem: Pain - Standard  Goal: Alleviation of pain or a reduction in pain to the patient’s comfort goal  Outcome: Progressing  Note: Assessed for pain and discomfort , medicated with oxycodone 2.5 mg at 2044 hrs for back pain with relief. Cont monitored.   The patient is Stable - Low risk of patient condition declining or worsening    Shift Goals  Clinical Goals: SAFETY  Patient Goals: Rest  Family Goals: NILSON    Progress made toward(s) clinical / shift goals:  Pt free from fall and injury.    "

## 2025-04-23 NOTE — PROGRESS NOTES
NURSING DAILY NOTE    Name: Priscilla Hdz   Date of Admission: 4/14/2025   Admitting Diagnosis: GI bleed  Attending Physician: KWAKU MARTINEZ D.O.  Allergies: Other environmental    Safety  Patient Assist  Min assist  Patient Precautions  Precautions: Fall Risk  Fall Risk: Poor balance  Skin: Current wound, Low air loss mattress, Turning schedule, Barros boots  Cardiopulmonary: Pacemaker  Orthopedic: Other (see comments)  Comments: 0.5-1L O2 wean  Bed Transfer Status  Stand By Assist  Toilet Transfer Status   Stand By Assist  Assistive Devices  Rails, Wheelchair  Oxygen  None - Room Air  Diet/Therapeutic Dining  Current Diet Order   Procedures    Diet Order Diet: Regular (Advance Diet as Tolerated)     Pill Administration  whole  Agitated Behavioral Scale  20  ABS Level of Severity  No Agitation    Fall Risk  Has the patient had a fall this admission?      Greer Chino Fall Risk Scoring  21, HIGH RISK  Fall Risk Safety Measures  bed alarm, chair alarm, poor balance, and low vision/hearing    Vitals  Temperature: 37.8 °C (100 °F)  Temp src: Temporal  Pulse: 79  Respiration: 18  Blood Pressure : 125/61  Blood Pressure MAP (Calculated): 82 MM HG  BP Location: Right, Upper Arm  Patient BP Position: Abdul's Position     Oxygen  Pulse Oximetry: 92 %  O2 (LPM): 0  O2 Delivery Device: None - Room Air  Incentive Spirometer Volume: 750 mL    Bowel and Bladder  Last Bowel Movement  04/23/25  Stool Type  Type 3: Like a sausage, but with cracks on its surface  Bowel Device  Bathroom  Continent  Bladder: Did not void   Bowel: No movement  Bladder Function  Urine Void (mL):  (large)  Number of Times Voided: 1  Urine Color: Yellow  Genitourinary Assessment   Bladder Assessment (WDL):  Within Defined Limits  Kumar Catheter: Not Applicable  Urine Color: Yellow  Bladder Device: Bathroom  Bladder Scan: Post Void  $ Bladder Scan Results (mL): 69    Skin  Ezequiel Score   17  Sensory Interventions   Bed Types: Low Airloss  Skin  Preventative Measures: Pillows in Use to Float Heels  Skin Preventative Dressing:  (refused moon boots)  Moisture Interventions  Moisturizers/Barriers: Barrier Wipes      Pain  Pain Rating Scale  0 - No Pain  Pain Location  Back  Pain Location Orientation  Posterior  Pain Interventions   [place]    ADLs    Bathing   Patient Refused Bathing (rn notified)  Linen Change   Partial  Personal Hygiene  Perineal Care, Moist Tiff Wipes  Chlorhexidine Bath      Oral Care  Brushed Teeth  Teeth/Dentures     Shave     Nutrition Percentage Eaten  Dinner, Between % Consumed  Environmental Precautions  Treaded Slipper Socks on Patient, Personal Belongings, Wastebasket, Call Bell etc. in Easy Reach, Bed in Low Position  Patient Turns/Positioning  Q2 turns w/ pillows  Patient Turns Assistance/Tolerance  Assistance of One  Bed Positions  Bed Controls On, Bed Locked  Head of Bed Elevated  Self regulated      Psychosocial/Neurologic Assessment  Psychosocial Assessment  Psychosocial (WDL):  Within Defined Limits  Patient Behaviors: Fatigue  Neurologic Assessment  Neuro (WDL): Exceptions to WDL  Level of Consciousness: Alert  Orientation Level: Oriented X4  Cognition: Appropriate judgement, Appropriate safety awareness, Follows commands  Speech: Clear  Pupil Assesment: No  Motor Function/Sensation Assessment: Motor strength  Muscle Strength Right Arm: Good Strength Against Gravity and Moderate Resistance  Muscle Strength Left Arm: Good Strength Against Gravity and Moderate Resistance  Muscle Strength Right Leg: Good Strength Against Gravity and Moderate Resistance  Muscle Strength Left Leg: Good Strength Against Gravity and Moderate Resistance  EENT (WDL):  WDL Except    Cardio/Pulmonary Assessment  Edema   RLE Edema: 2+  LLE Edema: 2+  Respiratory Breath Sounds  RUL Breath Sounds: Clear  RML Breath Sounds: Diminished  RLL Breath Sounds: Clear  KEVIN Breath Sounds: Clear  LLL Breath Sounds: Diminished  Cardiac Assessment   Cardiac  (WDL):  WDL Except (HTN, HLD, pacemaker)

## 2025-04-23 NOTE — DISCHARGE PLANNING
Case Management/IDT follow up.   dc date: 4/24  IDT continues to recommend IRF level of care as patient continue to make progress with all therapies.     DC needs  Home health:  PT/OT//RN - order sent to [facility]- to include sacral wound care  DME: pt has a fww/4ww; therapy does not recommend  AFO's @ this time; son was given info on AFO's if pt become receptive to use them   Family training: completed w/son    Follow up:   PCP/ Neurology/ GI    I met with patient providing update from IDT and discussed plan of care.  She is in agreement.      Plan:   dc 4/24

## 2025-04-23 NOTE — CARE PLAN
The patient is Watcher - Medium risk of patient condition declining or worsening    Shift Goals  Clinical Goals: safety, repositioning    Problem: Bladder / Voiding  Goal: Patient will establish and maintain regular urinary output  Outcome: Progressing     Problem: Bowel Elimination  Goal: Patient will participate in bowel management program  Outcome: Progressing

## 2025-04-23 NOTE — PROGRESS NOTES
Hospital Medicine Daily Progress Note        Chief Complaint  Pneumonia  Edema  Hypertension    Interval Problem Update  Leg swelling much better.  Laboratory results reviewed and discussed mild dehydration.    Disposition  Per Physiatry    Review of Systems  Review of Systems   Constitutional:  Negative for chills and fever.   HENT: Negative.     Eyes: Negative.    Respiratory:  Negative for cough and shortness of breath.    Cardiovascular:  Positive for leg swelling. Negative for chest pain and palpitations.   Gastrointestinal:  Negative for abdominal pain, nausea and vomiting.   Musculoskeletal: Negative.    Skin:  Negative for itching and rash.   Endo/Heme/Allergies:  Negative for polydipsia. Does not bruise/bleed easily.        Physical Exam  Temp:  [36.6 °C (97.8 °F)-37.8 °C (100 °F)] 36.6 °C (97.8 °F)  Pulse:  [75-80] 78  Resp:  [17-19] 17  BP: (121-146)/(55-69) 121/55  SpO2:  [92 %-95 %] 93 %    Physical Exam  Vitals reviewed.   Constitutional:       General: She is not in acute distress.     Appearance: Normal appearance. She is not ill-appearing.   HENT:      Head: Normocephalic and atraumatic.      Right Ear: External ear normal.      Left Ear: External ear normal.      Nose: Nose normal.      Mouth/Throat:      Pharynx: Oropharynx is clear.   Eyes:      General:         Right eye: No discharge.         Left eye: No discharge.      Extraocular Movements: Extraocular movements intact.      Conjunctiva/sclera: Conjunctivae normal.   Cardiovascular:      Rate and Rhythm: Normal rate and regular rhythm.   Pulmonary:      Effort: No respiratory distress.      Breath sounds: No wheezing.      Comments: Decreased BS  Abdominal:      General: Bowel sounds are normal. There is no distension.      Palpations: Abdomen is soft.      Tenderness: There is no abdominal tenderness.   Musculoskeletal:      Cervical back: Normal range of motion and neck supple.      Right lower leg: Edema present.      Left lower leg:  Edema present.   Skin:     General: Skin is warm and dry.   Neurological:      Mental Status: She is alert and oriented to person, place, and time.         Fluids    Intake/Output Summary (Last 24 hours) at 4/23/2025 1147  Last data filed at 4/23/2025 0838  Gross per 24 hour   Intake 837 ml   Output --   Net 837 ml          Laboratory  Recent Labs     04/21/25  0651   WBC 4.8   RBC 3.05*   HEMOGLOBIN 8.7*   HEMATOCRIT 27.3*   MCV 89.5   MCH 28.5   MCHC 31.9*   RDW 49.5   PLATELETCT 213   MPV 11.0     Recent Labs     04/21/25  0651 04/23/25  0536   SODIUM 140 135   POTASSIUM 3.8 5.3   CHLORIDE 107 105   CO2 23 18*   GLUCOSE 95 78   BUN 23* 29*   CREATININE 0.78 0.83   CALCIUM 8.2* 8.4*                   Assessment/Plan  * GI bleed- (present on admission)  Assessment & Plan  Pt presented w/ bloody stools  4/8/25 CT Abd/Pelvis no active GIB, diverticulosis, RLL PNA  EGD 4/9/25 non-obstructing Schatzki ring, hiatal hernia, and gastric erosion  Colonoscopy 4/10/25 no active bleeding  S/[facility] at [facility]  Continue PPI bid  GI F/U    Anemia  Assessment & Plan  Has normocytic indices  Fe 14, continue supplements  Follow H/H    Edema  Assessment & Plan  U/S BLE negative for DVT  Echo EF 70%, RVSP 33 mmHg  BNP improving w/ diuresis  Leg swelling better  Discontinued Aldactone for azotemia  Will also stop Lasix for same    Complete heart block by electrocardiogram (HCC)- (present on admission)  Assessment & Plan  S/P PPM    Pneumonia- (present on admission)  Assessment & Plan  4/8/25 CT Abd/Pelvis no active GIB, diverticulosis, RLL PNA  COVID/FLU/RSV negative  Now off supplemental O2 S/P empiric abx  Was on Augmentin x 5 days, completed 4/20/25  Guaifenesin prn, IS, and RT protocol    Vitamin D deficiency disease- (present on admission)  Assessment & Plan  Vit D level 11  Continue supplementation    Essential hypertension- (present on admission)  Assessment & Plan  On Coreg, Losartan, Hydralazine, and Lasix  Discontinued  Aldactone for azotemia  Will also stop Lasix for same    Dyslipidemia- (present on admission)  Assessment & Plan  On Lipitor       Full Code

## 2025-04-23 NOTE — THERAPY
Physical Therapy   Daily Treatment     Patient Name:  Priscilla Hdz  Age:  85 y.o., Sex:  female  Medical Record #:  [id number]  Today's Date: 4/23/2025     Precautions  Precautions: Fall Risk, Skin  Fall Risk: Poor balance  Skin: Current wound, Low air loss mattress, Turning schedule, Barros boots  Cardiopulmonary: Pacemaker  Orthopedic: Other (see comments)  Comments: 0.5-1L O2 wean    Subjective: pt seated in wc, looking forward to upcoming dc    Objective:    04/23/25 0901   PT Charge Group   PT Gait Training (Units) 2   PT Therapeutic Exercise (Units) 1   PT Therapeutic Activities (Units) 1   Supervising Physical Therapist Angeli Johnston   PT Total Time Spent   PT Individual Total Time Spent (Mins) 60   Sit to Stand   Level of Assist Stand By Assist   Assistive Devices FWW   Additional Description Cueing needed;Extra time needed   Chair/Bed-to-Chair Transfer   Level of Assist Stand By Assist   Transfer Type Stand Step Transfer   Assistive Devices FWW   Ambulation   Level of Assist Stand By Assist   Assistive Device FWW   Additional Description Extra time needed   Distance 110'x4, 40' x 2   Interdisciplinary Plan of Care Collaboration   IDT Collaboration with  Nursing   Patient Position at End of Therapy Seated;Chair Alarm On;Call Light within Reach   Collaboration Comments hand off to RN for wound care         Therapeutic Activities  Purpose: to improve performance and function of daily activities and to increase safety with activities of daily life and mobility related to activities of daily life  Interventions:   Sit to stand training  2 sets x 5 reps throughout session emphasis on sequencing of STS    Therapeutic Exercise  Purpose: to improve strength and to improve functional endurance  Interventions:   Nustep: Resistance Level 3/4 and Time 8 minutes 2 rest breaks, B LE/UE    Gait Training  Purpose: to improve functional ambulation and to improve walking endurance  Interventions:   Safe use of  device  Walking endurance  Normalization of gait   Deviations (laterality in comments):  Steppage pattern  Decreased heel strike  B hip/knee slight flexion  Gait training focusing on navigation around obstacles, chair approaches and safely turning with walker     Assessment: Priscilla has made tremendous progress with functional endurance and gait tolerance since admission at rehab. Pt met all 5 of her STG and is on track for dc tomorrow. FT has been completed with pt's son, Akil he is able to safely assist at dc    Strengths: Able to follow instructions, Alert and oriented, Effective communication skills, Good insight into deficits/needs, Motivated for self care and independence, Pleasant and cooperative, Willingly participates in therapeutic activities  Barriers: Decreased endurance, Fatigue, Generalized weakness, Home accessibility, Impaired activity tolerance, Pain, Limited mobility    Plan:   Dc 4/24 HH PT-no equipment needs    DME    PT DME Recommendations  Wheelchair:  (pt has wc-per son)  Assistive Device:  (owns FWW and 4WW)  Vendor Equipment:  (deferring AFO's to HH or out patient PT-per pt request)      Passport completed:      Physical Therapy Problems (Active)       Problem: Mobility       Dates: Start:  04/15/25         Goal: STG-Within one week, patient will ambulate 40ft c/ FWW and CGA       Dates: Start:  04/15/25    Expected End:  04/24/25            Goal: STG-Within one week, patient will ascend and descend 3 stairs c/ BHR and ModA       Dates: Start:  04/15/25    Expected End:  04/24/25               Problem: Mobility Transfers       Dates: Start:  04/15/25         Goal: STG-Within one week, patient will perform bed mobility c/ SPV       Dates: Start:  04/15/25    Expected End:  04/24/25            Goal: STG-Within one week, patient will sit to stand c/ FWW and CGA       Dates: Start:  04/15/25    Expected End:  04/24/25            Goal: STG-Within one week, patient will transfer bed to chair c/  FWW and CGA       Dates: Start:  04/15/25    Expected End:  04/24/25               Problem: PT-Long Term Goals       Dates: Start:  04/15/25         Goal: LTG-By discharge, patient will ambulate 50ft c/ FWW and SBA       Dates: Start:  04/15/25    Expected End:  04/24/25            Goal: LTG-By discharge, patient will transfer one surface to another c/ FWW and SBA       Dates: Start:  04/15/25    Expected End:  04/24/25            Goal: LTG-By discharge, patient will perform home exercise program c/ set-up       Dates: Start:  04/15/25    Expected End:  04/24/25            Goal: LTG-By discharge, patient will ambulate up/down 3 stairs c/ RHR and Kelly       Dates: Start:  04/15/25    Expected End:  04/24/25            Goal: LTG-By discharge, patient will transfer in/out of a car c/ FWW and Kelly       Dates: Start:  04/15/25    Expected End:  04/24/25

## 2025-04-23 NOTE — THERAPY
Occupational Therapy  Daily Treatment     Patient Name:  Priscilla Hdz  Age:  85 y.o., Sex:  female  Medical Record #:  [id number]  Today's Date:  4/23/2025    Precautions  Precautions: (P) Fall Risk, Skin  Fall Risk: (P) Poor balance  Skin: (P) Current wound, Low air loss mattress  Cardiopulmonary: (P) Pacemaker  Orthopedic: Other (see comments)  Comments: 0.5-1L O2 wean    Subjective: Pt asleep in bed, easily aroused and agreeable to OT session reporting her backside is very painful, RN notified and pain medication provided.     Objective:    04/23/25 1401   OT Charge Group   OT Self Care / ADL (Units) 1   OT Therapeutic Exercise (Units) 1   OT Total Time Spent   OT Individual Total Time Spent (Mins) 30   Precautions   Precautions Fall Risk;Skin   Fall Risk Poor balance   Skin Current wound;Low air loss mattress   Cardiopulmonary Pacemaker   Pain 0 - 10 Group   Location Coccyx;Buttock   Location Orientation Mid   Pain Rating Scale (NPRS) 8   Description Aching  (RN notified, medication provided)   Bladder   Urine Color Straw   Number of Times Voided 1   Bladder Device Bathroom   Grooming   Level of Assist Independent   Patient Position Seated   Additional Description Hair care;Hand hygiene   Toilet Transfer   Level of Assist Stand By Assist   Transfer Type Stand Step Transfer   Adaptive Equipment Grab bars   Toileting Hygiene   Level of Assist Stand By Assist   Additional Description Grab bars   Chair/Bed-to-Chair Transfer   Level of Assist Stand By Assist   Transfer Type Stand Step Transfer   Assistive Devices FWW   Interdisciplinary Plan of Care Collaboration   IDT Collaboration with  Nursing   Patient Position at End of Therapy Seated;Self Releasing Lap Belt Applied;Call Light within Reach;Tray Table within Reach   Collaboration Comments Pain management         Therapeutic Exercise  Purpose: to improve strength, to improve ROM/flexibility, and to improve functional endurance  Interventions:   Upper body  exercises:   Seated program -   Bilateral row, Other Resistance 1x5 w/ light resistant theraband - unable to complete full set d/t pain  Bicep curls, 3 sets of 10, Bilateral, and Weight 1x10 at 2# dowel, increased to 2x10 at 4# dowel  Elbow extension, 2 sets of 10, Bilateral, and Light Resistance Theraband    Activities of Daily Living (ADL's)  Purpose: to improve function, safety, and independence with activities of daily living and to provide patient and family education  Interventions:   Grooming  Toilet Transfers  Toilet Hygiene    Assessment:    Pt tolerated session fair, limited by pain but still motivated to participate. Pt on track for project d/c home tomorrow. Met all goals - STG and LTG.  Strengths: Alert and oriented, Supportive family  Barriers: Agitation, Decreased endurance, Difficulty following instructions, Fatigue, Generalized weakness, Impaired activity tolerance, Impaired balance, Impaired carryover of learning, Impaired functional cognition, Limited mobility    Plan:  DC home tomorrow.    DME  OT DME Recommendations  Additional Equipment (NOT TYPICALLY COVERED BY INSURANCE): Sock aid, Reacher, Long handled shoe horn    Passport items to be completed:  Perform bathroom transfers, complete dressing, complete feeding, get ready for the day, prepare a simple meal, participate in household tasks, adapt home for safety needs, demonstrate home exercise program, complete caregiver training     Occupational Therapy Goals (Active)       There are no active problems.

## 2025-04-23 NOTE — DISCHARGE PLANNING
Case management  Reviewed signed copy of IMM and answered all questions.  Dc date /disposition:4/24.  Home w/ home health.      Follow-up [facility]:     Beth Friedman M.D.  [address]  [address]  [place] [id number] [phone]  Call to schedule follow up with your PCP.       [facility] [place]  [id number]  [place] [address]  [phone]  A referral has been sent for home health; they will call you to schedule follow up home visits    Future Appointments   Date Time Provider Department Center   5/13/2025  2[facility]   5/13/2025  3:00 PM Celeste Urena M.D. [facility] None      Referral placed by MD for Neurology / GI follow up.  Referral office will reach out to pt to scheduled follow up appt.     PT has fww/4ww @ [facility].

## 2025-04-23 NOTE — WOUND TEAM
[facility]  Inpatient Services  Initial Wound and Skin Care Evaluation    Admission Date: 4/14/2025     Last order of IP CONSULT TO WOUND CARE was found on 4/14/2025 from Hospital Encounter on 4/14/2025     HPI, PMH, SH: Reviewed    Past Surgical History:   Procedure Laterality Date    COLONOSCOPY N/A 4/10/2025    Procedure: COLONOSCOPY;  Surgeon: Juan Manuel James M.D.;  Location: SURGERY SAME DAY [facility];  Service: Gastroenterology    DC UPPER GI ENDOSCOPY,BIOPSY N/A 4/9/2025    Procedure: GASTROSCOPY, WITH BIOPSY;  Surgeon: Ly Arnold M.D.;  Location: SURGERY SAME DAY [facility];  Service: Gastroenterology    COLONOSCOPY N/A 4/9/2025    Procedure: COLONOSCOPY;  Surgeon: Ly Arnold M.D.;  Location: SURGERY SAME DAY [facility];  Service: Gastroenterology    PACEMAKER INSERTION Left 06/20/2019    [employer] Semmes S DR MRI [id number] implanted by Dr. Mcginnis.    HIP REVISION TOTAL Right 8/29/2018    Procedure: HIP REVISION TOTAL;  Surgeon: Oniel Goodson M.D.;  Location: SURGERY [facility];  Service: Orthopedics    HIP ARTHROPLASTY TOTAL Right 8/9/2016    Procedure: HIP ARTHROPLASTY TOTAL;  Surgeon: Oniel Goodson M.D.;  Location: SURGERY [facility];  Service:     CATARACT EXTRACTION WITH IOL Bilateral 2012     Social History     Tobacco Use    Smoking status: Never    Smokeless tobacco: Never   Substance Use Topics    Alcohol use: Yes     Alcohol/week: 0.6 oz     Types: 1 Glasses of wine per week     Comment: once every 2-3 months.      No chief complaint on file.    Diagnosis: GI bleed [K92.2]    Unit where seen by Wound Team: [facility]03/01     WOUND CONSULT RELATED TO:  Coccyx and sacrum    WOUND TEAM PLAN OF CARE - Frequency of Follow-up:   Nursing to follow dressing orders written for wound care. Contact wound team if area fails to progress, deteriorates or with any questions/concerns if something comes up before next scheduled follow up (See below as to whether wound is  following and frequency of wound follow up)   Weekly - wednesday    WOUND HISTORY:   Pt with a POA wounds to her sacrum and coccyx and heels. She Reports sleeping sitting in her recliner at home.       WOUND ASSESSMENT/LDA  Wound 04/08/25 Pressure Injury Coccyx Medial (Active)   Date First Assessed/Time First Assessed: 04/08/25 2355   Present on Original Admission: Yes  Hand Hygiene Completed: Yes  Primary Wound Type: Pressure Injury  Location: Coccyx  Wound Orientation: Medial      Assessments 4/23/2025 11:00 AM   Wound Image     Site Assessment Yellow   Periwound Assessment Red   Margins Attached edges   Drainage Amount Scant   Drainage Description Clear   Treatments Cleansed;Irrigation;Nonselective debridement   Wound Cleansing Approved Wound Cleanser   Dressing Status Removed   Dressing Changed Changed   Dressing Options Hydrocolloid Thick   Dressing Change/Treatment Frequency Every 72 hrs, and As Needed   Wound Team Following Weekly   Wound Length (cm) 0.2 cm   Wound Width (cm) 0.2 cm   Wound Depth (cm) 0.2 cm   Wound Surface Area (cm^2) 0.04 cm^2   Wound Volume (cm^3) 0.008 cm^3   Wound Healing % 0   Wound Bed Slough (%) 100 %   WOUND NURSE ONLY - Time Spent with Patient (mins) 30       Wound 04/16/25 Pressure Injury Coccyx Right unstagable (Active)   Date First Assessed/Time First Assessed: 04/16/25 1150   Present on Original Admission: Yes  Hand Hygiene Completed: Yes  Primary Wound Type: Pressure Injury  Location: Coccyx  Laterality: Right  Wound Description (Comments): unstagable      Assessments 4/23/2025 11:00 AM   Wound Image     Site Assessment Yellow;Red   Periwound Assessment Red   Margins Attached edges   Drainage Amount Scant   Treatments Cleansed;Irrigation;Nonselective debridement   Wound Cleansing Normal Saline Irrigation   Periwound Protectant Hydrocolloid   Dressing Status Removed   Dressing Changed Changed   Dressing Options Hydrocolloid Thick   Dressing Change/Treatment Frequency Every 72  hrs, and As Needed   NEXT Dressing Change/Treatment Date 04/26/25   Pressure Injury Stage Unstageable   Wound Length (cm) 0.5 cm   Wound Width (cm) 0.6 cm   Wound Depth (cm) 0.5 cm   Wound Surface Area (cm^2) 0.3 cm^2   Wound Volume (cm^3) 0.15 cm^3   Wound Healing % 29   Wound Bed Granulation (%) 50 %   Wound Bed Slough (%) 50 %   Shape circular   WOUND NURSE ONLY - Time Spent with Patient (mins) 20       Wound 04/16/25 Pressure Injury Ankle Posterior Right none blanching callous stage 1 PI (Active)   Date First Assessed/Time First Assessed: 04/16/25 1154   Present on Original Admission: Yes  Hand Hygiene Completed: Yes  Primary Wound Type: Pressure Injury  Location: Ankle  Wound Orientation: Posterior  Laterality: Right  Wound Description (Comment...      Assessments 4/16/2025 11:00 AM   Wound Image     Site Assessment Red;Callus;Intact   Periwound Assessment Red   Margins Attached edges   Drainage Amount None   Treatments Site care;Nonselective debridement;Cleansed;Offloading   Wound Cleansing Approved Wound Cleanser   Dressing Status Open to Air   NEXT Weekly Photo (Inpatient Only) 04/23/25   Wound Team Following Weekly   Pressure Injury Stage Stage 1   Post-Procedure Length (cm) 1 cm   Post-Procedure Width (cm) 2 cm   Post-Procedure Surface Area (cm^2) 2 cm^2   WOUND NURSE ONLY - Time Spent with Patient (mins) 30        Vascular:    AMANDA:   No results found.    Lab Values:    Lab Results   Component Value Date/Time    WBC 4.8 04/21/2025 06:51 AM    RBC 3.05 (L) 04/21/2025 06:51 AM    HEMOGLOBIN 8.7 (L) 04/21/2025 06:51 AM    HEMATOCRIT 27.3 (L) 04/21/2025 06:51 AM    HBA1C 6.1 (H) 04/15/2025 05:28 AM    PLATELETCT 213 04/21/2025 06:51 AM         Culture Results show:  No results found for this or any previous visit (from the past 720 hours).    Pain Level/Medicated:  None, Tolerated without pain medication       INTERVENTIONS BY WOUND TEAM:  Chart and images reviewed. Discussed with bedside RN. All areas of  concern (based on picture review, LDA review and discussion with bedside RN) have been thoroughly assessed. Documentation of areas based on significant findings. This RN in to assess patient. Performed standard wound care which includes appropriate positioning, dressing removal and non-selective debridement. Pictures and measurements obtained weekly if/when required.    Wound:  Sacrum and coccyx  Cleansed/Non-selectively Debrided with:  Wound cleanser and Gauze  Primary Dressing:  hydrocolloid    Advanced Wound Care Discharge Planning  Number of Clinicians necessary to complete wound care: 2  Is patient requiring IV pain medications for dressing changes:  No   Length of time for dressing change 20 min. (This does not include chart review, pre-medication time, set up, clean up or time spent charting.)    Interdisciplinary consultation: Patient, Bedside RN (Madalyn Trujillo .  Pressure injury and staging reviewed with N/A.    EVALUATION / RATIONALE FOR TREATMENT:     Date:  04/23/25  Wound Status:  Wound improving    Wound to coccyx, open with 100% slough no change. Left sacrum open able to scrub some slough off and reveal granulation tissue. Laceratins have healed. Area is still red but blanching and fragile.   Date:  04/16/25  Wound Status:  Initial evaluation    Wound to coccyx, open area midline coccyx 100% slough covering, right sacrum larger opening with 100% slough covering, left sacrum two small lacerations possible excoriation. Area is red, she was having incontinence at main she has not had any here and that is baseline. Areas cleansed with wound cleanser and off loading sacral dressing placed to protect and offload area. Right heel red and none blanching left heel is very red and slow to torey, offloading measures placed.            Goals: Steady decrease in wound area and depth weekly.    NURSING PLAN OF CARE ORDERS:  Dressing changes: See Dressing Care orders    NUTRITION RECOMMENDATIONS   Wound Team  Recommendations:  N/A    DIET ORDERS (From admission to next 24h)       Start     Ordered    04/22/25 1233  Diet Order Diet: Regular (Advance Diet as Tolerated)  ALL MEALS        Question:  Diet:  Answer:  Regular  Comment:  Advance Diet as Tolerated    04/22/25 1232    04/15/25 0842  Supplements  ONCE DAILY        Question Answer Comment   Which Supplement Ensure    Ensure: Ensure Plus Carton        04/15/25 0841                    PREVENTATIVE INTERVENTIONS:    Q shift Ezequiel - performed per nursing policy  Q shift pressure point assessments - performed per nursing policy    Surface/Positioning  Low Airloss - Currently in Place    Anticipated discharge plans:  Self/Family Care        Vac Discharge Needs:  Vac Discharge plan is purely a recommendation from wound team and not a requirement for discharge unless otherwise stated by physician.  Not Applicable Pt not on a wound vac

## 2025-04-24 ENCOUNTER — NON-PROVIDER VISIT (OUTPATIENT)
Dept: CARDIOLOGY | Facility: MEDICAL CENTER | Age: 85
End: 2025-04-24
Payer: MEDICARE

## 2025-04-24 VITALS
TEMPERATURE: 98.6 F | WEIGHT: 144.4 LBS | HEART RATE: 76 BPM | RESPIRATION RATE: 16 BRPM | BODY MASS INDEX: 24.06 KG/M2 | HEIGHT: 65 IN | SYSTOLIC BLOOD PRESSURE: 116 MMHG | DIASTOLIC BLOOD PRESSURE: 40 MMHG | OXYGEN SATURATION: 92 %

## 2025-04-24 PROCEDURE — 99232 SBSQ HOSP IP/OBS MODERATE 35: CPT | Performed by: HOSPITALIST

## 2025-04-24 PROCEDURE — A9270 NON-COVERED ITEM OR SERVICE: HCPCS | Performed by: STUDENT IN AN ORGANIZED HEALTH CARE EDUCATION/TRAINING PROGRAM

## 2025-04-24 PROCEDURE — A9270 NON-COVERED ITEM OR SERVICE: HCPCS | Performed by: HOSPITALIST

## 2025-04-24 PROCEDURE — 99239 HOSP IP/OBS DSCHRG MGMT >30: CPT | Performed by: STUDENT IN AN ORGANIZED HEALTH CARE EDUCATION/TRAINING PROGRAM

## 2025-04-24 PROCEDURE — 700102 HCHG RX REV CODE 250 W/ 637 OVERRIDE(OP): Performed by: HOSPITALIST

## 2025-04-24 PROCEDURE — 700102 HCHG RX REV CODE 250 W/ 637 OVERRIDE(OP): Performed by: STUDENT IN AN ORGANIZED HEALTH CARE EDUCATION/TRAINING PROGRAM

## 2025-04-24 RX ADMIN — OXYCODONE 2.5 MG: 5 TABLET ORAL at 05:25

## 2025-04-24 RX ADMIN — LOSARTAN POTASSIUM 50 MG: 50 TABLET, FILM COATED ORAL at 05:20

## 2025-04-24 RX ADMIN — CARVEDILOL 6.25 MG: 3.12 TABLET, FILM COATED ORAL at 08:56

## 2025-04-24 RX ADMIN — HYDRALAZINE HYDROCHLORIDE 100 MG: 50 TABLET ORAL at 08:56

## 2025-04-24 RX ADMIN — OMEPRAZOLE 20 MG: 20 CAPSULE, DELAYED RELEASE ORAL at 08:56

## 2025-04-24 RX ADMIN — FERROUS GLUCONATE 324 MG: 324 TABLET ORAL at 08:56

## 2025-04-24 ASSESSMENT — PAIN DESCRIPTION - PAIN TYPE
TYPE: ACUTE PAIN
TYPE: ACUTE PAIN

## 2025-04-24 ASSESSMENT — ENCOUNTER SYMPTOMS
VOMITING: 0
BRUISES/BLEEDS EASILY: 0
EYES NEGATIVE: 1
SHORTNESS OF BREATH: 0
PALPITATIONS: 0
ABDOMINAL PAIN: 0
MUSCULOSKELETAL NEGATIVE: 1
NAUSEA: 0
FEVER: 0
POLYDIPSIA: 0
CHILLS: 0
COUGH: 0

## 2025-04-24 NOTE — DISCHARGE SUMMARY
Physical Medicine & Rehabilitation Discharge Summary    Admission Date: 4/14/2025    Discharge Date: 4/24/2025    Attending Provider: Ludwig Moeller D.O.    Admission Diagnosis:   Active Hospital Problems    Diagnosis     *GI bleed     Edema     Anemia     Complete heart block by electrocardiogram (HCC)     Pneumonia     Vitamin D deficiency disease     Essential hypertension     Dyslipidemia        Discharge Diagnosis:  Active Hospital Problems    Diagnosis     *GI bleed     Edema     Anemia     Complete heart block by electrocardiogram (HCC)     Pneumonia     Vitamin D deficiency disease     Essential hypertension     Dyslipidemia        HPI per Admission History & Physical:  The patient is a 85 y.o.  female with a past medical history of hyperlipidemia, hypertension, history of complete heart block status post pacemaker in 2019, and CKD;  who presented on 4/8/2025  3:01 PM with bloody stools.  Per documentation, patient had noted dark red blood clots in his stools.  Upon evaluation in the emergency department hemoglobin was noted to be 7.7 which dropped to 6.4.  CTA abdomen and pelvis was negative for an active GI bleed.  Patient was transfused 1 unit packed red blood cells in the emergency department.  GI was consulted and patient was taken for an EGD on 4/9 which showed a nonobstructing Schatzki ring, hiatal hernia and gastric erosion.  4/10 patient was taken for colonoscopy which was negative for an active GI bleed.  Patient remains on a PPI, hemoglobin has been stable around 8.4.  Hospital course has been notable for hypertension and nonsustained elevated troponin. She was transferred to [facility] on 4/14/2025.      On my evaluation, the patient is awake and alert and answer questions appropriately.  Admits to having a dry cough that is bothersome, was told she had walking pneumonia. Patient denies fevers, chills, headache, abdominal pain, nausea, vomiting, dysuria.      Patient was admitted to [facility] on 4/14/2025.     Hospital Course by Problem List:    GI bleed  -Presented with dark red clots in stool.  Hemoglobin found to be 7.7, dropped to 6.4. CT abd/pelv negative for acute GI bleeding. EGD showed hiatal hernia, nonobstructing Schatzki ring, gastric erosion.  -PPI BID  -PT and OT for mobility and ADLs. Per guidelines, 15 hours per week between PT, OT and/or SLP.  -Follow up with GI, referral placed     Acute blood loss anemia  -Received transfusion at acute hospital  -On admission to rehab: Most recent Hgb 9.4.  Check morning labs.  -hgb 9.1 --> 8.8 --> 8.7 --> 8.7 (4/21)     Distal LE weakness, distal motor neuropathy  -on admission: has weakness in bilateral ankle dorsiflexion and plantarflexion.  Maybe due to edema.  Monitor for now.    -4/16: Persistent distal weakness. Consistent with distal motor polyneuropathy. There may be a history of worsening distal weakness prior to this hospitalization.   -Recommend neurology evaluation, may benefit from NCS/EMG  -referral to neurology placed      Hypertension  -on admission: coreg 25mg BID, losartan 50mg BID, spironolactone 12.5mg daily, hydralazine 100mg BID. Monitor for need to adjust medications  -IM following  -4/16: BP low. Coreg to 12.5mg BID.   -4/17: Coreg to 6.25mg BID  -4/20: stop spironolactone.   -Discharge with Coreg, losartan, hydralazine     LE venous insufficiency   LE edema  Hx of lymphedema  -on admission: chronic swelling.  Recent ultrasound Doppler negative for DVT. compression stockings. lasix 20mg daily.   -4/15: IM consult.   -4/16: BNP 3040  -4/7: Echo completed: Normal left ventricular systolic function. Right ventricular systolic pressure is estimated to be  33 mmHg. No significant valvular disease. EF 70%.   -4/23: stop lasix and KCL   -Follow up with PCP      Cough, pneumonia  -on admission: has nonproductive cough. Check CXR. Check AM labs.   -4/15: CXR appears to have fluid overload in lungs, possible  pneumonia. IM consulted. Patient started on augmentin.   -Augmentin ended      Acute respiratory failure  -On admission: On 1 L supplemental O2.  Wean as tolerated.  -: 0.5L O2  -: now on RA.     History of CKD stage III, azotemia  -Creatinine has remained within normal limits recently  -: BUN elevated 28.   -: BUN 29  -Follow up with PCP     History of pacemaker  -Known history of prior complete heart block, status post pacemaker in 2019     Hyperlipidemia  -On home dose Lipitor     Pain - prn Tylenol, oxycodone     Vit D deficiency - 4/15: vit D 11. Plan for ergocalciferol 50,000u weekly while in rehab.   -Discharge with vit d supplement. Will need follow up with PCP.      Poor mood, adjustment disorder  -  about 6 months ago  -Monitor mood, can consider SSRI. Patient does not want to talk with our neuropsychologist here.      Bladder management  -monitor for urinary retention. Bladder scan and ICP per protocol.      Bowel management  -senna/docusate for prophylaxis  -: add miralax daily.   -okay for OTC stool softeners and laxatives as needed     Sacral pressure injury, present on admission  -on admission: slough covering entire wound base, most consistent with unstagable pressure injury.   -Continue daily wound care  -Continue turn schedule  -home health wound care at discharge     DVT Ppx - Patient not cleared for DVT chemoprophylaxis on arrival.  Ultrasound negative for DVT on .  discussed with GI, okay for DVT ppx. However patient is moving better with therapy, risks and benefits discussed and patient wants to hold off on blood thinners.       DISPO: Home. Lives in [facility] with [facility]. Her son lives with her and can provide support upon discharge.       DISCHARGE SPECIALIST FOLLOW UP: Gastroenterology, neurology     Patient to scheduled follow up with their PCP within 2 weeks from discharge from the [facility].    Functional Status at Discharge  Eating  Description:     Oral Hygiene Description:  Independent  Grooming Description:  Independent  Shower/Bathe Self Description:  Contact Guard Assist  Upper Body Dressing Description:  Set Up, Modified Independent  Lower Body Dressing Description:  Set Up  Putting on/Taking Off Footwear Description:  Minimal Assist, Maximal Assist  Toileting Hygiene Description:  Stand By Assist  Toilet Transfer Description:  Stand By Assist  Tub/Shower Transfer Description:  Contact Guard Assist, Stand by Assist  Discharge Location : Home  Patient Discharging with Assist of: Family   Level of Supervision Required: Intermittent Supervision  Recommended Equipment for Discharge: Front-Wheeled Walker;Shower Chair;Grab Bars by Toilet;Grab Bars in Tub / Shower;Hand Held Shower Head;Sock Aid;Reacher  Recommended Services Upon Discharge: Home Health Occupational Therapy  Long Term Goals Met: 4  Long Term Goals Not Met: 1  Reason(s) for Goals Not Met: Progressing, pt uses different AE at home that is not available here  Criteria for Termination of Services: Maximum Function Achieved for Inpatient Rehabilitation  Roll Left and Right Description:  Supervised  Sit to Lying Description:  Supervised  Lying to Sitting Description:  Stand By Assist  Sit to Stand Description:  Stand By Assist  Chair/Bed-to-Chair Transfer Description:  Stand By Assist  Car Transfer Description:  Contact Guard Assist  Walking Description:  Stand By Assist  Walking Distance:  110'x4, 40' x 2  Curbs Description:    Stairs Description:  Contact Guard Assist  Stairs Amount:  12  Wheelchair Description:     Wheelchair Distance:      Wheelchair Description:     Discharge Location: Home  Patient Discharging with Assist of: Family  Level of Supervision Required Upon Discharge: Intermittent Supervision  Recommended Equipment for Discharge: Front-Wheeled Walker  Recommeded Services Upon Discharge: Home Health Physical Therapy  Long Term Goals Met: 5  Long Term Goals Not Met:  0  Criteria for Termination of Services: Maximum Function Achieved for Inpatient Rehabilitation  Comprehension Level of Assist:     Comprehension Description:     Expression Level of Assist:     Expression Description:     Social Interaction Level of Assist:     Social Interaction Description:     Problem Solving Level of Assist:     Problem Solving Description:     Memory Level of Assist:     Memory Description:          Ludwig MCNEILL D.O., personally performed a complete drug regimen review and no potential clinically significant medication issues were identified.   Discharge Medication:     Medication List        START taking these medications        Instructions   ferrous gluconate 324 (38 Fe) MG Tabs  Commonly known as: Fergon   Take 1 Tablet by mouth every morning with breakfast.  Dose: 324 mg     omeprazole 20 MG delayed-release capsule  Commonly known as: PriLOSEC   Take 1 Capsule by mouth 2 times a day.  Dose: 20 mg     vitamin D2 (Ergocalciferol) 1.25 MG (94852 UT) Caps capsule  Start taking on: April 29, 2025  Commonly known as: Drisdol   Take 1 Capsule by mouth every 7 days.  Dose: 50,000 Units            CHANGE how you take these medications        Instructions   carvedilol 6.25 MG Tabs  What changed:   medication strength  how much to take  Commonly known as: Coreg   Take 1 Tablet by mouth 2 times a day with meals.  Dose: 6.25 mg            CONTINUE taking these medications        Instructions   atorvastatin 20 MG Tabs  Commonly known as: Lipitor   Take 1 Tablet by mouth every day.  Dose: 20 mg     hydrALAZINE 100 MG tablet  Commonly known as: Apresoline   Take 1 Tablet by mouth 2 times a day. Dose increase 6/3/2022  Dose: 100 mg     losartan 50 MG Tabs  Commonly known as: Cozaar   Take 1 Tablet by mouth 2 times a day.  Dose: 50 mg            STOP taking these medications      furosemide 20 MG Tabs  Commonly known as: Lasix     potassium chloride 10 MEQ capsule  Commonly known as: Micro-K      spironolactone 25 MG Tabs  Commonly known as: Aldactone              Discharge Diet:  Current Diet Order   Procedures    Diet Order Diet: Regular (Advance Diet as Tolerated)       Discharge Activity:  Per PT/OT     Disposition:  Patient to discharge home with family support and community resources.    Equipment:  Per PT/OT    Follow-up & Discharge Instructions:  Follow up with your primary care provider (PCP) within 7-10 days of discharge to review your medications and take over your care.     If you develop chest pain, fever, chills, change in neurologic function (weakness, sensation changes, vision changes), or other concerning sxs, seek immediate medical attention or call 911.      Future Appointments   Date Time Provider Department Center   5/13/2025  2:15 PM JONN Ordoñze [facility] None   5/13/2025  3:00 PM Celeste Urena M.D. [facility] None       Condition on Discharge:  Good    More than 40 minutes was spent on discharging this patient, including face-to-face time, prescription management, and the dictation of this note.    Ludwig Moeller D.O.    Date of Service: 4/24/2025

## 2025-04-24 NOTE — DISCHARGE PLANNING
Case management  Reviewed signed copy of IMM and answered all questions.  Dc date /disposition:4/24.  Home w/ home health.   Family will provide transportation home this afternoon.        Follow-up Informaon:     Beth Friedman M.D.  [address]  [address]  [place] [id number] [phone]  Call to schedule follow up with your PCP.       [facility]  [id number]  [place] [address]  [phone]  A referral has been sent for home health; they will call you to schedule follow up home visits            Future Appointments   Date Time Provider Department Center   5[facility]13[facility]  [facility]   5/13/2025  3:00 PM Celeste Urena M.D. [facility] None      Referral placed by MD for Neurology / GI follow up.  Referral office will reach out to pt to scheduled follow up appt.      PT has fww/4ww @ [place].

## 2025-04-24 NOTE — PROGRESS NOTES
Patient discharged to home per order.  Discharge instructions reviewed with patient and son; they verbalize understanding and signed copies placed in chart.  Patient has all belongings; signed copy of form in chart.  Patient left facility at 1402 via wheelchair accompanied by rehab staff and son.  All questions and concerns addressed at this time

## 2025-04-24 NOTE — THERAPY
Physical Therapy   Daily Treatment     Patient Name:  Priscilla Hdz  Age:  85 y.o., Sex:  female  Medical Record #:  [id number]  Today's Date: 4/23/2025     Precautions  Precautions: Fall Risk, Skin  Fall Risk: Poor balance  Skin: Current wound, Low air loss mattress  Cardiopulmonary: Pacemaker  Orthopedic: Other (see comments)  Comments: 0.5-1L O2 wean    Subjective: agreeable to PT    Objective:    04/23/25 1501   PT Charge Group   PT Therapeutic Activities (Units) 2   Supervising Physical Therapist Angeli Johnston   PT Total Time Spent   PT Individual Total Time Spent (Mins) 30   Roll Left and Right   Assistance Needed Independent   CARE Score - Roll Left and Right 6   Sit to Lying   Assistance Needed Independent   CARE Score - Sit to Lying 6   Lying to Sitting on Side of Bed   Assistance Needed Independent   CARE Score - Lying to Sitting on Side of Bed 6   Sit to Stand   Assistance Needed Supervision   CARE Score - Sit to Stand 4   Chair/Bed-to-Chair Transfer   Assistance Needed Set-up / clean-up;Verbal cues   CARE Score - Chair/Bed-to-Chair Transfer 4   Car Transfer   Assistance Needed Set-up / clean-up;Verbal cues   CARE Score - Car Transfer 4   Walk 10 Feet   Assistance Needed Supervision   CARE Score - Walk 10 Feet 4   Walk 50 Feet with Two Turns   Assistance Needed Supervision   CARE Score - Walk 50 Feet with Two Turns 4   Walk 150 Feet   Assistance Needed Supervision   CARE Score - Walk 150 Feet 4   Walking 10 Feet on Uneven Surfaces   Assistance Needed Incidental touching   CARE Score - Walking 10 Feet on Uneven Surfaces 4   1 Step (Curb)   Assistance Needed Incidental touching   CARE Score - 1 Step (Curb) 4   4 Steps   Assistance Needed Incidental touching   CARE Score - 4 Steps 4   12 Steps   Assistance Needed Incidental touching   CARE Score - 12 Steps 4   Picking Up Object   Assistance Needed Adaptive equipment;Incidental touching   CARE Score - Picking Up Object 4   Wheel 50 Feet with Two Turns  "  Reason if not Attempted Activity not applicable   CARE Score - Wheel 50 Feet with Two Turns 9   Wheel 150 Feet   Reason if not Attempted Activity not applicable   CARE Score - Wheel 150 Feet 9   PT Discharge Summary   Discharge Location Home   Patient Discharging with Assist of Family   Level of Supervision Required Upon Discharge Intermittent Supervision   Recommended Equipment for Discharge Front-Wheeled Walker   Recommeded Services Upon Discharge Home Health Physical Therapy   Long Term Goals Met 5   Long Term Goals Not Met 0   Criteria for Termination of Services Maximum Function Achieved for Inpatient Rehabilitation   Discharge Instructions to Patient   Level of Assist Required for Ambulation Supervision on Flat Surfaces;Assist for Balance on Curbs;Assist for Balance on Stairs   Distance Patient May Ambulate house hold distances with walker and caregiver   Device Recommended for Ambulation Front-Wheeled Walker   Level of Assist Required for Transfers Supervision   Device Recommended for Transfers Front-Wheeled Walker   Home Exercise Program Refer to Home Exercise Program Handout for Details   Prosthesis / Orthosis Recommendation / Location Right Leg;Left Leg         Stair Training  Purpose: to improve functional use of stairs and to improve stair climbing endurance  Interventions:   Ascending/descending 6\" stairs B HR, step to pattern-CGA    Discharge Interventions  Purpose: to complete discharge assessments in preparation for upcoming discharge from facility and to review final discharge recommendations and education  Interventions:   completed discharge IRF-BARBY items  completed patient passport  reviewed relevant DME and adaptive equipment recommendations  discussed home safety  discussed floor recovery/fall prevention  reviewed HEP with handout provided  reviewed relevant precautions to maximize safety during home and community mobility, ADLs, and IADLs    Assessment: completed DC IRF BARBY, pt met all LTG, " ready for DC    Strengths: Able to follow instructions, Alert and oriented, Effective communication skills, Good insight into deficits/needs, Motivated for self care and independence, Pleasant and cooperative, Willingly participates in therapeutic activities  Barriers: Decreased endurance, Fatigue, Generalized weakness, Home accessibility, Impaired activity tolerance, Pain, Limited mobility    Plan:   Dc 4/24    DME    PT DME Recommendations  Wheelchair:  (pt has wc-per son)  Assistive Device:  (owns FWW and 4WW)  Vendor Equipment:  (deferring AFO's to HH or out patient PT-per pt request)      Passport completed:      Physical Therapy Problems (Active)       Problem: PT-Long Term Goals       Dates: Start:  04/15/25         Goal: LTG-By discharge, patient will ambulate 50ft c/ FWW and SBA       Dates: Start:  04/15/25    Expected End:  04/24/25            Goal: LTG-By discharge, patient will transfer one surface to another c/ FWW and SBA       Dates: Start:  04/15/25    Expected End:  04/24/25            Goal: LTG-By discharge, patient will perform home exercise program c/ set-up       Dates: Start:  04/15/25    Expected End:  04/24/25            Goal: LTG-By discharge, patient will ambulate up/down 3 stairs c/ RHR and Kelly       Dates: Start:  04/15/25    Expected End:  04/24/25            Goal: LTG-By discharge, patient will transfer in/out of a car c/ FWW and Kelly       Dates: Start:  04/15/25    Expected End:  04/24/25

## 2025-04-24 NOTE — CARE PLAN
"  Problem: Fall Risk - Rehab  Goal: Patient will remain free from falls  Outcome: Progressing  Note: Greer Matti Fall risk Assessment Score: 21    High fall risk Interventions   - Alarming seatbelt  - Bed and strip alarm   - Yellow sign by the door   - Yellow wrist band \"Fall risk\"  - Room near to the nurse station  - Do not leave patient unattended in the bathroom  - Fall risk education provided     Problem: Pain - Standard  Goal: Alleviation of pain or a reduction in pain to the patient’s comfort goal  Outcome: Progressing  Note: Assessed for pain and discomfort , pt on pain  mgt  with ,relief . Needs anticipated and attended. Reinforced importance of safely measures compliance at home to prevent falls and injury. Advised to avoid sudden positional change when getting out of bed to use the toilet at night to prevent postural hypotension . Verbalized understanding.Encouraged to either turn to sides , right and left side to ease the pressure on the sacral area, coccyx area with hydrocolloid thick dressing .   The patient is Stable - Low risk of patient condition declining or worsening    Shift Goals  Clinical Goals: safety, repositioning  Patient Goals: Rest  Family Goals: NILSON    Progress made toward(s) clinical / shift goals:  Pt free from fall and injuyry.    Patient is not progressing towards the following goals:      "

## 2025-04-24 NOTE — CARE PLAN
The patient is Stable - Low risk of patient condition declining or worsening    Shift Goals  Clinical Goals: safety, repositioning  Patient Goals: Rest  Family Goals: NILSON    Progress made toward(s) clinical / shift goals:    Problem: Knowledge Deficit - Standard  Goal: Patient and family/care givers will demonstrate understanding of plan of care, disease process/condition, diagnostic tests and medications  Outcome: Progressing   Patient educated on the POC and medications administered. All questions administered. All questions and concerns addressed at this time   Problem: Skin Integrity  Goal: Skin integrity is maintained or improved  Outcome: Progressing  Problem: Fall Risk - Rehab  Goal: Patient will remain free from falls  Outcome: Progressing   Problem: Pain - Standard  Goal: Alleviation of pain or a reduction in pain to the patient’s comfort goal  Outcome: Progressing   Use of 0-10 pain scale. Patient is able to verbalize pain and discomfort.

## 2025-04-24 NOTE — PROGRESS NOTES
NURSING DAILY NOTE    Name: Priscilla Hdz   Date of Admission: 4/14/2025   Admitting Diagnosis: GI bleed  Attending Physician: KWAKU MARTINEZ D.O.  Allergies: Other environmental    Safety  Patient Assist  Min assist  Patient Precautions  Precautions: Fall Risk, Skin  Fall Risk: Poor balance  Skin: Current wound, Low air loss mattress  Cardiopulmonary: Pacemaker  Orthopedic: Other (see comments)  Comments: 0.5-1L O2 wean  Bed Transfer Status  Stand By Assist  Toilet Transfer Status   Stand By Assist  Assistive Devices  Rails, Wheelchair  Oxygen  None - Room Air  Diet/Therapeutic Dining  Current Diet Order   Procedures    Diet Order Diet: Regular (Advance Diet as Tolerated)     Pill Administration  whole and one at a time  Agitated Behavioral Scale  20  ABS Level of Severity  No Agitation    Fall Risk  Has the patient had a fall this admission?      Greer Cihno Fall Risk Scoring  21, HIGH RISK  Fall Risk Safety Measures  bed alarm, chair alarm, and poor balance    Vitals  Temperature: 36.8 °C (98.2 °F)  Temp src: Oral  Pulse: 76  Respiration: 17  Blood Pressure : 130/42 (MAP 71)  Blood Pressure MAP (Calculated): 71 MM HG  BP Location: Right, Upper Arm  Patient BP Position: Abdul's Position     Oxygen  Pulse Oximetry: 92 %  O2 (LPM): 0  O2 Delivery Device: None - Room Air  Incentive Spirometer Volume: 750 mL    Bowel and Bladder  Last Bowel Movement  04/23/25  Stool Type  Type 4: Like a sausage or snake, smooth and soft  Bowel Device  Bathroom  Continent  Bladder: Continent void   Bowel: Continent movement  Bladder Function  Urine Void (mL):  (large)  Number of Times Voided: 1  Urine Color: Yellow  Genitourinary Assessment   Bladder Assessment (WDL):  Within Defined Limits  Kumar Catheter: Not Applicable  Urine Color: Yellow  Bladder Device: Bathroom  Bladder Scan: Post Void  $ Bladder Scan Results (mL): 69    Skin  Ezequiel Score   17  Sensory Interventions   Bed Types: Low Airloss  Skin Preventative Measures:  Heel Float Boots in Use   Skin Preventative Dressing: Foam Sacral Protector  Moisture Interventions  Moisturizers/Barriers: Barrier Wipes      Pain  Pain Rating Scale  4 - Distracts me, can do usual activities  Pain Location  Scrotum  Pain Location Orientation  Lower  Pain Interventions   Medication (see MAR)    ADLs    Bathing   Shower, Staff (OT)  Linen Change   Partial  Personal Hygiene  Perineal Care, Moist Tiff Wipes  Chlorhexidine Bath      Oral Care  Brushed Teeth  Teeth/Dentures     Shave     Nutrition Percentage Eaten  *  * Meal *  *, Lunch  Environmental Precautions  Treaded Slipper Socks on Patient, Personal Belongings, Wastebasket, Call Bell etc. in Easy Reach, Bed in Low Position  Patient Turns/Positioning  Q2 turns w/ pillows, Sitting up in wheelchair  Patient Turns Assistance/Tolerance  Assistance of One  Bed Positions  Bed Locked, Bed Controls On  Head of Bed Elevated  Self regulated      Psychosocial/Neurologic Assessment  Psychosocial Assessment  Psychosocial (WDL):  Within Defined Limits  Patient Behaviors: Fatigue  Neurologic Assessment  Neuro (WDL): Exceptions to WDL  Level of Consciousness: Alert  Orientation Level: Oriented X4  Cognition: Follows commands, Appropriate attention/concentration, Memory Loss  Speech: Clear  Pupil Assesment: No  Motor Function/Sensation Assessment: Motor strength  Muscle Strength Right Arm: Good Strength Against Gravity and Moderate Resistance  Muscle Strength Left Arm: Good Strength Against Gravity and Moderate Resistance  Muscle Strength Right Leg: Good Strength Against Gravity and Moderate Resistance  Muscle Strength Left Leg: Good Strength Against Gravity and Moderate Resistance  EENT (WDL):  WDL Except    Cardio/Pulmonary Assessment  Edema   RLE Edema: 2+  LLE Edema: 2+  Respiratory Breath Sounds  RUL Breath Sounds: Clear  RML Breath Sounds: Diminished  RLL Breath Sounds: Clear  KEVIN Breath Sounds: Clear  LLL Breath Sounds: Diminished  Cardiac Assessment    Cardiac (WDL):  WDL Except

## 2025-04-24 NOTE — PROGRESS NOTES
Hospital Medicine Daily Progress Note        Chief Complaint  Pneumonia  Edema  Hypertension    Interval Problem Update  Doing well, excited for discharge from Rehab.    Disposition  Per Physiatry    Review of Systems  Review of Systems   Constitutional:  Negative for chills and fever.   HENT: Negative.     Eyes: Negative.    Respiratory:  Negative for cough and shortness of breath.    Cardiovascular:  Negative for chest pain, palpitations and leg swelling.   Gastrointestinal:  Negative for abdominal pain, nausea and vomiting.   Musculoskeletal: Negative.    Skin:  Negative for itching and rash.   Endo/Heme/Allergies:  Negative for polydipsia. Does not bruise/bleed easily.        Physical Exam  Temp:  [36.6 °C (97.8 °F)-36.8 °C (98.2 °F)] 36.8 °C (98.2 °F)  Pulse:  [67-80] 80  Resp:  [17-19] 17  BP: (120-144)/(42-64) 132/56  SpO2:  [92 %-100 %] 92 %    Physical Exam  Vitals reviewed.   Constitutional:       General: She is not in acute distress.     Appearance: Normal appearance. She is not ill-appearing.   HENT:      Head: Normocephalic and atraumatic.      Right Ear: External ear normal.      Left Ear: External ear normal.      Nose: Nose normal.      Mouth/Throat:      Pharynx: Oropharynx is clear.   Eyes:      General:         Right eye: No discharge.         Left eye: No discharge.      Extraocular Movements: Extraocular movements intact.      Conjunctiva/sclera: Conjunctivae normal.   Cardiovascular:      Rate and Rhythm: Normal rate and regular rhythm.   Pulmonary:      Effort: No respiratory distress.      Breath sounds: No wheezing.      Comments: Decreased BS  Abdominal:      General: Bowel sounds are normal. There is no distension.      Palpations: Abdomen is soft.      Tenderness: There is no abdominal tenderness.   Musculoskeletal:      Cervical back: Normal range of motion and neck supple.      Right lower leg: No edema.      Left lower leg: No edema.   Skin:     General: Skin is warm and dry.    Neurological:      Mental Status: She is alert and oriented to person, place, and time.         Fluids    Intake/Output Summary (Last 24 hours) at 4/24/2025 0858  Last data filed at 4/23/2025 1229  Gross per 24 hour   Intake 120 ml   Output --   Net 120 ml          Laboratory        Recent Labs     04/23/25  0536   SODIUM 135   POTASSIUM 5.3   CHLORIDE 105   CO2 18*   GLUCOSE 78   BUN 29*   CREATININE 0.83   CALCIUM 8.4*                   Assessment/Plan  * GI bleed- (present on admission)  Assessment & Plan  Pt presented w/ bloody stools  4/8/25 CT Abd/Pelvis no active GIB, diverticulosis, RLL PNA  EGD 4/9/25 non-obstructing Schatzki ring, hiatal hernia, and gastric erosion  Colonoscopy 4/10/25 no active bleeding  S/P [facility]  Continue PPI bid  GI F/U    Anemia  Assessment & Plan  Has normocytic indices  Fe 14, continue supplements  H/H has been stable    Edema  Assessment & Plan  U/S BLE negative for DVT  Echo EF 70%, RVSP 33 mmHg  BNP improved w/ diuresis  Leg swelling resolved  Discontinued Lasix and Aldactone for azotemia    Complete heart block by electrocardiogram (HCC)- (present on admission)  Assessment & Plan  S/P PPM    Pneumonia- (present on admission)  Assessment & Plan  4/8/25 CT Abd/Pelvis no active GIB, diverticulosis, RLL PNA  COVID/FLU/RSV negative  Now off supplemental O2 S/P empiric abx  Was on Augmentin x 5 days, completed 4/20/25    Vitamin D deficiency disease- (present on admission)  Assessment & Plan  Vit D level 11  Continue supplementation    Essential hypertension- (present on admission)  Assessment & Plan  Continue Coreg, Losartan, and Hydralazine  Discontinued Lasix and Aldactone for azotemia    Dyslipidemia- (present on admission)  Assessment & Plan  On Lipitor       Full Code

## 2025-04-25 PROCEDURE — 93294 REM INTERROG EVL PM/LDLS PM: CPT | Performed by: STUDENT IN AN ORGANIZED HEALTH CARE EDUCATION/TRAINING PROGRAM

## 2025-05-01 NOTE — Clinical Note
REFERRAL APPROVAL NOTICE         Sent on May 1, 2025                   Priscilla Hdz  [address] [place] [address]                   Dear Ms. Hdz,    After a careful review of the medical information and benefit coverage, [facility] has processed your referral. See below for additional details.    If applicable, you must be actively enrolled with your insurance for coverage of the authorized service. If you have any questions regarding your coverage, please contact your insurance directly.    REFERRAL INFORMATION   Referral #:  [id number]  Referred-To Provider    Referred-By Provider:  Gastroenterology    Ludwig Moeller D.O.   [facility]      [address] [facility]  [place] [id number] [phone] [address] [place] [id number] [phone]    Referral Start Date:  04/23/2025  Referral End Date:   04/23/2026             SCHEDULING  If you do not already have an appointment, please call [phone] to make an appointment.     MORE INFORMATION  If you do not already have a [employer] account, sign up at: [email or web address]  You can access your medical information, make appointments, see lab results, billing information, and more.  If you have questions regarding this referral, please contact  the [facility] Referrals department at:             [phone]. Monday - Friday 8:00AM - 5:00PM.     Sincerely,    [facility]

## 2025-05-04 NOTE — PROGRESS NOTES
Medical decision making:  - Today is my first appointment establishing with her and also hospital follow-up.  She appears to be in acute on chronic HFpEF.  - Multiple things we need to address with the most pressing issue is volume status.  I am not certain why her Lasix, potassium and spironolactone were discontinued after rehab.  During that time she had an echocardiogram which had normal right-sided pressures but now her legs are significantly more swollen.  - Long discussion today about fluid and salt restriction and checking her weights every day at home.  She has a scale but sitting on the carpet but I asked her to check anyway for consistency.  - Reinstituting low-dose Lasix with potassium with follow-up BMP in 1 to 2 weeks.  - Remains on iron replacement, follow-up CBC is pending.  - Blood pressure doing okay.  - Taking hydralazine for blood pressure, heart rate is elevated so we could consider reducing hydralazine and increasing carvedilol.    Problem list:  1. Acute on chronic heart failure with preserved ejection fraction (HFpEF) ([facility])  - furosemide (LASIX) 20 MG Tab; Take 1 Tablet by mouth every day.  Dispense: 90 Tablet; Refill: 3  - potassium chloride SA (K-DUR) 10 MEQ Tab CR; Take 1 Tablet by mouth 2 times a day.  Dispense: 90 Tablet; Refill: 3  - Basic Metabolic Panel; Future    2. Cardiac pacemaker in situ    3. Complete heart block by electrocardiogram (HCC)    4. Essential hypertension    5. Venous insufficiency of both lower extremities    6. Stage 3b chronic kidney disease    7. Other fatigue    8. Paroxysmal supraventricular tachycardia (HCC)     Chief Complaint:   Chief Complaint   Patient presents with    Hypertension    Dyslipidemia     History of Present Illness:  Priscilla Hdz is a 85 y.o. female who returns for long-term management of complete heart block s/p dual chamber pacemaker, RV lead repositioning, hypertension, dyslipidemia, pulmonary hypertension, CKD 3, tachycardia,  chronic HFpEF, PVCs, right hemidiaphragm elevation and today, acute on chronic HFpEF in hospital follow-up.    Last seen by Dr. Larry Becker 12/21/2023, more recently by SUHAIL Ordoñez.    Today is my first time meeting her and also hospital follow-up.  Admitted for serious GI bleed 4/8/2025, hemoglobin 6.4.  Required blood transfusion, then fluids followed by Lasix.  No obvious bleeding source was found and only mild hemorrhoids so she was started on Prilosec.  Remains on iron, follow-up CBC pending.  No anticoagulation or antiplatelet therapies.    Dual-chamber pacemaker for complete heart block 6/20/2019  RV lead positioning 8/9/2019.  V paced 84%.    Chronic HFpEF, compounded by significant CVI.  Previously on diuretics, recalls being on diuretics in the hospital.  Finds them challenging to take at home.  She thought she was taking diuretics but review of her discharge summary from rehab shows a stop her Lasix with potassium and spironolactone at that time.  Most recent echocardiogram had normal RVSP on 4/17/2025, I believe she was on diuretic therapy at that time in rehab.    CKD 3a.     PVCs, no symptoms.    Hypertension, overall controlled.  Requiring a lot of hydralazine.    Hyperlipidemia, on statin therapy for primary prevention.  LDL 94.  HDL low at times.    Incidental right elevated hemidiaphragm on imaging with reduced lung sounds at the traditional right lower lobe space.    Uses a rolling walker.  Limited by reduced energy and some shortness of breath.  No dionte orthopnea.  Not limited by chest pain, pressure or tightness with activity.  No significant palpitations, lightheadedness, or presyncope/syncope.  No symptoms of leg claudication.  No stroke/TIA like symptoms.    Lives in [place].  Point of contact is her son Akil Hdz who is here today.    Wt Readings from Last 5 Encounters:   05/13/25 59.9 kg (132 lb)   05/13/25 59.9 kg (132 lb)   04/14/25 65.5 kg (144 lb 6.4 oz)   04/13/25 69.1 kg (152 lb  5.4 oz)   12/03/24 64.9 kg (143 lb)       DATA REVIEWED by me:  ECG (my personal interpretation)  4-12-25 A pacing with inhibition, V pacing with inhibition    Heart Monitor  NA    Device check  DDD, 60  5/13/2025  AP 33,  84%.  No mode switches.    Echocardiogram  4-17-25  Normal left ventricular systolic function.  Right ventricular systolic pressure is estimated to be  33 mmHg.  No significant valvular disease.   10-17-23  Normal left ventricular size, thickness, and systolic function.  Normal right ventricular size and systolic function.  Normal left atrial size.  Normal pericardium without effusion.  Compared to the prior study on 6/19/19, improved estimated right   ventricular systolic function.  2016  No prior study is available for comparison.   Normal left ventricular systolic function.  Left ventricular ejection fraction is visually estimated to be 65%.  Right heart pressures are normal.  No significant valve abnormalities.     Heart Catheterization  NA    Stress test  8-2016  Myocardial Perfusion   Report   NUCLEAR IMAGING INTERPRETATION   Normal left ventricular size, ejection fraction, and wall motion.   No evidence of significant jeopardized viable myocardium or prior myocardial    infarction.   ECG INTERPRETATION   Negative stress ECG for ischemia.     Radiology test Review:  CXR:   Tubes and lines: Left transvenous electrodes project over the right atrium and right ventricle.  Elevated right hemidiaphragm again demonstrated.  Mild lung base interstitial opacities  No pleural effusion. No pneumothorax.  Stable cardiopericardial silhouette.  Brain/neck MRI/MRA  IMPRESSION:  Normal cervical MRA.  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  No definite aneurysm seen.    Most recent labs:       Lab Results   Component Value Date/Time    WBC 4.8 04/21/2025 06:51  "AM    HEMOGLOBIN 8.7 (L) 04/21/2025 06:51 AM    HEMATOCRIT 27.3 (L) 04/21/2025 06:51 AM    MCV 89.5 04/21/2025 06:51 AM    INR 1.32 (H) 04/08/2025 04:40 PM    TSH 1.990 03/09/2016 09:37 AM      Lab Results   Component Value Date/Time    SODIUM 135 04/23/2025 05:36 AM    POTASSIUM 5.3 04/23/2025 05:36 AM    CHLORIDE 105 04/23/2025 05:36 AM    CO2 18 (L) 04/23/2025 05:36 AM    GLUCOSE 78 04/23/2025 05:36 AM    BUN 29 (H) 04/23/2025 05:36 AM    CREATININE 0.83 04/23/2025 05:36 AM    CREATININE 0.77 01/29/2013 07:49 AM    GLOMRATE >59 11/10/2010 09:26 AM      Lab Results   Component Value Date/Time    ASTSGOT 37 04/19/2025 05:32 AM    ALTSGPT 14 04/19/2025 05:32 AM    ALBUMIN 2.9 (L) 04/19/2025 05:32 AM      Lab Results   Component Value Date/Time    CHOLSTRLTOT 151 11/14/2024 11:14 AM    LDL 94 11/14/2024 11:14 AM    HDL 35 (A) 11/14/2024 11:14 AM    TRIGLYCERIDE 111 11/14/2024 11:14 AM     No results for input(s): \"NTPROBNP\", \"TROPONINT\" in the last 72 hours.      Past Medical History:   Diagnosis Date    Arthritis     Hip, knee    ASTHMA     AV block, complete (HCC) 06/2019    Status post pacemaker implantation    Cataract     Bilateral IOL    CKD (chronic kidney disease)     Hyperlipidemia     Hypertension 10/2023    Echocardiogram with normal LV size, LVEF 69%. Normal LA and RV, enlarged RA. Trace MR. RVSP 35mmHg.    Menopause     Osteopenia     Pedal edema      Past Surgical History:   Procedure Laterality Date    COLONOSCOPY N/A 4/10/2025    Procedure: COLONOSCOPY;  Surgeon: Juan Manuel James M.D.;  Location: SURGERY SAME DAY [facility];  Service: Gastroenterology    MD UPPER GI ENDOSCOPY,BIOPSY N/A 4/9/2025    Procedure: GASTROSCOPY, WITH BIOPSY;  Surgeon: Ly Arnold M.D.;  Location: SURGERY SAME DAY [facility];  Service: Gastroenterology    COLONOSCOPY N/A 4/9/2025    Procedure: COLONOSCOPY;  Surgeon: Ly Arnold M.D.;  Location: SURGERY SAME DAY [facility];  Service: Gastroenterology    PACEMAKER INSERTION " Left 06/20/2019    Medtronic Sabinal S  MRI [id number] implanted by Dr. Mcginnis.    HIP REVISION TOTAL Right 8/29/2018    Procedure: HIP REVISION TOTAL;  Surgeon: Oniel Goodson M.D.;  Location: SURGERY [facility];  Service: Orthopedics    HIP ARTHROPLASTY TOTAL Right 8/9/2016    Procedure: HIP ARTHROPLASTY TOTAL;  Surgeon: Oniel Goodson M.D.;  Location: SURGERY [facility];  Service:     CATARACT EXTRACTION WITH IOL Bilateral 2012     Family History   Problem Relation Age of Onset    Heart Disease Father     Hyperlipidemia Father     Hypertension Father     Heart Disease Brother     Hypertension Mother     Hyperlipidemia Mother      Social History     Socioeconomic History    Marital status:      Spouse name: Not on file    Number of children: Not on file    Years of education: Not on file    Highest education level: Not on file   Occupational History    Not on file   Tobacco Use    Smoking status: Never    Smokeless tobacco: Never   Vaping Use    Vaping status: Never Used   Substance and Sexual Activity    Alcohol use: Yes     Alcohol/week: 0.6 oz     Types: 1 Glasses of wine per week     Comment: once every 2-3 months.     Drug use: No    Sexual activity: Not Currently     Partners: Male   Other Topics Concern    Not on file   Social History Narrative    Retired , [place] [employer].      Social Drivers of Health     Financial Resource Strain: Not on file   Food Insecurity: No Food Insecurity (4/24/2025)    Hunger Vital Sign     Worried About Running Out of Food in the Last Year: Never true     Ran Out of Food in the Last Year: Never true   Transportation Needs: No Transportation Needs (4/23/2025)    PRAPARE - Transportation     Lack of Transportation (Medical): No     Lack of Transportation (Non-Medical): No   Physical Activity: Not on file   Stress: Not on file   Social Connections: Not on file   Intimate Partner Violence: Not At Risk (4/24/2025)    Humiliation, Afraid,  "Rape, and Kick questionnaire     Fear of Current or Ex-Partner: No     Emotionally Abused: No     Physically Abused: No     Sexually Abused: No   Housing Stability: Low Risk  (4/24/2025)    Housing Stability Vital Sign     Unable to Pay for Housing in the Last Year: No     Number of Times Moved in the Last Year: 0     Homeless in the Last Year: No     Allergies   Allergen Reactions    Other Environmental Cough     Pine, Dust       Current Outpatient Medications   Medication Sig Dispense Refill    furosemide (LASIX) 20 MG Tab Take 1 Tablet by mouth every day. 90 Tablet 3    potassium chloride SA (K-DUR) 10 MEQ Tab CR Take 1 Tablet by mouth 2 times a day. 90 Tablet 3    carvedilol (COREG) 6.25 MG Tab Take 1 Tablet by mouth 2 times a day with meals. 60 Tablet 0    ferrous gluconate (FERGON) 324 (38 Fe) MG Tab Take 1 Tablet by mouth every morning with breakfast. 30 Tablet 0    omeprazole (PRILOSEC) 20 MG delayed-release capsule Take 1 Capsule by mouth 2 times a day. 60 Capsule 0    vitamin D2, Ergocalciferol, (DRISDOL) 1.25 MG (82610 UT) Cap capsule Take 1 Capsule by mouth every 7 days. 4 Capsule 0    losartan (COZAAR) 50 MG Tab Take 1 Tablet by mouth 2 times a day. 200 Tablet 3    atorvastatin (LIPITOR) 20 MG Tab Take 1 Tablet by mouth every day. 100 Tablet 3    hydrALAZINE (APRESOLINE) 100 MG tablet Take 1 Tablet by mouth 2 times a day. Dose increase 6/3/2022 200 Tablet 3     No current facility-administered medications for this visit.       ROS  All others systems reviewed and negative.    /68 (BP Location: Left arm, Patient Position: Sitting, BP Cuff Size: Adult)   Pulse 94   Resp 16   Ht 1.651 m (5' 5\")   Wt 59.9 kg (132 lb)   SpO2 92%  Body mass index is 21.97 kg/m².    General: No acute distress. Well nourished.  HEENT: EOM grossly intact, no scleral icterus, no pharyngeal erythema.   Neck:  No JVD at 90, no bruits, trachea midline  CVS: RRR. Normal S1, S2. No dionte M/R/G.  4+ LE edema to her knees.  " "2+ radial pulses, 2+ PT pulses, pacemaker pocket intact  Resp: CTAB. No wheezing or crackles/rhonchi. Normal respiratory effort.  Right lung base is elevated due to right hemidiaphragm elevation  Abdomen: Soft, NT, no dionte hepatomegaly.  MSK/Ext: No clubbing or cyanosis.  Skin: Warm and dry, no rashes.  Neurological: CN III-XII grossly intact. No focal deficits.   Psych: A&O x 3, appropriate affect, good judgement      Return in about 4 weeks (around 6/10/2025).    Written instructions given today:    Important things for you to know about management of congestive heart failure, chronic kidney disease and fluid balance:  *When we give you a diuretic/water pill, it is 1 job is to get rid of water.  We are making our best educated guess as to how much fluid you are retaining and what dose of the water pill you need.  We need consistency and data to make better decisions.   -Do not take an extra fluid to \"stay hydrated,\" no matter what anyone else tells you.  -Drinking extra fluids and taking a diuretic/water pill are the exact opposite actions.  You should fluid restrict before you take water pills.       1500 mL is 1.5 Liters which is equivalent to 6.3 cups (50 ounces)  of fluid daily.  -Salt makes the water stick.  Sodium= Salt= Sea Salt= Himalayan Salt, they are all the same.  Stay under 2400 mg of sodium daily.  -When we give you water pills we are making our best educated guess as to how much you need and the dose you need depends upon how much fluid and salt you get at home. We need your help to make the best guess and find the right dose.  -The key to managing your water situation is a digital scale in your own home. We need that data.  -Get up every morning, empty your bladder and weigh yourself on your own scale every single day like a  collecting data.  Write it down every single day.  This will be your log sheet.  Bring this with you to every doctor's appointment and remember to show every " provider, even if they forget to ask.  -Every time the medication is changed, note the change next to your weight for that day.  -If you forget to weigh in the morning, do not weigh yourself later, simply skip for that day.  Garbage data is not good data.  -The weight in the doctor's office does not matter, garbage data.  Only your scale at home will give us consistent information.  -Consider traveling with your scale.  When people travel they tend to get more salt when eating out.  -If you are successful at reducing salt and fluid intake, you may be able to reduce the amount of diuretics/water pills you need.    *Furosemide 20 mg taken once daily with potassium chloride 10 meq daily.  - You can take the furosemide anytime of day, just take it by 4 PM so is completely out of your system before you go to bed.    -Bring all of your medications to your next appointment just to be safe.    -Nonfasting blood test 2 weeks after starting the Lasix with potassium to ensure I am not dehydrating you and causing harm.      It is my pleasure to participate in the care of Ms. Hdz.  Please do not hesitate to contact me with questions or concerns.    Celeste Urena MD, [facility]  Cardiologist, [facility]    Please note that this dictation was created using voice recognition software. I have made every reasonable attempt to correct obvious errors, but it is possible there are errors of grammar and possibly content that I did not discover before finalizing the note.

## 2025-05-09 ENCOUNTER — TELEPHONE (OUTPATIENT)
Dept: HEALTH INFORMATION MANAGEMENT | Facility: OTHER | Age: 85
End: 2025-05-09
Payer: MEDICARE

## 2025-05-13 ENCOUNTER — NON-PROVIDER VISIT (OUTPATIENT)
Dept: CARDIOLOGY | Facility: MEDICAL CENTER | Age: 85
End: 2025-05-13
Attending: NURSE PRACTITIONER
Payer: MEDICARE

## 2025-05-13 ENCOUNTER — OFFICE VISIT (OUTPATIENT)
Dept: CARDIOLOGY | Facility: MEDICAL CENTER | Age: 85
End: 2025-05-13
Attending: INTERNAL MEDICINE
Payer: MEDICARE

## 2025-05-13 VITALS
HEART RATE: 94 BPM | OXYGEN SATURATION: 92 % | RESPIRATION RATE: 16 BRPM | HEIGHT: 65 IN | SYSTOLIC BLOOD PRESSURE: 134 MMHG | DIASTOLIC BLOOD PRESSURE: 68 MMHG | BODY MASS INDEX: 21.99 KG/M2 | WEIGHT: 132 LBS

## 2025-05-13 VITALS
DIASTOLIC BLOOD PRESSURE: 68 MMHG | SYSTOLIC BLOOD PRESSURE: 134 MMHG | HEIGHT: 65 IN | BODY MASS INDEX: 21.99 KG/M2 | HEART RATE: 94 BPM | WEIGHT: 132 LBS | OXYGEN SATURATION: 92 % | RESPIRATION RATE: 16 BRPM

## 2025-05-13 DIAGNOSIS — I47.10 PAROXYSMAL SUPRAVENTRICULAR TACHYCARDIA (HCC): ICD-10-CM

## 2025-05-13 DIAGNOSIS — Z95.0 CARDIAC PACEMAKER IN SITU: ICD-10-CM

## 2025-05-13 DIAGNOSIS — R53.83 OTHER FATIGUE: ICD-10-CM

## 2025-05-13 DIAGNOSIS — I87.2 VENOUS INSUFFICIENCY OF BOTH LOWER EXTREMITIES: ICD-10-CM

## 2025-05-13 DIAGNOSIS — N18.32 STAGE 3B CHRONIC KIDNEY DISEASE: ICD-10-CM

## 2025-05-13 DIAGNOSIS — I10 ESSENTIAL HYPERTENSION: ICD-10-CM

## 2025-05-13 DIAGNOSIS — I44.2 COMPLETE HEART BLOCK BY ELECTROCARDIOGRAM (HCC): ICD-10-CM

## 2025-05-13 DIAGNOSIS — I50.33 ACUTE ON CHRONIC HEART FAILURE WITH PRESERVED EJECTION FRACTION (HFPEF) (HCC): Primary | ICD-10-CM

## 2025-05-13 PROBLEM — E87.20 NORMAL ANION GAP METABOLIC ACIDOSIS: Status: RESOLVED | Noted: 2025-04-08 | Resolved: 2025-05-13

## 2025-05-13 PROBLEM — R79.89 ELEVATED TROPONIN: Status: RESOLVED | Noted: 2025-04-08 | Resolved: 2025-05-13

## 2025-05-13 PROCEDURE — 3075F SYST BP GE 130 - 139MM HG: CPT | Performed by: INTERNAL MEDICINE

## 2025-05-13 PROCEDURE — 99213 OFFICE O/P EST LOW 20 MIN: CPT | Performed by: INTERNAL MEDICINE

## 2025-05-13 PROCEDURE — 3078F DIAST BP <80 MM HG: CPT | Performed by: INTERNAL MEDICINE

## 2025-05-13 PROCEDURE — 93288 INTERROG EVL PM/LDLS PM IP: CPT | Mod: 26 | Performed by: NURSE PRACTITIONER

## 2025-05-13 PROCEDURE — 99214 OFFICE O/P EST MOD 30 MIN: CPT | Performed by: INTERNAL MEDICINE

## 2025-05-13 RX ORDER — POTASSIUM CHLORIDE 750 MG/1
10 TABLET, EXTENDED RELEASE ORAL 2 TIMES DAILY
Qty: 90 TABLET | Refills: 3 | Status: SHIPPED | OUTPATIENT
Start: 2025-05-13

## 2025-05-13 RX ORDER — FUROSEMIDE 20 MG/1
20 TABLET ORAL DAILY
Qty: 90 TABLET | Refills: 3 | Status: SHIPPED | OUTPATIENT
Start: 2025-05-13

## 2025-05-13 ASSESSMENT — FIBROSIS 4 INDEX
FIB4 SCORE: 4.04
FIB4 SCORE: 4.04

## 2025-05-13 NOTE — PROGRESS NOTES
Patient was hospitalized at [facility] on 4/8/2025 for GI bleed/bloody stools.    Colonoscopy reveals multiple diverticula, but no obvious bleeding sources and mild hemorrhoids. She was started on Prilosec 20mg BID.    Device is working normally. No mode switching episodes.  Normal sensing and capture of RA and RV leads; stable impedances. Battery longevity is 6.6 years.  No changes are made today.    She does see Dr. Urena today too.    Follow-up in 6 months for next PM check with me.

## 2025-05-13 NOTE — PATIENT INSTRUCTIONS
"Important things for you to know about management of congestive heart failure, chronic kidney disease and fluid balance:  *When we give you a diuretic/water pill, it is 1 job is to get rid of water.  We are making our best educated guess as to how much fluid you are retaining and what dose of the water pill you need.  We need consistency and data to make better decisions.   -Do not take an extra fluid to \"stay hydrated,\" no matter what anyone else tells you.  -Drinking extra fluids and taking a diuretic/water pill are the exact opposite actions.  You should fluid restrict before you take water pills.       1500 mL is 1.5 Liters which is equivalent to 6.3 cups (50 ounces)  of fluid daily.  -Salt makes the water stick.  Sodium= Salt= Sea Salt= Himalayan Salt, they are all the same.  Stay under 2400 mg of sodium daily.  -When we give you water pills we are making our best educated guess as to how much you need and the dose you need depends upon how much fluid and salt you get at home. We need your help to make the best guess and find the right dose.  -The key to managing your water situation is a digital scale in your own home. We need that data.  -Get up every morning, empty your bladder and weigh yourself on your own scale every single day like a  collecting data.  Write it down every single day.  This will be your log sheet.  Bring this with you to every doctor's appointment and remember to show every provider, even if they forget to ask.  -Every time the medication is changed, note the change next to your weight for that day.  -If you forget to weigh in the morning, do not weigh yourself later, simply skip for that day.  Garbage data is not good data.  -The weight in the doctor's office does not matter, garbage data.  Only your scale at home will give us consistent information.  -Consider traveling with your scale.  When people travel they tend to get more salt when eating out.  -If you are successful at " reducing salt and fluid intake, you may be able to reduce the amount of diuretics/water pills you need.    *Furosemide 20 mg taken once daily with potassium chloride 10 meq daily.  - You can take the furosemide anytime of day, just take it by 4 PM so is completely out of your system before you go to bed.    -Bring all of your medications to your next appointment just to be safe.    -Nonfasting blood test 2 weeks after starting the Lasix with potassium to ensure I am not dehydrating you and causing harm.

## 2025-05-28 ENCOUNTER — HOSPITAL ENCOUNTER (OUTPATIENT)
Facility: MEDICAL CENTER | Age: 85
End: 2025-05-28
Attending: INTERNAL MEDICINE
Payer: MEDICARE

## 2025-05-28 DIAGNOSIS — I50.33 ACUTE ON CHRONIC HEART FAILURE WITH PRESERVED EJECTION FRACTION (HFPEF) (HCC): ICD-10-CM

## 2025-05-28 LAB
ANION GAP SERPL CALC-SCNC: 14 MMOL/L (ref 7–16)
BUN SERPL-MCNC: 26 MG/DL (ref 8–22)
CALCIUM SERPL-MCNC: 8.6 MG/DL (ref 8.5–10.5)
CHLORIDE SERPL-SCNC: 105 MMOL/L (ref 96–112)
CO2 SERPL-SCNC: 21 MMOL/L (ref 20–33)
CREAT SERPL-MCNC: 0.86 MG/DL (ref 0.5–1.4)
GFR SERPLBLD CREATININE-BSD FMLA CKD-EPI: 65 ML/MIN/1.73 M 2
GLUCOSE SERPL-MCNC: 94 MG/DL (ref 65–99)
POTASSIUM SERPL-SCNC: 4.2 MMOL/L (ref 3.6–5.5)
SODIUM SERPL-SCNC: 140 MMOL/L (ref 135–145)

## 2025-05-28 PROCEDURE — 80048 BASIC METABOLIC PNL TOTAL CA: CPT

## 2025-05-29 ENCOUNTER — RESULTS FOLLOW-UP (OUTPATIENT)
Dept: CARDIOLOGY | Facility: MEDICAL CENTER | Age: 85
End: 2025-05-29
Payer: MEDICARE

## 2025-06-16 ENCOUNTER — OFFICE VISIT (OUTPATIENT)
Facility: MEDICAL CENTER | Age: 85
End: 2025-06-16
Attending: NURSE PRACTITIONER
Payer: MEDICARE

## 2025-06-16 VITALS
RESPIRATION RATE: 16 BRPM | BODY MASS INDEX: 21.16 KG/M2 | SYSTOLIC BLOOD PRESSURE: 138 MMHG | HEIGHT: 65 IN | DIASTOLIC BLOOD PRESSURE: 54 MMHG | HEART RATE: 82 BPM | OXYGEN SATURATION: 94 % | WEIGHT: 127 LBS

## 2025-06-16 DIAGNOSIS — I10 ESSENTIAL HYPERTENSION: ICD-10-CM

## 2025-06-16 DIAGNOSIS — Z95.0 CARDIAC PACEMAKER IN SITU: ICD-10-CM

## 2025-06-16 DIAGNOSIS — E78.5 DYSLIPIDEMIA: ICD-10-CM

## 2025-06-16 DIAGNOSIS — I44.2 COMPLETE HEART BLOCK BY ELECTROCARDIOGRAM (HCC): ICD-10-CM

## 2025-06-16 DIAGNOSIS — I50.33 ACUTE ON CHRONIC HEART FAILURE WITH PRESERVED EJECTION FRACTION (HFPEF) (HCC): Primary | ICD-10-CM

## 2025-06-16 DIAGNOSIS — K29.01 GASTROINTESTINAL HEMORRHAGE ASSOCIATED WITH ACUTE GASTRITIS: ICD-10-CM

## 2025-06-16 PROBLEM — I50.30 (HFPEF) HEART FAILURE WITH PRESERVED EJECTION FRACTION (HCC): Status: ACTIVE | Noted: 2025-06-16

## 2025-06-16 PROCEDURE — 99213 OFFICE O/P EST LOW 20 MIN: CPT | Performed by: NURSE PRACTITIONER

## 2025-06-16 PROCEDURE — 3075F SYST BP GE 130 - 139MM HG: CPT | Performed by: NURSE PRACTITIONER

## 2025-06-16 PROCEDURE — 99214 OFFICE O/P EST MOD 30 MIN: CPT | Performed by: NURSE PRACTITIONER

## 2025-06-16 PROCEDURE — 3078F DIAST BP <80 MM HG: CPT | Performed by: NURSE PRACTITIONER

## 2025-06-16 PROCEDURE — 2023F DILAT RTA XM W/O RTNOPTHY: CPT | Performed by: NURSE PRACTITIONER

## 2025-06-16 RX ORDER — SPIRONOLACTONE 25 MG/1
TABLET ORAL
COMMUNITY

## 2025-06-16 RX ORDER — TRAZODONE HYDROCHLORIDE 50 MG/1
50 TABLET ORAL NIGHTLY
COMMUNITY
End: 2025-06-16

## 2025-06-16 RX ORDER — ATORVASTATIN CALCIUM 20 MG/1
20 TABLET, FILM COATED ORAL
Qty: 100 TABLET | Refills: 3 | Status: SHIPPED | OUTPATIENT
Start: 2025-06-16

## 2025-06-16 RX ORDER — POTASSIUM CHLORIDE 750 MG/1
10 TABLET, EXTENDED RELEASE ORAL DAILY
Qty: 90 TABLET | Refills: 3
Start: 2025-06-16

## 2025-06-16 RX ORDER — CARVEDILOL 12.5 MG/1
12.5 TABLET ORAL 2 TIMES DAILY WITH MEALS
Qty: 200 TABLET | Refills: 3 | Status: SHIPPED | OUTPATIENT
Start: 2025-06-16

## 2025-06-16 ASSESSMENT — ENCOUNTER SYMPTOMS
CHILLS: 0
FEVER: 0
INSOMNIA: 0
DIZZINESS: 0
SHORTNESS OF BREATH: 0
MYALGIAS: 0
BRUISES/BLEEDS EASILY: 0
ABDOMINAL PAIN: 0
COUGH: 0
LOSS OF CONSCIOUSNESS: 0
ORTHOPNEA: 0
NAUSEA: 0
PALPITATIONS: 0
PND: 0
BACK PAIN: 0
HEADACHES: 0

## 2025-06-16 ASSESSMENT — FIBROSIS 4 INDEX: FIB4 SCORE: 4.04

## 2025-06-16 NOTE — PROGRESS NOTES
Chief Complaint   Patient presents with    Follow-Up    CHF (Compensated)    HTN (Controlled)    Hyperlipidemia       Subjective     Priscilla Hdz is a 85 y.o. female who presents today for one month follow-up of medication re-initiation response.    Priscilla is a 85 year old female with history of high AV block with PM since 2019, HFpEF, HTN (hard to control), hyperlipidemia, chronic venous insufficiency, pulmonary hypertension, and CKD, last seen by me in December 2024.     In April 2025, she was hospitalized for GI bleed/diverticula. Several of her cardiac medications were stopped, including Lasix, Potassium and Aldactone.    At follow-up last month (May 2025), she was Dr. Urena for the first time to establish care. Lasix and KCl were resumed, due to elevated right sided heart pressures.     She is here today for 4 week follow-up with her son. There are several medications she is taking, and not taking. Her weight is down, and staying consistent, but she tires easily, and sleeps a lot during the day.    She denies any chest pain, pressure, tightness or discomfort; no symptomatic palpitations; no shortness of breath, orthopnea or PND; no dizziness or syncope. She does still have some LE edema, and wears compression socks. She has not been checking her BP at home.    Past Medical History[1]  Past Surgical History[2]  Family History   Problem Relation Age of Onset    Heart Disease Father     Hyperlipidemia Father     Hypertension Father     Heart Disease Brother     Hypertension Mother     Hyperlipidemia Mother      Social History     Socioeconomic History    Marital status:      Spouse name: Not on file    Number of children: Not on file    Years of education: Not on file    Highest education level: Not on file   Occupational History    Not on file   Tobacco Use    Smoking status: Never    Smokeless tobacco: Never   Vaping Use    Vaping status: Never Used   Substance and Sexual Activity    Alcohol use:  Yes     Alcohol/week: 0.6 oz     Types: 1 Glasses of wine per week     Comment: once every 2-3 months.     Drug use: No    Sexual activity: Not Currently     Partners: Male   Other Topics Concern    Not on file   Social History Narrative    Retired , [employer] [employer].      Social Drivers of Health     Financial Resource Strain: Not on file   Food Insecurity: No Food Insecurity (4/24/2025)    Hunger Vital Sign     Worried About Running Out of Food in the Last Year: Never true     Ran Out of Food in the Last Year: Never true   Transportation Needs: No Transportation Needs (4/23/2025)    PRAPARE - Transportation     Lack of Transportation (Medical): No     Lack of Transportation (Non-Medical): No   Physical Activity: Not on file   Stress: Not on file   Social Connections: Not on file   Intimate Partner Violence: Not At Risk (4/24/2025)    Humiliation, Afraid, Rape, and Kick questionnaire     Fear of Current or Ex-Partner: No     Emotionally Abused: No     Physically Abused: No     Sexually Abused: No   Housing Stability: Low Risk  (4/24/2025)    Housing Stability Vital Sign     Unable to Pay for Housing in the Last Year: No     Number of Times Moved in the Last Year: 0     Homeless in the Last Year: No     Allergies[3]  Encounter Medications[4]  Review of Systems   Constitutional:  Positive for malaise/fatigue. Negative for chills and fever.   HENT:  Negative for congestion.    Respiratory:  Negative for cough and shortness of breath.    Cardiovascular:  Positive for leg swelling. Negative for chest pain, palpitations, orthopnea and PND.   Gastrointestinal:  Negative for abdominal pain and nausea.   Musculoskeletal:  Negative for back pain and myalgias.   Skin:  Negative for rash.   Neurological:  Negative for dizziness, loss of consciousness and headaches.   Endo/Heme/Allergies:  Does not bruise/bleed easily.   Psychiatric/Behavioral:  The patient does not have insomnia.               Objective     BP  "138/54 (BP Location: Left arm, Patient Position: Sitting, BP Cuff Size: Adult)   Pulse 82   Resp 16   Ht 1.651 m (5' 5\")   Wt 57.6 kg (127 lb)   SpO2 94%   BMI 21.13 kg/m²     Physical Exam  Constitutional:       Appearance: She is well-developed.      Comments: Ambulates with a walker.   HENT:      Head: Normocephalic.   Neck:      Vascular: No JVD.   Cardiovascular:      Rate and Rhythm: Normal rate and regular rhythm.      Heart sounds: Normal heart sounds.      Comments: PM in left chest wall.  Pulmonary:      Effort: Pulmonary effort is normal. No respiratory distress.      Breath sounds: Normal breath sounds. No wheezing or rales.   Abdominal:      General: Bowel sounds are normal. There is no distension.      Palpations: Abdomen is soft.      Tenderness: There is no abdominal tenderness.   Musculoskeletal:         General: Normal range of motion.      Cervical back: Normal range of motion and neck supple.      Right lower leg: Edema present.      Left lower leg: Edema present.      Comments: 1-2+ edema in the feet bilaterally.  Wearing compression stockings.   Skin:     General: Skin is warm and dry.      Findings: No rash.   Neurological:      Mental Status: She is alert and oriented to person, place, and time.       ECHOCARDIOGRAPHY:    CONCLUSIONS OF TTE OF 4/17/2025:  Normal left ventricular systolic function.  Right ventricular systolic pressure is estimated to be  33 mmHg.  No significant valvular disease.     CONCLUSIONS OF TTE OF 10/17/2023:  Normal left ventricular size, thickness, and systolic function.  Normal right ventricular size and systolic function.  Normal left atrial size.  Normal pericardium without effusion.     CONCLUSIONS OF ECHOCARDIOGRAM OF 6/19/2019:  Prior Echo - 8/5/16  Hyperdynamic left ventricular systolic function.  Left ventricular ejection fraction is visually estimated to be greater than 75%.  Estimated right ventricular systolic pressure is 60 mmHg.     LABS:    Lab " Results   Component Value Date/Time    SODIUM 140 05/28/2025 10:20 AM    POTASSIUM 4.2 05/28/2025 10:20 AM    CHLORIDE 105 05/28/2025 10:20 AM    CO2 21 05/28/2025 10:20 AM    GLUCOSE 94 05/28/2025 10:20 AM    BUN 26 (H) 05/28/2025 10:20 AM    CREATININE 0.86 05/28/2025 10:20 AM    CREATININE 0.77 01/29/2013 07:49 AM    BUNCREATRAT 19 03/09/2016 09:37 AM    BUNCREATRAT 23 01/29/2013 07:49 AM    GLOMRATE >59 11/10/2010 09:26 AM      Lab Results   Component Value Date/Time    WBC 4.8 04/21/2025 06:51 AM    RBC 3.05 (L) 04/21/2025 06:51 AM    HEMOGLOBIN 8.7 (L) 04/21/2025 06:51 AM    HEMATOCRIT 27.3 (L) 04/21/2025 06:51 AM    MCV 89.5 04/21/2025 06:51 AM    MCH 28.5 04/21/2025 06:51 AM    MCHC 31.9 (L) 04/21/2025 06:51 AM    MPV 11.0 04/21/2025 06:51 AM       Lab Results   Component Value Date/Time    CHOLSTRLTOT 151 11/14/2024 11:14 AM    LDL 94 11/14/2024 11:14 AM    HDL 35 (A) 11/14/2024 11:14 AM    TRIGLYCERIDE 111 11/14/2024 11:14 AM          Assessment & Plan     1. Acute on chronic heart failure with preserved ejection fraction (HFpEF) ([facility])  potassium chloride SA (K-DUR) 10 MEQ Tab CR    CBC WITHOUT DIFFERENTIAL      2. Essential hypertension  carvedilol (COREG) 12.5 MG Tab    Basic Metabolic Panel      3. Dyslipidemia  atorvastatin (LIPITOR) 20 MG Tab      4. Gastrointestinal hemorrhage associated with acute gastritis  carvedilol (COREG) 12.5 MG Tab    CBC WITHOUT DIFFERENTIAL      5. Cardiac pacemaker in situ        6. Complete heart block by electrocardiogram (HCC)            Medical Decision Making: Today's Assessment/Status/Plan:        Acute on chronic HFpEF, stage C, class II-III, LVEF 70% on echo in April 2025, stable, with stable weight. Continue:  Coreg 12.5mg twice daily  Lasix 20mg once daily  KCl 10mEq once daily  RESUME Losartan 50mg once daily  Consider re-adding Aldactone at next follow-up.  2.   Hypertension, treated with Coreg only. As above, re-add Losartan 50mg once daily.  3.    Hyperlipidemia, treated with Lipitor 20mg. She has not been taking this; to resume it.  4.   History of GI bleed, on Omeprazole, to recheck CBC.  5.   High AV block, with PPM, checked last month, to be checked again in November 2025.    As above, resume Lipitor and Losartan.  Consider re-adding Aldactone, watch BP response.  Repeat BMP and CBC.    Follow-up in 2 months to recheck BP and fluid status.  Keep November 2025 follow-up for next PM check.                         [1]   Past Medical History:  Diagnosis Date    Arthritis     Hip, knee    ASTHMA     AV block, complete (HCC) 06/2019    Status post pacemaker implantation    Cataract     Bilateral IOL    CKD (chronic kidney disease)     Hyperlipidemia     Hypertension 10/2023    Echocardiogram with normal LV size, LVEF 69%. Normal LA and RV, enlarged RA. Trace MR. RVSP 35mmHg.    Menopause     Osteopenia     Pedal edema    [2]   Past Surgical History:  Procedure Laterality Date    COLONOSCOPY N/A 4/10/2025    Procedure: COLONOSCOPY;  Surgeon: Juan Manuel James M.D.;  Location: SURGERY SAME DAY [facility];  Service: Gastroenterology    MI UPPER GI ENDOSCOPY,BIOPSY N/A 4/9/2025    Procedure: GASTROSCOPY, WITH BIOPSY;  Surgeon: Ly Arnold M.D.;  Location: SURGERY SAME DAY [facility];  Service: Gastroenterology    COLONOSCOPY N/A 4/9/2025    Procedure: COLONOSCOPY;  Surgeon: Ly Arnold M.D.;  Location: SURGERY SAME DAY [facility];  Service: Gastroenterology    PACEMAKER INSERTION Left 06/20/2019    [employer]onic Kalie S DR MRI [id number] implanted by Dr. Mcginnis.    HIP REVISION TOTAL Right 8/29/2018    Procedure: HIP REVISION TOTAL;  Surgeon: Oniel Goodson M.D.;  Location: [facility];  Service: Orthopedics    HIP ARTHROPLASTY TOTAL Right 8/9/2016    Procedure: HIP ARTHROPLASTY TOTAL;  Surgeon: Oniel Goodson M.D.;  Location: [facility];  Service:     CATARACT EXTRACTION WITH IOL Bilateral 2012   [3]   Allergies  Allergen Reactions     Other Environmental Cough     Pine, Dust   [4]   Outpatient Encounter Medications as of 6/16/2025   Medication Sig Dispense Refill    potassium chloride SA (K-DUR) 10 MEQ Tab CR Take 1 Tablet by mouth every day. 90 Tablet 3    carvedilol (COREG) 12.5 MG Tab Take 1 Tablet by mouth 2 times a day with meals. 200 Tablet 3    atorvastatin (LIPITOR) 20 MG Tab Take 1 Tablet by mouth every day. 100 Tablet 3    furosemide (LASIX) 20 MG Tab Take 1 Tablet by mouth every day. 90 Tablet 3    omeprazole (PRILOSEC) 20 MG delayed-release capsule Take 1 Capsule by mouth 2 times a day. 60 Capsule 0    spironolactone (ALDACTONE) 25 MG Tab 1 tablet Orally (Patient not taking: Reported on 6/16/2025)      [DISCONTINUED] traZODone (DESYREL) 50 MG Tab Take 50 mg by mouth every evening. (Patient not taking: Reported on 6/16/2025)      [DISCONTINUED] potassium chloride SA (K-DUR) 10 MEQ Tab CR Take 1 Tablet by mouth 2 times a day. (Patient not taking: Reported on 6/16/2025) 90 Tablet 3    [DISCONTINUED] carvedilol (COREG) 6.25 MG Tab Take 1 Tablet by mouth 2 times a day with meals. 60 Tablet 0    [DISCONTINUED] ferrous gluconate (FERGON) 324 (38 Fe) MG Tab Take 1 Tablet by mouth every morning with breakfast. (Patient not taking: Reported on 6/16/2025) 30 Tablet 0    [DISCONTINUED] vitamin D2, Ergocalciferol, (DRISDOL) 1.25 MG (76895 UT) Cap capsule Take 1 Capsule by mouth every 7 days. (Patient not taking: Reported on 6/16/2025) 4 Capsule 0    losartan (COZAAR) 50 MG Tab Take 1 Tablet by mouth 2 times a day. (Patient not taking: Reported on 6/16/2025) 200 Tablet 3    hydrALAZINE (APRESOLINE) 100 MG tablet Take 1 Tablet by mouth 2 times a day. Dose increase 6/3/2022 (Patient not taking: Reported on 6/16/2025) 200 Tablet 3    [DISCONTINUED] atorvastatin (LIPITOR) 20 MG Tab Take 1 Tablet by mouth every day. 100 Tablet 3     No facility-administered encounter medications on file as of 6/16/2025.

## 2025-07-23 ENCOUNTER — HOSPITAL ENCOUNTER (OUTPATIENT)
Dept: LAB | Facility: MEDICAL CENTER | Age: 85
End: 2025-07-23
Attending: NURSE PRACTITIONER
Payer: MEDICARE

## 2025-07-23 DIAGNOSIS — I50.33 ACUTE ON CHRONIC HEART FAILURE WITH PRESERVED EJECTION FRACTION (HFPEF) (HCC): ICD-10-CM

## 2025-07-23 DIAGNOSIS — K29.01 GASTROINTESTINAL HEMORRHAGE ASSOCIATED WITH ACUTE GASTRITIS: ICD-10-CM

## 2025-07-23 DIAGNOSIS — I10 ESSENTIAL HYPERTENSION: ICD-10-CM

## 2025-07-23 LAB
ANION GAP SERPL CALC-SCNC: 10 MMOL/L (ref 7–16)
ANISOCYTOSIS BLD QL SMEAR: ABNORMAL
BASOPHILS # BLD AUTO: 0.2 % (ref 0–1.8)
BASOPHILS # BLD: 0.01 K/UL (ref 0–0.12)
BUN SERPL-MCNC: 39 MG/DL (ref 8–22)
CALCIUM SERPL-MCNC: 9.2 MG/DL (ref 8.5–10.5)
CHLORIDE SERPL-SCNC: 111 MMOL/L (ref 96–112)
CO2 SERPL-SCNC: 24 MMOL/L (ref 20–33)
COMMENT 1642: NORMAL
CREAT SERPL-MCNC: 1.07 MG/DL (ref 0.5–1.4)
EOSINOPHIL # BLD AUTO: 0.28 K/UL (ref 0–0.51)
EOSINOPHIL NFR BLD: 5.8 % (ref 0–6.9)
ERYTHROCYTE [DISTWIDTH] IN BLOOD BY AUTOMATED COUNT: 56.1 FL (ref 35.9–50)
GFR SERPLBLD CREATININE-BSD FMLA CKD-EPI: 50 ML/MIN/1.73 M 2
GLUCOSE SERPL-MCNC: 94 MG/DL (ref 65–99)
HCT VFR BLD AUTO: 39.1 % (ref 37–47)
HGB BLD-MCNC: 11.7 G/DL (ref 12–16)
IMM GRANULOCYTES # BLD AUTO: 0.02 K/UL (ref 0–0.11)
IMM GRANULOCYTES NFR BLD AUTO: 0.4 % (ref 0–0.9)
LYMPHOCYTES # BLD AUTO: 1.56 K/UL (ref 1–4.8)
LYMPHOCYTES NFR BLD: 32.1 % (ref 22–41)
MCH RBC QN AUTO: 27.9 PG (ref 27–33)
MCHC RBC AUTO-ENTMCNC: 29.9 G/DL (ref 32.2–35.5)
MCV RBC AUTO: 93.1 FL (ref 81.4–97.8)
MICROCYTES BLD QL SMEAR: ABNORMAL
MONOCYTES # BLD AUTO: 0.49 K/UL (ref 0–0.85)
MONOCYTES NFR BLD AUTO: 10.1 % (ref 0–13.4)
MORPHOLOGY BLD-IMP: NORMAL
NEUTROPHILS # BLD AUTO: 2.5 K/UL (ref 1.82–7.42)
NEUTROPHILS NFR BLD: 51.4 % (ref 44–72)
NRBC # BLD AUTO: 0 K/UL
NRBC BLD-RTO: 0 /100 WBC (ref 0–0.2)
OVALOCYTES BLD QL SMEAR: NORMAL
PLATELET # BLD AUTO: 178 K/UL (ref 164–446)
PLATELET BLD QL SMEAR: NORMAL
PMV BLD AUTO: 12 FL (ref 9–12.9)
POIKILOCYTOSIS BLD QL SMEAR: NORMAL
POTASSIUM SERPL-SCNC: 5.3 MMOL/L (ref 3.6–5.5)
RBC # BLD AUTO: 4.2 M/UL (ref 4.2–5.4)
RBC BLD AUTO: PRESENT
SODIUM SERPL-SCNC: 145 MMOL/L (ref 135–145)
WBC # BLD AUTO: 4.9 K/UL (ref 4.8–10.8)

## 2025-07-23 PROCEDURE — 85025 COMPLETE CBC W/AUTO DIFF WBC: CPT

## 2025-07-23 PROCEDURE — 36415 COLL VENOUS BLD VENIPUNCTURE: CPT

## 2025-07-23 PROCEDURE — 80048 BASIC METABOLIC PNL TOTAL CA: CPT

## 2025-07-24 ENCOUNTER — RESULTS FOLLOW-UP (OUTPATIENT)
Dept: CARDIOLOGY | Facility: PHYSICIAN GROUP | Age: 85
End: 2025-07-24
Payer: MEDICARE

## 2025-07-24 ENCOUNTER — NON-PROVIDER VISIT (OUTPATIENT)
Dept: CARDIOLOGY | Facility: MEDICAL CENTER | Age: 85
End: 2025-07-24
Payer: MEDICARE

## 2025-07-24 PROCEDURE — 93294 REM INTERROG EVL PM/LDLS PM: CPT | Performed by: INTERNAL MEDICINE

## 2025-07-25 NOTE — TELEPHONE ENCOUNTER
Noted AB recommendations, response.  ----- Message from Nurse Practitioner JONN Ordoñez sent at 7/24/2025  9:52 AM PDT -----  CBC is improving, which is good.  GFR is down a little bit, but we restarted Lasix. Creatinine is OK.  No changes for now - will review at FU next month.  Thanks, AB  ===========================  Phone Number Called: [phone]     Call outcome: Spoke to patient regarding message below.    Message: Called to update pt on AB recommendations     Pt verbalized understanding, she is taking her lasix. Pt appreciative of  information received.

## 2025-07-29 NOTE — CARDIAC REMOTE MONITOR - SCAN
Device transmission reviewed. Device demonstrated appropriate function.       Electronically Signed by: Erik Woodall M.D.    7/29/2025  12:27 PM

## 2025-08-18 ENCOUNTER — APPOINTMENT (OUTPATIENT)
Facility: MEDICAL CENTER | Age: 85
End: 2025-08-18
Attending: NURSE PRACTITIONER
Payer: MEDICARE

## (undated) DEVICE — SUCTION INSTRUMENT YANKAUER BULBOUS TIP W/O VENT (50EA/CA)

## (undated) DEVICE — KIT ROOM DECONTAMINATION

## (undated) DEVICE — WATER IRRIGATION STERILE 1000ML (12EA/CA)

## (undated) DEVICE — GLOVE BIOGEL PI INDICATOR SZ 7.5 SURGICAL PF LF -(50/BX 4BX/CA)

## (undated) DEVICE — SENSOR OXIMETER ADULT SPO2 RD SET (20EA/BX)

## (undated) DEVICE — SODIUM CHL IRRIGATION 0.9% 1000ML (12EA/CA)

## (undated) DEVICE — HUMID-VENT HEAT AND MOISTURE EXCHANGE- (50/BX)

## (undated) DEVICE — SUTURE GENERAL

## (undated) DEVICE — FILM CASSETTE ENDO

## (undated) DEVICE — PORT AUXILLARY WATER (50EA/BX)

## (undated) DEVICE — GLOVE SZ 7.5 LF PROTEXIS (50PR/BX)

## (undated) DEVICE — PROTECTOR ULNA NERVE - (36PR/CA)

## (undated) DEVICE — TIP INTPLS HFLO ML ORFC BTRY - (12/CS)  FOR SURGILAV

## (undated) DEVICE — TUBING CLEARLINK DUO-VENT - C-FLO (48EA/CA)

## (undated) DEVICE — LENS/HOOD FOR SPACESUIT - (32/PK) PEEL AWAY FACE

## (undated) DEVICE — CANISTER SUCTION RIGID RED 1500CC (40EA/CA)

## (undated) DEVICE — ELECTRODE DUAL RETURN W/ CORD - (50/PK)

## (undated) DEVICE — KIT PROCEDURE DOUBLE ENDO ONLY (5/CA)

## (undated) DEVICE — STOCKINET TUBULAR 6IN STERILE - 6 X 48YDS (25/CA)

## (undated) DEVICE — KIT CUSTOM PROCEDURE SINGLE FOR ENDO (15/CA)

## (undated) DEVICE — PACK TOTAL HIP - (1/CA)

## (undated) DEVICE — BLADE SAGITTAL SAW DUAL CUT 75.0 X 25.0MM (1/EA)

## (undated) DEVICE — GLOVE, LITE (PAIR)

## (undated) DEVICE — GLOVE BIOGEL PI INDICATOR SZ 6.5 SURGICAL PF LF - (50/BX 4BX/CA)

## (undated) DEVICE — MASK PANORAMIC OXYGEN PRO2 (30EA/CA)

## (undated) DEVICE — TUBE E-T HI-LO CUFF 7.0MM (10EA/PK)

## (undated) DEVICE — GOWN WARMING STANDARD FLEX - (30/CA)

## (undated) DEVICE — TUBE CONNECTING SUCTION - CLEAR PLASTIC STERILE 72 IN (50EA/CA)

## (undated) DEVICE — DRAPE U ORTHOPEDIC - (10/BX)

## (undated) DEVICE — SODIUM CHL. IRRIGATION 0.9% 3000ML (4EA/CA 65CA/PF)

## (undated) DEVICE — ELECTRODE 850 FOAM ADHESIVE - HYDROGEL RADIOTRNSPRNT (50/PK)

## (undated) DEVICE — NEPTUNE 4 PORT MANIFOLD - (20/PK)

## (undated) DEVICE — GLOVE BIOGEL INDICATOR SZ 8.5 SURGICAL PF LTX - (50/BX 4BX/CA)

## (undated) DEVICE — MASK ANESTHESIA ADULT  - (100/CA)

## (undated) DEVICE — SLEEVE, SEQUENTIAL CALF REG

## (undated) DEVICE — LACTATED RINGERS INJ 1000 ML - (14EA/CA 60CA/PF)

## (undated) DEVICE — GLOVE SURGICAL PROTEXIS PI 8.0 LF - (50PR/BX)

## (undated) DEVICE — CHLORAPREP 26 ML APPLICATOR - ORANGE TINT(25/CA)

## (undated) DEVICE — DRESSING AQUACEL AG ADVANTAGE 3.5 X 10" (10EA/BX)"

## (undated) DEVICE — CONTAINER, SPECIMEN, STERILE

## (undated) DEVICE — DRAPE STRLE REG TOWEL 18X24 - (10/BX 4BX/CA)"

## (undated) DEVICE — GLOVE BIOGEL PI INDICATOR SZ 8.5 SURGICAL PF LF - (50PR/BX 4BX/CA)

## (undated) DEVICE — KIT ANESTHESIA W/CIRCUIT & 3/LT BAG W/FILTER (20EA/CA)

## (undated) DEVICE — TOWEL STOP TIMEOUT SAFETY FLAG (40EA/CA)

## (undated) DEVICE — FORCEP RADIAL JAW 4 STANDARD CAPACITY W/NEEDLE 240CM (40EA/BX)

## (undated) DEVICE — SENSOR SPO2 NEO LNCS ADHESIVE (20/BX) SEE USER NOTES

## (undated) DEVICE — BUTTON ENDOSCOPY DISPOSABLE

## (undated) DEVICE — HANDPIECE 10FT INTPLS SCT PLS IRRIGATION HAND CONTROL SET (6/PK)

## (undated) DEVICE — HEAD HOLDER JUNIOR/ADULT

## (undated) DEVICE — SUTURE 1 VICRYL PLUS CTX - 36 INCH (36/BX)

## (undated) DEVICE — GLOVE BIOGEL PI INDICATOR SZ 8.0 SURGICAL PF LF -(50/BX 4BX/CA)

## (undated) DEVICE — PILLOW ABDUCTION HIP MEDIUM - (6EA/CA)

## (undated) DEVICE — GLOVE BIOGEL SZ 7 SURGICAL PF LTX - (50PR/BX 4BX/CA)

## (undated) DEVICE — SUTURE 2-0 VICRYL PLUS CT-1 36 (36PK/BX)"

## (undated) DEVICE — BAG, SPONGE COUNT 50600